# Patient Record
Sex: FEMALE | Race: WHITE | Employment: UNEMPLOYED | URBAN - METROPOLITAN AREA
[De-identification: names, ages, dates, MRNs, and addresses within clinical notes are randomized per-mention and may not be internally consistent; named-entity substitution may affect disease eponyms.]

---

## 2017-01-04 DIAGNOSIS — F43.21 GRIEF REACTION: ICD-10-CM

## 2017-01-04 DIAGNOSIS — F51.02 ADJUSTMENT INSOMNIA: ICD-10-CM

## 2017-01-05 RX ORDER — TRAZODONE HYDROCHLORIDE 50 MG/1
50 TABLET ORAL NIGHTLY
Qty: 30 TABLET | Refills: 2 | Status: SHIPPED | OUTPATIENT
Start: 2017-01-05 | End: 2017-01-10 | Stop reason: ALTCHOICE

## 2017-01-10 ENCOUNTER — NURSE ONLY (OUTPATIENT)
Dept: URGENT CARE | Age: 32
End: 2017-01-10

## 2017-01-10 ENCOUNTER — OFFICE VISIT (OUTPATIENT)
Dept: PSYCHOLOGY | Age: 32
End: 2017-01-10

## 2017-01-10 ENCOUNTER — OFFICE VISIT (OUTPATIENT)
Dept: FAMILY MEDICINE CLINIC | Age: 32
End: 2017-01-10

## 2017-01-10 VITALS
BODY MASS INDEX: 24.4 KG/M2 | OXYGEN SATURATION: 96 % | SYSTOLIC BLOOD PRESSURE: 124 MMHG | HEART RATE: 86 BPM | WEIGHT: 155.8 LBS | DIASTOLIC BLOOD PRESSURE: 80 MMHG

## 2017-01-10 DIAGNOSIS — F31.4 BIPOLAR DISORDER, CURRENT EPISODE DEPRESSED, SEVERE, WITHOUT PSYCHOTIC FEATURES (HCC): Primary | ICD-10-CM

## 2017-01-10 DIAGNOSIS — F31.4 BIPOLAR DISORDER, CURRENT EPISODE DEPRESSED, SEVERE, WITHOUT PSYCHOTIC FEATURES (HCC): ICD-10-CM

## 2017-01-10 DIAGNOSIS — R52 PAIN: Primary | ICD-10-CM

## 2017-01-10 DIAGNOSIS — M79.672 INTRACTABLE LEFT HEEL PAIN: ICD-10-CM

## 2017-01-10 DIAGNOSIS — J01.00 ACUTE NON-RECURRENT MAXILLARY SINUSITIS: ICD-10-CM

## 2017-01-10 DIAGNOSIS — G43.709 CHRONIC MIGRAINE WITHOUT AURA WITHOUT STATUS MIGRAINOSUS, NOT INTRACTABLE: ICD-10-CM

## 2017-01-10 DIAGNOSIS — F43.21 GRIEF REACTION: Primary | ICD-10-CM

## 2017-01-10 PROCEDURE — 99214 OFFICE O/P EST MOD 30 MIN: CPT | Performed by: INTERNAL MEDICINE

## 2017-01-10 PROCEDURE — 90832 PSYTX W PT 30 MINUTES: CPT | Performed by: PSYCHOLOGIST

## 2017-01-10 RX ORDER — PROPRANOLOL HYDROCHLORIDE 120 MG/1
CAPSULE, EXTENDED RELEASE ORAL
Qty: 30 CAPSULE | Refills: 5 | Status: SHIPPED | OUTPATIENT
Start: 2017-01-10 | End: 2017-05-01

## 2017-01-10 RX ORDER — DULOXETIN HYDROCHLORIDE 60 MG/1
CAPSULE, DELAYED RELEASE ORAL
Qty: 30 CAPSULE | Refills: 5 | Status: SHIPPED | OUTPATIENT
Start: 2017-01-10 | End: 2017-05-01

## 2017-01-11 ENCOUNTER — TELEPHONE (OUTPATIENT)
Dept: FAMILY MEDICINE CLINIC | Age: 32
End: 2017-01-11

## 2017-01-12 ENCOUNTER — OFFICE VISIT (OUTPATIENT)
Dept: PSYCHOLOGY | Age: 32
End: 2017-01-12

## 2017-01-12 DIAGNOSIS — F43.21 GRIEF REACTION: ICD-10-CM

## 2017-01-12 DIAGNOSIS — F31.4 BIPOLAR DISORDER, CURRENT EPISODE DEPRESSED, SEVERE, WITHOUT PSYCHOTIC FEATURES (HCC): Primary | ICD-10-CM

## 2017-01-12 PROCEDURE — 90832 PSYTX W PT 30 MINUTES: CPT | Performed by: PSYCHOLOGIST

## 2017-01-15 RX ORDER — AZITHROMYCIN 250 MG/1
TABLET, FILM COATED ORAL
Qty: 6 TABLET | Refills: 0 | Status: SHIPPED | OUTPATIENT
Start: 2017-01-15 | End: 2017-05-01

## 2017-01-16 ENCOUNTER — HOSPITAL ENCOUNTER (OUTPATIENT)
Dept: PSYCHIATRY | Age: 32
Discharge: HOME | End: 2017-01-16
Attending: PSYCHIATRY & NEUROLOGY | Admitting: PSYCHIATRY & NEUROLOGY

## 2017-01-16 ENCOUNTER — TELEPHONE (OUTPATIENT)
Dept: FAMILY MEDICINE CLINIC | Age: 32
End: 2017-01-16

## 2017-01-23 ENCOUNTER — HOSPITAL ENCOUNTER (OUTPATIENT)
Dept: PSYCHIATRY | Age: 32
Discharge: HOME | End: 2017-01-23
Admitting: PSYCHIATRY & NEUROLOGY

## 2017-01-23 VITALS — RESPIRATION RATE: 24 BRPM | HEART RATE: 82 BPM | SYSTOLIC BLOOD PRESSURE: 107 MMHG | DIASTOLIC BLOOD PRESSURE: 74 MMHG

## 2017-01-23 RX ORDER — SUMATRIPTAN 25 MG/1
TABLET, FILM COATED ORAL
COMMUNITY
End: 2017-05-01

## 2017-01-24 ENCOUNTER — HOSPITAL ENCOUNTER (OUTPATIENT)
Dept: PSYCHIATRY | Age: 32
Discharge: HOME | End: 2017-01-24
Admitting: PSYCHIATRY & NEUROLOGY

## 2017-01-25 ENCOUNTER — HOSPITAL ENCOUNTER (OUTPATIENT)
Dept: PSYCHIATRY | Age: 32
Discharge: HOME | End: 2017-01-25
Admitting: PSYCHIATRY & NEUROLOGY

## 2017-01-25 VITALS — RESPIRATION RATE: 20 BRPM | HEART RATE: 84 BPM | DIASTOLIC BLOOD PRESSURE: 77 MMHG | SYSTOLIC BLOOD PRESSURE: 118 MMHG

## 2017-01-25 VITALS — SYSTOLIC BLOOD PRESSURE: 120 MMHG | HEART RATE: 96 BPM | RESPIRATION RATE: 20 BRPM | DIASTOLIC BLOOD PRESSURE: 80 MMHG

## 2017-01-25 PROCEDURE — 99214 OFFICE O/P EST MOD 30 MIN: CPT | Performed by: PHYSICIAN ASSISTANT

## 2017-01-25 ASSESSMENT — ENCOUNTER SYMPTOMS
VOMITING: 0
ABDOMINAL PAIN: 0
CONSTIPATION: 0
HEMOPTYSIS: 0
DIARRHEA: 0
HEARTBURN: 0
STRIDOR: 0
SORE THROAT: 0
WHEEZING: 0
COUGH: 0
NAUSEA: 0
SHORTNESS OF BREATH: 0

## 2017-01-26 ENCOUNTER — HOSPITAL ENCOUNTER (OUTPATIENT)
Dept: PSYCHIATRY | Age: 32
Discharge: HOME | End: 2017-01-26
Admitting: PSYCHIATRY & NEUROLOGY

## 2017-01-27 ENCOUNTER — HOSPITAL ENCOUNTER (OUTPATIENT)
Dept: PSYCHIATRY | Age: 32
Discharge: HOME | End: 2017-01-27
Admitting: PSYCHIATRY & NEUROLOGY

## 2017-01-30 ENCOUNTER — HOSPITAL ENCOUNTER (OUTPATIENT)
Dept: PSYCHIATRY | Age: 32
Discharge: HOME | End: 2017-01-30
Admitting: PSYCHIATRY & NEUROLOGY

## 2017-01-31 ENCOUNTER — HOSPITAL ENCOUNTER (OUTPATIENT)
Dept: PSYCHIATRY | Age: 32
Discharge: HOME | End: 2017-01-31
Admitting: PSYCHIATRY & NEUROLOGY

## 2017-01-31 VITALS — HEART RATE: 93 BPM | RESPIRATION RATE: 20 BRPM | DIASTOLIC BLOOD PRESSURE: 79 MMHG | SYSTOLIC BLOOD PRESSURE: 119 MMHG

## 2017-02-02 ENCOUNTER — HOSPITAL ENCOUNTER (OUTPATIENT)
Dept: PSYCHIATRY | Age: 32
Discharge: HOME | End: 2017-02-28
Attending: PSYCHIATRY & NEUROLOGY | Admitting: PSYCHIATRY & NEUROLOGY

## 2017-02-03 ENCOUNTER — HOSPITAL ENCOUNTER (OUTPATIENT)
Dept: PSYCHIATRY | Age: 32
Discharge: HOME | End: 2017-02-03
Admitting: PSYCHIATRY & NEUROLOGY

## 2017-02-03 DIAGNOSIS — F43.21 GRIEF REACTION: Primary | ICD-10-CM

## 2017-02-03 PROCEDURE — 99213 OFFICE O/P EST LOW 20 MIN: CPT | Performed by: PHYSICIAN ASSISTANT

## 2017-02-03 ASSESSMENT — ENCOUNTER SYMPTOMS
DIARRHEA: 0
SHORTNESS OF BREATH: 0
CONSTIPATION: 0
DOUBLE VISION: 0
VOMITING: 0
BLURRED VISION: 0
BACK PAIN: 0
NAUSEA: 0

## 2017-02-07 ENCOUNTER — HOSPITAL ENCOUNTER (OUTPATIENT)
Dept: PSYCHIATRY | Age: 32
Discharge: HOME | End: 2017-02-07
Admitting: PSYCHIATRY & NEUROLOGY

## 2017-02-08 ENCOUNTER — HOSPITAL ENCOUNTER (OUTPATIENT)
Dept: PSYCHIATRY | Age: 32
Discharge: HOME | End: 2017-02-08
Admitting: PSYCHIATRY & NEUROLOGY

## 2017-02-09 ENCOUNTER — HOSPITAL ENCOUNTER (OUTPATIENT)
Dept: PSYCHIATRY | Age: 32
Discharge: HOME | End: 2017-02-09
Admitting: PSYCHIATRY & NEUROLOGY

## 2017-02-14 ENCOUNTER — HOSPITAL ENCOUNTER (OUTPATIENT)
Dept: PSYCHIATRY | Age: 32
Discharge: HOME | End: 2017-02-14
Admitting: PSYCHIATRY & NEUROLOGY

## 2017-02-16 ENCOUNTER — HOSPITAL ENCOUNTER (OUTPATIENT)
Dept: PSYCHIATRY | Age: 32
Discharge: HOME | End: 2017-02-16
Admitting: PSYCHIATRY & NEUROLOGY

## 2017-02-17 ENCOUNTER — HOSPITAL ENCOUNTER (OUTPATIENT)
Dept: PSYCHIATRY | Age: 32
Discharge: HOME | End: 2017-02-17
Admitting: PSYCHIATRY & NEUROLOGY

## 2017-02-20 ENCOUNTER — HOSPITAL ENCOUNTER (OUTPATIENT)
Dept: PSYCHIATRY | Age: 32
Discharge: HOME | End: 2017-02-20
Admitting: PSYCHIATRY & NEUROLOGY

## 2017-02-21 ENCOUNTER — HOSPITAL ENCOUNTER (OUTPATIENT)
Dept: PSYCHIATRY | Age: 32
Discharge: HOME | End: 2017-02-21
Admitting: PSYCHIATRY & NEUROLOGY

## 2017-02-22 ENCOUNTER — HOSPITAL ENCOUNTER (OUTPATIENT)
Dept: PSYCHIATRY | Age: 32
Discharge: HOME | End: 2017-02-22
Admitting: PSYCHIATRY & NEUROLOGY

## 2017-02-22 DIAGNOSIS — F31.32 BIPOLAR AFFECTIVE DISORDER, CURRENTLY DEPRESSED, MODERATE (HCC): ICD-10-CM

## 2017-02-22 PROCEDURE — 99213 OFFICE O/P EST LOW 20 MIN: CPT | Performed by: PHYSICIAN ASSISTANT

## 2017-02-22 RX ORDER — ARIPIPRAZOLE 10 MG/1
10 TABLET ORAL DAILY
Qty: 30 TABLET | Refills: 1 | Status: SHIPPED | OUTPATIENT
Start: 2017-02-22 | End: 2017-05-01

## 2017-02-22 RX ORDER — HYDROXYZINE HYDROCHLORIDE 10 MG/1
10 TABLET, FILM COATED ORAL 3 TIMES DAILY PRN
Qty: 90 TABLET | Refills: 1 | Status: SHIPPED | OUTPATIENT
Start: 2017-02-22 | End: 2017-05-01

## 2017-03-01 ENCOUNTER — HOSPITAL ENCOUNTER (OUTPATIENT)
Dept: PSYCHIATRY | Age: 32
Discharge: HOME | End: 2017-03-31
Attending: PSYCHIATRY & NEUROLOGY | Admitting: PSYCHIATRY & NEUROLOGY

## 2017-05-08 ENCOUNTER — OFFICE VISIT (OUTPATIENT)
Dept: FAMILY MEDICINE CLINIC | Age: 32
End: 2017-05-08

## 2017-05-08 VITALS
RESPIRATION RATE: 18 BRPM | SYSTOLIC BLOOD PRESSURE: 124 MMHG | BODY MASS INDEX: 26.47 KG/M2 | HEART RATE: 98 BPM | DIASTOLIC BLOOD PRESSURE: 80 MMHG | OXYGEN SATURATION: 98 % | WEIGHT: 169 LBS | TEMPERATURE: 97.8 F

## 2017-05-08 DIAGNOSIS — R05.9 COUGH: ICD-10-CM

## 2017-05-08 DIAGNOSIS — J04.0 LARYNGITIS: ICD-10-CM

## 2017-05-08 DIAGNOSIS — R09.82 PND (POST-NASAL DRIP): ICD-10-CM

## 2017-05-08 PROCEDURE — 99213 OFFICE O/P EST LOW 20 MIN: CPT | Performed by: NURSE PRACTITIONER

## 2017-05-08 RX ORDER — IBUPROFEN 200 MG
200 TABLET ORAL EVERY 6 HOURS PRN
COMMUNITY
End: 2017-06-22

## 2017-05-08 RX ORDER — PRAZOSIN HYDROCHLORIDE 1 MG/1
CAPSULE ORAL
Refills: 2 | COMMUNITY
Start: 2017-04-12 | End: 2017-10-16

## 2017-05-08 RX ORDER — FLUTICASONE PROPIONATE 50 MCG
2 SPRAY, SUSPENSION (ML) NASAL DAILY
Qty: 1 BOTTLE | Refills: 0 | Status: SHIPPED | OUTPATIENT
Start: 2017-05-08 | End: 2017-06-22

## 2017-05-08 RX ORDER — PERPHENAZINE 2 MG/1
TABLET, FILM COATED ORAL
Refills: 2 | COMMUNITY
Start: 2017-04-12 | End: 2017-10-16

## 2017-05-08 RX ORDER — CHLORPROMAZINE HYDROCHLORIDE 25 MG/1
TABLET, FILM COATED ORAL
Refills: 2 | COMMUNITY
Start: 2017-04-12 | End: 2017-10-16

## 2017-05-08 RX ORDER — LITHIUM CARBONATE 300 MG/1
TABLET, FILM COATED, EXTENDED RELEASE ORAL
Refills: 2 | COMMUNITY
Start: 2017-04-12 | End: 2017-10-16

## 2017-05-08 RX ORDER — PREDNISONE 20 MG/1
TABLET ORAL
Refills: 0 | COMMUNITY
Start: 2017-04-19 | End: 2017-06-22

## 2017-05-10 ASSESSMENT — ENCOUNTER SYMPTOMS
TROUBLE SWALLOWING: 0
SINUS PRESSURE: 0
COUGH: 1
VOMITING: 0
ABDOMINAL PAIN: 0
DIARRHEA: 0
NAUSEA: 0
VOICE CHANGE: 1
SORE THROAT: 0
FACIAL SWELLING: 0
SHORTNESS OF BREATH: 0
CHEST TIGHTNESS: 0
WHEEZING: 0

## 2017-05-19 ENCOUNTER — OFFICE VISIT (OUTPATIENT)
Dept: FAMILY MEDICINE CLINIC | Age: 32
End: 2017-05-19

## 2017-05-19 VITALS
HEIGHT: 67 IN | HEART RATE: 80 BPM | BODY MASS INDEX: 27 KG/M2 | WEIGHT: 172 LBS | OXYGEN SATURATION: 98 % | DIASTOLIC BLOOD PRESSURE: 70 MMHG | SYSTOLIC BLOOD PRESSURE: 118 MMHG

## 2017-05-19 DIAGNOSIS — F31.31 BIPOLAR AFFECTIVE DISORDER, CURRENTLY DEPRESSED, MILD (HCC): Primary | ICD-10-CM

## 2017-05-19 PROCEDURE — 99213 OFFICE O/P EST LOW 20 MIN: CPT | Performed by: INTERNAL MEDICINE

## 2017-05-19 RX ORDER — LITHIUM CARBONATE 300 MG/1
TABLET, FILM COATED, EXTENDED RELEASE ORAL
Qty: 60 TABLET | Refills: 0 | Status: CANCELLED | OUTPATIENT
Start: 2017-05-19

## 2017-05-19 RX ORDER — ESCITALOPRAM OXALATE 10 MG/1
10 TABLET ORAL DAILY
Qty: 30 TABLET | Refills: 1 | Status: SHIPPED | OUTPATIENT
Start: 2017-05-19 | End: 2017-06-16 | Stop reason: ALTCHOICE

## 2017-05-19 RX ORDER — ESCITALOPRAM OXALATE 10 MG/1
5 TABLET ORAL DAILY
Qty: 30 TABLET | Refills: 1 | Status: SHIPPED | OUTPATIENT
Start: 2017-05-19 | End: 2017-05-19 | Stop reason: SDUPTHER

## 2017-05-19 ASSESSMENT — PATIENT HEALTH QUESTIONNAIRE - PHQ9
2. FEELING DOWN, DEPRESSED OR HOPELESS: 0
1. LITTLE INTEREST OR PLEASURE IN DOING THINGS: 0
SUM OF ALL RESPONSES TO PHQ QUESTIONS 1-9: 0
SUM OF ALL RESPONSES TO PHQ9 QUESTIONS 1 & 2: 0

## 2017-05-25 PROBLEM — F31.31 BIPOLAR AFFECTIVE DISORDER, CURRENTLY DEPRESSED, MILD (HCC): Status: ACTIVE | Noted: 2017-05-25

## 2017-05-25 ASSESSMENT — ENCOUNTER SYMPTOMS: SHORTNESS OF BREATH: 0

## 2017-06-16 ENCOUNTER — OFFICE VISIT (OUTPATIENT)
Dept: FAMILY MEDICINE CLINIC | Age: 32
End: 2017-06-16

## 2017-06-16 VITALS
WEIGHT: 171 LBS | SYSTOLIC BLOOD PRESSURE: 100 MMHG | OXYGEN SATURATION: 99 % | BODY MASS INDEX: 26.78 KG/M2 | HEART RATE: 74 BPM | DIASTOLIC BLOOD PRESSURE: 60 MMHG

## 2017-06-16 DIAGNOSIS — J04.0 LARYNGITIS: Primary | ICD-10-CM

## 2017-06-16 DIAGNOSIS — F31.32 BIPOLAR AFFECTIVE DISORDER, CURRENTLY DEPRESSED, MODERATE (HCC): ICD-10-CM

## 2017-06-16 PROCEDURE — 82962 GLUCOSE BLOOD TEST: CPT | Performed by: INTERNAL MEDICINE

## 2017-06-16 PROCEDURE — 99213 OFFICE O/P EST LOW 20 MIN: CPT | Performed by: INTERNAL MEDICINE

## 2017-06-16 RX ORDER — PREDNISONE 20 MG/1
TABLET ORAL
Qty: 11 TABLET | Refills: 0 | Status: SHIPPED | OUTPATIENT
Start: 2017-06-16 | End: 2017-06-22

## 2017-06-21 ENCOUNTER — OFFICE VISIT (OUTPATIENT)
Dept: ENT CLINIC | Age: 32
End: 2017-06-21

## 2017-06-21 VITALS
WEIGHT: 169.2 LBS | HEART RATE: 78 BPM | SYSTOLIC BLOOD PRESSURE: 101 MMHG | HEIGHT: 67 IN | BODY MASS INDEX: 26.56 KG/M2 | TEMPERATURE: 98.3 F | DIASTOLIC BLOOD PRESSURE: 70 MMHG

## 2017-06-21 DIAGNOSIS — J38.3 LESION OF VOCAL CORD: ICD-10-CM

## 2017-06-21 DIAGNOSIS — R49.0 DYSPHONIA: Primary | ICD-10-CM

## 2017-06-21 PROCEDURE — 31575 DIAGNOSTIC LARYNGOSCOPY: CPT | Performed by: OTOLARYNGOLOGY

## 2017-06-21 PROCEDURE — 99204 OFFICE O/P NEW MOD 45 MIN: CPT | Performed by: OTOLARYNGOLOGY

## 2017-06-22 ENCOUNTER — OFFICE VISIT (OUTPATIENT)
Dept: FAMILY MEDICINE CLINIC | Age: 32
End: 2017-06-22

## 2017-06-22 VITALS
HEIGHT: 67 IN | HEART RATE: 94 BPM | DIASTOLIC BLOOD PRESSURE: 78 MMHG | TEMPERATURE: 98.5 F | BODY MASS INDEX: 26.84 KG/M2 | OXYGEN SATURATION: 98 % | WEIGHT: 171 LBS | SYSTOLIC BLOOD PRESSURE: 120 MMHG

## 2017-06-22 DIAGNOSIS — J38.3 LESION OF VOCAL CORD: ICD-10-CM

## 2017-06-22 DIAGNOSIS — F39 MOOD DISORDER (HCC): ICD-10-CM

## 2017-06-22 DIAGNOSIS — Z01.818 PRE-OP EXAM: ICD-10-CM

## 2017-06-22 DIAGNOSIS — F17.200 TOBACCO DEPENDENCE: ICD-10-CM

## 2017-06-22 PROCEDURE — 99213 OFFICE O/P EST LOW 20 MIN: CPT | Performed by: NURSE PRACTITIONER

## 2017-06-22 RX ORDER — ACETAMINOPHEN 325 MG/1
650 TABLET ORAL EVERY 8 HOURS PRN
Qty: 30 TABLET | Refills: 0 | Status: SHIPPED | OUTPATIENT
Start: 2017-06-22 | End: 2019-02-05 | Stop reason: ALTCHOICE

## 2017-06-28 ENCOUNTER — HOSPITAL ENCOUNTER (OUTPATIENT)
Dept: PREADMISSION TESTING | Age: 32
Discharge: HOME | End: 2017-06-28
Attending: OTOLARYNGOLOGY | Admitting: OTOLARYNGOLOGY

## 2017-06-28 VITALS
DIASTOLIC BLOOD PRESSURE: 64 MMHG | SYSTOLIC BLOOD PRESSURE: 111 MMHG | HEIGHT: 67 IN | OXYGEN SATURATION: 97 % | RESPIRATION RATE: 14 BRPM | WEIGHT: 169 LBS | HEART RATE: 76 BPM | TEMPERATURE: 97.6 F | BODY MASS INDEX: 26.53 KG/M2

## 2017-06-28 LAB — PREGNANCY, URINE: NEGATIVE

## 2017-06-28 PROCEDURE — 31536 LARYNGOSCOPY W/BX & OP SCOPE: CPT | Performed by: OTOLARYNGOLOGY

## 2017-06-28 RX ORDER — SODIUM CHLORIDE, SODIUM LACTATE, POTASSIUM CHLORIDE, CALCIUM CHLORIDE 600; 310; 30; 20 MG/100ML; MG/100ML; MG/100ML; MG/100ML
INJECTION, SOLUTION INTRAVENOUS CONTINUOUS
Status: DISCONTINUED | OUTPATIENT
Start: 2017-06-28 | End: 2017-06-29 | Stop reason: HOSPADM

## 2017-06-28 RX ORDER — FENTANYL CITRATE 50 UG/ML
25 INJECTION, SOLUTION INTRAMUSCULAR; INTRAVENOUS EVERY 5 MIN PRN
Status: DISCONTINUED | OUTPATIENT
Start: 2017-06-28 | End: 2017-06-29 | Stop reason: HOSPADM

## 2017-06-28 RX ORDER — HYDRALAZINE HYDROCHLORIDE 20 MG/ML
5 INJECTION INTRAMUSCULAR; INTRAVENOUS EVERY 5 MIN PRN
Status: DISCONTINUED | OUTPATIENT
Start: 2017-06-28 | End: 2017-06-29 | Stop reason: HOSPADM

## 2017-06-28 RX ORDER — IPRATROPIUM BROMIDE AND ALBUTEROL SULFATE 2.5; .5 MG/3ML; MG/3ML
1 SOLUTION RESPIRATORY (INHALATION) EVERY 4 HOURS PRN
Status: DISCONTINUED | OUTPATIENT
Start: 2017-06-28 | End: 2017-06-29 | Stop reason: HOSPADM

## 2017-06-28 RX ORDER — ONDANSETRON 2 MG/ML
4 INJECTION INTRAMUSCULAR; INTRAVENOUS ONCE
Status: COMPLETED | OUTPATIENT
Start: 2017-06-28 | End: 2017-06-28

## 2017-06-28 RX ORDER — LABETALOL HYDROCHLORIDE 5 MG/ML
5 INJECTION, SOLUTION INTRAVENOUS EVERY 10 MIN PRN
Status: DISCONTINUED | OUTPATIENT
Start: 2017-06-28 | End: 2017-06-29 | Stop reason: HOSPADM

## 2017-06-28 RX ORDER — FENTANYL CITRATE 50 UG/ML
100 INJECTION, SOLUTION INTRAMUSCULAR; INTRAVENOUS ONCE
Status: COMPLETED | OUTPATIENT
Start: 2017-06-28 | End: 2017-06-28

## 2017-06-28 RX ORDER — DEXAMETHASONE SODIUM PHOSPHATE 4 MG/ML
4 INJECTION, SOLUTION INTRA-ARTICULAR; INTRALESIONAL; INTRAMUSCULAR; INTRAVENOUS; SOFT TISSUE ONCE
Status: COMPLETED | OUTPATIENT
Start: 2017-06-28 | End: 2017-06-28

## 2017-06-28 RX ORDER — IPRATROPIUM BROMIDE AND ALBUTEROL SULFATE 2.5; .5 MG/3ML; MG/3ML
SOLUTION RESPIRATORY (INHALATION)
Status: COMPLETED
Start: 2017-06-28 | End: 2017-06-28

## 2017-06-28 RX ORDER — ENALAPRILAT 2.5 MG/2ML
1.25 INJECTION INTRAVENOUS
Status: ACTIVE | OUTPATIENT
Start: 2017-06-28 | End: 2017-06-28

## 2017-06-28 RX ORDER — SODIUM CHLORIDE 0.9 % (FLUSH) 0.9 %
10 SYRINGE (ML) INJECTION EVERY 12 HOURS SCHEDULED
Status: DISCONTINUED | OUTPATIENT
Start: 2017-06-28 | End: 2017-06-29 | Stop reason: HOSPADM

## 2017-06-28 RX ORDER — SODIUM CHLORIDE 0.9 % (FLUSH) 0.9 %
10 SYRINGE (ML) INJECTION PRN
Status: DISCONTINUED | OUTPATIENT
Start: 2017-06-28 | End: 2017-06-29 | Stop reason: HOSPADM

## 2017-06-28 RX ORDER — CHLORHEXIDINE GLUCONATE 0.12 MG/ML
15 RINSE ORAL 2 TIMES DAILY
Status: DISCONTINUED | OUTPATIENT
Start: 2017-06-28 | End: 2017-06-29 | Stop reason: HOSPADM

## 2017-06-28 RX ORDER — MORPHINE SULFATE 2 MG/ML
2 INJECTION, SOLUTION INTRAMUSCULAR; INTRAVENOUS EVERY 5 MIN PRN
Status: DISCONTINUED | OUTPATIENT
Start: 2017-06-28 | End: 2017-06-29 | Stop reason: HOSPADM

## 2017-06-28 RX ORDER — IPRATROPIUM BROMIDE AND ALBUTEROL SULFATE 2.5; .5 MG/3ML; MG/3ML
1 SOLUTION RESPIRATORY (INHALATION)
Status: DISCONTINUED | OUTPATIENT
Start: 2017-06-28 | End: 2017-06-29 | Stop reason: HOSPADM

## 2017-06-28 RX ORDER — GLYCOPYRROLATE 0.2 MG/ML
0.2 INJECTION INTRAMUSCULAR; INTRAVENOUS ONCE
Status: COMPLETED | OUTPATIENT
Start: 2017-06-28 | End: 2017-06-28

## 2017-06-28 RX ORDER — LIDOCAINE HYDROCHLORIDE 10 MG/ML
1 INJECTION, SOLUTION EPIDURAL; INFILTRATION; INTRACAUDAL; PERINEURAL
Status: ACTIVE | OUTPATIENT
Start: 2017-06-28 | End: 2017-06-28

## 2017-06-28 RX ORDER — ONDANSETRON 2 MG/ML
4 INJECTION INTRAMUSCULAR; INTRAVENOUS
Status: ACTIVE | OUTPATIENT
Start: 2017-06-28 | End: 2017-06-28

## 2017-06-28 RX ADMIN — IPRATROPIUM BROMIDE AND ALBUTEROL SULFATE 1 AMPULE: 2.5; .5 SOLUTION RESPIRATORY (INHALATION) at 12:47

## 2017-06-28 RX ADMIN — DEXAMETHASONE SODIUM PHOSPHATE 4 MG: 4 INJECTION, SOLUTION INTRA-ARTICULAR; INTRALESIONAL; INTRAMUSCULAR; INTRAVENOUS; SOFT TISSUE at 10:55

## 2017-06-28 RX ADMIN — FENTANYL CITRATE 100 MCG: 50 INJECTION, SOLUTION INTRAMUSCULAR; INTRAVENOUS at 10:52

## 2017-06-28 RX ADMIN — FENTANYL CITRATE 25 MCG: 50 INJECTION, SOLUTION INTRAMUSCULAR; INTRAVENOUS at 13:30

## 2017-06-28 RX ADMIN — GLYCOPYRROLATE 0.2 MG: 0.2 INJECTION INTRAMUSCULAR; INTRAVENOUS at 10:57

## 2017-06-28 RX ADMIN — SODIUM CHLORIDE, SODIUM LACTATE, POTASSIUM CHLORIDE, CALCIUM CHLORIDE: 600; 310; 30; 20 INJECTION, SOLUTION INTRAVENOUS at 09:30

## 2017-06-28 RX ADMIN — ONDANSETRON 4 MG: 2 INJECTION INTRAMUSCULAR; INTRAVENOUS at 10:53

## 2017-06-28 RX ADMIN — FENTANYL CITRATE 25 MCG: 50 INJECTION, SOLUTION INTRAMUSCULAR; INTRAVENOUS at 13:13

## 2017-06-28 ASSESSMENT — PAIN SCALES - GENERAL
PAINLEVEL_OUTOF10: 2
PAINLEVEL_OUTOF10: 6
PAINLEVEL_OUTOF10: 4
PAINLEVEL_OUTOF10: 10

## 2017-06-28 ASSESSMENT — PAIN - FUNCTIONAL ASSESSMENT
PAIN_FUNCTIONAL_ASSESSMENT: 0-10
PAIN_FUNCTIONAL_ASSESSMENT: 0-10

## 2017-06-28 ASSESSMENT — PAIN DESCRIPTION - DESCRIPTORS: DESCRIPTORS: ACHING

## 2017-07-03 ENCOUNTER — TELEPHONE (OUTPATIENT)
Dept: ENT CLINIC | Age: 32
End: 2017-07-03

## 2017-07-07 ENCOUNTER — CLINICAL DOCUMENTATION (OUTPATIENT)
Dept: ENT CLINIC | Age: 32
End: 2017-07-07

## 2017-07-18 ENCOUNTER — HOSPITAL ENCOUNTER (OUTPATIENT)
Dept: MRI IMAGING | Age: 32
Discharge: HOME | End: 2017-07-18
Attending: INTERNAL MEDICINE | Admitting: INTERNAL MEDICINE

## 2017-07-18 DIAGNOSIS — J38.7 LARYNGEAL MASS: ICD-10-CM

## 2017-07-18 DIAGNOSIS — J38.7 OTHER DISEASES OF LARYNX: ICD-10-CM

## 2017-07-18 DIAGNOSIS — C32.9 LARYNGEAL CANCER (HCC): ICD-10-CM

## 2017-07-18 RX ORDER — SODIUM CHLORIDE 0.9 % (FLUSH) 0.9 %
10 SYRINGE (ML) INJECTION ONCE
Status: COMPLETED | OUTPATIENT
Start: 2017-07-18 | End: 2017-07-18

## 2017-07-18 RX ADMIN — Medication 10 ML: at 11:41

## 2017-08-01 RX ORDER — ESCITALOPRAM OXALATE 10 MG/1
TABLET ORAL
Qty: 30 TABLET | Refills: 0 | Status: SHIPPED | OUTPATIENT
Start: 2017-08-01 | End: 2017-08-29 | Stop reason: SDUPTHER

## 2017-08-10 ENCOUNTER — OFFICE VISIT (OUTPATIENT)
Dept: ENT CLINIC | Age: 32
End: 2017-08-10

## 2017-08-10 VITALS
HEIGHT: 67 IN | TEMPERATURE: 98 F | SYSTOLIC BLOOD PRESSURE: 128 MMHG | BODY MASS INDEX: 26.84 KG/M2 | DIASTOLIC BLOOD PRESSURE: 84 MMHG | WEIGHT: 171 LBS

## 2017-08-10 DIAGNOSIS — C32.9 CARCINOMA LARYNX (HCC): Primary | ICD-10-CM

## 2017-08-10 DIAGNOSIS — J98.8 AIRWAY OBSTRUCTION: ICD-10-CM

## 2017-08-10 PROCEDURE — 99213 OFFICE O/P EST LOW 20 MIN: CPT | Performed by: OTOLARYNGOLOGY

## 2017-08-10 PROCEDURE — 31575 DIAGNOSTIC LARYNGOSCOPY: CPT | Performed by: OTOLARYNGOLOGY

## 2017-08-11 PROBLEM — I10 HTN (HYPERTENSION): Status: ACTIVE | Noted: 2017-08-11

## 2017-08-16 ENCOUNTER — CARE COORDINATION (OUTPATIENT)
Dept: CARE COORDINATION | Age: 32
End: 2017-08-16

## 2017-08-18 ENCOUNTER — OFFICE VISIT (OUTPATIENT)
Dept: ENT CLINIC | Age: 32
End: 2017-08-18

## 2017-08-18 VITALS
DIASTOLIC BLOOD PRESSURE: 105 MMHG | HEIGHT: 67 IN | SYSTOLIC BLOOD PRESSURE: 146 MMHG | WEIGHT: 171 LBS | HEART RATE: 135 BPM | BODY MASS INDEX: 26.84 KG/M2

## 2017-08-18 DIAGNOSIS — J98.8 AIRWAY OBSTRUCTION: Primary | ICD-10-CM

## 2017-08-18 DIAGNOSIS — C32.9 CARCINOMA LARYNX (HCC): Primary | ICD-10-CM

## 2017-08-18 DIAGNOSIS — J98.8 AIRWAY OBSTRUCTION: ICD-10-CM

## 2017-08-18 DIAGNOSIS — Z93.0 TRACHEOSTOMY IN PLACE (HCC): ICD-10-CM

## 2017-08-18 PROCEDURE — 99213 OFFICE O/P EST LOW 20 MIN: CPT | Performed by: OTOLARYNGOLOGY

## 2017-08-18 PROCEDURE — 99212 OFFICE O/P EST SF 10 MIN: CPT | Performed by: OTOLARYNGOLOGY

## 2017-08-21 ENCOUNTER — TELEPHONE (OUTPATIENT)
Dept: ENT CLINIC | Age: 32
End: 2017-08-21

## 2017-08-21 RX ORDER — MAGNESIUM HYDROXIDE 1200 MG/15ML
LIQUID ORAL
Qty: 1000 ML | Refills: 5 | Status: CANCELLED | OUTPATIENT
Start: 2017-08-21

## 2017-08-21 NOTE — TELEPHONE ENCOUNTER
Autumn Ellis needs a prescription sent to the pharmacy for sterile water to clean out her trach. It is $11.00 a bottle and she does not have an income right now. If there is a prescription the insurance will cover it. She uses [facility] in [facility]. Also she needs an order sent to [facility] to get enough supplies to clean the trach 2x per day. Right now they are only sending enough for 1x per day. The phone number to [facility] is [phone]. If you need to call Cornelio Seip the number is [phone].  Thank you

## 2017-08-22 ENCOUNTER — OFFICE VISIT (OUTPATIENT)
Dept: ENT CLINIC | Age: 32
End: 2017-08-22

## 2017-08-22 VITALS
BODY MASS INDEX: 25.87 KG/M2 | HEIGHT: 67 IN | DIASTOLIC BLOOD PRESSURE: 81 MMHG | WEIGHT: 164.8 LBS | TEMPERATURE: 98.5 F | SYSTOLIC BLOOD PRESSURE: 116 MMHG | HEART RATE: 65 BPM

## 2017-08-22 DIAGNOSIS — C32.9 CARCINOMA LARYNX (HCC): Primary | ICD-10-CM

## 2017-08-22 DIAGNOSIS — Z93.0 TRACHEOSTOMY IN PLACE (HCC): ICD-10-CM

## 2017-08-29 ENCOUNTER — OFFICE VISIT (OUTPATIENT)
Dept: FAMILY MEDICINE CLINIC | Age: 32
End: 2017-08-29

## 2017-08-29 VITALS
BODY MASS INDEX: 25.31 KG/M2 | SYSTOLIC BLOOD PRESSURE: 100 MMHG | HEART RATE: 89 BPM | WEIGHT: 161.6 LBS | TEMPERATURE: 98.5 F | OXYGEN SATURATION: 99 % | DIASTOLIC BLOOD PRESSURE: 74 MMHG

## 2017-08-29 DIAGNOSIS — J20.9 ACUTE TRACHEOBRONCHITIS: Primary | ICD-10-CM

## 2017-08-29 PROCEDURE — 99213 OFFICE O/P EST LOW 20 MIN: CPT | Performed by: INTERNAL MEDICINE

## 2017-08-29 RX ORDER — ESCITALOPRAM OXALATE 10 MG/1
TABLET ORAL
Qty: 30 TABLET | Refills: 5 | Status: SHIPPED | OUTPATIENT
Start: 2017-08-29 | End: 2017-11-10 | Stop reason: SDUPTHER

## 2017-08-29 RX ORDER — AZITHROMYCIN 250 MG/1
TABLET, FILM COATED ORAL
Qty: 1 PACKET | Refills: 0 | Status: SHIPPED | OUTPATIENT
Start: 2017-08-29 | End: 2017-09-08

## 2017-08-29 RX ORDER — ARIPIPRAZOLE 5 MG/1
5 TABLET ORAL DAILY
Qty: 30 TABLET | Refills: 3 | Status: SHIPPED | OUTPATIENT
Start: 2017-08-29 | End: 2017-11-10 | Stop reason: SDUPTHER

## 2017-09-26 ENCOUNTER — OFFICE VISIT (OUTPATIENT)
Dept: ENT CLINIC | Age: 32
End: 2017-09-26

## 2017-09-26 DIAGNOSIS — C32.9 CARCINOMA LARYNX (HCC): Primary | ICD-10-CM

## 2017-09-26 DIAGNOSIS — Z92.3 STATUS POST RADIATION THERAPY WITHIN LAST FOUR WEEKS: ICD-10-CM

## 2017-09-26 DIAGNOSIS — Z93.0 TRACHEOSTOMY IN PLACE (HCC): ICD-10-CM

## 2017-09-26 PROCEDURE — 31575 DIAGNOSTIC LARYNGOSCOPY: CPT | Performed by: OTOLARYNGOLOGY

## 2017-09-26 RX ORDER — HYDROCODONE POLISTIREX AND CHLORPHENIRAMINE POLISTIREX 10; 8 MG/5ML; MG/5ML
5 SUSPENSION, EXTENDED RELEASE ORAL EVERY 12 HOURS PRN
Qty: 120 ML | Refills: 0 | Status: SHIPPED | OUTPATIENT
Start: 2017-09-26 | End: 2017-10-03

## 2017-09-26 RX ORDER — GUAIFENESIN 600 MG/1
600 TABLET, EXTENDED RELEASE ORAL 2 TIMES DAILY
Qty: 60 TABLET | Refills: 1 | Status: SHIPPED | OUTPATIENT
Start: 2017-09-26 | End: 2018-08-29

## 2017-09-27 RX ORDER — HYDROCODONE POLISTIREX AND CHLORPHENIRAMINE POLISTIREX 10; 8 MG/5ML; MG/5ML
5 SUSPENSION, EXTENDED RELEASE ORAL EVERY 12 HOURS PRN
Qty: 120 ML | Refills: 0 | OUTPATIENT
Start: 2017-09-27 | End: 2017-10-04

## 2017-10-24 ENCOUNTER — OFFICE VISIT (OUTPATIENT)
Dept: ENT CLINIC | Age: 32
End: 2017-10-24

## 2017-10-24 VITALS
TEMPERATURE: 98.2 F | SYSTOLIC BLOOD PRESSURE: 120 MMHG | DIASTOLIC BLOOD PRESSURE: 82 MMHG | BODY MASS INDEX: 26.34 KG/M2 | WEIGHT: 167.8 LBS | HEIGHT: 67 IN

## 2017-10-24 DIAGNOSIS — C32.9 CARCINOMA LARYNX (HCC): Primary | ICD-10-CM

## 2017-10-24 DIAGNOSIS — Z92.3 STATUS POST RADIATION THERAPY WITHIN LAST FOUR WEEKS: ICD-10-CM

## 2017-10-24 DIAGNOSIS — Z93.0 TRACHEOSTOMY IN PLACE (HCC): ICD-10-CM

## 2017-10-24 PROCEDURE — G8484 FLU IMMUNIZE NO ADMIN: HCPCS | Performed by: OTOLARYNGOLOGY

## 2017-10-24 PROCEDURE — 1036F TOBACCO NON-USER: CPT | Performed by: OTOLARYNGOLOGY

## 2017-10-24 PROCEDURE — G8427 DOCREV CUR MEDS BY ELIG CLIN: HCPCS | Performed by: OTOLARYNGOLOGY

## 2017-10-24 PROCEDURE — G8417 CALC BMI ABV UP PARAM F/U: HCPCS | Performed by: OTOLARYNGOLOGY

## 2017-10-24 PROCEDURE — 99212 OFFICE O/P EST SF 10 MIN: CPT | Performed by: OTOLARYNGOLOGY

## 2017-10-24 NOTE — PROGRESS NOTES
Past Medical History:   Diagnosis Date    Anemia     Anxiety     Back pain     Bipolar affective disorder ([facility] [address]     Depression     Migraine     Primary cancer of larynx ([facility] [address] 7/18/2017                                                    Past Surgical History:   Procedure Laterality Date    OTHER SURGICAL HISTORY Left 06/28/2017    MICROLARYNGOSCOPY WITH BIOPSY OF LEFT TRUE VOCAL CORD MASS    OTHER SURGICAL HISTORY  08/10/2017    Tracheostomy    TONSILLECTOMY           REVIEW OF SYSTEMS:  Al pertinent positive and negative findings included in HPI. Otherwise, all other systems are reviewed and are negative    PHYSICAL EXAMINATION:   GENERAL: wdwn- no acute distress  COMMUNICATION :  Normal voice  MENTAL STATUS:  Normal  HEAD AND FACE:  Normal  EXTERNAL EARS AND NOSE:  Normal  FACIAL MUSCLES:  Normal  NECK:  No masses or adenopathy  SALIVARY GLANDS:  Normal  THYROID:  Normal  As the patient has symptoms suggestive of disease in the larynx or hypopharynx, fiberoptic laryngoscopy is performed. FIBEROPTIC LARYNGOSCOPY:  Nares topically anaesthetized with lidocaine spray. Fiberoptic scope passed per naris into nasopharynx and hypopharyrnx and larynx visualized. Larynx now looks normal without edema. Vocal cord mobility is excellent. Glottic chink is open.     IMPRESSION: Laryngeal carcinoma is in remission    PLAN: Will arrange for tracheotomy decannulation in [place]: In the hospital.

## 2017-11-03 ENCOUNTER — OFFICE VISIT (OUTPATIENT)
Dept: ENT CLINIC | Age: 32
End: 2017-11-03

## 2017-11-03 VITALS
HEIGHT: 67 IN | TEMPERATURE: 97 F | DIASTOLIC BLOOD PRESSURE: 80 MMHG | SYSTOLIC BLOOD PRESSURE: 124 MMHG | WEIGHT: 175.2 LBS | HEART RATE: 137 BPM | BODY MASS INDEX: 27.5 KG/M2

## 2017-11-03 DIAGNOSIS — C32.9 CARCINOMA LARYNX (HCC): Primary | ICD-10-CM

## 2017-11-03 PROCEDURE — G8417 CALC BMI ABV UP PARAM F/U: HCPCS | Performed by: OTOLARYNGOLOGY

## 2017-11-03 PROCEDURE — G8484 FLU IMMUNIZE NO ADMIN: HCPCS | Performed by: OTOLARYNGOLOGY

## 2017-11-03 PROCEDURE — 4004F PT TOBACCO SCREEN RCVD TLK: CPT | Performed by: OTOLARYNGOLOGY

## 2017-11-03 PROCEDURE — 99212 OFFICE O/P EST SF 10 MIN: CPT | Performed by: OTOLARYNGOLOGY

## 2017-11-03 PROCEDURE — 1111F DSCHRG MED/CURRENT MED MERGE: CPT | Performed by: OTOLARYNGOLOGY

## 2017-11-03 PROCEDURE — G8427 DOCREV CUR MEDS BY ELIG CLIN: HCPCS | Performed by: OTOLARYNGOLOGY

## 2017-11-03 NOTE — PROGRESS NOTES
FOLLOW UP VISIT:      INTERIM HISTORY: Tracheotomy for obstructing carcinoma the larynx. Chemoirradiation has been completed. Neoplasm has resolved. Good airway. Tracheotomy tube removed 3 days ago.       PAST MEDICAL HISTORY:   History   Smoking Status    Current Some Day Smoker    Packs/day: 1.00    Years: 18.00    Types: Cigarettes    Start date: 10/19/2013   Smokeless Tobacco    Never Used                                                    History   Alcohol Use No     Comment: none for 120 days                                                    Current Outpatient Prescriptions:     NONFORMULARY, Take 1 Dose by mouth daily Tri sprintec 28 day, Disp: , Rfl:     ALPRAZolam (XANAX) 1 MG tablet, Take 1 mg by mouth 3 times daily as needed for Sleep, Disp: , Rfl:     ondansetron (ZOFRAN ODT) 4 MG disintegrating tablet, Take 1 tablet by mouth every 8 hours as needed for Nausea, Disp: 20 tablet, Rfl: 0    guaiFENesin (MUCINEX) 600 MG extended release tablet, Take 1 tablet by mouth 2 times daily, Disp: 60 tablet, Rfl: 1    escitalopram (LEXAPRO) 10 MG tablet, TAKE ONE TABLET BY MOUTH DAILY, Disp: 30 tablet, Rfl: 5    ARIPiprazole (ABILIFY) 5 MG tablet, Take 1 tablet by mouth daily, Disp: 30 tablet, Rfl: 3    acetaminophen (TYLENOL) 325 MG tablet, Take 2 tablets by mouth every 8 hours as needed for Pain, Disp: 30 tablet, Rfl: 0    albuterol sulfate HFA (PROVENTIL HFA) 108 (90 BASE) MCG/ACT inhaler, Inhale 2 puffs into the lungs every 4 hours as needed for Wheezing or Shortness of Breath (Space out to every 6 hours as symptoms improve) Space out to every 6 hours as symptoms improve., Disp: 1 Inhaler, Rfl: 0                                                 Past Medical History:   Diagnosis Date    Anemia     Anxiety     Back pain     Bipolar affective disorder (HCC)     Depression     Migraine     Primary cancer of larynx ([facility] [address] 7/18/2017                                                    Past Surgical History:   Procedure Laterality Date    OTHER SURGICAL HISTORY Left 06/28/2017    MICROLARYNGOSCOPY WITH BIOPSY OF LEFT TRUE VOCAL CORD MASS    OTHER SURGICAL HISTORY  08/10/2017    Tracheostomy    TONSILLECTOMY           REVIEW OF SYSTEMS:  Al pertinent positive and negative findings included in HPI. Otherwise, all other systems are reviewed and are negative    PHYSICAL EXAMINATION:   GENERAL: wdwn- no acute distress  COMMUNICATION :  Normal voice  MENTAL STATUS:  Normal  HEAD AND FACE:  Normal  EXTERNAL EARS AND NOSE:  Normal  FACIAL MUSCLES:  Normal  FACE PALPATION:  Negative  NECK:  No masses or adenopathy. Trachea stoma is nearly closed. Granulation tissue at stoma. SALIVARY GLANDS:  Normal  THYROID:  Normal    IMPRESSION: Granulation tissue at trachea stoma. PLAN: Any induration tissue trimmed with the scissors. Silver nitrate to remaining granulation. FOLLOW-UP: 5 days.

## 2017-11-08 ENCOUNTER — OFFICE VISIT (OUTPATIENT)
Dept: ENT CLINIC | Age: 32
End: 2017-11-08

## 2017-11-08 VITALS
BODY MASS INDEX: 28.16 KG/M2 | SYSTOLIC BLOOD PRESSURE: 123 MMHG | HEIGHT: 67 IN | HEART RATE: 114 BPM | DIASTOLIC BLOOD PRESSURE: 86 MMHG | WEIGHT: 179.4 LBS | TEMPERATURE: 97.9 F

## 2017-11-08 DIAGNOSIS — C32.9 CARCINOMA LARYNX (HCC): Primary | ICD-10-CM

## 2017-11-08 PROCEDURE — 1111F DSCHRG MED/CURRENT MED MERGE: CPT | Performed by: OTOLARYNGOLOGY

## 2017-11-08 PROCEDURE — 99212 OFFICE O/P EST SF 10 MIN: CPT | Performed by: OTOLARYNGOLOGY

## 2017-11-08 PROCEDURE — G8428 CUR MEDS NOT DOCUMENT: HCPCS | Performed by: OTOLARYNGOLOGY

## 2017-11-08 PROCEDURE — G8484 FLU IMMUNIZE NO ADMIN: HCPCS | Performed by: OTOLARYNGOLOGY

## 2017-11-08 PROCEDURE — G8417 CALC BMI ABV UP PARAM F/U: HCPCS | Performed by: OTOLARYNGOLOGY

## 2017-11-08 PROCEDURE — 4004F PT TOBACCO SCREEN RCVD TLK: CPT | Performed by: OTOLARYNGOLOGY

## 2017-11-10 ENCOUNTER — OFFICE VISIT (OUTPATIENT)
Dept: FAMILY MEDICINE CLINIC | Age: 32
End: 2017-11-10

## 2017-11-10 VITALS
DIASTOLIC BLOOD PRESSURE: 70 MMHG | OXYGEN SATURATION: 97 % | WEIGHT: 184.8 LBS | HEART RATE: 121 BPM | SYSTOLIC BLOOD PRESSURE: 110 MMHG | BODY MASS INDEX: 28.94 KG/M2

## 2017-11-10 DIAGNOSIS — C32.9 CARCINOMA LARYNX (HCC): ICD-10-CM

## 2017-11-10 DIAGNOSIS — F31.32 BIPOLAR AFFECTIVE DISORDER, CURRENTLY DEPRESSED, MODERATE (HCC): Primary | ICD-10-CM

## 2017-11-10 PROCEDURE — 90732 PPSV23 VACC 2 YRS+ SUBQ/IM: CPT | Performed by: INTERNAL MEDICINE

## 2017-11-10 PROCEDURE — G8427 DOCREV CUR MEDS BY ELIG CLIN: HCPCS | Performed by: INTERNAL MEDICINE

## 2017-11-10 PROCEDURE — 90472 IMMUNIZATION ADMIN EACH ADD: CPT | Performed by: INTERNAL MEDICINE

## 2017-11-10 PROCEDURE — 90686 IIV4 VACC NO PRSV 0.5 ML IM: CPT | Performed by: INTERNAL MEDICINE

## 2017-11-10 PROCEDURE — G8484 FLU IMMUNIZE NO ADMIN: HCPCS | Performed by: INTERNAL MEDICINE

## 2017-11-10 PROCEDURE — G8417 CALC BMI ABV UP PARAM F/U: HCPCS | Performed by: INTERNAL MEDICINE

## 2017-11-10 PROCEDURE — 90471 IMMUNIZATION ADMIN: CPT | Performed by: INTERNAL MEDICINE

## 2017-11-10 PROCEDURE — 99213 OFFICE O/P EST LOW 20 MIN: CPT | Performed by: INTERNAL MEDICINE

## 2017-11-10 PROCEDURE — 4004F PT TOBACCO SCREEN RCVD TLK: CPT | Performed by: INTERNAL MEDICINE

## 2017-11-10 PROCEDURE — 1111F DSCHRG MED/CURRENT MED MERGE: CPT | Performed by: INTERNAL MEDICINE

## 2017-11-10 RX ORDER — KETOROLAC TROMETHAMINE 10 MG/1
10 TABLET, FILM COATED ORAL EVERY 6 HOURS PRN
COMMUNITY
End: 2018-08-29

## 2017-11-10 RX ORDER — METHOCARBAMOL 500 MG/1
1000 TABLET, FILM COATED ORAL 4 TIMES DAILY
COMMUNITY
End: 2018-08-29

## 2017-11-10 RX ORDER — VARENICLINE TARTRATE 25 MG
KIT ORAL
Qty: 53 EACH | Refills: 0 | Status: SHIPPED | OUTPATIENT
Start: 2017-11-10 | End: 2018-08-29

## 2017-11-10 RX ORDER — CHLORPROMAZINE HYDROCHLORIDE 100 MG/1
200 TABLET, FILM COATED ORAL NIGHTLY
COMMUNITY
End: 2017-11-12

## 2017-11-10 RX ORDER — VARENICLINE TARTRATE 1 MG/1
1 TABLET, FILM COATED ORAL 2 TIMES DAILY
Qty: 60 TABLET | Refills: 1 | Status: SHIPPED | OUTPATIENT
Start: 2017-11-10 | End: 2018-08-29

## 2017-11-10 RX ORDER — PREDNISONE 10 MG/1
10 TABLET ORAL DAILY
COMMUNITY
End: 2018-08-29

## 2017-11-10 RX ORDER — ARIPIPRAZOLE 10 MG/1
10 TABLET ORAL DAILY
Qty: 30 TABLET | Refills: 2 | Status: SHIPPED | OUTPATIENT
Start: 2017-11-10 | End: 2018-02-05 | Stop reason: SDUPTHER

## 2017-11-10 RX ORDER — TRAZODONE HYDROCHLORIDE 50 MG/1
100 TABLET ORAL NIGHTLY PRN
Qty: 60 TABLET | Refills: 3 | Status: SHIPPED | OUTPATIENT
Start: 2017-11-10 | End: 2018-04-17 | Stop reason: SDUPTHER

## 2017-11-10 RX ORDER — ESCITALOPRAM OXALATE 20 MG/1
TABLET ORAL
Qty: 30 TABLET | Refills: 5 | Status: SHIPPED | OUTPATIENT
Start: 2017-11-10 | End: 2018-04-17 | Stop reason: SDUPTHER

## 2017-11-10 NOTE — PATIENT INSTRUCTIONS
Xanax   take half tab twice daily, whole tab at night for 5 days  Take half tab 3 times daily for 3 days  Take half tab twice daily for 5 days  Half tab daily at night for 3 days then stop

## 2017-11-10 NOTE — PROGRESS NOTES
Vaccine Information Sheet, \"Influenza - Inactivated\"  given to Jennifer Haines, or parent/legal guardian of  Jennifer Haines and verbalized understanding. Patient responses:    Have you ever had a reaction to a flu vaccine? No  Are you able to eat eggs without adverse effects? Yes  Do you have any current illness? No  Have you ever had Guillian [place] Syndrome? No    Flu vaccine given per order. Please see immunization tab.

## 2017-11-11 NOTE — PROGRESS NOTES
MG extended release tablet, Take 1 tablet by mouth 2 times daily, Disp: 60 tablet, Rfl: 1    acetaminophen (TYLENOL) 325 MG tablet, Take 2 tablets by mouth every 8 hours as needed for Pain, Disp: 30 tablet, Rfl: 0    albuterol sulfate HFA (PROVENTIL HFA) 108 (90 BASE) MCG/ACT inhaler, Inhale 2 puffs into the lungs every 4 hours as needed for Wheezing or Shortness of Breath (Space out to every 6 hours as symptoms improve) Space out to every 6 hours as symptoms improve., Disp: 1 Inhaler, Rfl: 0                                                 Past Medical History:   Diagnosis Date    Anemia     Anxiety     Back pain     Bipolar affective disorder (HCC)     Depression     Migraine     Primary cancer of larynx ([facility] [address] 7/18/2017                                                    Past Surgical History:   Procedure Laterality Date    OTHER SURGICAL HISTORY Left 06/28/2017    MICROLARYNGOSCOPY WITH BIOPSY OF LEFT TRUE VOCAL CORD MASS    OTHER SURGICAL HISTORY  08/10/2017    Tracheostomy    TONSILLECTOMY           REVIEW OF SYSTEMS:  Al pertinent positive and negative findings included in HPI. Otherwise, all other systems are reviewed and are negative    PHYSICAL EXAMINATION:   GENERAL: wdwn- no acute distress  COMMUNICATION :  Normal voice  MENTAL STATUS:  Normal  HEAD AND FACE:  Normal  EXTERNAL EARS AND NOSE:  Normal  FACIAL MUSCLES:  Normal  FACE PALPATION:  Negative  NECK:  No masses or adenopathy. Trachea stoma is healed. SALIVARY GLANDS:  Normal  THYROID:  Normal    IMPRESSION: Healed trachea stoma. PLAN: Reevaluate larynx in 3 months. FOLLOW-UP: 3 months.

## 2017-11-13 ASSESSMENT — ENCOUNTER SYMPTOMS: SHORTNESS OF BREATH: 0

## 2017-11-28 ENCOUNTER — OFFICE VISIT (OUTPATIENT)
Dept: ENT CLINIC | Age: 32
End: 2017-11-28

## 2017-11-28 VITALS
DIASTOLIC BLOOD PRESSURE: 86 MMHG | HEIGHT: 67 IN | WEIGHT: 184.2 LBS | SYSTOLIC BLOOD PRESSURE: 126 MMHG | BODY MASS INDEX: 28.91 KG/M2 | HEART RATE: 114 BPM | TEMPERATURE: 97.9 F

## 2017-11-28 DIAGNOSIS — R05.9 COUGH: Primary | ICD-10-CM

## 2017-11-28 DIAGNOSIS — Z92.3 STATUS POST RADIATION THERAPY WITHIN LAST FOUR WEEKS: ICD-10-CM

## 2017-11-28 DIAGNOSIS — J38.4 LARYNGEAL EDEMA DETERMINED BY LARYNGOSCOPY: ICD-10-CM

## 2017-11-28 DIAGNOSIS — C32.9 CARCINOMA LARYNX (HCC): ICD-10-CM

## 2017-11-28 PROCEDURE — G8417 CALC BMI ABV UP PARAM F/U: HCPCS | Performed by: OTOLARYNGOLOGY

## 2017-11-28 PROCEDURE — 1111F DSCHRG MED/CURRENT MED MERGE: CPT | Performed by: OTOLARYNGOLOGY

## 2017-11-28 PROCEDURE — G8427 DOCREV CUR MEDS BY ELIG CLIN: HCPCS | Performed by: OTOLARYNGOLOGY

## 2017-11-28 PROCEDURE — 99212 OFFICE O/P EST SF 10 MIN: CPT | Performed by: OTOLARYNGOLOGY

## 2017-11-28 PROCEDURE — 1036F TOBACCO NON-USER: CPT | Performed by: OTOLARYNGOLOGY

## 2017-11-28 PROCEDURE — G8484 FLU IMMUNIZE NO ADMIN: HCPCS | Performed by: OTOLARYNGOLOGY

## 2017-11-28 PROCEDURE — 31575 DIAGNOSTIC LARYNGOSCOPY: CPT | Performed by: OTOLARYNGOLOGY

## 2017-11-28 RX ORDER — HYDROCODONE POLISTIREX AND CHLORPHENIRAMINE POLISTIREX 10; 8 MG/5ML; MG/5ML
5 SUSPENSION, EXTENDED RELEASE ORAL EVERY 12 HOURS PRN
Qty: 120 ML | Refills: 0 | Status: SHIPPED | OUTPATIENT
Start: 2017-11-28 | End: 2017-12-05

## 2017-11-28 RX ORDER — METHYLPREDNISOLONE 4 MG/1
TABLET ORAL
Qty: 1 KIT | Refills: 0 | Status: SHIPPED | OUTPATIENT
Start: 2017-11-28 | End: 2018-08-29

## 2017-11-28 NOTE — PROGRESS NOTES
nasopharynx and hypopharyrnx and larynx visualized. Edema of both true vocal cords. Vocal cord mobility is good. Glottic airway seems adequate. No obvious recurrence of tumor. IMPRESSION: Laryngeal edema. PLAN: Prescribe Medrol Dosepak as well as Tussionex. FOLLOW-UP: One week.

## 2017-12-12 ENCOUNTER — TELEPHONE (OUTPATIENT)
Dept: FAMILY MEDICINE CLINIC | Age: 32
End: 2017-12-12

## 2017-12-12 ENCOUNTER — OFFICE VISIT (OUTPATIENT)
Dept: ENT CLINIC | Age: 32
End: 2017-12-12

## 2017-12-12 VITALS
SYSTOLIC BLOOD PRESSURE: 173 MMHG | HEART RATE: 116 BPM | HEIGHT: 68 IN | BODY MASS INDEX: 27.58 KG/M2 | WEIGHT: 182 LBS | TEMPERATURE: 97.8 F | DIASTOLIC BLOOD PRESSURE: 101 MMHG

## 2017-12-12 DIAGNOSIS — C32.9 CARCINOMA LARYNX (HCC): Primary | ICD-10-CM

## 2017-12-12 DIAGNOSIS — R06.1 STRIDOR: ICD-10-CM

## 2017-12-12 DIAGNOSIS — Z92.3 STATUS POST RADIATION THERAPY WITHIN LAST FOUR WEEKS: ICD-10-CM

## 2017-12-12 PROCEDURE — 99212 OFFICE O/P EST SF 10 MIN: CPT | Performed by: OTOLARYNGOLOGY

## 2017-12-12 PROCEDURE — G8427 DOCREV CUR MEDS BY ELIG CLIN: HCPCS | Performed by: OTOLARYNGOLOGY

## 2017-12-12 PROCEDURE — G8484 FLU IMMUNIZE NO ADMIN: HCPCS | Performed by: OTOLARYNGOLOGY

## 2017-12-12 PROCEDURE — 31575 DIAGNOSTIC LARYNGOSCOPY: CPT | Performed by: OTOLARYNGOLOGY

## 2017-12-12 PROCEDURE — 1036F TOBACCO NON-USER: CPT | Performed by: OTOLARYNGOLOGY

## 2017-12-12 PROCEDURE — G8417 CALC BMI ABV UP PARAM F/U: HCPCS | Performed by: OTOLARYNGOLOGY

## 2017-12-12 RX ORDER — VARENICLINE TARTRATE
KIT
Qty: 60 TABLET | Refills: 0 | OUTPATIENT
Start: 2017-12-12

## 2017-12-12 NOTE — PROGRESS NOTES
FOLLOW UP VISIT:      INTERIM HISTORY: Obstructing carcinoma the larynx requiring tracheotomy. Completed chemoradiation in tracheotomy tube was removed 6 weeks ago. Has continued to smoke until about 2 weeks ago. Now with increased hoarseness and some stridor.       PAST MEDICAL HISTORY:   History   Smoking Status    Former Smoker    Packs/day: 1.00    Years: 18.00    Types: Cigarettes    Start date: 10/19/2013   [facility] Quit date: 11/21/2017   Smokeless Tobacco    Never Used     Comment: currently taking chantix                                                    History   Alcohol Use No     Comment: none for 120 days                                                    Current Outpatient Prescriptions:     methylPREDNISolone (MEDROL, JENNIE,) 4 MG tablet, As directed., Disp: 1 kit, Rfl: 0    lithium 300 MG capsule, Take 300 mg by mouth 2 times daily (with meals), Disp: , Rfl:     predniSONE (DELTASONE) 10 MG tablet, Take 10 mg by mouth daily, Disp: , Rfl:     methocarbamol (ROBAXIN) 500 MG tablet, Take 1,000 mg by mouth 4 times daily, Disp: , Rfl:     ketorolac (TORADOL) 10 MG tablet, Take 10 mg by mouth every 6 hours as needed for Pain, Disp: , Rfl:     escitalopram (LEXAPRO) 20 MG tablet, TAKE ONE TABLET BY MOUTH DAILY, Disp: 30 tablet, Rfl: 5    varenicline (CHANTIX STARTING MONTH PAK) 0.5 MG X 11 & 1 MG X 42 tablet, By mouth., Disp: 53 each, Rfl: 0    varenicline (CHANTIX) 1 MG tablet, Take 1 tablet by mouth 2 times daily, Disp: 60 tablet, Rfl: 1    ARIPiprazole (ABILIFY) 10 MG tablet, Take 1 tablet by mouth daily, Disp: 30 tablet, Rfl: 2    traZODone (DESYREL) 50 MG tablet, Take 2 tablets by mouth nightly as needed for Sleep, Disp: 60 tablet, Rfl: 3    NONFORMULARY, Take 1 Dose by mouth daily Tri sprintec 28 day, Disp: , Rfl:     ALPRAZolam (XANAX) 1 MG tablet, Take 1 mg by mouth 3 times daily as needed for Sleep, Disp: , Rfl:     ondansetron (ZOFRAN ODT) 4 MG disintegrating tablet, Take 1 tablet by mouth every 8 hours as needed for Nausea, Disp: 20 tablet, Rfl: 0    guaiFENesin (MUCINEX) 600 MG extended release tablet, Take 1 tablet by mouth 2 times daily, Disp: 60 tablet, Rfl: 1    acetaminophen (TYLENOL) 325 MG tablet, Take 2 tablets by mouth every 8 hours as needed for Pain, Disp: 30 tablet, Rfl: 0    albuterol sulfate HFA (PROVENTIL HFA) 108 (90 BASE) MCG/ACT inhaler, Inhale 2 puffs into the lungs every 4 hours as needed for Wheezing or Shortness of Breath (Space out to every 6 hours as symptoms improve) Space out to every 6 hours as symptoms improve., Disp: 1 Inhaler, Rfl: 0                                                 Past Medical History:   Diagnosis Date    Anemia     Anxiety     Back pain     Bipolar affective disorder (HCC)     Depression     Migraine     Opiate abuse, continuous     Primary cancer of larynx (HCC) 7/18/2017    PTSD (post-traumatic stress disorder)                                                     Past Surgical History:   Procedure Laterality Date    OTHER SURGICAL HISTORY Left 06/28/2017    MICROLARYNGOSCOPY WITH BIOPSY OF LEFT TRUE VOCAL CORD MASS    OTHER SURGICAL HISTORY  08/10/2017    Tracheostomy    TONSILLECTOMY      TRACHEOSTOMY           REVIEW OF SYSTEMS:  Al pertinent positive and negative findings included in HPI. Otherwise, all other systems are reviewed and are negative    PHYSICAL EXAMINATION:   GENERAL: wdwn- no acute distress  COMMUNICATION :  Normal voice  MENTAL STATUS:  Normal  HEAD AND FACE:  Normal  EXTERNAL EARS AND NOSE:  Normal  FACIAL MUSCLES:  Normal  LIPS, TEETH, GINGIVA:  Normal mucosa  PHARYNX:  Normal  NECK:  Trachea stoma healed. SALIVARY GLANDS:  Normal  THYROID:  Normal  As the patient has symptoms suggestive of disease in the larynx or hypopharynx, fiberoptic laryngoscopy is performed. FIBEROPTIC LARYNGOSCOPY:  Nares topically anaesthetized with lidocaine spray.   Fiberoptic scope passed per naris into nasopharynx and hypopharyrnx and larynx visualized. Normal tongue base                                                          Normal epiglottis                                                          Cords appear normal and seemed to move well. Normal pyriform sinuses  IMPRESSION: Stridor following treatment for laryngeal carcinoma. PLAN: To [facility]. for reopening of tracheotomy and microlaryngoscopy to determine source of stridor.     FOLLOW-UP: At [facility]

## 2017-12-14 PROBLEM — C32.9 LARYNGEAL CANCER (HCC): Status: ACTIVE | Noted: 2017-12-14

## 2018-01-02 ENCOUNTER — OFFICE VISIT (OUTPATIENT)
Dept: ENT CLINIC | Age: 33
End: 2018-01-02

## 2018-01-02 VITALS
HEIGHT: 68 IN | DIASTOLIC BLOOD PRESSURE: 88 MMHG | BODY MASS INDEX: 28.37 KG/M2 | SYSTOLIC BLOOD PRESSURE: 114 MMHG | HEART RATE: 119 BPM | TEMPERATURE: 97.9 F | WEIGHT: 187.2 LBS

## 2018-01-02 DIAGNOSIS — C32.9 CARCINOMA LARYNX (HCC): Primary | ICD-10-CM

## 2018-01-02 PROCEDURE — 31575 DIAGNOSTIC LARYNGOSCOPY: CPT | Performed by: OTOLARYNGOLOGY

## 2018-01-02 PROCEDURE — 99212 OFFICE O/P EST SF 10 MIN: CPT | Performed by: OTOLARYNGOLOGY

## 2018-01-02 RX ORDER — OXYCODONE HYDROCHLORIDE AND ACETAMINOPHEN 5; 325 MG/1; MG/1
1 TABLET ORAL EVERY 4 HOURS PRN
Qty: 25 TABLET | Refills: 0 | Status: SHIPPED | OUTPATIENT
Start: 2018-01-02 | End: 2018-01-09

## 2018-01-02 NOTE — PROGRESS NOTES
FOLLOW UP VISIT:      INTERIM HISTORY: Carcinoma of the  Larynx, obstructing, required tracheotomy. After completion of radiation, patient was decannulated but developed stridor and hoarseness within 1 month. Tracheotomy reopened. Now here for follow up laryngeal evaluation.        PAST MEDICAL HISTORY:   History   Smoking Status    Former Smoker    Packs/day: 1.00    Years: 18.00    Types: Cigarettes    Start date: 10/19/2013   [facility] Quit date: 11/21/2017   Smokeless Tobacco    Never Used     Comment: currently taking chantix                                                    History   Alcohol Use No     Comment: none for 120 days                                                    Current Outpatient Prescriptions:     escitalopram (LEXAPRO) 20 MG tablet, TAKE ONE TABLET BY MOUTH DAILY, Disp: 30 tablet, Rfl: 5    ARIPiprazole (ABILIFY) 10 MG tablet, Take 1 tablet by mouth daily, Disp: 30 tablet, Rfl: 2    traZODone (DESYREL) 50 MG tablet, Take 2 tablets by mouth nightly as needed for Sleep, Disp: 60 tablet, Rfl: 3    ondansetron (ZOFRAN ODT) 4 MG disintegrating tablet, Take 1 tablet by mouth every 8 hours as needed for Nausea, Disp: 20 tablet, Rfl: 0    guaiFENesin (MUCINEX) 600 MG extended release tablet, Take 1 tablet by mouth 2 times daily, Disp: 60 tablet, Rfl: 1    acetaminophen (TYLENOL) 325 MG tablet, Take 2 tablets by mouth every 8 hours as needed for Pain, Disp: 30 tablet, Rfl: 0    albuterol sulfate HFA (PROVENTIL HFA) 108 (90 BASE) MCG/ACT inhaler, Inhale 2 puffs into the lungs every 4 hours as needed for Wheezing or Shortness of Breath (Space out to every 6 hours as symptoms improve) Space out to every 6 hours as symptoms improve., Disp: 1 Inhaler, Rfl: 0    oxyCODONE-acetaminophen (PERCOCET) 5-325 MG per tablet, Take 1 tablet by mouth every 6 hours as needed for Pain ., Disp: 20 tablet, Rfl: 0    methylPREDNISolone (MEDROL, JENNIE,) 4 MG tablet, As directed., Disp: 1 kit, Rfl: 0    lithium 300 MG capsule, Take 300 mg by mouth 2 times daily (with meals), Disp: , Rfl:     predniSONE (DELTASONE) 10 MG tablet, Take 10 mg by mouth daily, Disp: , Rfl:     methocarbamol (ROBAXIN) 500 MG tablet, Take 1,000 mg by mouth 4 times daily, Disp: , Rfl:     ketorolac (TORADOL) 10 MG tablet, Take 10 mg by mouth every 6 hours as needed for Pain, Disp: , Rfl:     varenicline (CHANTIX STARTING MONTH PAK) 0.5 MG X 11 & 1 MG X 42 tablet, By mouth., Disp: 53 each, Rfl: 0    varenicline (CHANTIX) 1 MG tablet, Take 1 tablet by mouth 2 times daily, Disp: 60 tablet, Rfl: 1    NONFORMULARY, Take 1 Dose by mouth daily Tri sprintec 28 day, Disp: , Rfl:     ALPRAZolam (XANAX) 1 MG tablet, Take 1 mg by mouth 3 times daily as needed for Sleep, Disp: , Rfl:                                                  Past Medical History:   Diagnosis Date    Anemia     Anxiety     Back pain     Bipolar affective disorder (HCC)     Depression     Migraine     Opiate abuse, continuous     Primary cancer of larynx (HCC) 7/18/2017    PTSD (post-traumatic stress disorder)                                                     Past Surgical History:   Procedure Laterality Date    OTHER SURGICAL HISTORY Left 06/28/2017    MICROLARYNGOSCOPY WITH BIOPSY OF LEFT TRUE VOCAL CORD MASS    OTHER SURGICAL HISTORY  08/10/2017    Tracheostomy    TONSILLECTOMY      TRACHEOSTOMY      TRACHEOTOMY N/A 12/14/2017     TRACHEOTOMY WITH 6DCT [place]                   REVIEW OF SYSTEMS:  Al pertinent positive and negative findings included in HPI.   Otherwise, all other systems are reviewed and are negative    PHYSICAL EXAMINATION:   GENERAL: wdwn- no acute distress  COMMUNICATION :  Normal voice  MENTAL STATUS:  Normal  HEAD AND FACE:  Normal  EXTERNAL EARS AND NOSE:  Normal  FACIAL MUSCLES:  Normal  FACE PALPATION:  Negative  OTOSCOPY:  Normal tympanic membranes and middle ear spaces  TUNING FORKS:  Rinne ++ Courtney midline at 512 Hz  INTRANASAL:  Septum

## 2018-04-17 ENCOUNTER — OFFICE VISIT (OUTPATIENT)
Dept: FAMILY MEDICINE CLINIC | Age: 33
End: 2018-04-17

## 2018-04-17 VITALS
BODY MASS INDEX: 24.34 KG/M2 | DIASTOLIC BLOOD PRESSURE: 60 MMHG | HEART RATE: 93 BPM | SYSTOLIC BLOOD PRESSURE: 100 MMHG | WEIGHT: 162 LBS | OXYGEN SATURATION: 96 %

## 2018-04-17 DIAGNOSIS — Z93.0 TRACHEOSTOMY IN PLACE (HCC): ICD-10-CM

## 2018-04-17 DIAGNOSIS — J20.9 ACUTE BRONCHITIS, UNSPECIFIED ORGANISM: ICD-10-CM

## 2018-04-17 DIAGNOSIS — F31.32 BIPOLAR AFFECTIVE DISORDER, CURRENTLY DEPRESSED, MODERATE (HCC): ICD-10-CM

## 2018-04-17 DIAGNOSIS — J02.9 SORE THROAT: Primary | ICD-10-CM

## 2018-04-17 PROBLEM — R06.03 ACUTE RESPIRATORY DISTRESS: Status: ACTIVE | Noted: 2017-12-09

## 2018-04-17 LAB — S PYO AG THROAT QL: NORMAL

## 2018-04-17 PROCEDURE — 1036F TOBACCO NON-USER: CPT | Performed by: INTERNAL MEDICINE

## 2018-04-17 PROCEDURE — 87880 STREP A ASSAY W/OPTIC: CPT | Performed by: INTERNAL MEDICINE

## 2018-04-17 PROCEDURE — 99213 OFFICE O/P EST LOW 20 MIN: CPT | Performed by: INTERNAL MEDICINE

## 2018-04-17 PROCEDURE — G8427 DOCREV CUR MEDS BY ELIG CLIN: HCPCS | Performed by: INTERNAL MEDICINE

## 2018-04-17 PROCEDURE — G8420 CALC BMI NORM PARAMETERS: HCPCS | Performed by: INTERNAL MEDICINE

## 2018-04-17 RX ORDER — CEFUROXIME AXETIL 500 MG/1
500 TABLET ORAL 2 TIMES DAILY
Qty: 20 TABLET | Refills: 0 | Status: SHIPPED | OUTPATIENT
Start: 2018-04-17 | End: 2018-04-27

## 2018-04-17 RX ORDER — TRAZODONE HYDROCHLORIDE 50 MG/1
100 TABLET ORAL NIGHTLY PRN
Qty: 60 TABLET | Refills: 3 | Status: SHIPPED | OUTPATIENT
Start: 2018-04-17 | End: 2018-09-12 | Stop reason: SDUPTHER

## 2018-04-17 RX ORDER — ESCITALOPRAM OXALATE 20 MG/1
TABLET ORAL
Qty: 30 TABLET | Refills: 5 | Status: SHIPPED | OUTPATIENT
Start: 2018-04-17 | End: 2019-02-05 | Stop reason: ALTCHOICE

## 2018-04-17 RX ORDER — ARIPIPRAZOLE 10 MG/1
TABLET ORAL
Qty: 30 TABLET | Refills: 2 | Status: SHIPPED | OUTPATIENT
Start: 2018-04-17 | End: 2018-10-11 | Stop reason: SDUPTHER

## 2018-04-30 ENCOUNTER — TELEPHONE (OUTPATIENT)
Dept: FAMILY MEDICINE CLINIC | Age: 33
End: 2018-04-30

## 2018-04-30 DIAGNOSIS — R05.9 COUGH: Primary | ICD-10-CM

## 2018-04-30 RX ORDER — AZITHROMYCIN 250 MG/1
TABLET, FILM COATED ORAL
Qty: 1 PACKET | Refills: 0 | Status: SHIPPED | OUTPATIENT
Start: 2018-04-30 | End: 2018-05-04

## 2018-04-30 RX ORDER — BENZONATATE 200 MG/1
200 CAPSULE ORAL 3 TIMES DAILY PRN
Qty: 30 CAPSULE | Refills: 0 | Status: SHIPPED | OUTPATIENT
Start: 2018-04-30 | End: 2018-05-07

## 2018-08-29 ENCOUNTER — OFFICE VISIT (OUTPATIENT)
Dept: FAMILY MEDICINE CLINIC | Age: 33
End: 2018-08-29

## 2018-08-29 VITALS
SYSTOLIC BLOOD PRESSURE: 100 MMHG | WEIGHT: 180 LBS | BODY MASS INDEX: 27.05 KG/M2 | OXYGEN SATURATION: 97 % | DIASTOLIC BLOOD PRESSURE: 64 MMHG | HEART RATE: 82 BPM

## 2018-08-29 DIAGNOSIS — M54.2 NECK PAIN: ICD-10-CM

## 2018-08-29 DIAGNOSIS — F31.0 BIPOLAR AFFECTIVE DISORDER, CURRENT EPISODE HYPOMANIC (HCC): ICD-10-CM

## 2018-08-29 DIAGNOSIS — G43.709 CHRONIC MIGRAINE WITHOUT AURA WITHOUT STATUS MIGRAINOSUS, NOT INTRACTABLE: Primary | ICD-10-CM

## 2018-08-29 DIAGNOSIS — K21.9 GASTROESOPHAGEAL REFLUX DISEASE WITHOUT ESOPHAGITIS: ICD-10-CM

## 2018-08-29 LAB
A/G RATIO: 1.9 (ref 1.1–2.2)
ALBUMIN SERPL-MCNC: 4.1 G/DL (ref 3.4–5)
ALP BLD-CCNC: 72 U/L (ref 40–129)
ALT SERPL-CCNC: 7 U/L (ref 10–40)
ANION GAP SERPL CALCULATED.3IONS-SCNC: 13 MMOL/L (ref 3–16)
AST SERPL-CCNC: 9 U/L (ref 15–37)
BASOPHILS ABSOLUTE: 0.1 K/UL (ref 0–0.2)
BASOPHILS RELATIVE PERCENT: 0.7 %
BILIRUB SERPL-MCNC: <0.2 MG/DL (ref 0–1)
BUN BLDV-MCNC: 4 MG/DL (ref 7–20)
CALCIUM SERPL-MCNC: 9.5 MG/DL (ref 8.3–10.6)
CHLORIDE BLD-SCNC: 102 MMOL/L (ref 99–110)
CO2: 25 MMOL/L (ref 21–32)
CREAT SERPL-MCNC: 0.7 MG/DL (ref 0.6–1.1)
EOSINOPHILS ABSOLUTE: 0.2 K/UL (ref 0–0.6)
EOSINOPHILS RELATIVE PERCENT: 2.4 %
GFR AFRICAN AMERICAN: >60
GFR NON-AFRICAN AMERICAN: >60
GLOBULIN: 2.2 G/DL
GLUCOSE BLD-MCNC: 93 MG/DL (ref 70–99)
HCT VFR BLD CALC: 37.5 % (ref 36–48)
HEMOGLOBIN: 12.5 G/DL (ref 12–16)
LYMPHOCYTES ABSOLUTE: 2.5 K/UL (ref 1–5.1)
LYMPHOCYTES RELATIVE PERCENT: 26.6 %
MCH RBC QN AUTO: 29.4 PG (ref 26–34)
MCHC RBC AUTO-ENTMCNC: 33.3 G/DL (ref 31–36)
MCV RBC AUTO: 88.4 FL (ref 80–100)
MONOCYTES ABSOLUTE: 0.4 K/UL (ref 0–1.3)
MONOCYTES RELATIVE PERCENT: 4.6 %
NEUTROPHILS ABSOLUTE: 6.1 K/UL (ref 1.7–7.7)
NEUTROPHILS RELATIVE PERCENT: 65.7 %
PDW BLD-RTO: 16.2 % (ref 12.4–15.4)
PLATELET # BLD: 263 K/UL (ref 135–450)
PMV BLD AUTO: 7.8 FL (ref 5–10.5)
POTASSIUM SERPL-SCNC: 4.3 MMOL/L (ref 3.5–5.1)
RBC # BLD: 4.24 M/UL (ref 4–5.2)
SODIUM BLD-SCNC: 140 MMOL/L (ref 136–145)
TOTAL PROTEIN: 6.3 G/DL (ref 6.4–8.2)
WBC # BLD: 9.3 K/UL (ref 4–11)

## 2018-08-29 PROCEDURE — G8427 DOCREV CUR MEDS BY ELIG CLIN: HCPCS | Performed by: PHYSICIAN ASSISTANT

## 2018-08-29 PROCEDURE — 99214 OFFICE O/P EST MOD 30 MIN: CPT | Performed by: PHYSICIAN ASSISTANT

## 2018-08-29 PROCEDURE — 4004F PT TOBACCO SCREEN RCVD TLK: CPT | Performed by: PHYSICIAN ASSISTANT

## 2018-08-29 PROCEDURE — G8417 CALC BMI ABV UP PARAM F/U: HCPCS | Performed by: PHYSICIAN ASSISTANT

## 2018-08-29 RX ORDER — SUMATRIPTAN 25 MG/1
25 TABLET, FILM COATED ORAL
Qty: 18 TABLET | Refills: 0 | Status: SHIPPED | OUTPATIENT
Start: 2018-08-29 | End: 2018-12-13 | Stop reason: SDUPTHER

## 2018-08-29 RX ORDER — BUSPIRONE HYDROCHLORIDE 5 MG/1
7.5 TABLET ORAL 3 TIMES DAILY
Qty: 60 TABLET | Refills: 0 | Status: SHIPPED | OUTPATIENT
Start: 2018-08-29 | End: 2018-09-08 | Stop reason: SDUPTHER

## 2018-08-29 RX ORDER — NAPROXEN 500 MG/1
500 TABLET ORAL 2 TIMES DAILY WITH MEALS
Qty: 60 TABLET | Refills: 3 | Status: SHIPPED | OUTPATIENT
Start: 2018-08-29 | End: 2018-12-28 | Stop reason: SDUPTHER

## 2018-08-29 RX ORDER — PANTOPRAZOLE SODIUM 20 MG/1
20 TABLET, DELAYED RELEASE ORAL DAILY
Qty: 30 TABLET | Refills: 3 | Status: SHIPPED | OUTPATIENT
Start: 2018-08-29 | End: 2018-12-28 | Stop reason: SDUPTHER

## 2018-08-29 RX ORDER — CYCLOBENZAPRINE HCL 5 MG
5 TABLET ORAL 3 TIMES DAILY PRN
Qty: 18 TABLET | Refills: 0 | Status: SHIPPED | OUTPATIENT
Start: 2018-08-29 | End: 2018-09-08

## 2018-08-29 ASSESSMENT — ENCOUNTER SYMPTOMS
ABDOMINAL PAIN: 1
RHINORRHEA: 0
VOMITING: 0
SHORTNESS OF BREATH: 0
COUGH: 0
NAUSEA: 0
SORE THROAT: 0
DIARRHEA: 0
CONSTIPATION: 0

## 2018-08-29 NOTE — PATIENT INSTRUCTIONS
Patient Education        Stopping Smoking: Care Instructions  Your Care Instructions  Cigarette smokers crave the nicotine in cigarettes. Giving it up is much harder than simply changing a habit. Your body has to stop craving the nicotine. It is hard to quit, but you can do it. There are many tools that people use to quit smoking. You may find that combining tools works best for you. There are several steps to quitting. First you get ready to quit. Then you get support to help you. After that, you learn new skills and behaviors to become a nonsmoker. For many people, a necessary step is getting and using medicine. Your doctor will help you set up the plan that best meets your needs. You may want to attend a smoking cessation program to help you quit smoking. When you choose a program, look for one that has proven success. Ask your doctor for ideas. You will greatly increase your chances of success if you take medicine as well as get counseling or join a cessation program.  Some of the changes you feel when you first quit tobacco are uncomfortable. Your body will miss the nicotine at first, and you may feel short-tempered and grumpy. You may have trouble sleeping or concentrating. Medicine can help you deal with these symptoms. You may struggle with changing your smoking habits and rituals. The last step is the tricky one: Be prepared for the smoking urge to continue for a time. This is a lot to deal with, but keep at it. You will feel better. Follow-up care is a key part of your treatment and safety. Be sure to make and go to all appointments, and call your doctor if you are having problems. It's also a good idea to know your test results and keep a list of the medicines you take. How can you care for yourself at home? · Ask your family, friends, and coworkers for support. You have a better chance of quitting if you have help and support.   · Join a support group, such as Nicotine Anonymous, for people who are trying to quit smoking. · Consider signing up for a smoking cessation program, such as the American Lung Association's Freedom from Smoking program.  · Get text messaging support. Go to the website at www.smokefree. gov to sign up for the [facility] program.  · Set a quit date. Pick your date carefully so that it is not right in the middle of a big deadline or stressful time. Once you quit, do not even take a puff. Get rid of all ashtrays and lighters after your last cigarette. Clean your house and your clothes so that they do not smell of smoke. · Learn how to be a nonsmoker. Think about ways you can avoid those things that make you reach for a cigarette. ¨ Avoid situations that put you at greatest risk for smoking. For some people, it is hard to have a drink with friends without smoking. For others, they might skip a coffee break with coworkers who smoke. ¨ Change your daily routine. Take a different route to work or eat a meal in a different place. · Cut down on stress. Calm yourself or release tension by doing an activity you enjoy, such as reading a book, taking a hot bath, or gardening. · Talk to your doctor or pharmacist about nicotine replacement therapy, which replaces the nicotine in your body. You still get nicotine but you do not use tobacco. Nicotine replacement products help you slowly reduce the amount of nicotine you need. These products come in several forms, many of them available over-the-counter:  ¨ Nicotine patches  ¨ Nicotine gum and lozenges  ¨ Nicotine inhaler  · Ask your doctor about bupropion (Wellbutrin) or varenicline (Chantix), which are prescription medicines. They do not contain nicotine. They help you by reducing withdrawal symptoms, such as stress and anxiety. · Some people find hypnosis, acupuncture, and massage helpful for ending the smoking habit. · Eat a healthy diet and get regular exercise.  Having healthy habits will help your body move past its craving for

## 2018-08-29 NOTE — PROGRESS NOTES
2018  Thomas Salas (: 1985)  26 y.o. HPI     Patient here with multiple complaints and follow up of chronic conditions. Restless leg syndrome: this is a new problem. Patient states that she has been having restless legs over the last 2 weeks. Is keeping her from sleeping and keeping her boyfriend up at night. States that she just cannot get comfortable. Has not tried anything to help. Neck Pain: 3 weeks of throat pain. Initially thought it was a kink in her neck from sleeping funny, but has persisted. Pain is persistent, does not come and go. Patient with history of laryngeal cancer. Has follow up with onc in the next couple of weeks. Patient reports pain is worse with lowering her left ear to her left shoulder, no pain with flexion/extension. Denies night sweats, fatigue, fever. GERD: Patient reports worsening of reflux since completing radiation therapy for her throat cancer. Has been on protonix previously and reports that it was controlled. States that the prescription ran out     Migraines: Has been on imitrex, but ran out. Would like to restart. Patient is having 1 migraine monthly, that lasts 3 days. Some associated nausea. Denies vision changes, unstable gait. Anxiety/Depression: patient has been taking lexapro and abilify. States that anxiety has been worsening over the last few months. Is asking for prn medication. Denies si/hi. Review of Systems   Constitutional: Negative for activity change, chills and fever. HENT: Negative for congestion, ear pain, rhinorrhea and sore throat. Eyes: Negative for visual disturbance. Respiratory: Negative for cough and shortness of breath. Cardiovascular: Negative for chest pain and palpitations. Gastrointestinal: Positive for abdominal pain (reflux). Negative for constipation, diarrhea, nausea and vomiting. Genitourinary: Negative for difficulty urinating and dysuria.    Musculoskeletal: Positive for myalgias (bilateral leg pain, restless) and neck pain. Negative for arthralgias. Skin: Negative for rash. Neurological: Positive for headaches. Negative for dizziness, weakness and numbness. Psychiatric/Behavioral: Negative for sleep disturbance. Allergies, past medical history, family history, and social history reviewed and unchanged from previous encounter. Current Outpatient Prescriptions   Medication Sig Dispense Refill    SUMAtriptan (IMITREX) 25 MG tablet Take 1 tablet by mouth once as needed for Migraine 18 tablet 0    busPIRone (BUSPAR) 5 MG tablet Take 1.5 tablets by mouth 3 times daily May cause drowsiness. 60 tablet 0    pantoprazole (PROTONIX) 20 MG tablet Take 1 tablet by mouth daily 30 tablet 3    naproxen (NAPROSYN) 500 MG tablet Take 1 tablet by mouth 2 times daily (with meals) 60 tablet 3    cyclobenzaprine (FLEXERIL) 5 MG tablet Take 1 tablet by mouth 3 times daily as needed for Muscle spasms 18 tablet 0    ARIPiprazole (ABILIFY) 10 MG tablet TAKE ONE TABLET BY MOUTH DAILY 30 tablet 2    escitalopram (LEXAPRO) 20 MG tablet TAKE ONE TABLET BY MOUTH DAILY 30 tablet 5    traZODone (DESYREL) 50 MG tablet Take 2 tablets by mouth nightly as needed for Sleep 60 tablet 3    NONFORMULARY Take 1 Dose by mouth daily Tri sprintec  28 day      ondansetron (ZOFRAN ODT) 4 MG disintegrating tablet Take 1 tablet by mouth every 8 hours as needed for Nausea 20 tablet 0    acetaminophen (TYLENOL) 325 MG tablet Take 2 tablets by mouth every 8 hours as needed for Pain 30 tablet 0    albuterol sulfate HFA (PROVENTIL HFA) 108 (90 BASE) MCG/ACT inhaler Inhale 2 puffs into the lungs every 4 hours as needed for Wheezing or Shortness of Breath (Space out to every 6 hours as symptoms improve) Space out to every 6 hours as symptoms improve. 1 Inhaler 0     No current facility-administered medications for this visit.         Vitals:    08/29/18 1435   BP: 100/64   Pulse: 82   SpO2: 97%   Weight: 180 lb (81.6 History of laryngeal cancer, low threshold for imaging. Patient to call if pain does not improve or worsens. -naproxen (NAPROSYN) 500 MG tablet; Take 1 tablet by mouth 2 times daily (with meals)  -     cyclobenzaprine (FLEXERIL) 5 MG tablet; Take 1 tablet by mouth 3 times daily as needed for Muscle spasms      Return in about 6 weeks (around 10/10/2018).

## 2018-09-10 NOTE — TELEPHONE ENCOUNTER
.  Last office visit 8/29/2018     Last written 4-17-18 #60 with 3 refills      Next office visit scheduled 10-16-18    Requested Prescriptions     Pending Prescriptions Disp Refills    traZODone (DESYREL) 50 MG tablet [Pharmacy Med Name: traZODone 50 MG TABLET] 60 tablet 2     Sig: TAKE TWO TABLETS BY MOUTH ONCE NIGHTLY AS NEEDED FOR SLEEP

## 2018-09-12 RX ORDER — TRAZODONE HYDROCHLORIDE 50 MG/1
TABLET ORAL
Qty: 60 TABLET | Refills: 2 | Status: SHIPPED | OUTPATIENT
Start: 2018-09-12 | End: 2018-12-10 | Stop reason: SDUPTHER

## 2018-10-11 DIAGNOSIS — F31.0 BIPOLAR AFFECTIVE DISORDER, CURRENT EPISODE HYPOMANIC (HCC): ICD-10-CM

## 2018-10-11 NOTE — TELEPHONE ENCOUNTER
.  Last office visit 8/29/2018     Last written 9-10-18 #135 with 0 refills      Next office visit scheduled 10-16-18    Requested Prescriptions     Pending Prescriptions Disp Refills    busPIRone (BUSPAR) 7.5 MG tablet [Pharmacy Med Name: busPIRone HCL 7.5 MG TABLET] 90 tablet 0     Sig: TAKE ONE TABLET BY MOUTH THREE TIMES A DAY

## 2018-10-12 RX ORDER — BUSPIRONE HYDROCHLORIDE 7.5 MG/1
TABLET ORAL
Qty: 90 TABLET | Refills: 0 | Status: SHIPPED | OUTPATIENT
Start: 2018-10-12 | End: 2018-11-11 | Stop reason: SDUPTHER

## 2018-10-30 ENCOUNTER — OFFICE VISIT (OUTPATIENT)
Dept: FAMILY MEDICINE CLINIC | Age: 33
End: 2018-10-30
Payer: COMMERCIAL

## 2018-10-30 VITALS
TEMPERATURE: 98.4 F | HEIGHT: 67 IN | WEIGHT: 182 LBS | DIASTOLIC BLOOD PRESSURE: 72 MMHG | OXYGEN SATURATION: 99 % | BODY MASS INDEX: 28.56 KG/M2 | HEART RATE: 106 BPM | SYSTOLIC BLOOD PRESSURE: 112 MMHG

## 2018-10-30 DIAGNOSIS — F31.0 BIPOLAR AFFECTIVE DISORDER, CURRENT EPISODE HYPOMANIC (HCC): Primary | ICD-10-CM

## 2018-10-30 DIAGNOSIS — Z23 NEED FOR INFLUENZA VACCINATION: ICD-10-CM

## 2018-10-30 DIAGNOSIS — R68.82 LOW LIBIDO: ICD-10-CM

## 2018-10-30 DIAGNOSIS — Z01.419 ENCOUNTER FOR ANNUAL ROUTINE GYNECOLOGICAL EXAMINATION: ICD-10-CM

## 2018-10-30 LAB — TSH REFLEX: 3.97 UIU/ML (ref 0.27–4.2)

## 2018-10-30 PROCEDURE — 90686 IIV4 VACC NO PRSV 0.5 ML IM: CPT | Performed by: INTERNAL MEDICINE

## 2018-10-30 PROCEDURE — 99213 OFFICE O/P EST LOW 20 MIN: CPT | Performed by: INTERNAL MEDICINE

## 2018-10-30 PROCEDURE — 90471 IMMUNIZATION ADMIN: CPT | Performed by: INTERNAL MEDICINE

## 2018-10-30 PROCEDURE — 4004F PT TOBACCO SCREEN RCVD TLK: CPT | Performed by: INTERNAL MEDICINE

## 2018-10-30 PROCEDURE — G8482 FLU IMMUNIZE ORDER/ADMIN: HCPCS | Performed by: INTERNAL MEDICINE

## 2018-10-30 PROCEDURE — G8427 DOCREV CUR MEDS BY ELIG CLIN: HCPCS | Performed by: INTERNAL MEDICINE

## 2018-10-30 PROCEDURE — G8417 CALC BMI ABV UP PARAM F/U: HCPCS | Performed by: INTERNAL MEDICINE

## 2018-10-30 RX ORDER — BUPROPION HYDROCHLORIDE 100 MG/1
100 TABLET, EXTENDED RELEASE ORAL 2 TIMES DAILY
Qty: 60 TABLET | Refills: 5 | Status: SHIPPED | OUTPATIENT
Start: 2018-10-30 | End: 2018-11-20 | Stop reason: SINTOL

## 2018-10-30 RX ORDER — NORGESTIMATE AND ETHINYL ESTRADIOL 7DAYSX3 LO
1 KIT ORAL DAILY
Qty: 28 TABLET | Refills: 12 | Status: SHIPPED | OUTPATIENT
Start: 2018-10-30 | End: 2019-11-07 | Stop reason: SDUPTHER

## 2018-10-30 NOTE — PROGRESS NOTES
Vaccine Information Sheet, \"Influenza - Inactivated\"  given to Valetta Kehr, or parent/legal guardian of  Valetta Kehr and verbalized understanding. Patient responses:    Have you ever had a reaction to a flu vaccine? No  Are you able to eat eggs without adverse effects? Yes  Do you have any current illness? No  Have you ever had Guillian Cleveland Syndrome? No    Flu vaccine given per order. Please see immunization tab.

## 2018-10-30 NOTE — PROGRESS NOTES
out to every 6 hours as symptoms improve) Space out to every 6 hours as symptoms improve. 1 Inhaler 0       Preventive Care and Risk Factor Assessment  Health Maintenance   Topic Date Due    Cervical cancer screen  06/25/2018    Flu vaccine (1) 09/01/2018    DTaP/Tdap/Td vaccine (2 - Td) 10/22/2023    Pneumococcal med risk  Completed    HIV screen  Completed      Hx abnormal PAP: no  Sexual activity: single partner, contraception - OCP (estrogen/progesterone). Hx of STD: no  Hx of abnormal mammogram: no  Self-breast exams: no  FH of breast cancer: no  FH of GYN cancer: no   Previous DEXA scan: no  Exercise: no regular exercise  History   Smoking Status    Current Every Day Smoker    Packs/day: 1.00    Years: 18.00    Types: Cigarettes    Start date: 10/19/2013    Last attempt to quit: 11/21/2017   Smokeless Tobacco    Never Used      History   Alcohol Use No     Comment: none for 120 days        Immunization History   Administered Date(s) Administered    Influenza Virus Vaccine 09/27/2012, 10/07/2013, 12/18/2014, 11/10/2015    Influenza, Kp Birks, 3 Years and older, IM (Fluzone 3 yrs and older or Afluria 5 yrs and older) 10/01/2016    Influenza, Kp Birks, 3 yrs and older, IM, PF (Fluzone 3 yrs and older or Afluria 5 yrs and older) 11/10/2017    Pneumococcal Polysaccharide ([id number]) 11/10/2017    Tdap (Boostrix, Adacel) 10/22/2013       Review of Systems:  A comprehensive review of systems was negative except for what was noted in the HPI.     Wt Readings from Last 3 Encounters:   10/30/18 182 lb (82.6 kg)   08/29/18 180 lb (81.6 kg)   04/17/18 162 lb (73.5 kg)     BP Readings from Last 3 Encounters:   10/30/18 112/72   08/29/18 100/64   04/17/18 100/60       Physical Exam:  Vitals:    10/30/18 1139   BP: 112/72   Site: Right Upper Arm   Position: Sitting   Cuff Size: Small Adult   Pulse: 106   Temp: 98.4 °F (36.9 °C)   TempSrc: Oral   SpO2: 99%   Weight: 182 lb (82.6 kg)   Height: 5' 7\" (1.702 m) Body mass index is 28.51 kg/m². Constitutional: She is oriented to person, place, and time. She appears well-developed and well-nourished. No distress. Neck: No mass and no thyromegaly present. Cardiovascular: Normal rate, regular rhythm and normal heart sounds. No murmur heard. Pulmonary/Chest: Effort and breath sounds normal.   Abdominal: Soft, non-tender. No distension or masses. Genitourinary: normal external genitalia, vulva, vagina, cervix, uterus and adnexa. Breast exam:  breasts appear normal, no suspicious masses, no skin or nipple changes or axillary nodes. Neurological: She is alert and oriented to person, place, and time. Skin: Skin is warm and dry. No rash noted. No erythema. Psychiatric: She has a normal mood and affect. Her behavior is normal.     Assessment/Plan:  Gustabo Drake was seen today for gynecologic exam.    Diagnoses and all orders for this visit:    Encounter for annual routine gynecological examination  -     PAP SMEAR    Need for influenza vaccination  -     INFLUENZA, QUADV, 3 YRS AND OLDER, IM, PF, PREFILL SYR OR SDV, 0.5ML (FLUZONE QUADV, PF)    Bipolar affective disorder, current episode hypomanic ([facility] [address]  Adding wellbutrin, follow up with psychiatry. Low libido  -     TSH with Reflex  Likely depression and med related. Trial of wellbutrin. Other orders  -     Norgestim-Eth Estrad Triphasic 0.18/0.215/0.25 MG-25 MCG TABS; Take 1 tablet by mouth daily  -     buPROPion (WELLBUTRIN SR) 100 MG extended release tablet;  Take 1 tablet by mouth 2 times daily

## 2018-11-05 LAB
HPV COMMENT: NORMAL
HPV TYPE 16: NORMAL
HPV TYPE 18: NORMAL
HPVOH (OTHER TYPES): NORMAL

## 2018-11-20 ENCOUNTER — TELEPHONE (OUTPATIENT)
Dept: FAMILY MEDICINE CLINIC | Age: 33
End: 2018-11-20

## 2018-11-21 RX ORDER — MIRTAZAPINE 15 MG/1
15 TABLET, FILM COATED ORAL NIGHTLY
Qty: 30 TABLET | Refills: 1 | Status: SHIPPED | OUTPATIENT
Start: 2018-11-21 | End: 2019-01-23 | Stop reason: SDUPTHER

## 2018-12-09 DIAGNOSIS — F31.0 BIPOLAR AFFECTIVE DISORDER, CURRENT EPISODE HYPOMANIC (HCC): ICD-10-CM

## 2018-12-10 RX ORDER — TRAZODONE HYDROCHLORIDE 50 MG/1
TABLET ORAL
Qty: 60 TABLET | Refills: 1 | Status: SHIPPED | OUTPATIENT
Start: 2018-12-10 | End: 2019-02-06 | Stop reason: SDUPTHER

## 2018-12-10 RX ORDER — BUSPIRONE HYDROCHLORIDE 7.5 MG/1
TABLET ORAL
Qty: 90 TABLET | Refills: 0 | Status: SHIPPED | OUTPATIENT
Start: 2018-12-10 | End: 2019-01-07 | Stop reason: SDUPTHER

## 2018-12-10 NOTE — TELEPHONE ENCOUNTER
Last Seen: 8/29/2018  Next Appointment: Visit date not found    Requested Prescriptions     Pending Prescriptions Disp Refills    traZODone (DESYREL) 50 MG tablet [Pharmacy Med Name: traZODone 50 MG TABLET] 60 tablet 1     Sig: TAKE TWO TABLETS BY MOUTH ONCE NIGHTLY AS NEEDED FOR SLEEP

## 2018-12-28 DIAGNOSIS — M54.2 NECK PAIN: ICD-10-CM

## 2018-12-28 DIAGNOSIS — K21.9 GASTROESOPHAGEAL REFLUX DISEASE WITHOUT ESOPHAGITIS: ICD-10-CM

## 2018-12-28 RX ORDER — PANTOPRAZOLE SODIUM 20 MG/1
TABLET, DELAYED RELEASE ORAL
Qty: 30 TABLET | Refills: 2 | Status: SHIPPED | OUTPATIENT
Start: 2018-12-28 | End: 2019-03-27 | Stop reason: SDUPTHER

## 2018-12-28 RX ORDER — NAPROXEN 500 MG/1
TABLET ORAL
Qty: 60 TABLET | Refills: 2 | Status: SHIPPED | OUTPATIENT
Start: 2018-12-28 | End: 2019-05-23 | Stop reason: SDUPTHER

## 2018-12-30 ENCOUNTER — HOSPITAL ENCOUNTER (EMERGENCY)
Age: 33
Discharge: HOME | End: 2018-12-30
Attending: EMERGENCY MEDICINE
Payer: COMMERCIAL

## 2018-12-30 VITALS
BODY MASS INDEX: 28.19 KG/M2 | DIASTOLIC BLOOD PRESSURE: 106 MMHG | WEIGHT: 180 LBS | TEMPERATURE: 98.1 F | RESPIRATION RATE: 16 BRPM | OXYGEN SATURATION: 96 % | SYSTOLIC BLOOD PRESSURE: 175 MMHG | HEART RATE: 96 BPM

## 2018-12-30 DIAGNOSIS — R22.0 SWELLING OF RIGHT SIDE OF FACE: ICD-10-CM

## 2018-12-30 DIAGNOSIS — K04.7 DENTAL ABSCESS: Primary | ICD-10-CM

## 2018-12-30 LAB
A/G RATIO: 1.4 (ref 1.1–2.2)
ALBUMIN SERPL-MCNC: 4.3 G/DL (ref 3.4–5)
ALP BLD-CCNC: 88 U/L (ref 40–129)
ALT SERPL-CCNC: 10 U/L (ref 10–40)
ANION GAP SERPL CALCULATED.3IONS-SCNC: 10 MMOL/L (ref 3–16)
AST SERPL-CCNC: 10 U/L (ref 15–37)
BILIRUB SERPL-MCNC: <0.2 MG/DL (ref 0–1)
BUN BLDV-MCNC: 9 MG/DL (ref 7–20)
CALCIUM SERPL-MCNC: 8.9 MG/DL (ref 8.3–10.6)
CHLORIDE BLD-SCNC: 99 MMOL/L (ref 99–110)
CO2: 24 MMOL/L (ref 21–32)
CREAT SERPL-MCNC: 0.6 MG/DL (ref 0.6–1.1)
GFR AFRICAN AMERICAN: >60
GFR NON-AFRICAN AMERICAN: >60
GLOBULIN: 3.1 G/DL
GLUCOSE BLD-MCNC: 122 MG/DL (ref 70–99)
HCT VFR BLD CALC: 38.4 % (ref 36–48)
HEMOGLOBIN: 12.5 G/DL (ref 12–16)
LACTIC ACID: 1.2 MMOL/L (ref 0.4–2)
MCH RBC QN AUTO: 27.9 PG (ref 26–34)
MCHC RBC AUTO-ENTMCNC: 32.6 G/DL (ref 31–36)
MCV RBC AUTO: 85.5 FL (ref 80–100)
PDW BLD-RTO: 17.2 % (ref 12.4–15.4)
PLATELET # BLD: 308 K/UL (ref 135–450)
PMV BLD AUTO: 7.3 FL (ref 5–10.5)
POTASSIUM SERPL-SCNC: 3.8 MMOL/L (ref 3.5–5.1)
RBC # BLD: 4.49 M/UL (ref 4–5.2)
SODIUM BLD-SCNC: 133 MMOL/L (ref 136–145)
TOTAL PROTEIN: 7.4 G/DL (ref 6.4–8.2)
WBC # BLD: 15.5 K/UL (ref 4–11)

## 2018-12-30 PROCEDURE — 80053 COMPREHEN METABOLIC PANEL: CPT

## 2018-12-30 PROCEDURE — 83605 ASSAY OF LACTIC ACID: CPT

## 2018-12-30 PROCEDURE — 6370000000 HC RX 637 (ALT 250 FOR IP): Performed by: PHYSICIAN ASSISTANT

## 2018-12-30 PROCEDURE — 85027 COMPLETE CBC AUTOMATED: CPT

## 2018-12-30 PROCEDURE — 99283 EMERGENCY DEPT VISIT LOW MDM: CPT

## 2018-12-30 RX ORDER — ACETAMINOPHEN 500 MG
1000 TABLET ORAL ONCE
Status: COMPLETED | OUTPATIENT
Start: 2018-12-30 | End: 2018-12-30

## 2018-12-30 RX ORDER — NAPROXEN 250 MG/1
500 TABLET ORAL ONCE
Status: COMPLETED | OUTPATIENT
Start: 2018-12-30 | End: 2018-12-30

## 2018-12-30 RX ORDER — CLINDAMYCIN HYDROCHLORIDE 150 MG/1
300 CAPSULE ORAL ONCE
Status: COMPLETED | OUTPATIENT
Start: 2018-12-30 | End: 2018-12-30

## 2018-12-30 RX ORDER — NAPROXEN 500 MG/1
500 TABLET ORAL 2 TIMES DAILY WITH MEALS
Qty: 30 TABLET | Refills: 0 | Status: SHIPPED | OUTPATIENT
Start: 2018-12-30 | End: 2019-02-05 | Stop reason: SDUPTHER

## 2018-12-30 RX ORDER — ACETAMINOPHEN 325 MG/1
650 TABLET ORAL EVERY 6 HOURS PRN
Qty: 120 TABLET | Refills: 3 | Status: SHIPPED | OUTPATIENT
Start: 2018-12-30 | End: 2019-08-14

## 2018-12-30 RX ORDER — CLINDAMYCIN HYDROCHLORIDE 150 MG/1
300 CAPSULE ORAL 4 TIMES DAILY
Qty: 56 CAPSULE | Refills: 0 | Status: SHIPPED | OUTPATIENT
Start: 2018-12-30 | End: 2019-01-06

## 2018-12-30 RX ADMIN — CLINDAMYCIN HYDROCHLORIDE 300 MG: 150 CAPSULE ORAL at 17:47

## 2018-12-30 RX ADMIN — NAPROXEN 500 MG: 250 TABLET ORAL at 17:47

## 2018-12-30 RX ADMIN — ACETAMINOPHEN 1000 MG: 500 TABLET, FILM COATED ORAL at 17:47

## 2018-12-30 ASSESSMENT — PAIN SCALES - GENERAL
PAINLEVEL_OUTOF10: 10

## 2019-01-07 DIAGNOSIS — F31.0 BIPOLAR AFFECTIVE DISORDER, CURRENT EPISODE HYPOMANIC (HCC): ICD-10-CM

## 2019-01-07 RX ORDER — BUSPIRONE HYDROCHLORIDE 7.5 MG/1
TABLET ORAL
Qty: 90 TABLET | Refills: 0 | Status: SHIPPED | OUTPATIENT
Start: 2019-01-07 | End: 2019-02-04 | Stop reason: SDUPTHER

## 2019-01-23 RX ORDER — MIRTAZAPINE 15 MG/1
TABLET, FILM COATED ORAL
Qty: 30 TABLET | Refills: 1 | Status: SHIPPED | OUTPATIENT
Start: 2019-01-23 | End: 2019-03-26 | Stop reason: SDUPTHER

## 2019-02-04 DIAGNOSIS — F31.0 BIPOLAR AFFECTIVE DISORDER, CURRENT EPISODE HYPOMANIC (HCC): ICD-10-CM

## 2019-02-04 RX ORDER — BUSPIRONE HYDROCHLORIDE 7.5 MG/1
TABLET ORAL
Qty: 90 TABLET | Refills: 3 | Status: SHIPPED | OUTPATIENT
Start: 2019-02-04 | End: 2019-04-23 | Stop reason: ALTCHOICE

## 2019-02-05 ENCOUNTER — OFFICE VISIT (OUTPATIENT)
Dept: FAMILY MEDICINE CLINIC | Age: 34
End: 2019-02-05
Payer: COMMERCIAL

## 2019-02-05 VITALS
DIASTOLIC BLOOD PRESSURE: 72 MMHG | HEART RATE: 118 BPM | SYSTOLIC BLOOD PRESSURE: 100 MMHG | TEMPERATURE: 97.5 F | OXYGEN SATURATION: 98 % | WEIGHT: 203 LBS | HEIGHT: 67 IN | BODY MASS INDEX: 31.86 KG/M2

## 2019-02-05 DIAGNOSIS — L91.0 KELOID OF SKIN: Primary | ICD-10-CM

## 2019-02-05 DIAGNOSIS — C32.9 LARYNGEAL CARCINOMA (HCC): ICD-10-CM

## 2019-02-05 DIAGNOSIS — F31.75 BIPOLAR DISORDER, IN PARTIAL REMISSION, MOST RECENT EPISODE DEPRESSED (HCC): ICD-10-CM

## 2019-02-05 DIAGNOSIS — M54.2 NECK PAIN, ACUTE: ICD-10-CM

## 2019-02-05 PROCEDURE — G8427 DOCREV CUR MEDS BY ELIG CLIN: HCPCS | Performed by: INTERNAL MEDICINE

## 2019-02-05 PROCEDURE — 4004F PT TOBACCO SCREEN RCVD TLK: CPT | Performed by: INTERNAL MEDICINE

## 2019-02-05 PROCEDURE — 99213 OFFICE O/P EST LOW 20 MIN: CPT | Performed by: INTERNAL MEDICINE

## 2019-02-05 PROCEDURE — G8417 CALC BMI ABV UP PARAM F/U: HCPCS | Performed by: INTERNAL MEDICINE

## 2019-02-05 PROCEDURE — G8482 FLU IMMUNIZE ORDER/ADMIN: HCPCS | Performed by: INTERNAL MEDICINE

## 2019-02-05 RX ORDER — DESVENLAFAXINE 50 MG/1
50 TABLET, EXTENDED RELEASE ORAL DAILY
Qty: 30 TABLET | Refills: 5 | Status: SHIPPED | OUTPATIENT
Start: 2019-02-05 | End: 2019-04-23 | Stop reason: SDUPTHER

## 2019-02-05 RX ORDER — CEFUROXIME AXETIL 500 MG/1
500 TABLET ORAL 2 TIMES DAILY
Qty: 20 TABLET | Refills: 0 | Status: SHIPPED | OUTPATIENT
Start: 2019-02-05 | End: 2019-02-15

## 2019-02-06 RX ORDER — TRAZODONE HYDROCHLORIDE 50 MG/1
TABLET ORAL
Qty: 60 TABLET | Refills: 0 | Status: SHIPPED | OUTPATIENT
Start: 2019-02-06 | End: 2019-03-09 | Stop reason: SDUPTHER

## 2019-02-19 PROBLEM — R06.03 ACUTE RESPIRATORY DISTRESS: Status: RESOLVED | Noted: 2017-12-09 | Resolved: 2019-02-19

## 2019-02-19 ASSESSMENT — ENCOUNTER SYMPTOMS: SHORTNESS OF BREATH: 0

## 2019-02-25 ENCOUNTER — HOSPITAL ENCOUNTER (OUTPATIENT)
Dept: CT IMAGING | Age: 34
Discharge: HOME | End: 2019-02-25
Payer: COMMERCIAL

## 2019-02-25 DIAGNOSIS — M54.2 NECK PAIN, ACUTE: ICD-10-CM

## 2019-02-25 DIAGNOSIS — C32.9 LARYNGEAL CARCINOMA (HCC): ICD-10-CM

## 2019-02-25 PROCEDURE — 6360000004 HC RX CONTRAST MEDICATION: Performed by: INTERNAL MEDICINE

## 2019-02-25 PROCEDURE — 70492 CT SFT TSUE NCK W/O & W/DYE: CPT

## 2019-02-25 RX ADMIN — IOPAMIDOL 75 ML: 755 INJECTION, SOLUTION INTRAVENOUS at 14:36

## 2019-03-11 RX ORDER — TRAZODONE HYDROCHLORIDE 50 MG/1
TABLET ORAL
Qty: 60 TABLET | Refills: 0 | Status: SHIPPED | OUTPATIENT
Start: 2019-03-11 | End: 2019-04-07 | Stop reason: SDUPTHER

## 2019-03-26 RX ORDER — MIRTAZAPINE 15 MG/1
TABLET, FILM COATED ORAL
Qty: 30 TABLET | Refills: 0 | Status: SHIPPED | OUTPATIENT
Start: 2019-03-26 | End: 2019-04-24 | Stop reason: SDUPTHER

## 2019-03-27 DIAGNOSIS — K21.9 GASTROESOPHAGEAL REFLUX DISEASE WITHOUT ESOPHAGITIS: ICD-10-CM

## 2019-03-27 RX ORDER — PANTOPRAZOLE SODIUM 20 MG/1
TABLET, DELAYED RELEASE ORAL
Qty: 30 TABLET | Refills: 1 | Status: SHIPPED | OUTPATIENT
Start: 2019-03-27 | End: 2019-04-23 | Stop reason: SDUPTHER

## 2019-04-01 ENCOUNTER — OFFICE VISIT (OUTPATIENT)
Dept: FAMILY MEDICINE CLINIC | Age: 34
End: 2019-04-01
Payer: COMMERCIAL

## 2019-04-01 VITALS
TEMPERATURE: 97.3 F | HEIGHT: 67 IN | BODY MASS INDEX: 32.8 KG/M2 | WEIGHT: 209 LBS | OXYGEN SATURATION: 99 % | HEART RATE: 108 BPM | DIASTOLIC BLOOD PRESSURE: 100 MMHG | RESPIRATION RATE: 16 BRPM | SYSTOLIC BLOOD PRESSURE: 140 MMHG

## 2019-04-01 DIAGNOSIS — J04.2 ACUTE LARYNGOTRACHEITIS: Primary | ICD-10-CM

## 2019-04-01 PROCEDURE — G8427 DOCREV CUR MEDS BY ELIG CLIN: HCPCS | Performed by: INTERNAL MEDICINE

## 2019-04-01 PROCEDURE — G8417 CALC BMI ABV UP PARAM F/U: HCPCS | Performed by: INTERNAL MEDICINE

## 2019-04-01 PROCEDURE — 99213 OFFICE O/P EST LOW 20 MIN: CPT | Performed by: INTERNAL MEDICINE

## 2019-04-01 PROCEDURE — 4004F PT TOBACCO SCREEN RCVD TLK: CPT | Performed by: INTERNAL MEDICINE

## 2019-04-01 RX ORDER — IPRATROPIUM BROMIDE AND ALBUTEROL SULFATE 2.5; .5 MG/3ML; MG/3ML
1 SOLUTION RESPIRATORY (INHALATION) 4 TIMES DAILY
Qty: 120 VIAL | Refills: 5 | Status: SHIPPED | OUTPATIENT
Start: 2019-04-01 | End: 2021-01-01

## 2019-04-01 RX ORDER — BENZONATATE 200 MG/1
200 CAPSULE ORAL 3 TIMES DAILY PRN
Qty: 90 CAPSULE | Refills: 1 | Status: SHIPPED | OUTPATIENT
Start: 2019-04-01 | End: 2019-04-08

## 2019-04-01 NOTE — PROGRESS NOTES
Subjective:       Steven Rodrigues is a 26 y.o. female who presents for evaluation of chronic cough post tracheostomy. Thick white phlegm. Continues to smoke. Recent stressor of mom kicking her out and moving out of state to live with her dad. Depression ongoing, has not seen psychiatry and is quite upset today. Struggles with health, family relationships. Denies suicidal thoughts and plans. Patient's medications, allergies, past medical, surgical, social and family histories were reviewed and updated as appropriate. Review of Systems  Constitutional: negative for fatigue and fevers  Ears, nose, mouth, throat, and face: negative for earaches and nasal congestion  Respiratory: positive for cough and sputum     Objective:      BP (!) 140/100 (Site: Left Upper Arm, Position: Sitting, Cuff Size: Small Adult)   Pulse 108   Temp 97.3 °F (36.3 °C) (Oral)   Resp 16   Ht 5' 7\" (1.702 m)   Wt 209 lb (94.8 kg)   LMP 03/11/2019 (Approximate)   SpO2 99% Comment: RA  BMI 32.73 kg/m²   General appearance: alert, appears stated age and cooperative  Ears: normal TM's and external ear canals both ears  Nose: Nares normal. Septum midline. Mucosa normal. No drainage or sinus tenderness. Throat: lips, mucosa, and tongue normal; teeth and gums normal  Neck: no adenopathy, no carotid bruit, no JVD, supple, symmetrical, trachea midline and thyroid not enlarged, symmetric, no tenderness/mass/nodules  Lungs: clear to auscultation bilaterally  Heart: regular rate and rhythm, S1, S2 normal, no murmur, click, rub or gallop     Assessment:     Ellen Adams was seen today for cough and pharyngitis. Diagnoses and all orders for this visit:    Acute laryngotracheitis    Other orders  -     Respiratory Therapy Supplies (NEBULIZER COMPRESSOR) KIT; Use Q6 hours as needed with Duoneb. -     ipratropium-albuterol (DUONEB) 0.5-2.5 (3) MG/3ML SOLN nebulizer solution;  Inhale 3 mLs into the lungs 4 times daily  -     benzonatate (TESSALON) 200 MG capsule; Take 1 capsule by mouth 3 times daily as needed for Cough    chronic due to smoking and chronic cough. Trial of nebs to help cough up phlegm and tessalon per orders. Smoking and discussed cessation, behavioral changes, dependence, medication uses and side effects. Consider follow up with Dr. Bhumi Rivas. Support given for her social circumstances. Plan:        As above and per orders.

## 2019-04-08 RX ORDER — TRAZODONE HYDROCHLORIDE 50 MG/1
TABLET ORAL
Qty: 60 TABLET | Refills: 0 | Status: SHIPPED | OUTPATIENT
Start: 2019-04-08 | End: 2019-05-12 | Stop reason: SDUPTHER

## 2019-04-17 RX ORDER — ARIPIPRAZOLE 10 MG/1
TABLET ORAL
Qty: 30 TABLET | Refills: 0 | Status: SHIPPED | OUTPATIENT
Start: 2019-04-17 | End: 2019-05-18 | Stop reason: SDUPTHER

## 2019-04-23 ENCOUNTER — OFFICE VISIT (OUTPATIENT)
Dept: FAMILY MEDICINE CLINIC | Age: 34
End: 2019-04-23
Payer: COMMERCIAL

## 2019-04-23 VITALS
DIASTOLIC BLOOD PRESSURE: 110 MMHG | SYSTOLIC BLOOD PRESSURE: 140 MMHG | BODY MASS INDEX: 31.95 KG/M2 | OXYGEN SATURATION: 98 % | RESPIRATION RATE: 19 BRPM | HEART RATE: 118 BPM | WEIGHT: 204 LBS | TEMPERATURE: 98.5 F

## 2019-04-23 DIAGNOSIS — F31.32 BIPOLAR AFFECTIVE DISORDER, CURRENTLY DEPRESSED, MODERATE (HCC): Primary | ICD-10-CM

## 2019-04-23 DIAGNOSIS — K21.9 GASTROESOPHAGEAL REFLUX DISEASE WITHOUT ESOPHAGITIS: ICD-10-CM

## 2019-04-23 DIAGNOSIS — R49.9 HOARSENESS OR CHANGING VOICE: ICD-10-CM

## 2019-04-23 PROBLEM — C80.1 NECK PAIN DUE TO MALIGNANT NEOPLASTIC DISEASE (HCC): Status: ACTIVE | Noted: 2018-05-02

## 2019-04-23 PROBLEM — M54.2 NECK PAIN DUE TO MALIGNANT NEOPLASTIC DISEASE (HCC): Status: ACTIVE | Noted: 2018-05-02

## 2019-04-23 PROCEDURE — 4004F PT TOBACCO SCREEN RCVD TLK: CPT | Performed by: INTERNAL MEDICINE

## 2019-04-23 PROCEDURE — G8427 DOCREV CUR MEDS BY ELIG CLIN: HCPCS | Performed by: INTERNAL MEDICINE

## 2019-04-23 PROCEDURE — G8417 CALC BMI ABV UP PARAM F/U: HCPCS | Performed by: INTERNAL MEDICINE

## 2019-04-23 PROCEDURE — 99213 OFFICE O/P EST LOW 20 MIN: CPT | Performed by: INTERNAL MEDICINE

## 2019-04-23 RX ORDER — LAMOTRIGINE 25 MG/1
TABLET ORAL
Qty: 70 TABLET | Refills: 0 | Status: SHIPPED | OUTPATIENT
Start: 2019-04-23 | End: 2019-06-14 | Stop reason: DRUGHIGH

## 2019-04-23 RX ORDER — GUAIFENESIN AND DEXTROMETHORPHAN HYDROBROMIDE 1200; 60 MG/1; MG/1
1 TABLET, EXTENDED RELEASE ORAL 2 TIMES DAILY
Qty: 60 TABLET | Refills: 0 | Status: SHIPPED | OUTPATIENT
Start: 2019-04-23 | End: 2019-05-23

## 2019-04-23 RX ORDER — ALBUTEROL SULFATE 90 UG/1
2 AEROSOL, METERED RESPIRATORY (INHALATION) EVERY 4 HOURS PRN
Qty: 1 INHALER | Refills: 2 | Status: SHIPPED | OUTPATIENT
Start: 2019-04-23 | End: 2019-06-14 | Stop reason: SDUPTHER

## 2019-04-23 RX ORDER — PANTOPRAZOLE SODIUM 20 MG/1
TABLET, DELAYED RELEASE ORAL
Qty: 30 TABLET | Refills: 5 | Status: SHIPPED | OUTPATIENT
Start: 2019-04-23 | End: 2020-02-12 | Stop reason: ALTCHOICE

## 2019-04-23 RX ORDER — FLUTICASONE PROPIONATE 110 UG/1
2 AEROSOL, METERED RESPIRATORY (INHALATION) 2 TIMES DAILY
Qty: 1 INHALER | Refills: 3 | Status: SHIPPED | OUTPATIENT
Start: 2019-04-23 | End: 2019-06-14 | Stop reason: SDUPTHER

## 2019-04-23 RX ORDER — DESVENLAFAXINE 100 MG/1
100 TABLET, EXTENDED RELEASE ORAL DAILY
Qty: 30 TABLET | Refills: 2 | Status: SHIPPED | OUTPATIENT
Start: 2019-04-23 | End: 2019-06-14 | Stop reason: SDUPTHER

## 2019-04-24 ENCOUNTER — PATIENT MESSAGE (OUTPATIENT)
Dept: FAMILY MEDICINE CLINIC | Age: 34
End: 2019-04-24

## 2019-04-24 RX ORDER — MIRTAZAPINE 15 MG/1
TABLET, FILM COATED ORAL
Qty: 30 TABLET | Refills: 0 | Status: SHIPPED | OUTPATIENT
Start: 2019-04-24 | End: 2019-05-27 | Stop reason: SDUPTHER

## 2019-04-24 RX ORDER — BENZONATATE 100 MG/1
100 CAPSULE ORAL 2 TIMES DAILY PRN
Qty: 20 CAPSULE | Refills: 0 | Status: SHIPPED | OUTPATIENT
Start: 2019-04-24 | End: 2019-05-01

## 2019-04-24 NOTE — TELEPHONE ENCOUNTER
From: Shashi Grant  To: Mohan Morrison MD  Sent: 4/24/2019 9:30 AM EDT  Subject: Prescription Question    My insurance doesn't cover the Mucinex, that you prescribed. Will you PLEASE call in something for this cough? I would appreciate it. Thank you.

## 2019-04-24 NOTE — TELEPHONE ENCOUNTER
.  Last office visit 4/23/2019     Last written 3/26/19 #30 no refills    Next office visit scheduled 5/13/2019    Requested Prescriptions     Pending Prescriptions Disp Refills    mirtazapine (REMERON) 15 MG tablet [Pharmacy Med Name: MIRTAZAPINE 15 MG TABLET] 30 tablet 0     Sig: TAKE ONE TABLET BY MOUTH ONCE NIGHTLY

## 2019-05-06 NOTE — PROGRESS NOTES
Subjective:       Alexia Figueroa is a 26 y.o. female who presents for evaluation of chronic cough post tracheostomy. Ongoing. Continues to smoke. Didn't get kicked out of mom's house, still need to get her the neb machine. Struggles with health, family relationships. Denies suicidal thoughts and plans. Depression is not improving and she needs to see psychiatry. Patient's medications, allergies, past medical, surgical, social and family histories were reviewed and updated as appropriate. Review of Systems  Constitutional: negative for fatigue and fevers  Ears, nose, mouth, throat, and face: negative for earaches and nasal congestion  Respiratory: positive for cough and sputum  Behavioral/Psych: positive for bad mood and mood swings, negative for aggressive behavior and behavior problems     Objective:      BP (!) 140/110   Pulse 118   Temp 98.5 °F (36.9 °C) (Oral)   Resp 19   Wt 204 lb (92.5 kg)   LMP 04/15/2019 (Exact Date)   SpO2 98%   BMI 31.95 kg/m²   General appearance: alert, appears stated age and cooperative  Ears: normal TM's and external ear canals both ears  Nose: Nares normal. Septum midline. Mucosa normal. No drainage or sinus tenderness. Throat: lips, mucosa, and tongue normal; teeth and gums normal  Neck: no adenopathy, no carotid bruit, no JVD, supple, symmetrical, trachea midline and thyroid not enlarged, symmetric, no tenderness/mass/nodules  Lungs: clear to auscultation bilaterally  Heart: regular rate and rhythm, S1, S2 normal, no murmur, click, rub or gallop   Psych: tearful throughout visit, depressed, normal range of emotion and affect. Assessment:     Bryant Holbhupinder was seen today for depression, gastroesophageal reflux, cough, nicotine dependence and manic behavior.     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 -     Respiratory Therapy Supplies (NEBULIZER COMPRESSOR) KIT; Use Q6 hours as needed with Duoneb. Disp: Nebulizer Machine #1 no refill and disposable Nebulizer tubing and mouthpiece Disp: 3 months supply with 3 refills  -     fluticasone (FLOVENT HFA) 110 MCG/ACT inhaler; Inhale 2 puffs into the lungs 2 times daily  -     Dextromethorphan-guaiFENesin (MUCINEX DM MAXIMUM STRENGTH)  MG TB12; Take 1 tablet by mouth 2 times daily  -     desvenlafaxine succinate (PRISTIQ) 100 MG TB24 extended release tablet; Take 1 tablet by mouth daily  -     lamoTRIgine (LAMICTAL) 25 MG tablet; Take 1 tablet by mouth daily for 7 days, THEN 2 tablets daily for 7 days, THEN 3 tablets daily for 7 days, THEN 4 tablets daily for 7 days. Trial of nebs to help cough up phlegm. Smoking and discussed cessation, behavioral changes, dependence, medication uses and side effects. Consider follow up with Dr. Rajiv Lara. Support given for her social circumstances. Plan:        As above and per orders.

## 2019-05-13 RX ORDER — TRAZODONE HYDROCHLORIDE 50 MG/1
TABLET ORAL
Qty: 60 TABLET | Refills: 0 | Status: SHIPPED | OUTPATIENT
Start: 2019-05-13 | End: 2019-06-09 | Stop reason: SDUPTHER

## 2019-05-17 ENCOUNTER — TELEPHONE (OUTPATIENT)
Dept: FAMILY MEDICINE CLINIC | Age: 34
End: 2019-05-17

## 2019-05-17 RX ORDER — LAMOTRIGINE 25 MG/1
TABLET ORAL
Qty: 30 TABLET | Refills: 1 | Status: CANCELLED | OUTPATIENT
Start: 2019-05-17

## 2019-05-17 NOTE — TELEPHONE ENCOUNTER
Please confirm if patient is taking the 100 mg dose (four 25 mg tabs) if so, I will send in the 100mg tab instead.

## 2019-05-23 ENCOUNTER — OFFICE VISIT (OUTPATIENT)
Dept: ENT CLINIC | Age: 34
End: 2019-05-23
Payer: COMMERCIAL

## 2019-05-23 VITALS
DIASTOLIC BLOOD PRESSURE: 76 MMHG | WEIGHT: 205.3 LBS | HEIGHT: 67 IN | SYSTOLIC BLOOD PRESSURE: 138 MMHG | TEMPERATURE: 98.3 F | BODY MASS INDEX: 32.22 KG/M2

## 2019-05-23 DIAGNOSIS — R49.0 DYSPHONIA: ICD-10-CM

## 2019-05-23 DIAGNOSIS — J98.8 AIRWAY OBSTRUCTION: ICD-10-CM

## 2019-05-23 DIAGNOSIS — R05.9 COUGH: Primary | ICD-10-CM

## 2019-05-23 DIAGNOSIS — Z92.3 STATUS POST RADIATION THERAPY GREATER THAN TWELVE WEEKS AGO: ICD-10-CM

## 2019-05-23 DIAGNOSIS — M54.2 NECK PAIN: ICD-10-CM

## 2019-05-23 DIAGNOSIS — J38.4 LARYNGEAL EDEMA DETERMINED BY LARYNGOSCOPY: ICD-10-CM

## 2019-05-23 DIAGNOSIS — C32.9 CARCINOMA LARYNX (HCC): ICD-10-CM

## 2019-05-23 PROCEDURE — 31575 DIAGNOSTIC LARYNGOSCOPY: CPT | Performed by: OTOLARYNGOLOGY

## 2019-05-23 PROCEDURE — 99214 OFFICE O/P EST MOD 30 MIN: CPT | Performed by: OTOLARYNGOLOGY

## 2019-05-23 RX ORDER — NAPROXEN 500 MG/1
TABLET ORAL
Qty: 60 TABLET | Refills: 1 | Status: SHIPPED | OUTPATIENT
Start: 2019-05-23 | End: 2020-02-12 | Stop reason: ALTCHOICE

## 2019-05-23 RX ORDER — GABAPENTIN 100 MG/1
100 CAPSULE ORAL NIGHTLY
Qty: 30 CAPSULE | Refills: 1 | Status: SHIPPED | OUTPATIENT
Start: 2019-05-23 | End: 2020-02-12 | Stop reason: SDUPTHER

## 2019-05-24 RX ORDER — LAMOTRIGINE 100 MG/1
100 TABLET ORAL DAILY
Qty: 30 TABLET | Refills: 5 | Status: SHIPPED | OUTPATIENT
Start: 2019-05-24 | End: 2019-10-25

## 2019-05-25 NOTE — PROGRESS NOTES
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 R tracheotomy for safe biopsy. Underwent radiation therapy. Had post radiation vocal cord edema requiring reopening her tracheotomy. Last seen by me in January 2018. Was not comfortable with the cannulating her and  obtained a second opinion from [place]. He was ultimately decannulated by [facility] ENT department at [facility]. He has persistent intermittent airway obstruction, hoarseness, and a chronic cough. Subsequent PET scans and indicated no evidence of recurrent carcinoma. PAST MEDICAL HISTORY:   Social History     Tobacco Use   Smoking Status Current Every Day Smoker    Packs/day: 0.50    Years: 18.00    Pack years: 9.00    Types: Cigarettes    Start date: 10/19/2013   Smokeless Tobacco Never Used                                                    Social History     Substance and Sexual Activity   Alcohol Use No    Alcohol/week: 12.0 oz    Types: 10 Cans of beer, 10 Shots of liquor per week    Comment: none for 120 days                                                    Current Outpatient Medications:     gabapentin (NEURONTIN) 100 MG capsule, Take 1 capsule by mouth nightly for 30 days. , Disp: 30 capsule, Rfl: 1    lamoTRIgine (LAMICTAL) 100 MG tablet, Take 1 tablet by mouth daily, Disp: 30 tablet, Rfl: 5    naproxen (NAPROSYN) 500 MG tablet, TAKE ONE TABLET BY MOUTH TWICE A DAY WITH MEALS, Disp: 60 tablet, Rfl: 1    ARIPiprazole (ABILIFY) 10 MG tablet, TAKE ONE TABLET BY MOUTH DAILY, Disp: 30 tablet, Rfl: 2    traZODone (DESYREL) 50 MG tablet, TAKE TWO TABLETS BY MOUTH ONCE NIGHTLY AS NEEDED FOR SLEEP, Disp: 60 tablet, Rfl: 0    Respiratory Therapy Supplies (NEBULIZER COMPRESSOR) KIT, Use Q6 hours as needed with Duoneb. Dispense: Nebulizer Compressor Machine, mouthpiece and tubing.  DX: Airway Obstruction J98.8, Disp: 1 kit, Rfl: 0    mirtazapine (REMERON) 15 MG tablet, TAKE ONE TABLET BY MOUTH ONCE NIGHTLY, Disp: 30 tablet, Rfl: 0    pantoprazole (PROTONIX) 20 MG tablet, TAKE ONE TABLET BY MOUTH DAILY, Disp: 30 tablet, Rfl: 5    albuterol sulfate HFA (PROVENTIL HFA) 108 (90 Base) MCG/ACT inhaler, Inhale 2 puffs into the lungs every 4 hours as needed for Wheezing or Shortness of Breath (Space out to every 6 hours as symptoms improve) Space out to every 6 hours as symptoms improve., Disp: 1 Inhaler, Rfl: 2    Respiratory Therapy Supplies (NEBULIZER COMPRESSOR) KIT, Use Q6 hours as needed with Duoneb. Disp: Nebulizer Machine #1 no refill and disposable Nebulizer tubing and mouthpiece Disp: 3 months supply with 3 refills, Disp: 1 kit, Rfl: 0    fluticasone (FLOVENT HFA) 110 MCG/ACT inhaler, Inhale 2 puffs into the lungs 2 times daily, Disp: 1 Inhaler, Rfl: 3    desvenlafaxine succinate (PRISTIQ) 100 MG TB24 extended release tablet, Take 1 tablet by mouth daily, Disp: 30 tablet, Rfl: 2    lamoTRIgine (LAMICTAL) 25 MG tablet, Take 1 tablet by mouth daily for 7 days, THEN 2 tablets daily for 7 days, THEN 3 tablets daily for 7 days, THEN 4 tablets daily for 7 days. , Disp: 70 tablet, Rfl: 0    ipratropium-albuterol (DUONEB) 0.5-2.5 (3) MG/3ML SOLN nebulizer solution, Inhale 3 mLs into the lungs 4 times daily, Disp: 120 vial, Rfl: 5    acetaminophen (AMINOFEN) 325 MG tablet, Take 2 tablets by mouth every 6 hours as needed for Pain, Disp: 120 tablet, Rfl: 3    SUMAtriptan (IMITREX) 25 MG tablet, TAKE ONE TABLET BY MOUTH AT ONSET OF HEADACHE; MAY REPEAT ONE TABLET IN 2 HOURS IF NEEDED., Disp: 9 tablet, Rfl: 5    Norgestim-Eth Estrad Triphasic 0.18/0.215/0.25 MG-25 MCG TABS, Take 1 tablet by mouth daily, Disp: 28 tablet, Rfl: 12    ondansetron (ZOFRAN ODT) 4 MG disintegrating tablet, Take 1 tablet by mouth every 8 hours as needed for Nausea, Disp: 20 tablet, Rfl: 0                                                 Past Medical History:   Diagnosis Date    Anemia     Anxiety     Back pain  Bipolar affective disorder ([facility] [address]     Depression     Migraine     Opiate abuse, continuous ([facility] [address]     Primary cancer of larynx ([facility] [address] 7/18/2017    PTSD (post-traumatic stress disorder)                                                     Past Surgical History:   Procedure Laterality Date    OTHER SURGICAL HISTORY Left 06/28/2017    MICROLARYNGOSCOPY WITH BIOPSY OF LEFT TRUE VOCAL CORD MASS    OTHER SURGICAL HISTORY  08/10/2017    Tracheostomy    TONSILLECTOMY      TRACHEOSTOMY      TRACHEOTOMY N/A 12/14/2017     TRACHEOTOMY WITH 6DCT PA[place]LEY                   REVIEW OF SYSTEMS:  All pertinent positive and negative findings included in HPI. Otherwise, all other systems are reviewed and are negative    PHYSICAL EXAMINATION:   GENERAL: wdwn- no acute distress  COMMUNICATION :  Normal voice  MENTAL STATUS:  Normal  HEAD AND FACE:  Normal  EXTERNAL EARS AND NOSE:  Normal  FACIAL MUSCLES:  Normal  FACE PALPATION:  Negative  OTOSCOPY:  Normal tympanic membranes and middle ear spaces  TUNING FORKS:  Rinne ++ Courtney midline at 512 Hz  INTRANASAL:  Septum midline, turbinates normal, meati clear. LIPS, TEETH, GINGIVA:  Normal mucosa  PHARYNX:  Normal  NECK:  No masses or adenopathy. Well-healed tracheotomy scar. SALIVARY GLANDS:  Normal  THYROID:  Normal  To assess the status of the larynx, fiberoptic laryngoscopy is performed. FIBEROPTIC LARYNGOSCOPY:  Nares topically anaesthetized with lidocaine spray. Fiberoptic scope passed per naris into nasopharynx and hypopharyrnx and larynx visualized. Normal tongue base                                                          Normal epiglottis                                                         Edema of both vocal cords. There is some cicatrix formation at the anterior commissure with compromise of the glottic airway.                                                           Normal pyriform sinuses    IMPRESSION: Laryngeal scarring following radiation for carcinoma of the larynx. No apparent recurrent tumor. PLAN: After long discussion with patient agreed to return to the operating room and reopen the tracheotomy for safety sake. I will perform a microlaryngoscopy and used the CO2 laser to lyse any adhesions and takedown scar tissue and try and give her a better airway. So prescribed gabapentin 100 mg to see if this would control her cough. FOLLOW-UP: At surgery.

## 2019-05-31 RX ORDER — MIRTAZAPINE 15 MG/1
TABLET, FILM COATED ORAL
Qty: 30 TABLET | Refills: 0 | Status: SHIPPED | OUTPATIENT
Start: 2019-05-31 | End: 2019-06-12 | Stop reason: SDUPTHER

## 2019-06-10 NOTE — TELEPHONE ENCOUNTER
.  Last office visit 4/23/2019     Last written 2-4-19 90 with 3      Next office visit scheduled 6/14/2019    Requested Prescriptions     Pending Prescriptions Disp Refills    busPIRone (BUSPAR) 7.5 MG tablet [Pharmacy Med Name: busPIRone HCL 7.5 MG TABLET] 90 tablet 2     Sig: TAKE ONE TABLET BY MOUTH THREE TIMES A DAY AS NEEDED FOR ANXIETY

## 2019-06-10 NOTE — TELEPHONE ENCOUNTER
.  Last office visit 8/29/2018     Last written 5-13-19 60 with 0      Next office visit scheduled 6-14-19    Requested Prescriptions     Pending Prescriptions Disp Refills    traZODone (DESYREL) 50 MG tablet [Pharmacy Med Name: traZODone 50 MG TABLET] 60 tablet 0     Sig: TAKE TWO TABLETS BY MOUTH ONCE NIGHTLY AS NEEDED FOR SLEEP

## 2019-06-13 RX ORDER — BUSPIRONE HYDROCHLORIDE 7.5 MG/1
TABLET ORAL
Qty: 90 TABLET | Refills: 2 | Status: SHIPPED | OUTPATIENT
Start: 2019-06-13 | End: 2019-09-08 | Stop reason: SDUPTHER

## 2019-06-13 RX ORDER — TRAZODONE HYDROCHLORIDE 50 MG/1
TABLET ORAL
Qty: 60 TABLET | Refills: 0 | Status: SHIPPED | OUTPATIENT
Start: 2019-06-13 | End: 2019-10-25 | Stop reason: ALTCHOICE

## 2019-06-13 NOTE — PROGRESS NOTES
[facility] [employer] / [facility] [place] ([facility]) [address] [facility], [address]    Acknowledgment of Informed Consent for Surgical or Medical Procedure and Sedation  I agree to allow doctor(s) TRISTIN GONZALEZ and his/her associates or assistants, including residents and/or other qualified medical practitioner to perform the following medical treatment or procedure and to administer or direct the administration of sedation as necessary:  Procedure(s): TRACHEOTOMY, MICROLARYNGOSCOPY WITH CO2 EXCISION OF TUMOR  My doctor has explained the following regarding the proposed procedure:   the explanation of the procedure   the benefits of the procedure   the potential problems that might occur during recuperation   the risks and side effects of the procedure which could include but are not limited to severe blood loss, infection, stroke or death   the benefits, risks and side effect of alternative procedures including the consequences of declining this procedure or any alternative procedures   the likelihood of achieving satisfactory results. I acknowledge no guarantee or assurance has been made to me regarding the results. I understand that during the course of this treatment/procedure, unforeseen conditions can occur which require an additional or different procedure. I agree to allow my physician or assistants to perform such extension of the original procedure as they may find necessary. I understand that sedation will often result in temporary impairment of memory and fine motor skills and that sedation can occasionally progress to a state of deep sedation or general anesthesia. I understand the risks of anesthesia for surgery include, but are not limited to, sore throat, hoarseness, injury to face, mouth, or teeth; nausea; headache; injury to blood vessels or nerves; death, brain damage, or paralysis.     I understand that if I have a Limitation of Treatment order in effect during my hospitalization, the order may or may not be in effect during this procedure. I give my doctor permission to give me blood or blood products. I understand that there are risks with receiving blood such as hepatitis, AIDS, fever, or allergic reaction. I acknowledge that the risks, benefits, and alternatives of this treatment have been explained to me and that no express or implied warranty has been given by the hospital, any blood bank, or any person or entity as to the blood or blood components transfused. At the discretion of my doctor, I agree to allow observers, equipment/product representatives and allow photographing, and/or televising of the procedure, provided my name or identity is maintained confidentially. I agree the hospital may dispose of or use for scientific or educational purposes any tissue, fluid, or body parts which may be removed.     ________________________________Date________Time______ am/pm  ([place] [facility])  Patient or Signature of Closest Relative or Legal Guardian    ________________________________Date________Time______am/pm      Page 1 of  1  Witness

## 2019-06-14 ENCOUNTER — HOSPITAL ENCOUNTER (OUTPATIENT)
Age: 34
Discharge: HOME | End: 2019-06-14
Payer: COMMERCIAL

## 2019-06-14 ENCOUNTER — HOSPITAL ENCOUNTER (OUTPATIENT)
Dept: GENERAL RADIOLOGY | Age: 34
Discharge: HOME | End: 2019-06-14
Payer: COMMERCIAL

## 2019-06-14 ENCOUNTER — OFFICE VISIT (OUTPATIENT)
Dept: FAMILY MEDICINE CLINIC | Age: 34
End: 2019-06-14
Payer: COMMERCIAL

## 2019-06-14 VITALS
RESPIRATION RATE: 18 BRPM | DIASTOLIC BLOOD PRESSURE: 86 MMHG | SYSTOLIC BLOOD PRESSURE: 124 MMHG | OXYGEN SATURATION: 98 % | WEIGHT: 206 LBS | HEIGHT: 66 IN | BODY MASS INDEX: 33.11 KG/M2 | HEART RATE: 41 BPM | TEMPERATURE: 98.6 F

## 2019-06-14 DIAGNOSIS — J98.8 AIRWAY OBSTRUCTION: Primary | ICD-10-CM

## 2019-06-14 DIAGNOSIS — R05.9 COUGH: ICD-10-CM

## 2019-06-14 DIAGNOSIS — F31.32 BIPOLAR AFFECTIVE DISORDER, CURRENTLY DEPRESSED, MODERATE (HCC): ICD-10-CM

## 2019-06-14 DIAGNOSIS — Z01.818 PRE-OP EXAM: ICD-10-CM

## 2019-06-14 PROCEDURE — 71046 X-RAY EXAM CHEST 2 VIEWS: CPT

## 2019-06-14 PROCEDURE — G8427 DOCREV CUR MEDS BY ELIG CLIN: HCPCS | Performed by: INTERNAL MEDICINE

## 2019-06-14 PROCEDURE — 4004F PT TOBACCO SCREEN RCVD TLK: CPT | Performed by: INTERNAL MEDICINE

## 2019-06-14 PROCEDURE — 99214 OFFICE O/P EST MOD 30 MIN: CPT | Performed by: INTERNAL MEDICINE

## 2019-06-14 PROCEDURE — G8417 CALC BMI ABV UP PARAM F/U: HCPCS | Performed by: INTERNAL MEDICINE

## 2019-06-14 RX ORDER — FLUTICASONE PROPIONATE 110 UG/1
2 AEROSOL, METERED RESPIRATORY (INHALATION) 2 TIMES DAILY
Qty: 1 INHALER | Refills: 5 | Status: SHIPPED | OUTPATIENT
Start: 2019-06-14 | End: 2021-01-01

## 2019-06-14 RX ORDER — DESVENLAFAXINE 100 MG/1
100 TABLET, EXTENDED RELEASE ORAL DAILY
Qty: 30 TABLET | Refills: 5 | Status: SHIPPED | OUTPATIENT
Start: 2019-06-14 | End: 2019-11-04 | Stop reason: SDUPTHER

## 2019-06-14 RX ORDER — ARIPIPRAZOLE 10 MG/1
TABLET ORAL
Qty: 30 TABLET | Refills: 5 | Status: CANCELLED | OUTPATIENT
Start: 2019-06-14

## 2019-06-14 RX ORDER — ALBUTEROL SULFATE 90 UG/1
2 AEROSOL, METERED RESPIRATORY (INHALATION) EVERY 4 HOURS PRN
Qty: 1 INHALER | Refills: 5 | Status: SHIPPED | OUTPATIENT
Start: 2019-06-14 | End: 2021-01-01 | Stop reason: ALTCHOICE

## 2019-06-14 RX ORDER — TRAZODONE HYDROCHLORIDE 50 MG/1
TABLET ORAL
Qty: 60 TABLET | Refills: 5 | Status: ON HOLD | OUTPATIENT
Start: 2019-06-14 | End: 2019-06-20 | Stop reason: SDUPTHER

## 2019-06-14 RX ORDER — MIRTAZAPINE 15 MG/1
TABLET, FILM COATED ORAL
Qty: 30 TABLET | Refills: 5 | Status: SHIPPED | OUTPATIENT
Start: 2019-06-14 | End: 2019-10-25 | Stop reason: SDUPTHER

## 2019-06-14 NOTE — PATIENT INSTRUCTIONS
Address: [address] [place]     Phone: [phone]  [place]ul [address] [place] [address], above number    Wean off abilify, take half daily for one week, then every other day for one week, then stop.     [facility]   Website   Directions   Save   3.217 Google reviews     Doctor in [facility]     Address: [facility] [address] [facility]   Hours:   Open now ·   Add full hours         Phone: [phone]

## 2019-06-14 NOTE — PROGRESS NOTES
Preoperative Consultation      [facility]  YOB: 1985    Date of Service:  6/14/2019    Vitals:    06/14/19 0913 06/14/19 0923   BP: (!) 126/92 124/86   Site: Left Upper Arm    Position: Sitting    Cuff Size: Small Adult    Pulse: (!) 41    Resp: 18    Temp: 98.6 °F (37 °C)    TempSrc: Oral    SpO2: 98%    Weight: 206 lb (93.4 kg)    Height: 5' 6.14\" (1.68 m)       Wt Readings from Last 2 Encounters:   06/14/19 206 lb (93.4 kg)   05/23/19 205 lb 4.8 oz (93.1 kg)     BP Readings from Last 3 Encounters:   06/14/19 124/86   05/23/19 138/76   04/23/19 (!) 140/110        Chief Complaint   Patient presents with   Dr. Tootie Torres, fax [phone], 6/20/19     Discuss Medications     THROAT PAIN- DR. GONZALEZ GAVE GABAPENTIN NOT WORKING     Migraine     takes imitrex     Manic Behavior    Gastroesophageal Reflux    Nicotine Dependence     Allergies   Allergen Reactions    Amoxicillin-Pot Clavulanate      vomiting     Outpatient Medications Marked as Taking for the 6/14/19 encounter (Office Visit) with Chelsie Patrick MD   Medication Sig Dispense Refill    busPIRone (BUSPAR) 7.5 MG tablet TAKE ONE TABLET BY MOUTH THREE TIMES A DAY AS NEEDED FOR ANXIETY 90 tablet 2    ARIPiprazole (ABILIFY) 10 MG tablet TAKE ONE TABLET BY MOUTH DAILY 30 tablet 2    albuterol sulfate HFA (PROVENTIL HFA) 108 (90 Base) MCG/ACT inhaler Inhale 2 puffs into the lungs every 4 hours as needed for Wheezing or Shortness of Breath (Space out to every 6 hours as symptoms improve) Space out to every 6 hours as symptoms improve.  1 Inhaler 2    desvenlafaxine succinate (PRISTIQ) 100 MG TB24 extended release tablet Take 1 tablet by mouth daily 30 tablet 2    acetaminophen (AMINOFEN) 325 MG tablet Take 2 tablets by mouth every 6 hours as needed for Pain 120 tablet 3       This patient presents to the office today for a preoperative consultation at the request of surgeon,  Josi, who plans on performing tracheostomy on June 20 at [facility].  The current problem began 1 year ago, and symptoms have been unchanged with time. Conservative therapy: Nebs which have not been effective.     Planned anesthesia: General   Known anesthesia problems: None   Bleeding risk: No recent or remote history of abnormal bleeding  Personal or FH of DVT/PE: No    Patient objection to receiving blood products: No    Patient Active Problem List   Diagnosis    Migraine    Anemia    Bipolar affective disorder ([address]    Adjustment insomnia    Intractable left heel pain    Bipolar affective disorder, currently depressed, moderate ([address]    Laryngeal carcinoma ([address]    Airway obstruction    HTN (hypertension)    Status post radiation therapy within four to twelve weeks    Laryngeal cancer ([address]    Hoarseness or changing voice    Neck pain due to malignant neoplastic disease ([address]       Past Medical History:   Diagnosis Date    Anemia     Anxiety     Back pain     Bipolar affective disorder ([address]     Depression     Migraine     Opiate abuse, continuous ([address]     Primary cancer of larynx ([address] 7/18/2017    PTSD (post-traumatic stress disorder)      Past Surgical History:   Procedure Laterality Date    OTHER SURGICAL HISTORY Left 06/28/2017    MICROLARYNGOSCOPY WITH BIOPSY OF LEFT TRUE VOCAL CORD MASS    OTHER SURGICAL HISTORY  08/10/2017    Tracheostomy    TONSILLECTOMY      TRACHEOSTOMY      TRACHEOTOMY N/A 12/14/2017     TRACHEOTOMY WITH 6DCT [place]               Family History   Problem Relation Age of Onset    High Blood Pressure Mother     High Blood Pressure Father     Diabetes Maternal Uncle      Social History     Socioeconomic History    Marital status: Single     Spouse name: Not on file    Number of children: 3    Years of education: 15    Highest education level: Not on file   Occupational History     Comment: unemployed    Social Needs    Financial resource strain: Not on file    Food insecurity:     Worry: Not on file     Inability: Not on file    Transportation needs:     Medical: Not on file     Non-medical: Not on file   Tobacco Use    Smoking status: Current Every Day Smoker     Packs/day: 0.50     Years: 18.00     Pack years: 9.00     Types: Cigarettes     Start date: 10/19/2013    Smokeless tobacco: Never Used   Substance and Sexual Activity    Alcohol use: No     Alcohol/week: 12.0 oz     Types: 10 Cans of beer, 10 Shots of liquor per week     Comment: none for 120 days    Drug use: Yes     Types: Marijuana, Opiates     Sexual activity: Yes     Partners: Male   Lifestyle    Physical activity:     Days per week: Not on file     Minutes per session: Not on file    Stress: Not on file   Relationships    Social connections:     Talks on phone: Not on file     Gets together: Not on file     Attends Pentecostal service: Not on file     Active member of club or organization: Not on file     Attends meetings of clubs or organizations: Not on file     Relationship status: Not on file    Intimate partner violence:     Fear of current or ex partner: Not on file     Emotionally abused: Not on file     Physically abused: Not on file     Forced sexual activity: Not on file   Other Topics Concern    Not on file   Social History Narrative    Not on file       Review of Systems  A comprehensive review of systems was negative except for what was noted in the HPI. Physical Exam   Constitutional: She is oriented to person, place, and time. She appears well-developed and well-nourished. No distress. HENT:   Head: Normocephalic and atraumatic. Mouth/Throat: Uvula is midline, oropharynx is clear and moist and mucous membranes are normal.   Eyes: Conjunctivae and EOM are normal. Pupils are equal, round, and reactive to light. Neck: Trachea normal, firm soft tissue enlargement bilateral upper trachea Neck supple. No JVD present.  Carotid bruit is not present. No mass and no thyromegaly present. Cardiovascular: Normal rate, regular rhythm, normal heart sounds and intact distal pulses. Exam reveals no gallop and no friction rub. No murmur heard. Pulmonary/Chest: Effort normal and breath sounds normal. No respiratory distress. She has no wheezes. She has no rales. Abdominal: Soft. Normal aorta and bowel sounds are normal. She exhibits no distension and no mass. There is no hepatosplenomegaly. No tenderness. Musculoskeletal: She exhibits no edema and no tenderness. Neurological: She is alert and oriented to person, place, and time. She has normal strength. No cranial nerve deficit or sensory deficit. Coordination and gait normal.   Skin: Skin is warm and dry. No rash noted. No erythema. Psychiatric: She has a normal mood and affect. Her behavior is normal.     EKG Interpretation:  Not completed or required. Lab Review   No visits with results within 2 Month(s) from this visit.    Latest known visit with results is:   Admission on 12/30/2018, Discharged on 12/30/2018   Component Date Value    WBC 12/30/2018 15.5*    RBC 12/30/2018 4.49     Hemoglobin 12/30/2018 12.5     Hematocrit 12/30/2018 38.4     MCV 12/30/2018 85.5     MCH 12/30/2018 27.9     MCHC 12/30/2018 32.6     RDW 12/30/2018 17.2*    Platelets 21/52/1770 308     MPV 12/30/2018 7.3     Sodium 12/30/2018 133*    Potassium 12/30/2018 3.8     Chloride 12/30/2018 99     CO2 12/30/2018 24     Anion Gap 12/30/2018 10     Glucose 12/30/2018 122*    BUN 12/30/2018 9     CREATININE 12/30/2018 0.6     GFR Non- 12/30/2018 >60     GFR  12/30/2018 >60     Calcium 12/30/2018 8.9     Total Protein 12/30/2018 7.4     Alb 12/30/2018 4.3     Albumin/Globulin Ratio 12/30/2018 1.4     Total Bilirubin 12/30/2018 <0.2     Alkaline Phosphatase 12/30/2018 88     ALT 12/30/2018 10     AST 12/30/2018 10*    Globulin 12/30/2018 3.1     Lactic Acid

## 2019-06-19 ENCOUNTER — ANESTHESIA EVENT (OUTPATIENT)
Dept: OPERATING ROOM | Age: 34
End: 2019-06-19
Payer: COMMERCIAL

## 2019-06-19 NOTE — PROGRESS NOTES
PRE-OP INSTRUCTIONS FOR THE SURGICAL PATIENT YOU ARE UNABLE TO MAKE CONTACT FOR AN INTERVIEW:      1. Follow instructions for your ARRIVAL TIME as DIRECTED BY YOUR SURGEON. 2. Enter the MAIN entrance located on [address] and report to the desk. 3. Bring your insurance & prescription card and photo ID with you. You may also be asked to pay a co-pay, as you may want to bring a check or credit card with you. 4. Leave all other valuables at home. 5. Arrange for someone to drive you home and be with you for the first 24 hours after discharge. 6. You must contact your surgeon for Instructions regarding:         - ALL medication instructions, especially if taking blood thinners, aspirin, or diabetic medication.  - IF  there is a change in your physical condition such as a cold, fever, rash, cuts, sores or any other infection, especially near your surgical site. 7. A Pre-op History and Physical for surgery MUST be completed by your Physician or an Urgent Care within 30 days of your procedure date. Please bring a copy with you on the day of your procedure and along with any other testing performed. 8. DO NOT EAT ANYTHING eight hours prior to surgery. May have up to 8 ounces of water 4 hours prior to surgery. 9. No gum, candy, mints, or ice chips day of procedure. 10. Please refrain from drinking alcohol the day before or day of your procedure. 11. Please do not smoke the day of your procedure. 12. Dress in loose, comfortable clothing appropriate for redressing after your procedure. Do not wear jewelry (including body piercings), make-up, fingernail polish, lotion, powders or metal hairclips. 15. Contacts will need to be removed prior to surgery. You may want to bring your            Eye glasses to wear immediately before and after surgery. 14. Dentures will need to be removed before your procedure.    13. Bring cases for your glasses, contacts, dentures, or hearing aids to protect them while you are in surgery. 16. If you use a CPAP, please bring it with you on the day of your procedure. 17. Do not shave or wax for 72 hours prior to procedure near your operative site  18. FOR WOMAN OF CHILDBEARING AGE ONLY- please bring a urine sample with you on day of surgery or make sure we can collect on arrival.    If you have further questions, you may contact us at [phone]    Left instructions on patient's voicemail. Che Degroot. 6/19/2019 .1:07 PM      H&P ON CHART

## 2019-06-20 ENCOUNTER — HOSPITAL ENCOUNTER (OUTPATIENT)
Age: 34
Setting detail: SURGERY ADMIT
Discharge: HOME | End: 2019-06-20
Attending: OTOLARYNGOLOGY | Admitting: OTOLARYNGOLOGY
Payer: COMMERCIAL

## 2019-06-20 ENCOUNTER — ANESTHESIA (OUTPATIENT)
Dept: OPERATING ROOM | Age: 34
End: 2019-06-20
Payer: COMMERCIAL

## 2019-06-20 VITALS
BODY MASS INDEX: 32.78 KG/M2 | WEIGHT: 204 LBS | HEIGHT: 66 IN | RESPIRATION RATE: 16 BRPM | SYSTOLIC BLOOD PRESSURE: 104 MMHG | DIASTOLIC BLOOD PRESSURE: 67 MMHG | HEART RATE: 91 BPM | OXYGEN SATURATION: 93 % | TEMPERATURE: 96.2 F

## 2019-06-20 VITALS — DIASTOLIC BLOOD PRESSURE: 102 MMHG | OXYGEN SATURATION: 94 % | TEMPERATURE: 97 F | SYSTOLIC BLOOD PRESSURE: 149 MMHG

## 2019-06-20 DIAGNOSIS — J38.6 LARYNGEAL STENOSIS: Primary | ICD-10-CM

## 2019-06-20 LAB
ABO/RH: NORMAL
ANION GAP SERPL CALCULATED.3IONS-SCNC: 13 MMOL/L (ref 3–16)
ANTIBODY SCREEN: NORMAL
BUN BLDV-MCNC: 12 MG/DL (ref 7–20)
CALCIUM SERPL-MCNC: 8.9 MG/DL (ref 8.3–10.6)
CHLORIDE BLD-SCNC: 103 MMOL/L (ref 99–110)
CO2: 24 MMOL/L (ref 21–32)
CREAT SERPL-MCNC: 0.8 MG/DL (ref 0.6–1.1)
GFR AFRICAN AMERICAN: >60
GFR NON-AFRICAN AMERICAN: >60
GLUCOSE BLD-MCNC: 92 MG/DL (ref 70–99)
GLUCOSE BLD-MCNC: 95 MG/DL (ref 70–99)
HCT VFR BLD CALC: 32.3 % (ref 36–48)
HEMOGLOBIN: 10.5 G/DL (ref 12–16)
MCH RBC QN AUTO: 26.9 PG (ref 26–34)
MCHC RBC AUTO-ENTMCNC: 32.6 G/DL (ref 31–36)
MCV RBC AUTO: 82.6 FL (ref 80–100)
PDW BLD-RTO: 16.8 % (ref 12.4–15.4)
PERFORMED ON: NORMAL
PLATELET # BLD: 273 K/UL (ref 135–450)
PMV BLD AUTO: 7.6 FL (ref 5–10.5)
POTASSIUM SERPL-SCNC: 3.8 MMOL/L (ref 3.5–5.1)
PREGNANCY, URINE: NEGATIVE
RBC # BLD: 3.91 M/UL (ref 4–5.2)
SODIUM BLD-SCNC: 140 MMOL/L (ref 136–145)
WBC # BLD: 9.4 K/UL (ref 4–11)

## 2019-06-20 PROCEDURE — 3600000003 HC SURGERY LEVEL 3 BASE: Performed by: OTOLARYNGOLOGY

## 2019-06-20 PROCEDURE — 86850 RBC ANTIBODY SCREEN: CPT

## 2019-06-20 PROCEDURE — 2500000003 HC RX 250 WO HCPCS: Performed by: NURSE ANESTHETIST, CERTIFIED REGISTERED

## 2019-06-20 PROCEDURE — 85027 COMPLETE CBC AUTOMATED: CPT

## 2019-06-20 PROCEDURE — 31541 LARYNSCOP W/TUMR EXC + SCOPE: CPT | Performed by: OTOLARYNGOLOGY

## 2019-06-20 PROCEDURE — 3600000013 HC SURGERY LEVEL 3 ADDTL 15MIN: Performed by: OTOLARYNGOLOGY

## 2019-06-20 PROCEDURE — 6370000000 HC RX 637 (ALT 250 FOR IP): Performed by: OTOLARYNGOLOGY

## 2019-06-20 PROCEDURE — 6360000002 HC RX W HCPCS: Performed by: ANESTHESIOLOGY

## 2019-06-20 PROCEDURE — 86900 BLOOD TYPING SEROLOGIC ABO: CPT

## 2019-06-20 PROCEDURE — 7100000011 HC PHASE II RECOVERY - ADDTL 15 MIN: Performed by: OTOLARYNGOLOGY

## 2019-06-20 PROCEDURE — 2580000003 HC RX 258: Performed by: ANESTHESIOLOGY

## 2019-06-20 PROCEDURE — 7100000010 HC PHASE II RECOVERY - FIRST 15 MIN: Performed by: OTOLARYNGOLOGY

## 2019-06-20 PROCEDURE — 6360000002 HC RX W HCPCS: Performed by: NURSE ANESTHETIST, CERTIFIED REGISTERED

## 2019-06-20 PROCEDURE — 84703 CHORIONIC GONADOTROPIN ASSAY: CPT

## 2019-06-20 PROCEDURE — 88305 TISSUE EXAM BY PATHOLOGIST: CPT

## 2019-06-20 PROCEDURE — 3700000001 HC ADD 15 MINUTES (ANESTHESIA): Performed by: OTOLARYNGOLOGY

## 2019-06-20 PROCEDURE — 7100000000 HC PACU RECOVERY - FIRST 15 MIN: Performed by: OTOLARYNGOLOGY

## 2019-06-20 PROCEDURE — 2709999900 HC NON-CHARGEABLE SUPPLY: Performed by: OTOLARYNGOLOGY

## 2019-06-20 PROCEDURE — 80048 BASIC METABOLIC PNL TOTAL CA: CPT

## 2019-06-20 PROCEDURE — 7100000001 HC PACU RECOVERY - ADDTL 15 MIN: Performed by: OTOLARYNGOLOGY

## 2019-06-20 PROCEDURE — 3700000000 HC ANESTHESIA ATTENDED CARE: Performed by: OTOLARYNGOLOGY

## 2019-06-20 PROCEDURE — 86901 BLOOD TYPING SEROLOGIC RH(D): CPT

## 2019-06-20 RX ORDER — OXYCODONE HYDROCHLORIDE AND ACETAMINOPHEN 5; 325 MG/1; MG/1
1 TABLET ORAL EVERY 4 HOURS PRN
Qty: 25 TABLET | Refills: 0 | Status: SHIPPED | OUTPATIENT
Start: 2019-06-20 | End: 2019-06-25

## 2019-06-20 RX ORDER — ROCURONIUM BROMIDE 10 MG/ML
INJECTION, SOLUTION INTRAVENOUS PRN
Status: DISCONTINUED | OUTPATIENT
Start: 2019-06-20 | End: 2019-06-20 | Stop reason: SDUPTHER

## 2019-06-20 RX ORDER — PROPOFOL 10 MG/ML
INJECTION, EMULSION INTRAVENOUS PRN
Status: DISCONTINUED | OUTPATIENT
Start: 2019-06-20 | End: 2019-06-20 | Stop reason: SDUPTHER

## 2019-06-20 RX ORDER — FENTANYL CITRATE 50 UG/ML
50 INJECTION, SOLUTION INTRAMUSCULAR; INTRAVENOUS EVERY 5 MIN PRN
Status: DISCONTINUED | OUTPATIENT
Start: 2019-06-20 | End: 2019-06-20 | Stop reason: HOSPADM

## 2019-06-20 RX ORDER — HYDRALAZINE HYDROCHLORIDE 20 MG/ML
5 INJECTION INTRAMUSCULAR; INTRAVENOUS EVERY 10 MIN PRN
Status: DISCONTINUED | OUTPATIENT
Start: 2019-06-20 | End: 2019-06-20 | Stop reason: HOSPADM

## 2019-06-20 RX ORDER — MIDAZOLAM HYDROCHLORIDE 1 MG/ML
1 INJECTION INTRAMUSCULAR; INTRAVENOUS EVERY 10 MIN PRN
Status: DISCONTINUED | OUTPATIENT
Start: 2019-06-20 | End: 2019-06-20 | Stop reason: HOSPADM

## 2019-06-20 RX ORDER — SODIUM CHLORIDE, SODIUM LACTATE, POTASSIUM CHLORIDE, CALCIUM CHLORIDE 600; 310; 30; 20 MG/100ML; MG/100ML; MG/100ML; MG/100ML
INJECTION, SOLUTION INTRAVENOUS CONTINUOUS
Status: DISCONTINUED | OUTPATIENT
Start: 2019-06-20 | End: 2019-06-20 | Stop reason: HOSPADM

## 2019-06-20 RX ORDER — DEXAMETHASONE SODIUM PHOSPHATE 4 MG/ML
INJECTION, SOLUTION INTRA-ARTICULAR; INTRALESIONAL; INTRAMUSCULAR; INTRAVENOUS; SOFT TISSUE PRN
Status: DISCONTINUED | OUTPATIENT
Start: 2019-06-20 | End: 2019-06-20 | Stop reason: SDUPTHER

## 2019-06-20 RX ORDER — FENTANYL CITRATE 50 UG/ML
25 INJECTION, SOLUTION INTRAMUSCULAR; INTRAVENOUS EVERY 5 MIN PRN
Status: DISCONTINUED | OUTPATIENT
Start: 2019-06-20 | End: 2019-06-20 | Stop reason: HOSPADM

## 2019-06-20 RX ORDER — ONDANSETRON 2 MG/ML
INJECTION INTRAMUSCULAR; INTRAVENOUS PRN
Status: DISCONTINUED | OUTPATIENT
Start: 2019-06-20 | End: 2019-06-20 | Stop reason: SDUPTHER

## 2019-06-20 RX ORDER — ONDANSETRON 2 MG/ML
4 INJECTION INTRAMUSCULAR; INTRAVENOUS
Status: DISCONTINUED | OUTPATIENT
Start: 2019-06-20 | End: 2019-06-20 | Stop reason: HOSPADM

## 2019-06-20 RX ORDER — SUCCINYLCHOLINE CHLORIDE 20 MG/ML
INJECTION INTRAMUSCULAR; INTRAVENOUS PRN
Status: DISCONTINUED | OUTPATIENT
Start: 2019-06-20 | End: 2019-06-20 | Stop reason: SDUPTHER

## 2019-06-20 RX ORDER — EPINEPHRINE NASAL SOLUTION 1 MG/ML
SOLUTION NASAL PRN
Status: DISCONTINUED | OUTPATIENT
Start: 2019-06-20 | End: 2019-06-20 | Stop reason: ALTCHOICE

## 2019-06-20 RX ORDER — LABETALOL HYDROCHLORIDE 5 MG/ML
5 INJECTION, SOLUTION INTRAVENOUS EVERY 10 MIN PRN
Status: DISCONTINUED | OUTPATIENT
Start: 2019-06-20 | End: 2019-06-20 | Stop reason: HOSPADM

## 2019-06-20 RX ORDER — FENTANYL CITRATE 50 UG/ML
INJECTION, SOLUTION INTRAMUSCULAR; INTRAVENOUS PRN
Status: DISCONTINUED | OUTPATIENT
Start: 2019-06-20 | End: 2019-06-20 | Stop reason: SDUPTHER

## 2019-06-20 RX ORDER — LIDOCAINE HYDROCHLORIDE 20 MG/ML
INJECTION, SOLUTION INTRAVENOUS PRN
Status: DISCONTINUED | OUTPATIENT
Start: 2019-06-20 | End: 2019-06-20 | Stop reason: SDUPTHER

## 2019-06-20 RX ADMIN — MIDAZOLAM 1 MG: 1 INJECTION INTRAMUSCULAR; INTRAVENOUS at 08:19

## 2019-06-20 RX ADMIN — PROPOFOL 200 MG: 10 INJECTION, EMULSION INTRAVENOUS at 08:40

## 2019-06-20 RX ADMIN — FENTANYL CITRATE 100 MCG: 50 INJECTION INTRAMUSCULAR; INTRAVENOUS at 08:40

## 2019-06-20 RX ADMIN — SUCCINYLCHOLINE CHLORIDE 100 MG: 20 INJECTION, SOLUTION INTRAMUSCULAR; INTRAVENOUS; PARENTERAL at 08:44

## 2019-06-20 RX ADMIN — DEXAMETHASONE SODIUM PHOSPHATE 10 MG: 4 INJECTION, SOLUTION INTRAMUSCULAR; INTRAVENOUS at 09:04

## 2019-06-20 RX ADMIN — ROCURONIUM BROMIDE 5 MG: 10 INJECTION, SOLUTION INTRAVENOUS at 08:40

## 2019-06-20 RX ADMIN — SUGAMMADEX 200 MG: 100 INJECTION, SOLUTION INTRAVENOUS at 09:22

## 2019-06-20 RX ADMIN — ONDANSETRON 4 MG: 2 INJECTION INTRAMUSCULAR; INTRAVENOUS at 09:04

## 2019-06-20 RX ADMIN — ROCURONIUM BROMIDE 30 MG: 10 INJECTION, SOLUTION INTRAVENOUS at 08:57

## 2019-06-20 RX ADMIN — HYDROMORPHONE HYDROCHLORIDE 0.5 MG: 1 INJECTION, SOLUTION INTRAMUSCULAR; INTRAVENOUS; SUBCUTANEOUS at 09:51

## 2019-06-20 RX ADMIN — SODIUM CHLORIDE, SODIUM LACTATE, POTASSIUM CHLORIDE, AND CALCIUM CHLORIDE: 600; 310; 30; 20 INJECTION, SOLUTION INTRAVENOUS at 08:36

## 2019-06-20 RX ADMIN — LIDOCAINE HYDROCHLORIDE 100 MG: 20 INJECTION, SOLUTION INTRAVENOUS at 08:40

## 2019-06-20 RX ADMIN — SODIUM CHLORIDE, SODIUM LACTATE, POTASSIUM CHLORIDE, AND CALCIUM CHLORIDE: 600; 310; 30; 20 INJECTION, SOLUTION INTRAVENOUS at 07:43

## 2019-06-20 ASSESSMENT — PULMONARY FUNCTION TESTS
PIF_VALUE: 26
PIF_VALUE: 0
PIF_VALUE: 26
PIF_VALUE: 2
PIF_VALUE: 18
PIF_VALUE: 1
PIF_VALUE: 28
PIF_VALUE: 6
PIF_VALUE: 28
PIF_VALUE: 28
PIF_VALUE: 26
PIF_VALUE: 28
PIF_VALUE: 26
PIF_VALUE: 22
PIF_VALUE: 28
PIF_VALUE: 1
PIF_VALUE: 28
PIF_VALUE: 28
PIF_VALUE: 0
PIF_VALUE: 26
PIF_VALUE: 21
PIF_VALUE: 21
PIF_VALUE: 26
PIF_VALUE: 1
PIF_VALUE: 1
PIF_VALUE: 4
PIF_VALUE: 4
PIF_VALUE: 24
PIF_VALUE: 20
PIF_VALUE: 28
PIF_VALUE: 26
PIF_VALUE: 26
PIF_VALUE: 2
PIF_VALUE: 5
PIF_VALUE: 26
PIF_VALUE: 28
PIF_VALUE: 18
PIF_VALUE: 28
PIF_VALUE: 1
PIF_VALUE: 23
PIF_VALUE: 26
PIF_VALUE: 3
PIF_VALUE: 1
PIF_VALUE: 20
PIF_VALUE: 20
PIF_VALUE: 19
PIF_VALUE: 24
PIF_VALUE: 26
PIF_VALUE: 26
PIF_VALUE: 19
PIF_VALUE: 23
PIF_VALUE: 26
PIF_VALUE: 20
PIF_VALUE: 28

## 2019-06-20 ASSESSMENT — PAIN SCALES - GENERAL
PAINLEVEL_OUTOF10: 7
PAINLEVEL_OUTOF10: 2
PAINLEVEL_OUTOF10: 3

## 2019-06-20 ASSESSMENT — PAIN DESCRIPTION - PAIN TYPE: TYPE: SURGICAL PAIN

## 2019-06-20 ASSESSMENT — LIFESTYLE VARIABLES: SMOKING_STATUS: 1

## 2019-06-20 ASSESSMENT — PAIN DESCRIPTION - LOCATION: LOCATION: THROAT

## 2019-06-20 ASSESSMENT — PAIN - FUNCTIONAL ASSESSMENT: PAIN_FUNCTIONAL_ASSESSMENT: 0-10

## 2019-06-20 ASSESSMENT — PAIN DESCRIPTION - DESCRIPTORS: DESCRIPTORS: ACHING

## 2019-06-20 NOTE — PROGRESS NOTES
Patient admitted to PACU #17 from OR per stretcher at 8850 s/p MICROLARYNGOSCOPY AND LYSIS OF ADHESIONS OF LARYNX (N/A). Report received at bedside in PACU per CRNA. Patient was reported to be hemodynamically stable in OR with no complications reported. Patient connected to PACU monitoring equipment. IVF's infusing with site unremarkable. Patient arrived to PACU responsive but not fully wakeful from anesthesia but with respirations easy, even and regular. Raspy voice noted when patient talked. No pain verbalized upon arrival to PACU. Face tent applied soon after arrival. No further changes. Will continue to monitor.

## 2019-06-20 NOTE — PROGRESS NOTES
PACU Transfer to [facility]    Vitals:    06/20/19 1115   BP: 115/86   Pulse: 80   Resp: 16   Temp: 98 °F (36.7 °C)   SpO2: 94%         Intake/Output Summary (Last 24 hours) at 6/20/2019 1122  Last data filed at 6/20/2019 1115  Gross per 24 hour   Intake 1012 ml   Output 25 ml   Net 987 ml       Pain assessment:  present - adequately treated  Pain Level: 3    Patient transferred to care of [facility] RN. Patients mother called personally per myself and instructed to come to hospital, park car in garage, go to same day waiting area and go to desk.  Same day waiting area informed of this along with room number patient will be in [facility] #5      6/20/2019 11:22 AM

## 2019-06-20 NOTE — H&P
Peterson Band    [id number]    [facility] [employer] Same Day Surgery Update H & P  Department of General Surgery   Surgical Service   Pre-operative History and Physical  Last H & P within the last 30 days. DIAGNOSIS:   AIRWAY OBSTRUCTION, LARYNGEAL EDEMA    PROCEDURE:  UT TRACHEOSTOMY, PLANNED [85789] (TRACHEOTOMY)  UT LARYNGOSCOPY,DIRCT,OP SCOP,EXC TUMR [65170] (LARYNGOSCOPY MICRO)     HISTORY OF PRESENT ILLNESS:    Patient with hx of larynx carcinoma c/o intermittent airway obstruction, hoarseness and chronic cough. Past Medical History:        Diagnosis Date    Anemia     Anxiety     Back pain     Bipolar affective disorder ([facility]     Depression     Migraine     Opiate abuse, continuous ([facility]     Primary cancer of larynx ([facility] 7/18/2017    PTSD (post-traumatic stress disorder)      Past Surgical History:        Procedure Laterality Date    OTHER SURGICAL HISTORY Left 06/28/2017    MICROLARYNGOSCOPY WITH BIOPSY OF LEFT TRUE VOCAL CORD MASS    OTHER SURGICAL HISTORY  08/10/2017    Tracheostomy    TONSILLECTOMY      TRACHEOSTOMY      TRACHEOTOMY N/A 12/14/2017     TRACHEOTOMY WITH 6DCT [place]               Past Social History:  Social History     Socioeconomic History    Marital status: Single     Spouse name: None    Number of children: 3    Years of education: 15    Highest education level: None   Occupational History     Comment: unemployed    Social Needs    Financial resource strain: None    Food insecurity:     Worry: None     Inability: None    Transportation needs:     Medical: None     Non-medical: None   Tobacco Use    Smoking status: Current Every Day Smoker     Packs/day: 0.50     Years: 18.00     Pack years: 9.00     Types: Cigarettes     Start date: 10/19/2013    Smokeless tobacco: Never Used   Substance and Sexual Activity    Alcohol use: No     Alcohol/week: 12.0 oz     Types: 10 Cans of beer, 10 Shots of liquor per week     Comment: none for 120 days    Drug use:  Yes Types: Marijuana, Opiates      Comment: last 3 days ago    Sexual activity: Yes     Partners: Male   Lifestyle    Physical activity:     Days per week: None     Minutes per session: None    Stress: None   Relationships    Social connections:     Talks on phone: None     Gets together: None     Attends Yazidi service: None     Active member of club or organization: None     Attends meetings of clubs or organizations: None     Relationship status: None    Intimate partner violence:     Fear of current or ex partner: None     Emotionally abused: None     Physically abused: None     Forced sexual activity: None   Other Topics Concern    None   Social History Narrative    None         Medications Prior to Admission:      Prior to Admission medications    Medication Sig Start Date End Date Taking? Authorizing Provider   mirtazapine (REMERON) 15 MG tablet TAKE ONE TABLET BY MOUTH ONCE NIGHTLY 6/14/19  Yes Genesis Koch MD   albuterol sulfate HFA (PROVENTIL HFA) 108 (90 Base) MCG/ACT inhaler Inhale 2 puffs into the lungs every 4 hours as needed for Wheezing or Shortness of Breath (Space out to every 6 hours as symptoms improve) 6/14/19  Yes Genesis Koch MD   desvenlafaxine succinate (PRISTIQ) 100 MG TB24 extended release tablet Take 1 tablet by mouth daily 6/14/19  Yes Genesis Koch MD   fluticasone (FLOVENT HFA) 110 MCG/ACT inhaler Inhale 2 puffs into the lungs 2 times daily 6/14/19 6/13/20 Yes Genesis Koch MD   busPIRone (BUSPAR) 7.5 MG tablet TAKE ONE TABLET BY MOUTH THREE TIMES A DAY AS NEEDED FOR ANXIETY 6/13/19  Yes Genesis Koch MD   lamoTRIgine (LAMICTAL) 100 MG tablet Take 1 tablet by mouth daily 5/24/19  Yes Genesis Koch MD   naproxen (NAPROSYN) 500 MG tablet TAKE ONE TABLET BY MOUTH TWICE A DAY WITH MEALS 5/23/19  Yes VENKATA Veliz   gabapentin (NEURONTIN) 100 MG capsule Take 1 capsule by mouth nightly for 30 days.  5/23/19 6/22/19 Yes Linda Wallace MD   ARIPiprazole (ABILIFY) 10 MG tablet TAKE ONE TABLET BY MOUTH DAILY 5/22/19  Yes VENKATA Veliz   pantoprazole (PROTONIX) 20 MG tablet TAKE ONE TABLET BY MOUTH DAILY 4/23/19  Yes Genesis Koch MD   Norgestim-Eth Estrad Triphasic 0.18/0.215/0.25 MG-25 MCG TABS Take 1 tablet by mouth daily 10/30/18  Yes Genesis Koch MD   traZODone (DESYREL) 50 MG tablet TAKE TWO TABLETS BY MOUTH ONCE NIGHTLY AS NEEDED FOR SLEEP 6/13/19   Genesis Koch MD   Respiratory Therapy Supplies (NEBULIZER COMPRESSOR) KIT Use Q6 hours as needed with Duoneb. Dispense: Nebulizer Compressor Machine, mouthpiece and tubing. DX: Airway Obstruction J98.8 4/29/19   Genesis Koch MD   Respiratory Therapy Supplies (NEBULIZER COMPRESSOR) KIT Use Q6 hours as needed with Duoneb.  Disp: Nebulizer Machine #1 no refill and disposable Nebulizer tubing and mouthpiece Disp: 3 months supply with 3 refills 4/23/19   Genesis Koch MD   ipratropium-albuterol (DUONEB) 0.5-2.5 (3) MG/3ML SOLN nebulizer solution Inhale 3 mLs into the lungs 4 times daily 4/1/19   Genesis Koch MD   acetaminophen (AMINOFEN) 325 MG tablet Take 2 tablets by mouth every 6 hours as needed for Pain 12/30/18   VENKATA Guillory   SUMAtriptan (IMITREX) 25 MG tablet TAKE ONE TABLET BY MOUTH AT ONSET OF HEADACHE; MAY REPEAT ONE TABLET IN 2 HOURS IF NEEDED. 12/13/18   Genesis Koch MD   ondansetron (ZOFRAN ODT) 4 MG disintegrating tablet Take 1 tablet by mouth every 8 hours as needed for Nausea 10/16/17   Lyndol Pair, APRN - CNP         Allergies:  Amoxicillin-pot clavulanate    PHYSICAL EXAM:      BP (!) 131/92   Pulse 104   Temp 98.2 °F (36.8 °C) (Oral)   Resp 16   Ht 5' 6\" (1.676 m)   Wt 204 lb (92.5 kg)   LMP 06/03/2019 (Exact Date)   SpO2 96%   BMI 32.93 kg/m²      Heart:  regular rate and rhythm    Lungs:  No increased work of breathing, good air exchange, clear to auscultation bilaterally, no crackles or wheezing    Abdomen:  soft, non-distended, non-tender, no rebound tenderness or guarding, normal active bowel sounds and no masses palpated    ASSESSMENT AND PLAN:    1. Patient seen and focused exam done today- no new changes since last physical exam on 6/14/19    2. Access to ancillary services are available per request of the provider.     AMBER Burgos - CNP     6/20/2019

## 2019-06-20 NOTE — ANESTHESIA PRE PROCEDURE
Department of Anesthesiology  Preprocedure Note       Name:  Ani Irene   Age:  37 y.o.  :  1985                                          MRN:  [id number]         Date:  2019      Surgeon: Jeison Vega): Norberto Key MD    Procedure: TRACHEOTOMY (N/A )  MICROLARYNGOSCOPY WITH C02 EXCISION OF TUMOR (N/A )    Medications prior to admission:   Prior to Admission medications    Medication Sig Start Date End Date Taking? Authorizing Provider   mirtazapine (REMERON) 15 MG tablet TAKE ONE TABLET BY MOUTH ONCE NIGHTLY 19  Yes Baron Lloyd MD   albuterol sulfate HFA (PROVENTIL HFA) 108 (90 Base) MCG/ACT inhaler Inhale 2 puffs into the lungs every 4 hours as needed for Wheezing or Shortness of Breath (Space out to every 6 hours as symptoms improve) 19  Yes Baron Lloyd MD   desvenlafaxine succinate (PRISTIQ) 100 MG TB24 extended release tablet Take 1 tablet by mouth daily 19  Yes Baron Lloyd MD   fluticasone (FLOVENT HFA) 110 MCG/ACT inhaler Inhale 2 puffs into the lungs 2 times daily 19 Yes Baron Lloyd MD   busPIRone (BUSPAR) 7.5 MG tablet TAKE ONE TABLET BY MOUTH THREE TIMES A DAY AS NEEDED FOR ANXIETY 19  Yes Baron Lloyd MD   lamoTRIgine (LAMICTAL) 100 MG tablet Take 1 tablet by mouth daily 19  Yes Baron Lloyd MD   naproxen (NAPROSYN) 500 MG tablet TAKE ONE TABLET BY MOUTH TWICE A DAY WITH MEALS 19  Yes VENKATA Cardona   gabapentin (NEURONTIN) 100 MG capsule Take 1 capsule by mouth nightly for 30 days.  19 Yes Norberto Key MD   ARIPiprazole (ABILIFY) 10 MG tablet TAKE ONE TABLET BY MOUTH DAILY 19  Yes VENKATA Cardona   pantoprazole (PROTONIX) 20 MG tablet TAKE ONE TABLET BY MOUTH DAILY 19  Yes Baron Lloyd MD   Norgestim-Eth Estrad Triphasic 0.18/0.215/0.25 MG-25 MCG TABS Take 1 tablet by mouth daily 10/30/18  Yes Baron Lloyd MD   traZODone (DESYREL) 50 MG tablet TAKE TWO TABLETS BY MOUTH ONCE NIGHTLY AS NEEDED FOR SLEEP 6/13/19   Rosalba Aschoff, MD   Respiratory Therapy Supplies (NEBULIZER COMPRESSOR) KIT Use Q6 hours as needed with Duoneb. Dispense: Nebulizer Compressor Machine, mouthpiece and tubing. DX: Airway Obstruction J98.8 4/29/19   Rosalba Aschoff, MD   Respiratory Therapy Supplies (NEBULIZER COMPRESSOR) KIT Use Q6 hours as needed with Duoneb. Disp: Nebulizer Machine #1 no refill and disposable Nebulizer tubing and mouthpiece Disp: 3 months supply with 3 refills 4/23/19   Rosalba Aschoff, MD   ipratropium-albuterol (DUONEB) 0.5-2.5 (3) MG/3ML SOLN nebulizer solution Inhale 3 mLs into the lungs 4 times daily 4/1/19   Rosalba Aschoff, MD   acetaminophen (AMINOFEN) 325 MG tablet Take 2 tablets by mouth every 6 hours as needed for Pain 12/30/18   VENKATA Cheng   SUMAtriptan (IMITREX) 25 MG tablet TAKE ONE TABLET BY MOUTH AT ONSET OF HEADACHE; MAY REPEAT ONE TABLET IN 2 HOURS IF NEEDED. 12/13/18   Rosalba Aschoff, MD   ondansetron (ZOFRAN ODT) 4 MG disintegrating tablet Take 1 tablet by mouth every 8 hours as needed for Nausea 10/16/17   AMBER Munoz - CNP       Current medications:    Current Facility-Administered Medications   Medication Dose Route Frequency Provider Last Rate Last Dose    lactated ringers infusion   Intravenous Continuous Virlinfrank Acosta  mL/hr at 06/20/19 0743      midazolam (VERSED) injection 1 mg  1 mg Intravenous Q10 Min PRN Carlos Koehler MD   1 mg at 06/20/19 0553       Allergies:     Allergies   Allergen Reactions    Amoxicillin-Pot Clavulanate      vomiting       Problem List:    Patient Active Problem List   Diagnosis Code    Migraine G43.909    Anemia D64.9    Bipolar affective disorder (HCC) F31.9    Adjustment insomnia F51.02    Intractable left heel pain M79.672    Bipolar affective disorder, currently depressed, moderate (HCC) F31.32    Laryngeal carcinoma ([address] C32.9    Airway obstruction J98.8    HTN (hypertension) I10 consumption: 06/19/19                        Date of last solid food consumption: 06/19/19    BMI:   Wt Readings from Last 3 Encounters:   06/20/19 204 lb (92.5 kg)   06/14/19 206 lb (93.4 kg)   05/23/19 205 lb 4.8 oz (93.1 kg)     Body mass index is 32.93 kg/m².     CBC:   Lab Results   Component Value Date    WBC 9.4 06/20/2019    RBC 3.91 06/20/2019    HGB 10.5 06/20/2019    HCT 32.3 06/20/2019    MCV 82.6 06/20/2019    RDW 16.8 06/20/2019     06/20/2019       CMP:   Lab Results   Component Value Date     12/30/2018    K 3.8 12/30/2018    CL 99 12/30/2018    CO2 24 12/30/2018    BUN 9 12/30/2018    CREATININE 0.6 12/30/2018    GFRAA >60 12/30/2018    GFRAA >60 11/14/2011    AGRATIO 1.4 12/30/2018    LABGLOM >60 12/30/2018    GLUCOSE 122 12/30/2018    PROT 7.4 12/30/2018    PROT 7.8 11/14/2011    CALCIUM 8.9 12/30/2018    BILITOT <0.2 12/30/2018    ALKPHOS 88 12/30/2018    AST 10 12/30/2018    ALT 10 12/30/2018       POC Tests:   Recent Labs     06/20/19  0740   POCGLU 95       Coags: No results found for: PROTIME, INR, APTT    HCG (If Applicable):   Lab Results   Component Value Date    PREGTESTUR Negative 06/20/2019        ABGs: No results found for: PHART, PO2ART, SNF3TIZ, BWA2IEK, BEART, Q0CIBHOA     Type & Screen (If Applicable):  Lab Results   Component Value Date    LABABO O 03/12/2013    [address] Positive 03/12/2013       Anesthesia Evaluation  Patient summary reviewed and Nursing notes reviewed  Airway: Mallampati: II  TM distance: <3 FB   Neck ROM: limited  Mouth opening: < 3 FB Dental:          Pulmonary:Negative Pulmonary ROS   (+) current smoker                          ROS comment: Patient states she has scarring \"inside\" from previous tracheostomy  Hoarseness   Cardiovascular:Negative CV ROS          ECG reviewed                        Neuro/Psych:   Negative Neuro/Psych ROS              GI/Hepatic/Renal: Neg GI/Hepatic/Renal ROS            Endo/Other: Negative Endo/Other ROS Abdominal:           Vascular: negative vascular ROS. Anesthesia Plan      general     ASA 3       Induction: intravenous. MIPS: Postoperative opioids intended and Prophylactic antiemetics administered. Anesthetic plan and risks discussed with patient.         Attending anesthesiologist reviewed and agrees with Gila Ellison MD   6/20/2019

## 2019-06-20 NOTE — PROGRESS NOTES
No family with patient at this moment. Patient stated they need to be called when she is ready to go home.

## 2019-06-20 NOTE — BRIEF OP NOTE
Brief Postoperative Note  ______________________________________________________________    Patient: Shashi Grant  YOB: 1985  MRN: [id number]  Date of Procedure: 6/20/2019    Pre-Op Diagnosis: AIRWAY OBSTRUCTION, LARYNGEAL  STENOSIS    Post-Op Diagnosis: Same       Procedure(s): MICROLARYNGOSCOPY AND LYSIS OF ADHESIONS OF LARYNX    Anesthesia: General    Surgeon(s):   Art Soto MD    Assistant: Dr. Yuliet Miller    Estimated Blood Loss (mL): less than 50     Complications: None    Specimens:   ID Type Source Tests Collected by Time Destination   A : LARYNGEAL SCAR TISSUE Specimen Mouth SURGICAL PATHOLOGY Art Soto MD 6/20/2019 8901        Implants:  * No implants in log *      Drains: * No LDAs found *    Findings: Anterior stenosis larynx    Art Soto MD  Date: 6/20/2019  Time: 9:41 AM

## 2019-06-20 NOTE — PROGRESS NOTES
Ambulatory Surgery/Procedure Discharge Note    Vitals:    06/20/19 1149   BP: 104/67   Pulse: 91   Resp: 16   Temp: 96.2 °F (35.7 °C)   SpO2: 93%       In: 1062 [P.O.:50; I.V.:1012]  Out: 25     Restroom use offered before discharge. Yes    Pain assessment:  level of pain (1-10, 10 severe), resp easy, swallowing ice and water without problem  Pain Level: 3        Patient discharged to home/self care.  Patient discharged via wheel chair by transporter to waiting family/S.O.       6/20/2019 12:03 PM

## 2019-06-22 NOTE — OP NOTE
was  ventilating well. She was transferred to the recovery room in good  condition.         Eddie Doll MD    D: 06/22/2019 10:41:06       T: 06/22/2019 10:52:05     [email or web address]  Job#: [id number]     Doc#: [id number]    CC:

## 2019-07-11 ENCOUNTER — OFFICE VISIT (OUTPATIENT)
Dept: ENT CLINIC | Age: 34
End: 2019-07-11
Payer: COMMERCIAL

## 2019-07-11 DIAGNOSIS — C32.9 CARCINOMA LARYNX (HCC): ICD-10-CM

## 2019-07-11 DIAGNOSIS — R05.9 COUGH: Primary | ICD-10-CM

## 2019-07-11 DIAGNOSIS — K21.9 LARYNGOPHARYNGEAL REFLUX (LPR): ICD-10-CM

## 2019-07-11 DIAGNOSIS — Z92.3 STATUS POST RADIATION THERAPY GREATER THAN TWELVE WEEKS AGO: ICD-10-CM

## 2019-07-11 DIAGNOSIS — R49.0 DYSPHONIA: ICD-10-CM

## 2019-07-11 PROCEDURE — 4004F PT TOBACCO SCREEN RCVD TLK: CPT | Performed by: OTOLARYNGOLOGY

## 2019-07-11 PROCEDURE — 31575 DIAGNOSTIC LARYNGOSCOPY: CPT | Performed by: OTOLARYNGOLOGY

## 2019-07-11 PROCEDURE — 99212 OFFICE O/P EST SF 10 MIN: CPT | Performed by: OTOLARYNGOLOGY

## 2019-07-11 PROCEDURE — G8417 CALC BMI ABV UP PARAM F/U: HCPCS | Performed by: OTOLARYNGOLOGY

## 2019-07-11 PROCEDURE — G8427 DOCREV CUR MEDS BY ELIG CLIN: HCPCS | Performed by: OTOLARYNGOLOGY

## 2019-07-11 RX ORDER — OMEPRAZOLE 40 MG/1
40 CAPSULE, DELAYED RELEASE ORAL DAILY
Qty: 30 CAPSULE | Refills: 3 | Status: SHIPPED | OUTPATIENT
Start: 2019-07-11 | End: 2019-11-13 | Stop reason: SDUPTHER

## 2019-07-11 NOTE — PROGRESS NOTES
30 capsule, Rfl: 1    ARIPiprazole (ABILIFY) 10 MG tablet, TAKE ONE TABLET BY MOUTH DAILY, Disp: 30 tablet, Rfl: 2    Respiratory Therapy Supplies (NEBULIZER COMPRESSOR) KIT, Use Q6 hours as needed with Duoneb. Dispense: Nebulizer Compressor Machine, mouthpiece and tubing. DX: Airway Obstruction J98.8, Disp: 1 kit, Rfl: 0    pantoprazole (PROTONIX) 20 MG tablet, TAKE ONE TABLET BY MOUTH DAILY, Disp: 30 tablet, Rfl: 5    Respiratory Therapy Supplies (NEBULIZER COMPRESSOR) KIT, Use Q6 hours as needed with Duoneb.  Disp: Nebulizer Machine #1 no refill and disposable Nebulizer tubing and mouthpiece Disp: 3 months supply with 3 refills, Disp: 1 kit, Rfl: 0    ipratropium-albuterol (DUONEB) 0.5-2.5 (3) MG/3ML SOLN nebulizer solution, Inhale 3 mLs into the lungs 4 times daily, Disp: 120 vial, Rfl: 5    acetaminophen (AMINOFEN) 325 MG tablet, Take 2 tablets by mouth every 6 hours as needed for Pain, Disp: 120 tablet, Rfl: 3    SUMAtriptan (IMITREX) 25 MG tablet, TAKE ONE TABLET BY MOUTH AT ONSET OF HEADACHE; MAY REPEAT ONE TABLET IN 2 HOURS IF NEEDED., Disp: 9 tablet, Rfl: 5    Norgestim-Eth Estrad Triphasic 0.18/0.215/0.25 MG-25 MCG TABS, Take 1 tablet by mouth daily, Disp: 28 tablet, Rfl: 12    ondansetron (ZOFRAN ODT) 4 MG disintegrating tablet, Take 1 tablet by mouth every 8 hours as needed for Nausea, Disp: 20 tablet, Rfl: 0                                                 Past Medical History:   Diagnosis Date    Anemia     Anxiety     Back pain     Bipolar affective disorder (HCC)     Depression     Migraine     Opiate abuse, continuous (HCC)     Primary cancer of larynx (HCC) 7/18/2017    PTSD (post-traumatic stress disorder)     Voice hoarseness                                                     Past Surgical History:   Procedure Laterality Date    LARYNGOSCOPY N/A 6/20/2019    MICROLARYNGOSCOPY AND LYSIS OF ADHESIONS OF LARYNX performed by Vicenta Gu MD at [address] [place] [address]

## 2019-07-19 DIAGNOSIS — R05.9 COUGH: ICD-10-CM

## 2019-07-19 RX ORDER — GABAPENTIN 100 MG/1
CAPSULE ORAL
Qty: 30 CAPSULE | Refills: 0 | OUTPATIENT
Start: 2019-07-19

## 2019-08-12 ENCOUNTER — OFFICE VISIT (OUTPATIENT)
Dept: ENT CLINIC | Age: 34
End: 2019-08-12
Payer: COMMERCIAL

## 2019-08-12 VITALS
SYSTOLIC BLOOD PRESSURE: 133 MMHG | WEIGHT: 182.4 LBS | HEART RATE: 99 BPM | TEMPERATURE: 97.2 F | DIASTOLIC BLOOD PRESSURE: 91 MMHG | BODY MASS INDEX: 28.63 KG/M2 | HEIGHT: 67 IN

## 2019-08-12 DIAGNOSIS — C32.9 CARCINOMA LARYNX (HCC): ICD-10-CM

## 2019-08-12 DIAGNOSIS — J38.4 LARYNGEAL EDEMA DETERMINED BY LARYNGOSCOPY: ICD-10-CM

## 2019-08-12 DIAGNOSIS — R05.9 COUGH: Primary | ICD-10-CM

## 2019-08-12 DIAGNOSIS — R49.0 DYSPHONIA: ICD-10-CM

## 2019-08-12 PROCEDURE — 4004F PT TOBACCO SCREEN RCVD TLK: CPT | Performed by: OTOLARYNGOLOGY

## 2019-08-12 PROCEDURE — 99213 OFFICE O/P EST LOW 20 MIN: CPT | Performed by: OTOLARYNGOLOGY

## 2019-08-12 PROCEDURE — G8417 CALC BMI ABV UP PARAM F/U: HCPCS | Performed by: OTOLARYNGOLOGY

## 2019-08-12 PROCEDURE — 31575 DIAGNOSTIC LARYNGOSCOPY: CPT | Performed by: OTOLARYNGOLOGY

## 2019-08-12 PROCEDURE — G8427 DOCREV CUR MEDS BY ELIG CLIN: HCPCS | Performed by: OTOLARYNGOLOGY

## 2019-08-12 RX ORDER — GABAPENTIN 100 MG/1
100 CAPSULE ORAL NIGHTLY
Qty: 90 CAPSULE | Refills: 5 | Status: SHIPPED | OUTPATIENT
Start: 2019-08-12 | End: 2020-03-06 | Stop reason: SDUPTHER

## 2019-08-12 NOTE — PROGRESS NOTES
LARYNGOSCOPY N/A 6/20/2019    MICROLARYNGOSCOPY AND LYSIS OF ADHESIONS OF LARYNX performed by Florina Ramirez MD at [address] Left 06/28/2017    MICROLARYNGOSCOPY WITH BIOPSY OF LEFT TRUE VOCAL CORD MASS    OTHER SURGICAL HISTORY  08/10/2017    Tracheostomy    TONSILLECTOMY      TRACHEOSTOMY      TRACHEOTOMY N/A 12/14/2017     TRACHEOTOMY WITH 6DCT [place]                 FAMILY HISTORY: Family history reviewed and except as pertinent to the interim history is not contributory. REVIEW OF SYSTEMS:  All pertinent positive and negative findings included in HPI. Otherwise, all other systems are reviewed and are negative    PHYSICAL EXAMINATION:   GENERAL: wdwn- no acute distress  RESPIRATORY: No stridor. COMMUNICATION : Voice is hoarse. MENTAL STATUS: Alert and oriented x3  HEAD AND FACE: No skin lesions of the face. EXTERNAL EARS AND NOSE: The external ears and nasal pyramid are normal.  FACIAL MUSCLES: All branches of the facial nerve intact. FACE PALPATION: Zygomatic arches and orbital rims are intact. No tenderness over the sinuses. EXTRAOCULAR MUSCLES: Intact with full range of motion. INTRANASAL:  Septum midline, turbinates normal, meati clear. LIPS, TEETH, GINGIVA:  Normal mucosa  PHARYNX:  Normal  NECK:  No masses. LYMPH NODES: No cervical lymphadenopathy. SALIVARY GLANDS: Parotid and submandibular glands normal.  THYROID: No goiter or thyroid nodules palpable. As the patient has symptoms suggestive of disease in the larynx or hypopharynx, fiberoptic laryngoscopy is performed. FIBEROPTIC LARYNGOSCOPY:  Nares topically anaesthetized with lidocaine spray. Fiberoptic scope passed per naris into nasopharynx and hypopharyrnx and larynx visualized.                                                             Normal tongue base                                                          Normal epiglottis                                                         Some edema of

## 2019-08-14 ENCOUNTER — OFFICE VISIT (OUTPATIENT)
Dept: INTERNAL MEDICINE CLINIC | Age: 34
End: 2019-08-14
Payer: COMMERCIAL

## 2019-08-14 VITALS
SYSTOLIC BLOOD PRESSURE: 134 MMHG | DIASTOLIC BLOOD PRESSURE: 84 MMHG | HEART RATE: 104 BPM | HEIGHT: 67 IN | BODY MASS INDEX: 28.66 KG/M2 | WEIGHT: 182.6 LBS

## 2019-08-14 DIAGNOSIS — F51.02 ADJUSTMENT INSOMNIA: ICD-10-CM

## 2019-08-14 DIAGNOSIS — D50.8 IRON DEFICIENCY ANEMIA SECONDARY TO INADEQUATE DIETARY IRON INTAKE: ICD-10-CM

## 2019-08-14 DIAGNOSIS — F41.9 ANXIETY: ICD-10-CM

## 2019-08-14 DIAGNOSIS — F31.32 BIPOLAR AFFECTIVE DISORDER, CURRENTLY DEPRESSED, MODERATE (HCC): ICD-10-CM

## 2019-08-14 DIAGNOSIS — I10 ESSENTIAL HYPERTENSION: ICD-10-CM

## 2019-08-14 DIAGNOSIS — Z00.00 HEALTHCARE MAINTENANCE: Primary | ICD-10-CM

## 2019-08-14 DIAGNOSIS — J38.6 LARYNGEAL STENOSIS: ICD-10-CM

## 2019-08-14 PROBLEM — Z86.005: Status: ACTIVE | Noted: 2017-07-18

## 2019-08-14 PROBLEM — C32.9 LARYNGEAL CANCER (HCC): Status: RESOLVED | Noted: 2017-12-14 | Resolved: 2019-08-14

## 2019-08-14 PROBLEM — M79.672 INTRACTABLE LEFT HEEL PAIN: Status: RESOLVED | Noted: 2017-01-10 | Resolved: 2019-08-14

## 2019-08-14 PROCEDURE — 99395 PREV VISIT EST AGE 18-39: CPT | Performed by: NURSE PRACTITIONER

## 2019-08-14 ASSESSMENT — ENCOUNTER SYMPTOMS
WHEEZING: 0
DIARRHEA: 0
APNEA: 0
BACK PAIN: 0
BLOOD IN STOOL: 0
EYE REDNESS: 0
CONSTIPATION: 0
EYE PAIN: 0
RHINORRHEA: 0
SHORTNESS OF BREATH: 0
COUGH: 0
NAUSEA: 0
VOMITING: 0
CHEST TIGHTNESS: 0
ABDOMINAL PAIN: 0
ABDOMINAL DISTENTION: 0
SINUS PRESSURE: 0

## 2019-08-14 NOTE — PROGRESS NOTES
8/14/2019    This is a 37 y.o. female   Chief Complaint   Patient presents with   Jose Luong Doctor     closer to home- discuss Vit D, also sun damage   . HPI     Here to establish care. Iris Zurita comes for depression, biopolar and anxiety. She does take medication for her condition. She is not suicidal or homicidal.  The depression is not associated with excessive sleeping, anhedonia. Not seeing a psychiatrist at this time. Still having some mood swings, does not know what kind of mood she will be in when she wakes up. Anemia, iron def- has been stable  Is not currently taking her iron     History of laryngeal cancer in 2017  ENT- Dr. David Morrell for follow up. Doing well. No cancer. PET in Feb  Is on gabapentin for cough. Is on inhalers which are effective     Iris Zurita returns for follow up of hypertension. Patient has been taking Her medications as prescribed. Patient's blood pressure is  controlled.   Side effects related to taking the medications include no medication side effects noted    Past Medical History:   Diagnosis Date    Anemia     Anxiety     Back pain     Bipolar affective disorder ([address]     Depression     Migraine     Opiate abuse, continuous ([address]     [facility] cancer of l[address] ([facility] 7/18/2017    PTSD (post-traumatic stress disorder)     Voice hoarseness      Past Surgical History:   Procedure Laterality Date    LARYNGOSCOPY N/A 6/20/2019    MICROLARYNGOSCOPY AND LYSIS OF ADHESIONS OF LARYNX performed by Tatyana Castrejon MD at [facility] Left 06/28/2017    MICROLARYNGOSCOPY WITH BIOPSY OF LEFT TRUE VOCAL CORD MASS    OTHER SURGICAL HISTORY  08/10/2017    Tracheostomy    TONSILLECTOMY      TRACHEOSTOMY      TRACHEOTOMY N/A 12/14/2017     TRACHEOTOMY WITH 6DCT [place]               Social History     Socioeconomic History    Marital status: Single     Spouse name: Not on file    Number of children: 3    Years of 20 tablet 0     No current facility-administered medications for this visit. Allergies   Allergen Reactions    Amoxicillin-Pot Clavulanate      vomiting       Admission on 06/20/2019, Discharged on 06/20/2019   Component Date Value Ref Range Status    Pregnancy, Urine 06/20/2019 Negative  Detects HCG level >25 MIU/mL Final    Comment: Performed on POC  Note:  Always repeat results in question with a serum  quantitative pregnancy test. A serum hCG is positive  2-5 days before the urine hCG test.      WBC 06/20/2019 9.4  4.0 - 11.0 K/uL Final    RBC 06/20/2019 3.91* 4.00 - 5.20 M/uL Final    Hemoglobin 06/20/2019 10.5* 12.0 - 16.0 g/dL Final    Hematocrit 06/20/2019 32.3* 36.0 - 48.0 % Final    MCV 06/20/2019 82.6  80.0 - 100.0 fL Final    MCH 06/20/2019 26.9  26.0 - 34.0 pg Final    MCHC 06/20/2019 32.6  31.0 - 36.0 g/dL Final    RDW 06/20/2019 16.8* 12.4 - 15.4 % Final    Platelets 19/08/1894 273  135 - 450 K/uL Final    MPV 06/20/2019 7.6  5.0 - 10.5 fL Final    Sodium 06/20/2019 140  136 - 145 mmol/L Final    Potassium 06/20/2019 3.8  3.5 - 5.1 mmol/L Final    Chloride 06/20/2019 103  99 - 110 mmol/L Final    CO2 06/20/2019 24  21 - 32 mmol/L Final    Anion Gap 06/20/2019 13  3 - 16 Final    Glucose 06/20/2019 92  70 - 99 mg/dL Final    BUN 06/20/2019 12  7 - 20 mg/dL Final    CREATININE 06/20/2019 0.8  0.6 - 1.1 mg/dL Final    GFR Non- 06/20/2019 >60  >60 Final    Comment: >60 mL/min/1.73m2 EGFR, calc. for ages 25 and older using the  MDRD formula (not corrected for weight), is valid for stable  renal function.  GFR  06/20/2019 >60  >60 Final    Comment: Chronic Kidney Disease: less than 60 ml/min/1.73 sq.m. Kidney Failure: less than 15 ml/min/1.73 sq.m. Results valid for patients 18 years and older.       Calcium 06/20/2019 8.9  8.3 - 10.6 mg/dL Final    ABO/Rh 06/20/2019 O POS   Final    Antibody Screen 06/20/2019 NEG   Final    POC

## 2019-09-12 ENCOUNTER — OFFICE VISIT (OUTPATIENT)
Dept: ENT CLINIC | Age: 34
End: 2019-09-12
Payer: COMMERCIAL

## 2019-09-12 ENCOUNTER — TELEPHONE (OUTPATIENT)
Dept: PULMONOLOGY | Age: 34
End: 2019-09-12

## 2019-09-12 VITALS
HEART RATE: 114 BPM | SYSTOLIC BLOOD PRESSURE: 152 MMHG | BODY MASS INDEX: 27.88 KG/M2 | DIASTOLIC BLOOD PRESSURE: 101 MMHG | WEIGHT: 178 LBS | TEMPERATURE: 97.6 F

## 2019-09-12 DIAGNOSIS — J38.4 LARYNGEAL EDEMA DETERMINED BY LARYNGOSCOPY: ICD-10-CM

## 2019-09-12 DIAGNOSIS — R05.3 CHRONIC COUGH: Primary | ICD-10-CM

## 2019-09-12 DIAGNOSIS — R05.9 COUGH: Primary | ICD-10-CM

## 2019-09-12 DIAGNOSIS — Z92.3 STATUS POST RADIATION THERAPY GREATER THAN TWELVE WEEKS AGO: ICD-10-CM

## 2019-09-12 DIAGNOSIS — C32.9 CARCINOMA LARYNX (HCC): ICD-10-CM

## 2019-09-12 PROCEDURE — 4004F PT TOBACCO SCREEN RCVD TLK: CPT | Performed by: OTOLARYNGOLOGY

## 2019-09-12 PROCEDURE — G8417 CALC BMI ABV UP PARAM F/U: HCPCS | Performed by: OTOLARYNGOLOGY

## 2019-09-12 PROCEDURE — G8427 DOCREV CUR MEDS BY ELIG CLIN: HCPCS | Performed by: OTOLARYNGOLOGY

## 2019-09-12 PROCEDURE — 99212 OFFICE O/P EST SF 10 MIN: CPT | Performed by: OTOLARYNGOLOGY

## 2019-09-12 NOTE — PROGRESS NOTES
FOLLOW UP VISIT:      INTERIM HISTORY: 2 years following completion of radiation therapy for laryngeal carcinoma. He has persistent cough which is been present for several months. With proton pump inhibitors was not effective. Recently put on gabapentin 100 mg 3 times daily. Cough persists.       PAST MEDICAL HISTORY:   Social History     Tobacco Use   Smoking Status Current Every Day Smoker    Packs/day: 0.50    Years: 18.00    Pack years: 9.00    Types: Cigarettes    Start date: 10/19/2013   Smokeless Tobacco Never Used                                                    Social History     Substance and Sexual Activity   Alcohol Use No    Alcohol/week: 20.0 standard drinks    Types: 10 Cans of beer, 10 Shots of liquor per week    Comment: none for 120 days                                                    Current Outpatient Medications:     busPIRone (BUSPAR) 7.5 MG tablet, TAKE ONE TABLET BY MOUTH THREE TIMES A DAY AS NEEDED FOR ANXIETY, Disp: 90 tablet, Rfl: 1    mirtazapine (REMERON) 15 MG tablet, TAKE ONE TABLET BY MOUTH ONCE NIGHTLY, Disp: 30 tablet, Rfl: 5    albuterol sulfate HFA (PROVENTIL HFA) 108 (90 Base) MCG/ACT inhaler, Inhale 2 puffs into the lungs every 4 hours as needed for Wheezing or Shortness of Breath (Space out to every 6 hours as symptoms improve), Disp: 1 Inhaler, Rfl: 5    desvenlafaxine succinate (PRISTIQ) 100 MG TB24 extended release tablet, Take 1 tablet by mouth daily, Disp: 30 tablet, Rfl: 5    fluticasone (FLOVENT HFA) 110 MCG/ACT inhaler, Inhale 2 puffs into the lungs 2 times daily, Disp: 1 Inhaler, Rfl: 5    traZODone (DESYREL) 50 MG tablet, TAKE TWO TABLETS BY MOUTH ONCE NIGHTLY AS NEEDED FOR SLEEP, Disp: 60 tablet, Rfl: 0    lamoTRIgine (LAMICTAL) 100 MG tablet, Take 1 tablet by mouth daily, Disp: 30 tablet, Rfl: 5    naproxen (NAPROSYN) 500 MG tablet, TAKE ONE TABLET BY MOUTH TWICE A DAY WITH MEALS, Disp: 60 tablet, Rfl: 1    pantoprazole (PROTONIX) 20 MG

## 2019-09-25 ENCOUNTER — OFFICE VISIT (OUTPATIENT)
Dept: INTERNAL MEDICINE CLINIC | Age: 34
End: 2019-09-25
Payer: COMMERCIAL

## 2019-09-25 ENCOUNTER — TELEPHONE (OUTPATIENT)
Dept: RHEUMATOLOGY | Age: 34
End: 2019-09-25

## 2019-09-25 VITALS
HEART RATE: 68 BPM | BODY MASS INDEX: 27.15 KG/M2 | DIASTOLIC BLOOD PRESSURE: 88 MMHG | HEIGHT: 67 IN | SYSTOLIC BLOOD PRESSURE: 148 MMHG | WEIGHT: 173 LBS

## 2019-09-25 DIAGNOSIS — K04.7 DENTAL ABSCESS: Primary | ICD-10-CM

## 2019-09-25 DIAGNOSIS — I10 ESSENTIAL HYPERTENSION: ICD-10-CM

## 2019-09-25 PROCEDURE — 99213 OFFICE O/P EST LOW 20 MIN: CPT | Performed by: NURSE PRACTITIONER

## 2019-09-25 PROCEDURE — G8427 DOCREV CUR MEDS BY ELIG CLIN: HCPCS | Performed by: NURSE PRACTITIONER

## 2019-09-25 PROCEDURE — G8417 CALC BMI ABV UP PARAM F/U: HCPCS | Performed by: NURSE PRACTITIONER

## 2019-09-25 PROCEDURE — 4004F PT TOBACCO SCREEN RCVD TLK: CPT | Performed by: NURSE PRACTITIONER

## 2019-09-25 RX ORDER — IBUPROFEN 800 MG/1
800 TABLET ORAL EVERY 6 HOURS PRN
Qty: 90 TABLET | Refills: 0 | Status: SHIPPED | OUTPATIENT
Start: 2019-09-25 | End: 2019-10-13 | Stop reason: SDUPTHER

## 2019-09-25 RX ORDER — CLINDAMYCIN HYDROCHLORIDE 300 MG/1
300 CAPSULE ORAL 3 TIMES DAILY
Qty: 30 CAPSULE | Refills: 0 | Status: SHIPPED | OUTPATIENT
Start: 2019-09-25 | End: 2019-10-05

## 2019-09-25 NOTE — PROGRESS NOTES
clindamycin (CLEOCIN) 300 MG capsule; Take 1 capsule by mouth 3 times daily for 10 days  Dispense: 30 capsule; Refill: 0  - ibuprofen (ADVIL;MOTRIN) 800 MG tablet; Take 1 tablet by mouth every 6 hours as needed for Pain  Dispense: 90 tablet; Refill: 0    2. Essential hypertension  Stable, uncontrolled  ?  Due to pain    Follow up in 2 weeks for BP    Electronically signed by AMBER Paz CNP on 9/25/2019 at 3:10 PM

## 2019-10-15 ENCOUNTER — OFFICE VISIT (OUTPATIENT)
Dept: INTERNAL MEDICINE CLINIC | Age: 34
End: 2019-10-15
Payer: COMMERCIAL

## 2019-10-15 VITALS
DIASTOLIC BLOOD PRESSURE: 78 MMHG | WEIGHT: 169 LBS | HEIGHT: 67 IN | SYSTOLIC BLOOD PRESSURE: 124 MMHG | BODY MASS INDEX: 26.53 KG/M2 | TEMPERATURE: 97.5 F | HEART RATE: 104 BPM

## 2019-10-15 DIAGNOSIS — R05.9 COUGH: ICD-10-CM

## 2019-10-15 DIAGNOSIS — J02.9 PHARYNGITIS, UNSPECIFIED ETIOLOGY: Primary | ICD-10-CM

## 2019-10-15 PROCEDURE — G8427 DOCREV CUR MEDS BY ELIG CLIN: HCPCS | Performed by: NURSE PRACTITIONER

## 2019-10-15 PROCEDURE — 99213 OFFICE O/P EST LOW 20 MIN: CPT | Performed by: NURSE PRACTITIONER

## 2019-10-15 PROCEDURE — G8417 CALC BMI ABV UP PARAM F/U: HCPCS | Performed by: NURSE PRACTITIONER

## 2019-10-15 PROCEDURE — G8484 FLU IMMUNIZE NO ADMIN: HCPCS | Performed by: NURSE PRACTITIONER

## 2019-10-15 PROCEDURE — 4004F PT TOBACCO SCREEN RCVD TLK: CPT | Performed by: NURSE PRACTITIONER

## 2019-10-15 RX ORDER — METHYLPREDNISOLONE 4 MG/1
TABLET ORAL
Qty: 1 KIT | Refills: 0 | Status: SHIPPED | OUTPATIENT
Start: 2019-10-15 | End: 2019-10-21

## 2019-10-15 ASSESSMENT — ENCOUNTER SYMPTOMS
SORE THROAT: 1
COUGH: 1
VOICE CHANGE: 1

## 2019-10-25 ENCOUNTER — OFFICE VISIT (OUTPATIENT)
Dept: PSYCHIATRY | Age: 34
End: 2019-10-25
Payer: COMMERCIAL

## 2019-10-25 VITALS
BODY MASS INDEX: 27.12 KG/M2 | WEIGHT: 172.8 LBS | SYSTOLIC BLOOD PRESSURE: 124 MMHG | HEIGHT: 67 IN | HEART RATE: 98 BPM | DIASTOLIC BLOOD PRESSURE: 79 MMHG

## 2019-10-25 DIAGNOSIS — F17.200 NICOTINE USE DISORDER: ICD-10-CM

## 2019-10-25 DIAGNOSIS — F12.20 CANNABIS USE DISORDER, MODERATE, DEPENDENCE (HCC): ICD-10-CM

## 2019-10-25 DIAGNOSIS — F43.10 POST TRAUMATIC STRESS DISORDER: ICD-10-CM

## 2019-10-25 DIAGNOSIS — F10.21 ALCOHOL USE DISORDER, MODERATE, IN EARLY REMISSION (HCC): ICD-10-CM

## 2019-10-25 DIAGNOSIS — F33.2 SEVERE EPISODE OF RECURRENT MAJOR DEPRESSIVE DISORDER, WITHOUT PSYCHOTIC FEATURES (HCC): Primary | ICD-10-CM

## 2019-10-25 PROCEDURE — G8484 FLU IMMUNIZE NO ADMIN: HCPCS | Performed by: PSYCHIATRY & NEUROLOGY

## 2019-10-25 PROCEDURE — G8427 DOCREV CUR MEDS BY ELIG CLIN: HCPCS | Performed by: PSYCHIATRY & NEUROLOGY

## 2019-10-25 PROCEDURE — 4004F PT TOBACCO SCREEN RCVD TLK: CPT | Performed by: PSYCHIATRY & NEUROLOGY

## 2019-10-25 PROCEDURE — G8417 CALC BMI ABV UP PARAM F/U: HCPCS | Performed by: PSYCHIATRY & NEUROLOGY

## 2019-10-25 PROCEDURE — 99204 OFFICE O/P NEW MOD 45 MIN: CPT | Performed by: PSYCHIATRY & NEUROLOGY

## 2019-10-25 RX ORDER — MIRTAZAPINE 30 MG/1
TABLET, FILM COATED ORAL
Qty: 30 TABLET | Refills: 1 | Status: SHIPPED | OUTPATIENT
Start: 2019-10-25 | End: 2019-11-04 | Stop reason: DRUGHIGH

## 2019-10-25 ASSESSMENT — ANXIETY QUESTIONNAIRES
GAD7 TOTAL SCORE: 21
2. NOT BEING ABLE TO STOP OR CONTROL WORRYING: 3-NEARLY EVERY DAY
5. BEING SO RESTLESS THAT IT IS HARD TO SIT STILL: 3-NEARLY EVERY DAY
6. BECOMING EASILY ANNOYED OR IRRITABLE: 3-NEARLY EVERY DAY
4. TROUBLE RELAXING: 3-NEARLY EVERY DAY
3. WORRYING TOO MUCH ABOUT DIFFERENT THINGS: 3-NEARLY EVERY DAY
1. FEELING NERVOUS, ANXIOUS, OR ON EDGE: 3-NEARLY EVERY DAY
7. FEELING AFRAID AS IF SOMETHING AWFUL MIGHT HAPPEN: 3-NEARLY EVERY DAY

## 2019-10-25 ASSESSMENT — PATIENT HEALTH QUESTIONNAIRE - PHQ9
9. THOUGHTS THAT YOU WOULD BE BETTER OFF DEAD, OR OF HURTING YOURSELF: 3
SUM OF ALL RESPONSES TO PHQ9 QUESTIONS 1 & 2: 6
1. LITTLE INTEREST OR PLEASURE IN DOING THINGS: 3
7. TROUBLE CONCENTRATING ON THINGS, SUCH AS READING THE NEWSPAPER OR WATCHING TELEVISION: 3
2. FEELING DOWN, DEPRESSED OR HOPELESS: 3
8. MOVING OR SPEAKING SO SLOWLY THAT OTHER PEOPLE COULD HAVE NOTICED. OR THE OPPOSITE, BEING SO FIGETY OR RESTLESS THAT YOU HAVE BEEN MOVING AROUND A LOT MORE THAN USUAL: 3
5. POOR APPETITE OR OVEREATING: 3
6. FEELING BAD ABOUT YOURSELF - OR THAT YOU ARE A FAILURE OR HAVE LET YOURSELF OR YOUR FAMILY DOWN: 3
SUM OF ALL RESPONSES TO PHQ QUESTIONS 1-9: 27
4. FEELING TIRED OR HAVING LITTLE ENERGY: 3
3. TROUBLE FALLING OR STAYING ASLEEP: 3
SUM OF ALL RESPONSES TO PHQ QUESTIONS 1-9: 27

## 2019-10-28 PROBLEM — F43.10 POST TRAUMATIC STRESS DISORDER: Status: ACTIVE | Noted: 2019-10-28

## 2019-10-28 PROBLEM — F33.2 SEVERE EPISODE OF RECURRENT MAJOR DEPRESSIVE DISORDER, WITHOUT PSYCHOTIC FEATURES (HCC): Status: ACTIVE | Noted: 2019-10-28

## 2019-10-28 PROBLEM — F10.21 ALCOHOL USE DISORDER, MODERATE, IN EARLY REMISSION (HCC): Status: ACTIVE | Noted: 2019-10-28

## 2019-10-28 PROBLEM — F17.200 NICOTINE USE DISORDER: Status: ACTIVE | Noted: 2019-10-28

## 2019-10-28 PROBLEM — F12.20 CANNABIS USE DISORDER, MODERATE, DEPENDENCE (HCC): Status: ACTIVE | Noted: 2019-10-28

## 2019-11-01 ENCOUNTER — TELEPHONE (OUTPATIENT)
Dept: PSYCHIATRY | Age: 34
End: 2019-11-01

## 2019-11-04 ENCOUNTER — TELEPHONE (OUTPATIENT)
Dept: INTERNAL MEDICINE CLINIC | Age: 34
End: 2019-11-04

## 2019-11-04 RX ORDER — DESVENLAFAXINE 100 MG/1
100 TABLET, EXTENDED RELEASE ORAL DAILY
Qty: 30 TABLET | Refills: 1 | Status: SHIPPED | OUTPATIENT
Start: 2019-11-04 | End: 2020-02-12 | Stop reason: SDUPTHER

## 2019-11-04 RX ORDER — MIRTAZAPINE 45 MG/1
45 TABLET, FILM COATED ORAL NIGHTLY
Qty: 30 TABLET | Refills: 1 | Status: SHIPPED | OUTPATIENT
Start: 2019-11-04 | End: 2020-01-02

## 2019-11-04 RX ORDER — LEVOMEFOLATE CALCIUM 15 MG
15 TABLET ORAL DAILY
Qty: 90 TABLET | Refills: 0 | Status: SHIPPED | OUTPATIENT
Start: 2019-11-04 | End: 2020-03-10

## 2019-11-07 RX ORDER — NORGESTIMATE AND ETHINYL ESTRADIOL 7DAYSX3 28
KIT ORAL
Qty: 28 TABLET | Refills: 12 | Status: SHIPPED | OUTPATIENT
Start: 2019-11-07 | End: 2021-01-01

## 2019-11-11 ENCOUNTER — TELEPHONE (OUTPATIENT)
Dept: INTERNAL MEDICINE CLINIC | Age: 34
End: 2019-11-11

## 2019-11-11 ENCOUNTER — TELEPHONE (OUTPATIENT)
Dept: FAMILY MEDICINE CLINIC | Age: 34
End: 2019-11-11

## 2019-11-11 NOTE — TELEPHONE ENCOUNTER
Called but patient did not answer. Left a message to call back and leave a time for me to reach her or to send me more details via Breeze.

## 2019-11-12 ENCOUNTER — TELEPHONE (OUTPATIENT)
Dept: FAMILY MEDICINE CLINIC | Age: 34
End: 2019-11-12

## 2019-11-13 ENCOUNTER — OFFICE VISIT (OUTPATIENT)
Dept: PSYCHIATRY | Age: 34
End: 2019-11-13
Payer: COMMERCIAL

## 2019-11-13 VITALS
SYSTOLIC BLOOD PRESSURE: 132 MMHG | DIASTOLIC BLOOD PRESSURE: 82 MMHG | WEIGHT: 172.5 LBS | HEART RATE: 110 BPM | BODY MASS INDEX: 27.02 KG/M2

## 2019-11-13 DIAGNOSIS — F33.2 SEVERE EPISODE OF RECURRENT MAJOR DEPRESSIVE DISORDER, WITHOUT PSYCHOTIC FEATURES (HCC): Primary | ICD-10-CM

## 2019-11-13 DIAGNOSIS — K21.9 LARYNGOPHARYNGEAL REFLUX (LPR): ICD-10-CM

## 2019-11-13 DIAGNOSIS — R05.9 COUGH: ICD-10-CM

## 2019-11-13 PROCEDURE — 99214 OFFICE O/P EST MOD 30 MIN: CPT | Performed by: PSYCHIATRY & NEUROLOGY

## 2019-11-13 PROCEDURE — G8428 CUR MEDS NOT DOCUMENT: HCPCS | Performed by: PSYCHIATRY & NEUROLOGY

## 2019-11-13 PROCEDURE — G8417 CALC BMI ABV UP PARAM F/U: HCPCS | Performed by: PSYCHIATRY & NEUROLOGY

## 2019-11-13 PROCEDURE — G8484 FLU IMMUNIZE NO ADMIN: HCPCS | Performed by: PSYCHIATRY & NEUROLOGY

## 2019-11-13 PROCEDURE — 4004F PT TOBACCO SCREEN RCVD TLK: CPT | Performed by: PSYCHIATRY & NEUROLOGY

## 2019-11-13 RX ORDER — HYDROXYZINE 50 MG/1
50 TABLET, FILM COATED ORAL 2 TIMES DAILY PRN
Qty: 60 TABLET | Refills: 0 | Status: SHIPPED | OUTPATIENT
Start: 2019-11-13 | End: 2019-12-18

## 2019-11-13 RX ORDER — OMEPRAZOLE 40 MG/1
CAPSULE, DELAYED RELEASE ORAL
Qty: 30 CAPSULE | Refills: 2 | Status: SHIPPED | OUTPATIENT
Start: 2019-11-13 | End: 2020-02-12

## 2019-11-13 ASSESSMENT — PATIENT HEALTH QUESTIONNAIRE - PHQ9
9. THOUGHTS THAT YOU WOULD BE BETTER OFF DEAD, OR OF HURTING YOURSELF: 3
8. MOVING OR SPEAKING SO SLOWLY THAT OTHER PEOPLE COULD HAVE NOTICED. OR THE OPPOSITE, BEING SO FIGETY OR RESTLESS THAT YOU HAVE BEEN MOVING AROUND A LOT MORE THAN USUAL: 3
4. FEELING TIRED OR HAVING LITTLE ENERGY: 3
7. TROUBLE CONCENTRATING ON THINGS, SUCH AS READING THE NEWSPAPER OR WATCHING TELEVISION: 3
5. POOR APPETITE OR OVEREATING: 3
3. TROUBLE FALLING OR STAYING ASLEEP: 3
SUM OF ALL RESPONSES TO PHQ QUESTIONS 1-9: 27
10. IF YOU CHECKED OFF ANY PROBLEMS, HOW DIFFICULT HAVE THESE PROBLEMS MADE IT FOR YOU TO DO YOUR WORK, TAKE CARE OF THINGS AT HOME, OR GET ALONG WITH OTHER PEOPLE: 2
SUM OF ALL RESPONSES TO PHQ9 QUESTIONS 1 & 2: 6
SUM OF ALL RESPONSES TO PHQ QUESTIONS 1-9: 27
2. FEELING DOWN, DEPRESSED OR HOPELESS: 3
1. LITTLE INTEREST OR PLEASURE IN DOING THINGS: 3
6. FEELING BAD ABOUT YOURSELF - OR THAT YOU ARE A FAILURE OR HAVE LET YOURSELF OR YOUR FAMILY DOWN: 3

## 2019-11-13 ASSESSMENT — ANXIETY QUESTIONNAIRES
2. NOT BEING ABLE TO STOP OR CONTROL WORRYING: 3-NEARLY EVERY DAY
5. BEING SO RESTLESS THAT IT IS HARD TO SIT STILL: 3-NEARLY EVERY DAY
6. BECOMING EASILY ANNOYED OR IRRITABLE: 3-NEARLY EVERY DAY
7. FEELING AFRAID AS IF SOMETHING AWFUL MIGHT HAPPEN: 3-NEARLY EVERY DAY
4. TROUBLE RELAXING: 3-NEARLY EVERY DAY
GAD7 TOTAL SCORE: 18
3. WORRYING TOO MUCH ABOUT DIFFERENT THINGS: 3-NEARLY EVERY DAY

## 2019-11-22 DIAGNOSIS — K04.7 DENTAL ABSCESS: ICD-10-CM

## 2019-11-22 RX ORDER — IBUPROFEN 800 MG/1
TABLET ORAL
Qty: 90 TABLET | Refills: 1 | OUTPATIENT
Start: 2019-11-22

## 2019-12-11 ENCOUNTER — OFFICE VISIT (OUTPATIENT)
Dept: INTERNAL MEDICINE CLINIC | Age: 34
End: 2019-12-11
Payer: COMMERCIAL

## 2019-12-11 VITALS
HEIGHT: 67 IN | HEART RATE: 100 BPM | SYSTOLIC BLOOD PRESSURE: 126 MMHG | TEMPERATURE: 97.9 F | WEIGHT: 170 LBS | DIASTOLIC BLOOD PRESSURE: 80 MMHG | BODY MASS INDEX: 26.68 KG/M2

## 2019-12-11 DIAGNOSIS — R68.89 FLU-LIKE SYMPTOMS: Primary | ICD-10-CM

## 2019-12-11 DIAGNOSIS — J02.9 SORETHROAT: ICD-10-CM

## 2019-12-11 LAB
INFLUENZA A ANTIBODY: NORMAL
INFLUENZA B ANTIBODY: NORMAL
S PYO AG THROAT QL: NORMAL

## 2019-12-11 PROCEDURE — G8427 DOCREV CUR MEDS BY ELIG CLIN: HCPCS | Performed by: NURSE PRACTITIONER

## 2019-12-11 PROCEDURE — 87804 INFLUENZA ASSAY W/OPTIC: CPT | Performed by: NURSE PRACTITIONER

## 2019-12-11 PROCEDURE — 4004F PT TOBACCO SCREEN RCVD TLK: CPT | Performed by: NURSE PRACTITIONER

## 2019-12-11 PROCEDURE — G8417 CALC BMI ABV UP PARAM F/U: HCPCS | Performed by: NURSE PRACTITIONER

## 2019-12-11 PROCEDURE — G8484 FLU IMMUNIZE NO ADMIN: HCPCS | Performed by: NURSE PRACTITIONER

## 2019-12-11 PROCEDURE — 99213 OFFICE O/P EST LOW 20 MIN: CPT | Performed by: NURSE PRACTITIONER

## 2019-12-11 PROCEDURE — 87880 STREP A ASSAY W/OPTIC: CPT | Performed by: NURSE PRACTITIONER

## 2019-12-11 RX ORDER — OSELTAMIVIR PHOSPHATE 75 MG/1
75 CAPSULE ORAL 2 TIMES DAILY
Qty: 10 CAPSULE | Refills: 0 | Status: SHIPPED | OUTPATIENT
Start: 2019-12-11 | End: 2019-12-16

## 2019-12-11 ASSESSMENT — ENCOUNTER SYMPTOMS
RHINORRHEA: 1
COUGH: 1
SORE THROAT: 1
SHORTNESS OF BREATH: 0
CHEST TIGHTNESS: 0
WHEEZING: 0
SINUS PRESSURE: 1

## 2019-12-18 DIAGNOSIS — K04.7 DENTAL ABSCESS: ICD-10-CM

## 2019-12-18 RX ORDER — IBUPROFEN 800 MG/1
TABLET ORAL
Qty: 90 TABLET | Refills: 0 | Status: SHIPPED | OUTPATIENT
Start: 2019-12-18 | End: 2020-01-09

## 2019-12-18 RX ORDER — HYDROXYZINE 50 MG/1
TABLET, FILM COATED ORAL
Qty: 60 TABLET | Refills: 0 | Status: SHIPPED | OUTPATIENT
Start: 2019-12-18 | End: 2020-02-12 | Stop reason: SDUPTHER

## 2019-12-27 ENCOUNTER — TELEPHONE (OUTPATIENT)
Dept: PULMONOLOGY | Age: 34
End: 2019-12-27

## 2020-01-01 ENCOUNTER — VIRTUAL VISIT (OUTPATIENT)
Dept: PSYCHOLOGY | Age: 35
End: 2020-01-01

## 2020-01-01 ENCOUNTER — OFFICE VISIT (OUTPATIENT)
Dept: INTERNAL MEDICINE CLINIC | Age: 35
End: 2020-01-01
Payer: COMMERCIAL

## 2020-01-01 ENCOUNTER — TELEPHONE (OUTPATIENT)
Dept: INTERNAL MEDICINE CLINIC | Age: 35
End: 2020-01-01

## 2020-01-01 ENCOUNTER — VIRTUAL VISIT (OUTPATIENT)
Dept: PSYCHOLOGY | Age: 35
End: 2020-01-01
Payer: COMMERCIAL

## 2020-01-01 ENCOUNTER — VIRTUAL VISIT (OUTPATIENT)
Dept: PSYCHIATRY | Age: 35
End: 2020-01-01
Payer: COMMERCIAL

## 2020-01-01 ENCOUNTER — PATIENT MESSAGE (OUTPATIENT)
Dept: INTERNAL MEDICINE CLINIC | Age: 35
End: 2020-01-01

## 2020-01-01 ENCOUNTER — HOSPITAL ENCOUNTER (EMERGENCY)
Age: 35
Discharge: HOME | End: 2020-09-12
Attending: EMERGENCY MEDICINE
Payer: COMMERCIAL

## 2020-01-01 ENCOUNTER — TELEPHONE (OUTPATIENT)
Dept: PSYCHOLOGY | Age: 35
End: 2020-01-01

## 2020-01-01 ENCOUNTER — OFFICE VISIT (OUTPATIENT)
Dept: ENT CLINIC | Age: 35
End: 2020-01-01
Payer: COMMERCIAL

## 2020-01-01 ENCOUNTER — VIRTUAL VISIT (OUTPATIENT)
Dept: INTERNAL MEDICINE CLINIC | Age: 35
End: 2020-01-01
Payer: COMMERCIAL

## 2020-01-01 ENCOUNTER — HOSPITAL ENCOUNTER (OUTPATIENT)
Age: 35
Discharge: HOME | End: 2020-10-16
Payer: COMMERCIAL

## 2020-01-01 ENCOUNTER — OFFICE VISIT (OUTPATIENT)
Dept: PSYCHIATRY | Age: 35
End: 2020-01-01
Payer: COMMERCIAL

## 2020-01-01 ENCOUNTER — OFFICE VISIT (OUTPATIENT)
Dept: RHEUMATOLOGY | Age: 35
End: 2020-01-01
Payer: COMMERCIAL

## 2020-01-01 ENCOUNTER — APPOINTMENT (OUTPATIENT)
Dept: GENERAL RADIOLOGY | Age: 35
End: 2020-01-01
Payer: COMMERCIAL

## 2020-01-01 ENCOUNTER — TELEPHONE (OUTPATIENT)
Dept: RHEUMATOLOGY | Age: 35
End: 2020-01-01

## 2020-01-01 ENCOUNTER — OFFICE VISIT (OUTPATIENT)
Dept: PSYCHOLOGY | Age: 35
End: 2020-01-01
Payer: COMMERCIAL

## 2020-01-01 ENCOUNTER — NURSE TRIAGE (OUTPATIENT)
Dept: OTHER | Facility: CLINIC | Age: 35
End: 2020-01-01

## 2020-01-01 ENCOUNTER — HOSPITAL ENCOUNTER (OUTPATIENT)
Dept: PHYSICAL THERAPY | Age: 35
Setting detail: THERAPIES SERIES
Discharge: HOME | End: 2020-10-19
Payer: COMMERCIAL

## 2020-01-01 ENCOUNTER — TELEPHONE (OUTPATIENT)
Dept: ENT CLINIC | Age: 35
End: 2020-01-01

## 2020-01-01 ENCOUNTER — TELEMEDICINE (OUTPATIENT)
Dept: PSYCHOLOGY | Age: 35
End: 2020-01-01
Payer: COMMERCIAL

## 2020-01-01 ENCOUNTER — HOSPITAL ENCOUNTER (OUTPATIENT)
Dept: GENERAL RADIOLOGY | Age: 35
Discharge: HOME | End: 2020-10-16
Payer: COMMERCIAL

## 2020-01-01 VITALS
HEART RATE: 98 BPM | BODY MASS INDEX: 24.43 KG/M2 | DIASTOLIC BLOOD PRESSURE: 78 MMHG | OXYGEN SATURATION: 99 % | TEMPERATURE: 96.8 F | WEIGHT: 156 LBS | SYSTOLIC BLOOD PRESSURE: 132 MMHG

## 2020-01-01 VITALS — SYSTOLIC BLOOD PRESSURE: 137 MMHG | HEART RATE: 97 BPM | DIASTOLIC BLOOD PRESSURE: 100 MMHG | TEMPERATURE: 97.8 F

## 2020-01-01 VITALS
WEIGHT: 150 LBS | BODY MASS INDEX: 23.49 KG/M2 | SYSTOLIC BLOOD PRESSURE: 124 MMHG | TEMPERATURE: 97.6 F | HEART RATE: 78 BPM | OXYGEN SATURATION: 98 % | DIASTOLIC BLOOD PRESSURE: 78 MMHG

## 2020-01-01 VITALS
HEIGHT: 67 IN | DIASTOLIC BLOOD PRESSURE: 112 MMHG | HEART RATE: 122 BPM | BODY MASS INDEX: 26.37 KG/M2 | TEMPERATURE: 98 F | SYSTOLIC BLOOD PRESSURE: 148 MMHG | WEIGHT: 168 LBS

## 2020-01-01 VITALS — TEMPERATURE: 95 F | WEIGHT: 167 LBS | HEIGHT: 67 IN | BODY MASS INDEX: 26.21 KG/M2

## 2020-01-01 VITALS
HEIGHT: 67 IN | RESPIRATION RATE: 20 BRPM | TEMPERATURE: 98.6 F | HEART RATE: 90 BPM | WEIGHT: 150 LBS | DIASTOLIC BLOOD PRESSURE: 80 MMHG | SYSTOLIC BLOOD PRESSURE: 111 MMHG | OXYGEN SATURATION: 100 % | BODY MASS INDEX: 23.54 KG/M2

## 2020-01-01 VITALS — DIASTOLIC BLOOD PRESSURE: 98 MMHG | TEMPERATURE: 97.5 F | SYSTOLIC BLOOD PRESSURE: 148 MMHG

## 2020-01-01 VITALS
HEART RATE: 94 BPM | OXYGEN SATURATION: 99 % | SYSTOLIC BLOOD PRESSURE: 134 MMHG | HEIGHT: 67 IN | WEIGHT: 169.6 LBS | DIASTOLIC BLOOD PRESSURE: 88 MMHG | TEMPERATURE: 98.2 F | BODY MASS INDEX: 26.62 KG/M2

## 2020-01-01 DIAGNOSIS — Z79.899 ON LONG TERM DRUG THERAPY: ICD-10-CM

## 2020-01-01 DIAGNOSIS — F17.200 NICOTINE USE DISORDER: ICD-10-CM

## 2020-01-01 LAB
A/G RATIO: 1.6 (ref 1.1–2.2)
ACETAMINOPHEN LEVEL: <5 UG/ML (ref 10–30)
ALBUMIN SERPL-MCNC: 4.4 G/DL (ref 3.4–5)
ALBUMIN SERPL-MCNC: 4.4 G/DL (ref 3.4–5)
ALBUMIN SERPL-MCNC: 4.6 G/DL (ref 3.4–5)
ALP BLD-CCNC: 103 U/L (ref 40–129)
ALT SERPL-CCNC: 25 U/L (ref 10–40)
ANGIOTENSIN CONVERTING ENZYME: 49 U/L (ref 9–67)
ANION GAP SERPL CALCULATED.3IONS-SCNC: 11 MMOL/L (ref 3–16)
ANION GAP SERPL CALCULATED.3IONS-SCNC: 11 MMOL/L (ref 3–16)
ANION GAP SERPL CALCULATED.3IONS-SCNC: 12 MMOL/L (ref 3–16)
AST SERPL-CCNC: 24 U/L (ref 15–37)
BASOPHILS ABSOLUTE: 0.1 K/UL (ref 0–0.2)
BASOPHILS ABSOLUTE: 0.1 K/UL (ref 0–0.2)
BASOPHILS RELATIVE PERCENT: 0.6 %
BASOPHILS RELATIVE PERCENT: 0.7 %
BILIRUB SERPL-MCNC: 0.3 MG/DL (ref 0–1)
BLOOD CULTURE, ROUTINE: NORMAL
BUN BLDV-MCNC: 13 MG/DL (ref 7–20)
BUN BLDV-MCNC: 17 MG/DL (ref 7–20)
BUN BLDV-MCNC: 9 MG/DL (ref 7–20)
C-REACTIVE PROTEIN: 1.1 MG/L (ref 0–5.1)
CALCIUM SERPL-MCNC: 9.2 MG/DL (ref 8.3–10.6)
CALCIUM SERPL-MCNC: 9.2 MG/DL (ref 8.3–10.6)
CALCIUM SERPL-MCNC: 9.3 MG/DL (ref 8.3–10.6)
CHLORIDE BLD-SCNC: 104 MMOL/L (ref 99–110)
CHLORIDE BLD-SCNC: 104 MMOL/L (ref 99–110)
CHLORIDE BLD-SCNC: 105 MMOL/L (ref 99–110)
CO2: 23 MMOL/L (ref 21–32)
CO2: 23 MMOL/L (ref 21–32)
CO2: 25 MMOL/L (ref 21–32)
CREAT SERPL-MCNC: 0.6 MG/DL (ref 0.6–1.1)
CREAT SERPL-MCNC: 0.6 MG/DL (ref 0.6–1.1)
CREAT SERPL-MCNC: 0.9 MG/DL (ref 0.6–1.1)
CULTURE, BLOOD 2: NORMAL
EKG ATRIAL RATE: 99 BPM
EKG DIAGNOSIS: NORMAL
EKG P AXIS: 69 DEGREES
EKG P-R INTERVAL: 142 MS
EKG Q-T INTERVAL: 376 MS
EKG QRS DURATION: 92 MS
EKG QTC CALCULATION (BAZETT): 482 MS
EKG R AXIS: 54 DEGREES
EKG T AXIS: 67 DEGREES
EKG VENTRICULAR RATE: 99 BPM
EOSINOPHILS ABSOLUTE: 0.1 K/UL (ref 0–0.6)
EOSINOPHILS ABSOLUTE: 0.2 K/UL (ref 0–0.6)
EOSINOPHILS RELATIVE PERCENT: 1.1 %
EOSINOPHILS RELATIVE PERCENT: 1.1 %
ETHANOL: NORMAL MG/DL (ref 0–0.08)
GFR AFRICAN AMERICAN: >60
GFR NON-AFRICAN AMERICAN: >60
GLOBULIN: 2.7 G/DL
GLUCOSE BLD-MCNC: 110 MG/DL (ref 70–99)
GLUCOSE BLD-MCNC: 113 MG/DL (ref 70–99)
GLUCOSE BLD-MCNC: 86 MG/DL (ref 70–99)
HCG QUALITATIVE: NEGATIVE
HCT VFR BLD CALC: 32.4 % (ref 36–48)
HCT VFR BLD CALC: 36.5 % (ref 36–48)
HEMOGLOBIN: 10.6 G/DL (ref 12–16)
HEMOGLOBIN: 11.3 G/DL (ref 12–16)
LITHIUM DOSE AMOUNT: ABNORMAL
LITHIUM LEVEL: 0.2 MMOL/L (ref 0.6–1.2)
LYMPHOCYTES ABSOLUTE: 2 K/UL (ref 1–5.1)
LYMPHOCYTES ABSOLUTE: 4.3 K/UL (ref 1–5.1)
LYMPHOCYTES RELATIVE PERCENT: 19 %
LYMPHOCYTES RELATIVE PERCENT: 28.6 %
MAGNESIUM: 2 MG/DL (ref 1.8–2.4)
MCH RBC QN AUTO: 24.6 PG (ref 26–34)
MCH RBC QN AUTO: 25.6 PG (ref 26–34)
MCHC RBC AUTO-ENTMCNC: 30.9 G/DL (ref 31–36)
MCHC RBC AUTO-ENTMCNC: 32.7 G/DL (ref 31–36)
MCV RBC AUTO: 78.4 FL (ref 80–100)
MCV RBC AUTO: 79.4 FL (ref 80–100)
MONOCYTES ABSOLUTE: 0.6 K/UL (ref 0–1.3)
MONOCYTES ABSOLUTE: 0.8 K/UL (ref 0–1.3)
MONOCYTES RELATIVE PERCENT: 5.6 %
MONOCYTES RELATIVE PERCENT: 5.7 %
NEUTROPHILS ABSOLUTE: 7.7 K/UL (ref 1.7–7.7)
NEUTROPHILS ABSOLUTE: 9.6 K/UL (ref 1.7–7.7)
NEUTROPHILS RELATIVE PERCENT: 64 %
NEUTROPHILS RELATIVE PERCENT: 73.6 %
PDW BLD-RTO: 17.6 % (ref 12.4–15.4)
PDW BLD-RTO: 19.5 % (ref 12.4–15.4)
PHOSPHORUS: 4.1 MG/DL (ref 2.5–4.9)
PHOSPHORUS: 4.2 MG/DL (ref 2.5–4.9)
PLATELET # BLD: 279 K/UL (ref 135–450)
PLATELET # BLD: 565 K/UL (ref 135–450)
PMV BLD AUTO: 7.3 FL (ref 5–10.5)
PMV BLD AUTO: 7.5 FL (ref 5–10.5)
POTASSIUM REFLEX MAGNESIUM: 3.3 MMOL/L (ref 3.5–5.1)
POTASSIUM SERPL-SCNC: 3.9 MMOL/L (ref 3.5–5.1)
POTASSIUM SERPL-SCNC: 4.4 MMOL/L (ref 3.5–5.1)
RBC # BLD: 4.13 M/UL (ref 4–5.2)
RBC # BLD: 4.59 M/UL (ref 4–5.2)
SALICYLATE, SERUM: <0.3 MG/DL (ref 15–30)
SODIUM BLD-SCNC: 138 MMOL/L (ref 136–145)
SODIUM BLD-SCNC: 139 MMOL/L (ref 136–145)
SODIUM BLD-SCNC: 141 MMOL/L (ref 136–145)
T4 FREE: 1.2 NG/DL (ref 0.9–1.8)
THYROGLOBULIN BY LC-MS/MS, SERUM/PLASMA: 5.2 NG/ML (ref 1.3–31.8)
THYROID STIMULATING IMMUNOGLOBULIN: <0.1 IU/L
TOTAL PROTEIN: 7.1 G/DL (ref 6.4–8.2)
TROPONIN: <0.01 NG/ML
TSH REFLEX: 2.22 UIU/ML (ref 0.27–4.2)
TSH REFLEX: 4.81 UIU/ML (ref 0.27–4.2)
URIC ACID, SERUM: 4.3 MG/DL (ref 2.6–6)
WBC # BLD: 10.5 K/UL (ref 4–11)
WBC # BLD: 15 K/UL (ref 4–11)

## 2020-01-01 PROCEDURE — 84484 ASSAY OF TROPONIN QUANT: CPT

## 2020-01-01 PROCEDURE — 99214 OFFICE O/P EST MOD 30 MIN: CPT | Performed by: OTOLARYNGOLOGY

## 2020-01-01 PROCEDURE — 36415 COLL VENOUS BLD VENIPUNCTURE: CPT

## 2020-01-01 PROCEDURE — G8427 DOCREV CUR MEDS BY ELIG CLIN: HCPCS | Performed by: NURSE PRACTITIONER

## 2020-01-01 PROCEDURE — 99214 OFFICE O/P EST MOD 30 MIN: CPT | Performed by: PSYCHIATRY & NEUROLOGY

## 2020-01-01 PROCEDURE — G0480 DRUG TEST DEF 1-7 CLASSES: HCPCS

## 2020-01-01 PROCEDURE — 4004F PT TOBACCO SCREEN RCVD TLK: CPT | Performed by: INTERNAL MEDICINE

## 2020-01-01 PROCEDURE — 85025 COMPLETE CBC W/AUTO DIFF WBC: CPT

## 2020-01-01 PROCEDURE — G8420 CALC BMI NORM PARAMETERS: HCPCS | Performed by: PSYCHIATRY & NEUROLOGY

## 2020-01-01 PROCEDURE — G8484 FLU IMMUNIZE NO ADMIN: HCPCS | Performed by: NURSE PRACTITIONER

## 2020-01-01 PROCEDURE — 99213 OFFICE O/P EST LOW 20 MIN: CPT | Performed by: INTERNAL MEDICINE

## 2020-01-01 PROCEDURE — 90832 PSYTX W PT 30 MINUTES: CPT | Performed by: PSYCHOLOGIST

## 2020-01-01 PROCEDURE — 99283 EMERGENCY DEPT VISIT LOW MDM: CPT

## 2020-01-01 PROCEDURE — G8417 CALC BMI ABV UP PARAM F/U: HCPCS | Performed by: INTERNAL MEDICINE

## 2020-01-01 PROCEDURE — 93010 ELECTROCARDIOGRAM REPORT: CPT | Performed by: INTERNAL MEDICINE

## 2020-01-01 PROCEDURE — 80053 COMPREHEN METABOLIC PANEL: CPT

## 2020-01-01 PROCEDURE — G8428 CUR MEDS NOT DOCUMENT: HCPCS | Performed by: INTERNAL MEDICINE

## 2020-01-01 PROCEDURE — 4004F PT TOBACCO SCREEN RCVD TLK: CPT | Performed by: PSYCHIATRY & NEUROLOGY

## 2020-01-01 PROCEDURE — G8428 CUR MEDS NOT DOCUMENT: HCPCS | Performed by: PSYCHIATRY & NEUROLOGY

## 2020-01-01 PROCEDURE — 71045 X-RAY EXAM CHEST 1 VIEW: CPT

## 2020-01-01 PROCEDURE — G8417 CALC BMI ABV UP PARAM F/U: HCPCS | Performed by: OTOLARYNGOLOGY

## 2020-01-01 PROCEDURE — G8427 DOCREV CUR MEDS BY ELIG CLIN: HCPCS | Performed by: OTOLARYNGOLOGY

## 2020-01-01 PROCEDURE — 93005 ELECTROCARDIOGRAM TRACING: CPT | Performed by: EMERGENCY MEDICINE

## 2020-01-01 PROCEDURE — 83735 ASSAY OF MAGNESIUM: CPT

## 2020-01-01 PROCEDURE — 99214 OFFICE O/P EST MOD 30 MIN: CPT | Performed by: NURSE PRACTITIONER

## 2020-01-01 PROCEDURE — 72050 X-RAY EXAM NECK SPINE 4/5VWS: CPT

## 2020-01-01 PROCEDURE — 6370000000 HC RX 637 (ALT 250 FOR IP): Performed by: PHYSICIAN ASSISTANT

## 2020-01-01 PROCEDURE — G8417 CALC BMI ABV UP PARAM F/U: HCPCS | Performed by: NURSE PRACTITIONER

## 2020-01-01 PROCEDURE — 99212 OFFICE O/P EST SF 10 MIN: CPT | Performed by: OTOLARYNGOLOGY

## 2020-01-01 PROCEDURE — 4004F PT TOBACCO SCREEN RCVD TLK: CPT | Performed by: NURSE PRACTITIONER

## 2020-01-01 PROCEDURE — G8484 FLU IMMUNIZE NO ADMIN: HCPCS | Performed by: OTOLARYNGOLOGY

## 2020-01-01 PROCEDURE — G8420 CALC BMI NORM PARAMETERS: HCPCS | Performed by: NURSE PRACTITIONER

## 2020-01-01 PROCEDURE — 4004F PT TOBACCO SCREEN RCVD TLK: CPT | Performed by: OTOLARYNGOLOGY

## 2020-01-01 PROCEDURE — 84703 CHORIONIC GONADOTROPIN ASSAY: CPT

## 2020-01-01 PROCEDURE — 99215 OFFICE O/P EST HI 40 MIN: CPT | Performed by: NURSE PRACTITIONER

## 2020-01-01 PROCEDURE — G8420 CALC BMI NORM PARAMETERS: HCPCS | Performed by: OTOLARYNGOLOGY

## 2020-01-01 PROCEDURE — G8427 DOCREV CUR MEDS BY ELIG CLIN: HCPCS | Performed by: PSYCHIATRY & NEUROLOGY

## 2020-01-01 RX ORDER — AMLODIPINE BESYLATE 5 MG/1
TABLET ORAL
Qty: 30 TABLET | Refills: 2 | Status: SHIPPED | OUTPATIENT
Start: 2020-01-01 | End: 2020-01-01

## 2020-01-01 RX ORDER — DESVENLAFAXINE 25 MG/1
TABLET, EXTENDED RELEASE ORAL
Qty: 42 TABLET | Refills: 0 | Status: SHIPPED | OUTPATIENT
Start: 2020-01-01 | End: 2020-01-01 | Stop reason: ALTCHOICE

## 2020-01-01 RX ORDER — AZITHROMYCIN 250 MG/1
250 TABLET, FILM COATED ORAL SEE ADMIN INSTRUCTIONS
Qty: 6 TABLET | Refills: 0 | Status: SHIPPED | OUTPATIENT
Start: 2020-01-01 | End: 2020-01-01

## 2020-01-01 RX ORDER — LITHIUM CARBONATE 300 MG/1
CAPSULE ORAL
Qty: 60 CAPSULE | Refills: 0 | Status: SHIPPED | OUTPATIENT
Start: 2020-01-01 | End: 2020-01-01

## 2020-01-01 RX ORDER — OMEPRAZOLE 40 MG/1
CAPSULE, DELAYED RELEASE ORAL
Qty: 30 CAPSULE | Refills: 0 | Status: SHIPPED | OUTPATIENT
Start: 2020-01-01 | End: 2020-01-01

## 2020-01-01 RX ORDER — LORAZEPAM 1 MG/1
1 TABLET ORAL EVERY 4 HOURS PRN
Status: DISCONTINUED | OUTPATIENT
Start: 2020-01-01 | End: 2020-01-01

## 2020-01-01 RX ORDER — LITHIUM CARBONATE 150 MG/1
150 CAPSULE ORAL 2 TIMES DAILY
Qty: 60 CAPSULE | Refills: 1 | Status: SHIPPED | OUTPATIENT
Start: 2020-01-01 | End: 2020-01-01

## 2020-01-01 RX ORDER — DOXYCYCLINE 100 MG/1
100 CAPSULE ORAL 2 TIMES DAILY
Qty: 14 CAPSULE | Refills: 0 | Status: SHIPPED | OUTPATIENT
Start: 2020-01-01 | End: 2020-01-01

## 2020-01-01 RX ORDER — GABAPENTIN 100 MG/1
CAPSULE ORAL
Qty: 90 CAPSULE | Refills: 0 | OUTPATIENT
Start: 2020-01-01

## 2020-01-01 RX ORDER — PROPRANOLOL HYDROCHLORIDE 120 MG/1
CAPSULE, EXTENDED RELEASE ORAL
Qty: 30 CAPSULE | Refills: 4 | Status: SHIPPED | OUTPATIENT
Start: 2020-01-01 | End: 2021-01-01

## 2020-01-01 RX ORDER — GABAPENTIN 300 MG/1
300 CAPSULE ORAL 3 TIMES DAILY
Qty: 90 CAPSULE | Refills: 2 | Status: SHIPPED
Start: 2020-01-01 | End: 2020-01-01

## 2020-01-01 RX ORDER — VARENICLINE TARTRATE
KIT
Qty: 1 BOX | Refills: 0 | Status: SHIPPED | OUTPATIENT
Start: 2020-01-01 | End: 2020-01-01

## 2020-01-01 RX ORDER — HYDROCODONE POLISTIREX AND CHLORPHENIRAMINE POLISTIREX 10; 8 MG/5ML; MG/5ML
5 SUSPENSION, EXTENDED RELEASE ORAL EVERY 12 HOURS PRN
Qty: 240 ML | Refills: 0 | Status: SHIPPED | OUTPATIENT
Start: 2020-01-01 | End: 2021-01-01

## 2020-01-01 RX ORDER — CEPHALEXIN 500 MG/1
500 CAPSULE ORAL 2 TIMES DAILY
Qty: 14 CAPSULE | Refills: 0 | Status: SHIPPED | OUTPATIENT
Start: 2020-01-01 | End: 2020-01-01

## 2020-01-01 RX ORDER — AZITHROMYCIN 250 MG/1
250 TABLET, FILM COATED ORAL SEE ADMIN INSTRUCTIONS
Qty: 6 TABLET | Refills: 0 | Status: SHIPPED | OUTPATIENT
Start: 2020-01-01 | End: 2020-01-01 | Stop reason: SDUPTHER

## 2020-01-01 RX ORDER — LORAZEPAM 1 MG/1
1 TABLET ORAL ONCE
Status: COMPLETED | OUTPATIENT
Start: 2020-01-01 | End: 2020-01-01

## 2020-01-01 RX ORDER — LITHIUM CARBONATE 300 MG/1
CAPSULE ORAL
Qty: 60 CAPSULE | Refills: 0 | Status: SHIPPED | OUTPATIENT
Start: 2020-01-01 | End: 2021-01-01 | Stop reason: ALTCHOICE

## 2020-01-01 RX ORDER — ONDANSETRON 4 MG/1
TABLET, ORALLY DISINTEGRATING ORAL
Qty: 20 TABLET | Refills: 0 | Status: SHIPPED | OUTPATIENT
Start: 2020-01-01 | End: 2020-01-01

## 2020-01-01 RX ORDER — MIRTAZAPINE 30 MG/1
30 TABLET, FILM COATED ORAL NIGHTLY
Qty: 30 TABLET | Refills: 1 | Status: SHIPPED | OUTPATIENT
Start: 2020-01-01 | End: 2020-01-01 | Stop reason: SDUPTHER

## 2020-01-01 RX ORDER — ONDANSETRON 4 MG/1
TABLET, ORALLY DISINTEGRATING ORAL
Qty: 20 TABLET | Refills: 0 | Status: SHIPPED | OUTPATIENT
Start: 2020-01-01 | End: 2021-01-01

## 2020-01-01 RX ORDER — PREDNISONE 20 MG/1
20 TABLET ORAL 2 TIMES DAILY
Qty: 10 TABLET | Refills: 0 | Status: SHIPPED | OUTPATIENT
Start: 2020-01-01 | End: 2020-01-01

## 2020-01-01 RX ORDER — OMEPRAZOLE 40 MG/1
CAPSULE, DELAYED RELEASE ORAL
Qty: 30 CAPSULE | Refills: 0 | Status: SHIPPED | OUTPATIENT
Start: 2020-01-01 | End: 2021-01-01

## 2020-01-01 RX ORDER — OMEPRAZOLE 40 MG/1
CAPSULE, DELAYED RELEASE ORAL
Qty: 30 CAPSULE | Refills: 0 | Status: SHIPPED | OUTPATIENT
Start: 2020-01-01 | End: 2020-01-01 | Stop reason: SDUPTHER

## 2020-01-01 RX ORDER — AMLODIPINE BESYLATE 5 MG/1
TABLET ORAL
Qty: 30 TABLET | Refills: 1 | Status: SHIPPED | OUTPATIENT
Start: 2020-01-01 | End: 2020-01-01

## 2020-01-01 RX ORDER — METHOCARBAMOL 500 MG/1
500 TABLET, FILM COATED ORAL 3 TIMES DAILY PRN
Qty: 40 TABLET | Refills: 0 | Status: SHIPPED | OUTPATIENT
Start: 2020-01-01 | End: 2020-01-01

## 2020-01-01 RX ORDER — NAPROXEN 500 MG/1
500 TABLET ORAL 2 TIMES DAILY PRN
Qty: 20 TABLET | Refills: 0 | Status: SHIPPED | OUTPATIENT
Start: 2020-01-01 | End: 2020-01-01

## 2020-01-01 RX ORDER — HYDROXYZINE 50 MG/1
50 TABLET, FILM COATED ORAL 2 TIMES DAILY PRN
Qty: 60 TABLET | Refills: 2 | Status: SHIPPED | OUTPATIENT
Start: 2020-01-01 | End: 2020-01-01 | Stop reason: SDUPTHER

## 2020-01-01 RX ORDER — MIRTAZAPINE 30 MG/1
30 TABLET, FILM COATED ORAL NIGHTLY
Qty: 30 TABLET | Refills: 2 | Status: SHIPPED | OUTPATIENT
Start: 2020-01-01 | End: 2021-01-01

## 2020-01-01 RX ORDER — LITHIUM CARBONATE 300 MG/1
300 CAPSULE ORAL 2 TIMES DAILY WITH MEALS
Qty: 60 CAPSULE | Refills: 0 | Status: SHIPPED | OUTPATIENT
Start: 2020-01-01 | End: 2020-01-01

## 2020-01-01 RX ORDER — ONDANSETRON 4 MG/1
TABLET, ORALLY DISINTEGRATING ORAL
Qty: 20 TABLET | Refills: 0 | Status: SHIPPED | OUTPATIENT
Start: 2020-01-01 | End: 2020-01-01 | Stop reason: SDUPTHER

## 2020-01-01 RX ORDER — HYDROXYZINE 50 MG/1
50 TABLET, FILM COATED ORAL 2 TIMES DAILY PRN
Qty: 60 TABLET | Refills: 2 | Status: SHIPPED | OUTPATIENT
Start: 2020-01-01 | End: 2020-01-01

## 2020-01-01 RX ORDER — GUAIFENESIN 600 MG/1
1200 TABLET, EXTENDED RELEASE ORAL 2 TIMES DAILY
Qty: 40 TABLET | Refills: 0 | Status: SHIPPED | OUTPATIENT
Start: 2020-01-01 | End: 2020-01-01

## 2020-01-01 RX ORDER — AMLODIPINE BESYLATE 5 MG/1
TABLET ORAL
Qty: 30 TABLET | Refills: 0 | Status: SHIPPED | OUTPATIENT
Start: 2020-01-01 | End: 2021-01-01

## 2020-01-01 RX ORDER — MELOXICAM 15 MG/1
15 TABLET ORAL DAILY
Qty: 30 TABLET | Refills: 3 | Status: SHIPPED | OUTPATIENT
Start: 2020-01-01 | End: 2021-01-01

## 2020-01-01 RX ORDER — VARENICLINE TARTRATE 1 MG/1
1 TABLET, FILM COATED ORAL 2 TIMES DAILY
Qty: 60 TABLET | Refills: 0 | Status: SHIPPED | OUTPATIENT
Start: 2020-01-01 | End: 2020-01-01

## 2020-01-01 RX ORDER — LORAZEPAM 1 MG/1
TABLET ORAL
Status: DISCONTINUED
Start: 2020-01-01 | End: 2020-01-01 | Stop reason: HOSPADM

## 2020-01-01 RX ADMIN — LORAZEPAM 1 MG: 1 TABLET ORAL at 19:34

## 2020-01-01 ASSESSMENT — ENCOUNTER SYMPTOMS
SHORTNESS OF BREATH: 0
CHEST TIGHTNESS: 0
NAUSEA: 0
DIARRHEA: 0
ABDOMINAL PAIN: 0
VOMITING: 0
CHEST TIGHTNESS: 0
SHORTNESS OF BREATH: 0
COUGH: 1
COUGH: 1
SORE THROAT: 1
WHEEZING: 0
WHEEZING: 0
SHORTNESS OF BREATH: 0
COUGH: 1

## 2020-01-01 ASSESSMENT — PAIN DESCRIPTION - PAIN TYPE: TYPE: ACUTE PAIN

## 2020-01-01 ASSESSMENT — PAIN DESCRIPTION - FREQUENCY: FREQUENCY: CONTINUOUS

## 2020-01-01 ASSESSMENT — PAIN SCALES - GENERAL: PAINLEVEL_OUTOF10: 10

## 2020-01-01 ASSESSMENT — PAIN DESCRIPTION - LOCATION: LOCATION: OTHER (COMMENT)

## 2020-01-02 RX ORDER — MIRTAZAPINE 45 MG/1
TABLET, FILM COATED ORAL
Qty: 30 TABLET | Refills: 0 | Status: SHIPPED | OUTPATIENT
Start: 2020-01-02 | End: 2020-01-30

## 2020-01-05 ENCOUNTER — APPOINTMENT (OUTPATIENT)
Dept: CT IMAGING | Age: 35
End: 2020-01-05
Payer: COMMERCIAL

## 2020-01-05 ENCOUNTER — HOSPITAL ENCOUNTER (EMERGENCY)
Age: 35
Discharge: HOME | End: 2020-01-05
Attending: EMERGENCY MEDICINE
Payer: COMMERCIAL

## 2020-01-05 VITALS
DIASTOLIC BLOOD PRESSURE: 59 MMHG | HEIGHT: 67 IN | BODY MASS INDEX: 26.68 KG/M2 | SYSTOLIC BLOOD PRESSURE: 112 MMHG | WEIGHT: 170 LBS | OXYGEN SATURATION: 99 % | HEART RATE: 128 BPM | RESPIRATION RATE: 14 BRPM

## 2020-01-05 LAB
ANION GAP SERPL CALCULATED.3IONS-SCNC: 12 MMOL/L (ref 3–16)
BASOPHILS ABSOLUTE: 0 K/UL (ref 0–0.2)
BASOPHILS RELATIVE PERCENT: 0.3 %
BUN BLDV-MCNC: 7 MG/DL (ref 7–20)
CALCIUM SERPL-MCNC: 8.7 MG/DL (ref 8.3–10.6)
CHLORIDE BLD-SCNC: 96 MMOL/L (ref 99–110)
CO2: 24 MMOL/L (ref 21–32)
CREAT SERPL-MCNC: 0.7 MG/DL (ref 0.6–1.1)
EKG ATRIAL RATE: 116 BPM
EKG DIAGNOSIS: NORMAL
EKG P AXIS: 69 DEGREES
EKG P-R INTERVAL: 140 MS
EKG Q-T INTERVAL: 328 MS
EKG QRS DURATION: 74 MS
EKG QTC CALCULATION (BAZETT): 455 MS
EKG R AXIS: 53 DEGREES
EKG T AXIS: 55 DEGREES
EKG VENTRICULAR RATE: 116 BPM
EOSINOPHILS ABSOLUTE: 0.2 K/UL (ref 0–0.6)
EOSINOPHILS RELATIVE PERCENT: 2.1 %
GFR AFRICAN AMERICAN: >60
GFR NON-AFRICAN AMERICAN: >60
GLUCOSE BLD-MCNC: 128 MG/DL (ref 70–99)
HCT VFR BLD CALC: 33.3 % (ref 36–48)
HEMOGLOBIN: 10.9 G/DL (ref 12–16)
LYMPHOCYTES ABSOLUTE: 1.2 K/UL (ref 1–5.1)
LYMPHOCYTES RELATIVE PERCENT: 11.9 %
MCH RBC QN AUTO: 26.2 PG (ref 26–34)
MCHC RBC AUTO-ENTMCNC: 32.6 G/DL (ref 31–36)
MCV RBC AUTO: 80.3 FL (ref 80–100)
MONOCYTES ABSOLUTE: 0.4 K/UL (ref 0–1.3)
MONOCYTES RELATIVE PERCENT: 3.6 %
NEUTROPHILS ABSOLUTE: 8.4 K/UL (ref 1.7–7.7)
NEUTROPHILS RELATIVE PERCENT: 82.1 %
PDW BLD-RTO: 17.3 % (ref 12.4–15.4)
PLATELET # BLD: 349 K/UL (ref 135–450)
PMV BLD AUTO: 7.2 FL (ref 5–10.5)
POTASSIUM REFLEX MAGNESIUM: 3.9 MMOL/L (ref 3.5–5.1)
RBC # BLD: 4.14 M/UL (ref 4–5.2)
SODIUM BLD-SCNC: 132 MMOL/L (ref 136–145)
WBC # BLD: 10.2 K/UL (ref 4–11)

## 2020-01-05 PROCEDURE — 36415 COLL VENOUS BLD VENIPUNCTURE: CPT

## 2020-01-05 PROCEDURE — 85025 COMPLETE CBC W/AUTO DIFF WBC: CPT

## 2020-01-05 PROCEDURE — 94640 AIRWAY INHALATION TREATMENT: CPT

## 2020-01-05 PROCEDURE — 70491 CT SOFT TISSUE NECK W/DYE: CPT

## 2020-01-05 PROCEDURE — 96374 THER/PROPH/DIAG INJ IV PUSH: CPT

## 2020-01-05 PROCEDURE — 6360000002 HC RX W HCPCS: Performed by: EMERGENCY MEDICINE

## 2020-01-05 PROCEDURE — 6360000004 HC RX CONTRAST MEDICATION: Performed by: EMERGENCY MEDICINE

## 2020-01-05 PROCEDURE — 2500000003 HC RX 250 WO HCPCS

## 2020-01-05 PROCEDURE — 71260 CT THORAX DX C+: CPT

## 2020-01-05 PROCEDURE — 99284 EMERGENCY DEPT VISIT MOD MDM: CPT

## 2020-01-05 PROCEDURE — 93010 ELECTROCARDIOGRAM REPORT: CPT | Performed by: INTERNAL MEDICINE

## 2020-01-05 PROCEDURE — 93005 ELECTROCARDIOGRAM TRACING: CPT | Performed by: EMERGENCY MEDICINE

## 2020-01-05 PROCEDURE — 96375 TX/PRO/DX INJ NEW DRUG ADDON: CPT

## 2020-01-05 PROCEDURE — 80048 BASIC METABOLIC PNL TOTAL CA: CPT

## 2020-01-05 RX ORDER — METHYLPREDNISOLONE SODIUM SUCCINATE 125 MG/2ML
125 INJECTION, POWDER, LYOPHILIZED, FOR SOLUTION INTRAMUSCULAR; INTRAVENOUS ONCE
Status: COMPLETED | OUTPATIENT
Start: 2020-01-05 | End: 2020-01-05

## 2020-01-05 RX ORDER — LIDOCAINE HYDROCHLORIDE 10 MG/ML
INJECTION, SOLUTION EPIDURAL; INFILTRATION; INTRACAUDAL; PERINEURAL
Status: COMPLETED
Start: 2020-01-05 | End: 2020-01-05

## 2020-01-05 RX ORDER — LORAZEPAM 2 MG/ML
2 INJECTION INTRAMUSCULAR ONCE
Status: COMPLETED | OUTPATIENT
Start: 2020-01-05 | End: 2020-01-05

## 2020-01-05 RX ORDER — LIDOCAINE HYDROCHLORIDE 10 MG/ML
10 INJECTION, SOLUTION EPIDURAL; INFILTRATION; INTRACAUDAL; PERINEURAL ONCE
Status: COMPLETED | OUTPATIENT
Start: 2020-01-05 | End: 2020-01-05

## 2020-01-05 RX ORDER — PREDNISONE 20 MG/1
40 TABLET ORAL DAILY
Qty: 20 TABLET | Refills: 0 | Status: SHIPPED | OUTPATIENT
Start: 2020-01-05 | End: 2020-01-05

## 2020-01-05 RX ORDER — LORAZEPAM 2 MG/ML
1 INJECTION INTRAMUSCULAR ONCE
Status: COMPLETED | OUTPATIENT
Start: 2020-01-05 | End: 2020-01-05

## 2020-01-05 RX ORDER — GUAIFENESIN AND CODEINE PHOSPHATE 100; 10 MG/5ML; MG/5ML
5 SOLUTION ORAL 3 TIMES DAILY PRN
Qty: 1 BOTTLE | Refills: 0 | Status: SHIPPED | OUTPATIENT
Start: 2020-01-05 | End: 2020-01-08

## 2020-01-05 RX ADMIN — METHYLPREDNISOLONE SODIUM SUCCINATE 125 MG: 125 INJECTION, POWDER, FOR SOLUTION INTRAMUSCULAR; INTRAVENOUS at 06:19

## 2020-01-05 RX ADMIN — LIDOCAINE HYDROCHLORIDE 10 ML: 10 INJECTION, SOLUTION EPIDURAL; INFILTRATION; INTRACAUDAL; PERINEURAL at 05:58

## 2020-01-05 RX ADMIN — IOPAMIDOL 75 ML: 755 INJECTION, SOLUTION INTRAVENOUS at 08:14

## 2020-01-05 RX ADMIN — LORAZEPAM 1 MG: 2 INJECTION INTRAMUSCULAR; INTRAVENOUS at 06:01

## 2020-01-05 RX ADMIN — LORAZEPAM 2 MG: 2 INJECTION INTRAMUSCULAR; INTRAVENOUS at 06:19

## 2020-01-05 NOTE — ED NOTES
Pt awakened when this RN went into the room. Upon entering, she was resting easy with her eyes closed. Once awake she started to move about the bed and becoming anxious, coughing in my face. PIV placed at this time with 22 g diffusic.      Taisha Almanzar, [facility]  01/05/20 1319

## 2020-01-05 NOTE — ED NOTES
Bed: 01  Expected date:   Expected time:   Means of arrival: [facility]  Comments:     Kira Wright RN  01/05/20 3865

## 2020-01-05 NOTE — ED PROVIDER NOTES
I independently performed a history and physical on Cameron Valero. Briefly, this is a 37 y.o. female here for foreign body sensation in her throat. Has history of throat cancer and radiation in 2017. For many months now, she has had coughing, gagging, and this sensation in her throat. She has seen multiple ear nose and throat doctors for this without finding relief. A couple of weeks ago, she did have an ear nose and throat doctor use a video scope in her throat. Her cough is unchanged. She is not having any chest pain. Her breathing is unchanged. She was recently started on Bactrim for possible pneumonia. She has been treated multiple times in the past for possible pneumonia however she states that this never improves her symptoms. .    On exam,   General: Patient is in no acute distress. Anxious  Skin: No cyanosis  HEENT: Moist mucous membranes. Raspy voice, coughing  Heart: Regular rate, regular rhythm  Lung: No respiratory distress. Abdomen: Soft, nontender  Neuro: Moving all extremities, no facial droop, no slurred speech, answers questions appropriately            Screenings            MDM  Patient is a 66-year-old woman with past medical history of throat cancer status post radiation in 2017 who presents with foreign body sensation and cough in her throat. She presents today because she is not having any relief and is having difficulty sleeping with this. Obtain CT chest which showed possible pneumonia. Patient was recorded to be very tachycardic however she seems very anxious and is crying about how difficult her symptoms have been. She was given some Ativan with improvement. While patient was asleep, her heart rate was much lower in the 80s. O2 saturations have been 99%. I do not believe that patient is tachycardic from sepsis. Discussed this with the patient and she does not wish to have any admission or additional antibiotics for her pneumonia.   She is already on Bactrim and every antibiotic treatment she has had in the past is never improved her symptoms. I discussed benefits and risks of not giving antibiotics to which she agreed. The CT of her neck did show laryngeal edema. I do believe that she is protecting her airway. Discussed with on-call ear nose and throat DrDenita Mari who agreed with this plan and stated that she is fit for close follow-up. Given the chronicity of symptoms, I believe that she is fit for outpatient treatment. She does have close follow-up with an ear nose and throat doctor. Will treat her with codeine. Patient Referrals:  AMBER Jane - [facility]  [address]  [phone]    Call in 1 day        Discharge Medications:  New Prescriptions    GUAIFENESIN-CODEINE (TUSSI-ORGANIDIN NR) 100-10 MG/5ML SYRUP    Take 5 mLs by mouth 3 times daily as needed for Cough for up to 3 days. PREDNISONE (DELTASONE) 20 MG TABLET    Take 2 tablets by mouth daily for 10 days       FINAL IMPRESSION  1. Cough    2. Laryngeal edema        Blood pressure 130/73, pulse 103, resp. rate 17, height 5' 7\" (1.702 m), weight 170 lb (77.1 kg), last menstrual period 11/25/2019, SpO2 97 %, not currently breastfeeding.      For further details of [facility] emergency department encounter, please see documentation by Dr. Radha Gates MD  01/05/20 138 Tereso Cifuentes MD  01/05/20 7380

## 2020-01-05 NOTE — ED NOTES
This RN in to speak with pt. She is very anxious about being sent home. Dr. Shani Lewis updated her on after care after discharge.      Tori Deal RN  01/05/20 1013

## 2020-01-05 NOTE — ED PROVIDER NOTES
LEFT TRUE VOCAL CORD MASS    OTHER SURGICAL HISTORY  08/10/2017    Tracheostomy    TONSILLECTOMY      TRACHEOSTOMY      TRACHEOTOMY N/A 12/14/2017     TRACHEOTOMY WITH 6DCT [place]                   CURRENT MEDICATIONS       Previous Medications    ALBUTEROL SULFATE HFA (PROVENTIL HFA) 108 (90 BASE) MCG/ACT INHALER    Inhale 2 puffs into the lungs every 4 hours as needed for Wheezing or Shortness of Breath (Space out to every 6 hours as symptoms improve)    DESVENLAFAXINE SUCCINATE (PRISTIQ) 100 MG TB24 EXTENDED RELEASE TABLET    Take 1 tablet by mouth daily    DEXTROMETHORPHAN-GUAIFENESIN  MG/5ML LIQD SYRUP    Take 5 mLs by mouth every 4 hours as needed (cough)    FLUTICASONE (FLOVENT HFA) 110 MCG/ACT INHALER    Inhale 2 puffs into the lungs 2 times daily    GABAPENTIN (NEURONTIN) 100 MG CAPSULE    Take 1 capsule by mouth nightly for 30 days. GABAPENTIN (NEURONTIN) 100 MG CAPSULE    Take 1 capsule by mouth nightly for 30 days. HYDROXYZINE (ATARAX) 50 MG TABLET    TAKE ONE TABLET BY MOUTH TWICE A DAY AS NEEDED FOR  ANXIETY    IBUPROFEN (ADVIL;MOTRIN) 800 MG TABLET    TAKE ONE TABLET BY MOUTH EVERY 6 HOURS AS NEEDED FOR PAIN    IPRATROPIUM-ALBUTEROL (DUONEB) 0.5-2.5 (3) MG/3ML SOLN NEBULIZER SOLUTION    Inhale 3 mLs into the lungs 4 times daily    L-METHYLFOLATE 15 MG TABS    Take 15 mg by mouth daily    MIRTAZAPINE (REMERON) 45 MG TABLET    TAKE ONE TABLET BY MOUTH ONCE NIGHTLY    NAPROXEN (NAPROSYN) 500 MG TABLET    TAKE ONE TABLET BY MOUTH TWICE A DAY WITH MEALS    OMEPRAZOLE (PRILOSEC) 40 MG DELAYED RELEASE CAPSULE    TAKE ONE CAPSULE BY MOUTH DAILY ONE HOUR BEFORE EVENING MEAL    ONDANSETRON (ZOFRAN ODT) 4 MG DISINTEGRATING TABLET    Take 1 tablet by mouth every 8 hours as needed for Nausea    PANTOPRAZOLE (PROTONIX) 20 MG TABLET    TAKE ONE TABLET BY MOUTH DAILY    RESPIRATORY THERAPY SUPPLIES (NEBULIZER COMPRESSOR) KIT    Use Q6 hours as needed with Duoneb.  Disp: Nebulizer Machine #1 no refill

## 2020-01-07 ENCOUNTER — OFFICE VISIT (OUTPATIENT)
Dept: ENT CLINIC | Age: 35
End: 2020-01-07
Payer: COMMERCIAL

## 2020-01-07 VITALS — SYSTOLIC BLOOD PRESSURE: 125 MMHG | DIASTOLIC BLOOD PRESSURE: 78 MMHG | OXYGEN SATURATION: 98 % | HEART RATE: 106 BPM

## 2020-01-07 PROCEDURE — 31575 DIAGNOSTIC LARYNGOSCOPY: CPT | Performed by: OTOLARYNGOLOGY

## 2020-01-07 PROCEDURE — G8484 FLU IMMUNIZE NO ADMIN: HCPCS | Performed by: OTOLARYNGOLOGY

## 2020-01-07 PROCEDURE — 4004F PT TOBACCO SCREEN RCVD TLK: CPT | Performed by: OTOLARYNGOLOGY

## 2020-01-07 PROCEDURE — G8417 CALC BMI ABV UP PARAM F/U: HCPCS | Performed by: OTOLARYNGOLOGY

## 2020-01-07 PROCEDURE — 99213 OFFICE O/P EST LOW 20 MIN: CPT | Performed by: OTOLARYNGOLOGY

## 2020-01-07 PROCEDURE — G8427 DOCREV CUR MEDS BY ELIG CLIN: HCPCS | Performed by: OTOLARYNGOLOGY

## 2020-01-07 NOTE — PROGRESS NOTES
release capsule, TAKE ONE CAPSULE BY MOUTH DAILY ONE HOUR BEFORE EVENING MEAL, Disp: 30 capsule, Rfl: 2    TRI-SPRINTEC 0.18/0.215/0.25 MG-35 MCG TABS, TAKE ONE TABLET BY MOUTH DAILY, Disp: 28 tablet, Rfl: 12    desvenlafaxine succinate (PRISTIQ) 100 MG TB24 extended release tablet, Take 1 tablet by mouth daily, Disp: 30 tablet, Rfl: 1    L-Methylfolate 15 MG TABS, Take 15 mg by mouth daily, Disp: 90 tablet, Rfl: 0    gabapentin (NEURONTIN) 100 MG capsule, Take 1 capsule by mouth nightly for 30 days. , Disp: 90 capsule, Rfl: 5    albuterol sulfate HFA (PROVENTIL HFA) 108 (90 Base) MCG/ACT inhaler, Inhale 2 puffs into the lungs every 4 hours as needed for Wheezing or Shortness of Breath (Space out to every 6 hours as symptoms improve), Disp: 1 Inhaler, Rfl: 5    fluticasone (FLOVENT HFA) 110 MCG/ACT inhaler, Inhale 2 puffs into the lungs 2 times daily, Disp: 1 Inhaler, Rfl: 5    naproxen (NAPROSYN) 500 MG tablet, TAKE ONE TABLET BY MOUTH TWICE A DAY WITH MEALS, Disp: 60 tablet, Rfl: 1    gabapentin (NEURONTIN) 100 MG capsule, Take 1 capsule by mouth nightly for 30 days. , Disp: 30 capsule, Rfl: 1    pantoprazole (PROTONIX) 20 MG tablet, TAKE ONE TABLET BY MOUTH DAILY, Disp: 30 tablet, Rfl: 5    Respiratory Therapy Supplies (NEBULIZER COMPRESSOR) KIT, Use Q6 hours as needed with Duoneb.  Disp: Nebulizer Machine #1 no refill and disposable Nebulizer tubing and mouthpiece Disp: 3 months supply with 3 refills, Disp: 1 kit, Rfl: 0    ipratropium-albuterol (DUONEB) 0.5-2.5 (3) MG/3ML SOLN nebulizer solution, Inhale 3 mLs into the lungs 4 times daily, Disp: 120 vial, Rfl: 5    SUMAtriptan (IMITREX) 25 MG tablet, TAKE ONE TABLET BY MOUTH AT ONSET OF HEADACHE; MAY REPEAT ONE TABLET IN 2 HOURS IF NEEDED., Disp: 9 tablet, Rfl: 5    ondansetron (ZOFRAN ODT) 4 MG disintegrating tablet, Take 1 tablet by mouth every 8 hours as needed for Nausea, Disp: 20 tablet, Rfl: 0                                                 Past Medical History:   Diagnosis Date    Alcohol use disorder, moderate, in early remission ([facility] [address] 10/28/2019    Anemia     Anxiety     Back pain     Bipolar affective disorder (HCC)     Depression     Migraine     Opiate abuse, continuous (HCC)     Primary cancer of larynx ([facility] [address] 7/18/2017    PTSD (post-traumatic stress disorder)     Voice hoarseness                                                     Past Surgical History:   Procedure Laterality Date    LARYNGOSCOPY N/A 6/20/2019    MICROLARYNGOSCOPY AND LYSIS OF ADHESIONS OF LARYNX performed by Justice Michael MD at [phone] Left 06/28/2017    MICROLARYNGOSCOPY WITH BIOPSY OF LEFT TRUE VOCAL CORD MASS    OTHER SURGICAL HISTORY  08/10/2017    Tracheostomy    TONSILLECTOMY      TRACHEOSTOMY      TRACHEOTOMY N/A 12/14/2017     TRACHEOTOMY WITH 6DCT [place]                 FAMILY HISTORY: Family history reviewed and except as pertinent to the interim history is not contributory. REVIEW OF SYSTEMS:  All pertinent positive and negative findings included in HPI. Otherwise, all other systems are reviewed and are negative    PHYSICAL EXAMINATION:   GENERAL: wdwn-appears greatly agitated. RESPIRATORY: No stridor. Oxygen saturation is 98% on room air. COMMUNICATION : Her voice is slightly hoarse. MENTAL STATUS: Alert and oriented x3  HEAD AND FACE: No skin lesions of the face. EXTERNAL EARS AND NOSE: The external ears and nasal pyramid are normal.  FACIAL MUSCLES: All branches of the facial nerve intact. FACE PALPATION: Zygomatic arches and orbital rims are intact. No tenderness over the sinuses. EXTRAOCULAR MUSCLES: Intact with full range of motion. OTOSCOPY:  Normal tympanic membranes, middle ear spaces, and external auditory canals. TUNING FORKS:  Rinne ++ Courtney midline at 512 Hz  INTRANASAL:  Septum midline, turbinates normal, meati clear.   LIPS, TEETH, GINGIVA:  Normal mucosa  PHARYNX:  Normal  NECK:  No

## 2020-01-09 RX ORDER — IBUPROFEN 800 MG/1
TABLET ORAL
Qty: 90 TABLET | Refills: 0 | Status: SHIPPED | OUTPATIENT
Start: 2020-01-09 | End: 2020-02-04

## 2020-01-28 ENCOUNTER — OFFICE VISIT (OUTPATIENT)
Dept: INTERNAL MEDICINE CLINIC | Age: 35
End: 2020-01-28
Payer: COMMERCIAL

## 2020-01-28 ENCOUNTER — HOSPITAL ENCOUNTER (OUTPATIENT)
Age: 35
Discharge: HOME | End: 2020-01-28
Payer: COMMERCIAL

## 2020-01-28 ENCOUNTER — HOSPITAL ENCOUNTER (OUTPATIENT)
Dept: GENERAL RADIOLOGY | Age: 35
Discharge: HOME | End: 2020-01-28
Payer: COMMERCIAL

## 2020-01-28 VITALS
WEIGHT: 168 LBS | BODY MASS INDEX: 26.31 KG/M2 | SYSTOLIC BLOOD PRESSURE: 144 MMHG | HEART RATE: 94 BPM | DIASTOLIC BLOOD PRESSURE: 98 MMHG

## 2020-01-28 LAB — URIC ACID, SERUM: 2.2 MG/DL (ref 2.6–6)

## 2020-01-28 PROCEDURE — 4004F PT TOBACCO SCREEN RCVD TLK: CPT | Performed by: NURSE PRACTITIONER

## 2020-01-28 PROCEDURE — 99213 OFFICE O/P EST LOW 20 MIN: CPT | Performed by: NURSE PRACTITIONER

## 2020-01-28 PROCEDURE — 73630 X-RAY EXAM OF FOOT: CPT

## 2020-01-28 PROCEDURE — 73610 X-RAY EXAM OF ANKLE: CPT

## 2020-01-28 PROCEDURE — G8427 DOCREV CUR MEDS BY ELIG CLIN: HCPCS | Performed by: NURSE PRACTITIONER

## 2020-01-28 PROCEDURE — G8417 CALC BMI ABV UP PARAM F/U: HCPCS | Performed by: NURSE PRACTITIONER

## 2020-01-28 PROCEDURE — G8484 FLU IMMUNIZE NO ADMIN: HCPCS | Performed by: NURSE PRACTITIONER

## 2020-01-28 ASSESSMENT — ENCOUNTER SYMPTOMS: COLOR CHANGE: 1

## 2020-01-30 PROCEDURE — 96372 THER/PROPH/DIAG INJ SC/IM: CPT | Performed by: OTOLARYNGOLOGY

## 2020-01-30 RX ORDER — MIRTAZAPINE 45 MG/1
TABLET, FILM COATED ORAL
Qty: 30 TABLET | Refills: 0 | Status: SHIPPED | OUTPATIENT
Start: 2020-01-30 | End: 2020-02-20

## 2020-01-30 RX ORDER — BETAMETHASONE SODIUM PHOSPHATE AND BETAMETHASONE ACETATE 3; 3 MG/ML; MG/ML
6 INJECTION, SUSPENSION INTRA-ARTICULAR; INTRALESIONAL; INTRAMUSCULAR; SOFT TISSUE ONCE
Status: COMPLETED | OUTPATIENT
Start: 2020-01-30 | End: 2020-01-30

## 2020-01-30 RX ADMIN — BETAMETHASONE SODIUM PHOSPHATE AND BETAMETHASONE ACETATE 6 MG: 3; 3 INJECTION, SUSPENSION INTRA-ARTICULAR; INTRALESIONAL; INTRAMUSCULAR; SOFT TISSUE at 11:49

## 2020-01-31 ENCOUNTER — TELEPHONE (OUTPATIENT)
Dept: INTERNAL MEDICINE CLINIC | Age: 35
End: 2020-01-31

## 2020-01-31 NOTE — TELEPHONE ENCOUNTER
Phone number has a different name on VM. Left message for pt to call back if this is an accurate number.

## 2020-01-31 NOTE — TELEPHONE ENCOUNTER
Pt states its been a week since she has been seen for her right foot. It is still swollen and painful. Pt states she is using ibuprofen and its not helping? Pt ask what should she do? Please advise pt.

## 2020-02-04 RX ORDER — IBUPROFEN 800 MG/1
TABLET ORAL
Qty: 90 TABLET | Refills: 0 | Status: SHIPPED | OUTPATIENT
Start: 2020-02-04 | End: 2020-02-12 | Stop reason: ALTCHOICE

## 2020-02-12 ENCOUNTER — OFFICE VISIT (OUTPATIENT)
Dept: INTERNAL MEDICINE CLINIC | Age: 35
End: 2020-02-12
Payer: COMMERCIAL

## 2020-02-12 VITALS
WEIGHT: 173.6 LBS | HEIGHT: 67 IN | DIASTOLIC BLOOD PRESSURE: 98 MMHG | SYSTOLIC BLOOD PRESSURE: 162 MMHG | BODY MASS INDEX: 27.25 KG/M2 | HEART RATE: 104 BPM

## 2020-02-12 PROCEDURE — G8484 FLU IMMUNIZE NO ADMIN: HCPCS | Performed by: NURSE PRACTITIONER

## 2020-02-12 PROCEDURE — G8427 DOCREV CUR MEDS BY ELIG CLIN: HCPCS | Performed by: NURSE PRACTITIONER

## 2020-02-12 PROCEDURE — 4004F PT TOBACCO SCREEN RCVD TLK: CPT | Performed by: NURSE PRACTITIONER

## 2020-02-12 PROCEDURE — G8417 CALC BMI ABV UP PARAM F/U: HCPCS | Performed by: NURSE PRACTITIONER

## 2020-02-12 PROCEDURE — 99215 OFFICE O/P EST HI 40 MIN: CPT | Performed by: NURSE PRACTITIONER

## 2020-02-12 RX ORDER — AMLODIPINE BESYLATE 5 MG/1
5 TABLET ORAL DAILY
Qty: 30 TABLET | Refills: 0 | Status: SHIPPED | OUTPATIENT
Start: 2020-02-12 | End: 2020-03-11

## 2020-02-12 RX ORDER — HYDROXYZINE 50 MG/1
TABLET, FILM COATED ORAL
Qty: 60 TABLET | Refills: 0 | Status: SHIPPED | OUTPATIENT
Start: 2020-02-12 | End: 2020-03-18

## 2020-02-12 RX ORDER — DESVENLAFAXINE 100 MG/1
100 TABLET, EXTENDED RELEASE ORAL DAILY
Qty: 30 TABLET | Refills: 1 | Status: SHIPPED | OUTPATIENT
Start: 2020-02-12 | End: 2020-04-29 | Stop reason: SDUPTHER

## 2020-02-12 RX ORDER — OMEPRAZOLE 40 MG/1
CAPSULE, DELAYED RELEASE ORAL
Qty: 30 CAPSULE | Refills: 1 | Status: SHIPPED | OUTPATIENT
Start: 2020-02-12 | End: 2020-04-10

## 2020-02-12 RX ORDER — RIZATRIPTAN BENZOATE 10 MG/1
10 TABLET ORAL
Qty: 30 TABLET | Refills: 3 | Status: SHIPPED | OUTPATIENT
Start: 2020-02-12 | End: 2020-05-15 | Stop reason: SDUPTHER

## 2020-02-12 ASSESSMENT — ENCOUNTER SYMPTOMS
COUGH: 1
SHORTNESS OF BREATH: 0
CHOKING: 0
EYES NEGATIVE: 1
ALLERGIC/IMMUNOLOGIC NEGATIVE: 1
WHEEZING: 0
GASTROINTESTINAL NEGATIVE: 1

## 2020-02-12 NOTE — PROGRESS NOTES
rizatriptan (MAXALT) 10 MG tablet Take 1 tablet by mouth once as needed for Migraine May repeat in 2 hours if needed 30 tablet 3    mirtazapine (REMERON) 45 MG tablet TAKE ONE TABLET BY MOUTH ONCE NIGHTLY 30 tablet 0    omeprazole (PRILOSEC) 40 MG delayed release capsule TAKE ONE CAPSULE BY MOUTH DAILY ONE HOUR BEFORE EVENING MEAL 30 capsule 2    TRI-SPRINTEC 0.18/0.215/0.25 MG-35 MCG TABS TAKE ONE TABLET BY MOUTH DAILY 28 tablet 12    L-Methylfolate 15 MG TABS Take 15 mg by mouth daily 90 tablet 0    gabapentin (NEURONTIN) 100 MG capsule Take 1 capsule by mouth nightly for 30 days. 90 capsule 5    albuterol sulfate HFA (PROVENTIL HFA) 108 (90 Base) MCG/ACT inhaler Inhale 2 puffs into the lungs every 4 hours as needed for Wheezing or Shortness of Breath (Space out to every 6 hours as symptoms improve) 1 Inhaler 5    fluticasone (FLOVENT HFA) 110 MCG/ACT inhaler Inhale 2 puffs into the lungs 2 times daily 1 Inhaler 5    Respiratory Therapy Supplies (NEBULIZER COMPRESSOR) KIT Use Q6 hours as needed with Duoneb. Disp: Nebulizer Machine #1 no refill and disposable Nebulizer tubing and mouthpiece Disp: 3 months supply with 3 refills 1 kit 0    ipratropium-albuterol (DUONEB) 0.5-2.5 (3) MG/3ML SOLN nebulizer solution Inhale 3 mLs into the lungs 4 times daily 120 vial 5    SUMAtriptan (IMITREX) 25 MG tablet TAKE ONE TABLET BY MOUTH AT ONSET OF HEADACHE; MAY REPEAT ONE TABLET IN 2 HOURS IF NEEDED. 9 tablet 5    ondansetron (ZOFRAN ODT) 4 MG disintegrating tablet Take 1 tablet by mouth every 8 hours as needed for Nausea (Patient not taking: Reported on 2/12/2020) 20 tablet 0     No current facility-administered medications for this visit. Review of Systems   Constitutional: Negative for chills, fatigue and fever. HENT: Positive for congestion. Eyes: Negative. Respiratory: Positive for cough. Negative for choking, shortness of breath and wheezing.     Cardiovascular: Negative for chest pain, palpitations and leg swelling. Gastrointestinal: Negative. Endocrine: Negative. Genitourinary: Negative. Musculoskeletal: Positive for arthralgias (right ankle pain), gait problem and joint swelling (right ankle). Skin: Negative. Allergic/Immunologic: Negative. Hematological: Negative. Psychiatric/Behavioral: The patient is nervous/anxious (depressed). Vitals:    02/12/20 1118 02/12/20 1210   BP: (!) 180/100 (!) 162/98   Site: Left Upper Arm    Position: Sitting    Cuff Size: Small Adult    Pulse: 104    Weight: 173 lb 9.6 oz (78.7 kg)    Height: 5' 7\" (1.702 m)       Physical Exam  Constitutional:       General: She is not in acute distress. Appearance: Normal appearance. She is not toxic-appearing. HENT:      Head: Normocephalic and atraumatic. Nose: Congestion present. Neck:      Musculoskeletal: Normal range of motion. Cardiovascular:      Rate and Rhythm: Normal rate and regular rhythm. Heart sounds: Normal heart sounds. No murmur. Pulmonary:      Effort: Respiratory distress (Mild with persistent cough -after expectorating breathing normalizes.) present. Breath sounds: Normal breath sounds. Abdominal:      General: Abdomen is flat. Palpations: Abdomen is soft. Musculoskeletal:      Right ankle: She exhibits decreased range of motion. She exhibits no swelling, no ecchymosis, no deformity, no laceration and normal pulse. Tenderness. Feet:       Comments: No swelling or erythema appreciated during office visit. Skin:     General: Skin is warm and dry. Neurological:      General: No focal deficit present. Mental Status: She is alert and oriented to person, place, and time. Assessment/Plan     1. Acute right ankle pain  Stable, uncontrolled  - Previous x-ray showed no abnormalities  - Given referral to Orthopedics with no appointment necessary -this is the third time that she is been advised to go to Ortho clinic.   Is no red flags. No swelling or erythema. 2. Right foot pain  See #1    3. Essential hypertension  Uncontrolled. Discussed options. Given the fact that she has been borderline before we are starting amlodipine. Cough was not controlled when first arriving to the office and this likely contributed to elevation. After expectorating and cough improved BP started to improve. - BP in OV was 162/98  - Previous BPs have been borderline  - amLODIPine (NORVASC) 5 MG tablet; Take 1 tablet by mouth daily  Dispense: 30 tablet; Refill: 0    4. History of carcinoma in situ of larynx  Stable, she is seeing ENT and a specialist with [facility] -continue follow-up and plan per them. She continues to smoke -strongly encouraged to quit  -Pulmonary Function Testing scheduled for 2/13/2020  -Pulmonology appt scheduled for 3/3/2020    5. Neck pain due to malignant neoplastic disease ([facility] [address]  See #4    6. Laryngeal stenosis  See #4    7. Nicotine use disorder  noncompliant  -Patient continues to smoke  Strongly advised to stop smoking. 8. Hoarseness or changing voice  Stable, controlled    9. Post traumatic stress disorder  uncontrolled  -Advised to schedule follow-up with [facility] or Dr. Fela Craven -she has been noncompliant with her follow-ups. She states she is taking her medication however it has not been filled-refilling today. - hydrOXYzine (ATARAX) 50 MG tablet; TAKE ONE TABLET BY MOUTH TWICE A DAY AS NEEDED FOR  ANXIETY  Dispense: 60 tablet; Refill: 0  - desvenlafaxine succinate (PRISTIQ) 100 MG TB24 extended release tablet; Take 1 tablet by mouth daily  Dispense: 30 tablet; Refill: 1    Discussed medications with patient, who voiced understanding of their use and indications. All questions answered. Follow up in 2 weeks for BP and as needed. Electronically signed by AMBER Arroyo CNP on 2/12/2020 at 12:51 PM     40 min spent with patient- >50 % continuity of care and/or counseling.

## 2020-02-13 ENCOUNTER — HOSPITAL ENCOUNTER (OUTPATIENT)
Dept: PULMONOLOGY | Age: 35
Discharge: HOME | End: 2020-02-13
Payer: COMMERCIAL

## 2020-02-13 ENCOUNTER — TELEPHONE (OUTPATIENT)
Dept: INTERNAL MEDICINE CLINIC | Age: 35
End: 2020-02-13

## 2020-02-13 VITALS — OXYGEN SATURATION: 98 % | RESPIRATION RATE: 16 BRPM | HEART RATE: 117 BPM

## 2020-02-13 LAB
DLCO %PRED: 78 %
DLCO PRED: NORMAL
DLCO/VA %PRED: NORMAL
DLCO/VA PRED: NORMAL
DLCO/VA: NORMAL
DLCO: NORMAL
EXPIRATORY TIME: NORMAL
FEF 25-75% %PRED-PRE: NORMAL
FEF 25-75% PRED: NORMAL
FEF 25-75%-PRE: NORMAL
FEV1 %PRED-PRE: 98 %
FEV1 PRED: NORMAL
FEV1/FVC %PRED-PRE: NORMAL
FEV1/FVC PRED: NORMAL
FEV1/FVC: 106 %
FEV1: NORMAL
FVC %PRED-PRE: NORMAL
FVC PRED: NORMAL
FVC: NORMAL
GAW %PRED: NORMAL
GAW PRED: NORMAL
GAW: NORMAL
IC %PRED: NORMAL
IC PRED: NORMAL
IC: NORMAL
MVV %PRED-PRE: NORMAL
MVV PRED: NORMAL
MVV-PRE: NORMAL
PEF %PRED-PRE: NORMAL
PEF PRED: NORMAL
PEF-PRE: NORMAL
RAW %PRED: NORMAL
RAW PRED: NORMAL
RAW: NORMAL
RV %PRED: NORMAL
RV PRED: NORMAL
RV: NORMAL
SVC %PRED: NORMAL
SVC PRED: NORMAL
SVC: NORMAL
TLC %PRED: 106 %
TLC PRED: NORMAL
TLC: NORMAL
VA %PRED: NORMAL
VA PRED: NORMAL
VA: NORMAL
VTG %PRED: NORMAL
VTG PRED: NORMAL
VTG: NORMAL

## 2020-02-13 PROCEDURE — 94760 N-INVAS EAR/PLS OXIMETRY 1: CPT

## 2020-02-13 PROCEDURE — 94200 LUNG FUNCTION TEST (MBC/MVV): CPT

## 2020-02-13 PROCEDURE — 94726 PLETHYSMOGRAPHY LUNG VOLUMES: CPT

## 2020-02-13 PROCEDURE — 6370000000 HC RX 637 (ALT 250 FOR IP): Performed by: INTERNAL MEDICINE

## 2020-02-13 PROCEDURE — 94010 BREATHING CAPACITY TEST: CPT

## 2020-02-13 PROCEDURE — 94729 DIFFUSING CAPACITY: CPT

## 2020-02-13 RX ORDER — ALBUTEROL SULFATE 90 UG/1
4 AEROSOL, METERED RESPIRATORY (INHALATION) ONCE
Status: DISCONTINUED | OUTPATIENT
Start: 2020-02-13 | End: 2020-02-13

## 2020-02-13 ASSESSMENT — PULMONARY FUNCTION TESTS
FEV1/FVC: 106
FEV1_PERCENT_PREDICTED_PRE: 98

## 2020-02-13 NOTE — TELEPHONE ENCOUNTER
Pt calling saw you yesterday---calling in tears said no one will help her up all night coughing throat hurts ankle still swollen and she can't walk on it---please call the pt. Thanks.

## 2020-02-17 NOTE — PROCEDURES
[facility]                     [facility], [address]                               PULMONARY FUNCTION    PATIENT NAME: Blossom Ojeda                    :        1985  MED REC NO:   [id number]                          ROOM:  ACCOUNT NO:   [de-identified]                           ADMIT DATE: 2020  PROVIDER:     Natali Page MD    DATE OF PROCEDURE:  2020    Spirometry reveals normal FVC and FEV1.  FEV1/FVC ratio is normal.   Expiratory flow rates are normal.  Lung volumes reveal normal total lung  capacity, vital capacity and residual volume. Diffusion capacity is  normal.    IMPRESSION:  Normal pulmonary function testing.         Jeyson Shah MD    D: 2020 11:58:11       T: 2020 17:15:25     [email or web address]  Job#: [id number]     Doc#: [id number]    CC:

## 2020-02-20 RX ORDER — MIRTAZAPINE 45 MG/1
TABLET, FILM COATED ORAL
Qty: 30 TABLET | Refills: 0 | Status: SHIPPED | OUTPATIENT
Start: 2020-02-20 | End: 2020-04-06

## 2020-03-03 ENCOUNTER — TELEPHONE (OUTPATIENT)
Dept: PULMONOLOGY | Age: 35
End: 2020-03-03

## 2020-03-03 ENCOUNTER — OFFICE VISIT (OUTPATIENT)
Dept: PULMONOLOGY | Age: 35
End: 2020-03-03
Payer: COMMERCIAL

## 2020-03-03 VITALS
BODY MASS INDEX: 27.62 KG/M2 | WEIGHT: 176 LBS | DIASTOLIC BLOOD PRESSURE: 90 MMHG | OXYGEN SATURATION: 99 % | HEIGHT: 67 IN | HEART RATE: 123 BPM | SYSTOLIC BLOOD PRESSURE: 152 MMHG

## 2020-03-03 PROCEDURE — 99204 OFFICE O/P NEW MOD 45 MIN: CPT | Performed by: INTERNAL MEDICINE

## 2020-03-03 PROCEDURE — G8417 CALC BMI ABV UP PARAM F/U: HCPCS | Performed by: INTERNAL MEDICINE

## 2020-03-03 PROCEDURE — G8427 DOCREV CUR MEDS BY ELIG CLIN: HCPCS | Performed by: INTERNAL MEDICINE

## 2020-03-03 PROCEDURE — G8484 FLU IMMUNIZE NO ADMIN: HCPCS | Performed by: INTERNAL MEDICINE

## 2020-03-03 PROCEDURE — 4004F PT TOBACCO SCREEN RCVD TLK: CPT | Performed by: INTERNAL MEDICINE

## 2020-03-03 RX ORDER — BUPROPION HYDROCHLORIDE 150 MG/1
TABLET, EXTENDED RELEASE ORAL
Qty: 171 TABLET | Refills: 0 | Status: SHIPPED | OUTPATIENT
Start: 2020-03-03 | End: 2020-03-20

## 2020-03-03 ASSESSMENT — ENCOUNTER SYMPTOMS
WHEEZING: 0
CHEST TIGHTNESS: 0
COUGH: 1
SHORTNESS OF BREATH: 1

## 2020-03-03 NOTE — TELEPHONE ENCOUNTER
Pauly Lynn from central scheduling called and needs to have a Stat order for a BUN. Please sign pending order.

## 2020-03-03 NOTE — PROGRESS NOTES
[facility]    Outpatient Note    Subjective:   CHIEF COMPLAINT:   Chief Complaint   Patient presents with    Establish Care     PFT done       HPI:     The patient is 37 y.o. female who presents today for a new patient visit for evaluation of cough which she has since she had the throat cancer in 2017 that was treated with radiation therapy. She is crying and emotional and does not maintain eye contact. She feels depressed. She has children and apparently broke up with her boyfriend in 2016. She is also concerned about visible veins in the legs and legs pain. She has this cough since 2017, feels as something stuck in the throat, but also has sputum production which is gray to green in color amd ometimes blood in it. She tried Tessalon perles  before but it did not help. There is no fever. She is not working at time. She gets SOB with exertion. She is still smoking 1/2 PPD. Used to smoke 1 PPD. She started smoking at the age of 23. She tried to quit before but she is unsuccessfully.       Past Medical History:    Past Medical History:   Diagnosis Date    Alcohol use disorder, moderate, in early remission ([facility] [address] 10/28/2019    Anemia     Anxiety     Back pain     Bipolar affective disorder (HCC)     Depression     Migraine     Opiate abuse, continuous (HCC)     Primary cancer of larynx (HCC) 7/18/2017    PTSD (post-traumatic stress disorder)     Voice hoarseness        Social History:    Social History     Tobacco Use   Smoking Status Current Every Day Smoker    Packs/day: 0.50    Years: 18.00    Pack years: 9.00    Types: Cigarettes    Start date: 10/19/2013   Smokeless Tobacco Never Used       Family History:  Family History   Problem Relation Age of Onset    High Blood Pressure Mother     Depression Mother     High Blood Pressure Father     Depression Father     Diabetes Maternal Uncle     Depression Paternal Grandmother        Current Medications:  Current Outpatient Medications on File Prior to Visit   Medication Sig Dispense Refill    mirtazapine (REMERON) 45 MG tablet TAKE ONE TABLET BY MOUTH ONCE NIGHTLY 30 tablet 0    omeprazole (PRILOSEC) 40 MG delayed release capsule TAKE ONE CAPSULE BY MOUTH DAILY ONE HOUR BEFORE EVENING MEAL 30 capsule 1    amLODIPine (NORVASC) 5 MG tablet Take 1 tablet by mouth daily 30 tablet 0    hydrOXYzine (ATARAX) 50 MG tablet TAKE ONE TABLET BY MOUTH TWICE A DAY AS NEEDED FOR  ANXIETY 60 tablet 0    desvenlafaxine succinate (PRISTIQ) 100 MG TB24 extended release tablet Take 1 tablet by mouth daily 30 tablet 1    TRI-SPRINTEC 0.18/0.215/0.25 MG-35 MCG TABS TAKE ONE TABLET BY MOUTH DAILY 28 tablet 12    L-Methylfolate 15 MG TABS Take 15 mg by mouth daily 90 tablet 0    albuterol sulfate HFA (PROVENTIL HFA) 108 (90 Base) MCG/ACT inhaler Inhale 2 puffs into the lungs every 4 hours as needed for Wheezing or Shortness of Breath (Space out to every 6 hours as symptoms improve) 1 Inhaler 5    fluticasone (FLOVENT HFA) 110 MCG/ACT inhaler Inhale 2 puffs into the lungs 2 times daily 1 Inhaler 5    Respiratory Therapy Supplies (NEBULIZER COMPRESSOR) KIT Use Q6 hours as needed with Duoneb. Disp: Nebulizer Machine #1 no refill and disposable Nebulizer tubing and mouthpiece Disp: 3 months supply with 3 refills 1 kit 0    ipratropium-albuterol (DUONEB) 0.5-2.5 (3) MG/3ML SOLN nebulizer solution Inhale 3 mLs into the lungs 4 times daily 120 vial 5    rizatriptan (MAXALT) 10 MG tablet Take 1 tablet by mouth once as needed for Migraine May repeat in 2 hours if needed 30 tablet 3    gabapentin (NEURONTIN) 100 MG capsule Take 1 capsule by mouth nightly for 30 days. 90 capsule 5    SUMAtriptan (IMITREX) 25 MG tablet TAKE ONE TABLET BY MOUTH AT ONSET OF HEADACHE; MAY REPEAT ONE TABLET IN 2 HOURS IF NEEDED.  (Patient not taking: Reported on 3/3/2020) 9 tablet 5    ondansetron (ZOFRAN ODT) 4 MG disintegrating tablet Take 1 Wellbutrin - it will help with depression too. Counseled for smoking cessation   Consider bronch depending on CT findings     Depending on CT chest findings bronchoscopy will be considered. This option was discussed with the patient.

## 2020-03-06 ENCOUNTER — NURSE TRIAGE (OUTPATIENT)
Dept: OTHER | Facility: CLINIC | Age: 35
End: 2020-03-06

## 2020-03-06 ENCOUNTER — OFFICE VISIT (OUTPATIENT)
Dept: INTERNAL MEDICINE CLINIC | Age: 35
End: 2020-03-06
Payer: COMMERCIAL

## 2020-03-06 VITALS
BODY MASS INDEX: 27.38 KG/M2 | WEIGHT: 174.8 LBS | HEART RATE: 112 BPM | SYSTOLIC BLOOD PRESSURE: 152 MMHG | DIASTOLIC BLOOD PRESSURE: 100 MMHG

## 2020-03-06 LAB — HBA1C MFR BLD: 6 %

## 2020-03-06 PROCEDURE — 4004F PT TOBACCO SCREEN RCVD TLK: CPT | Performed by: NURSE PRACTITIONER

## 2020-03-06 PROCEDURE — 99214 OFFICE O/P EST MOD 30 MIN: CPT | Performed by: NURSE PRACTITIONER

## 2020-03-06 PROCEDURE — G8484 FLU IMMUNIZE NO ADMIN: HCPCS | Performed by: NURSE PRACTITIONER

## 2020-03-06 PROCEDURE — 83036 HEMOGLOBIN GLYCOSYLATED A1C: CPT | Performed by: NURSE PRACTITIONER

## 2020-03-06 PROCEDURE — G8427 DOCREV CUR MEDS BY ELIG CLIN: HCPCS | Performed by: NURSE PRACTITIONER

## 2020-03-06 PROCEDURE — G8417 CALC BMI ABV UP PARAM F/U: HCPCS | Performed by: NURSE PRACTITIONER

## 2020-03-06 RX ORDER — ONDANSETRON 4 MG/1
4 TABLET, ORALLY DISINTEGRATING ORAL EVERY 8 HOURS PRN
Qty: 20 TABLET | Refills: 0 | Status: SHIPPED | OUTPATIENT
Start: 2020-03-06 | End: 2020-05-15 | Stop reason: SDUPTHER

## 2020-03-06 RX ORDER — PROPRANOLOL HYDROCHLORIDE 120 MG/1
CAPSULE, EXTENDED RELEASE ORAL
Qty: 30 CAPSULE | Refills: 5 | Status: SHIPPED | OUTPATIENT
Start: 2020-03-06 | End: 2020-01-01

## 2020-03-06 RX ORDER — GABAPENTIN 100 MG/1
100 CAPSULE ORAL 2 TIMES DAILY
Qty: 60 CAPSULE | Refills: 2 | Status: SHIPPED | OUTPATIENT
Start: 2020-03-06 | End: 2020-03-20 | Stop reason: SDUPTHER

## 2020-03-06 ASSESSMENT — ENCOUNTER SYMPTOMS
COUGH: 1
SHORTNESS OF BREATH: 1
WHEEZING: 0
CHEST TIGHTNESS: 0

## 2020-03-06 NOTE — TELEPHONE ENCOUNTER
Reason for Disposition   Thigh, calf, or ankle swelling in both legs, but one side is definitely more swollen    Protocols used: LEG SWELLING AND EDEMA-ADULT-OH    Received call from Maria Del Carmen Fuller in [facility]. Patient states she has bilateral lower leg swelling, right is much worse than left. Swelling goes up to mid calf. It is tender. She has a hard time straightening legs. She states she has some swelling for a few weeks, but has gotten worse over the past week. She has a complicated hx. She has throat cancer. Has had a cough for years and gets SOB at times. Has some chest pain when coughing,. No chest pain now. No fever. Call soft transferred to PCP office to discuss. Spoke with Chey Alonso, and she would like her seen in office today. Call soft transferred to [facility] in [address] to schedule.

## 2020-03-06 NOTE — PROGRESS NOTES
3/6/20     Chief Complaint   Patient presents with    Leg Swelling     Foot and ankle pain, intermittent numbness and tingling, ibuprofen not helping     HPI    Patient is here for worsening bilateral foot and ankle pain described as burning and tingling   She complains of intermittent swelling and intermittent numbness  Sometimes the tingling and numbness goes up the leg  States she went to ortho clinic and was told that they are not able to do anything about it since imaging is normal.  No injury known. Painful after walking. Has been coming and going   Today swelling is a better than yesterday but pain in feet is bad  She is not using compression consistently -when she is wearing compression it does help with the swelling but does not help with the pain. She was recently started on amlodipine for hypertension -this was after the intermittent swelling and pain started. She states she is taking her medications as prescribed. She is not monitoring her blood pressure at home. She was previously on propanolol. Her heart rate is elevated again today. Patient denies any chest pain, worsening shortness of breath, dizziness, palpitations. Saw pulmonology - getting CT chest and neck  Did swallow evaluation with ENT   Started wellbutrin to help quit smoking  Has not seen psychiatrist in a long time    Allergies   Allergen Reactions    Amoxicillin-Pot Clavulanate      vomiting       Current Outpatient Medications   Medication Sig Dispense Refill    ondansetron (ZOFRAN ODT) 4 MG disintegrating tablet Take 1 tablet by mouth every 8 hours as needed for Nausea 20 tablet 0    propranolol (INDERAL LA) 120 MG extended release capsule TAKE ONE CAPSULE BY MOUTH DAILY 30 capsule 5    gabapentin (NEURONTIN) 100 MG capsule Take 1 capsule by mouth 2 times daily for 90 days. 60 capsule 2    buPROPion (WELLBUTRIN SR) 150 MG extended release tablet I tab PO daily for 3 days then 1 Tab PO BID for 12 weeks.  171 tablet 0  mirtazapine (REMERON) 45 MG tablet TAKE ONE TABLET BY MOUTH ONCE NIGHTLY 30 tablet 0    omeprazole (PRILOSEC) 40 MG delayed release capsule TAKE ONE CAPSULE BY MOUTH DAILY ONE HOUR BEFORE EVENING MEAL 30 capsule 1    amLODIPine (NORVASC) 5 MG tablet Take 1 tablet by mouth daily 30 tablet 0    hydrOXYzine (ATARAX) 50 MG tablet TAKE ONE TABLET BY MOUTH TWICE A DAY AS NEEDED FOR  ANXIETY 60 tablet 0    desvenlafaxine succinate (PRISTIQ) 100 MG TB24 extended release tablet Take 1 tablet by mouth daily 30 tablet 1    TRI-SPRINTEC 0.18/0.215/0.25 MG-35 MCG TABS TAKE ONE TABLET BY MOUTH DAILY 28 tablet 12    L-Methylfolate 15 MG TABS Take 15 mg by mouth daily 90 tablet 0    albuterol sulfate HFA (PROVENTIL HFA) 108 (90 Base) MCG/ACT inhaler Inhale 2 puffs into the lungs every 4 hours as needed for Wheezing or Shortness of Breath (Space out to every 6 hours as symptoms improve) 1 Inhaler 5    fluticasone (FLOVENT HFA) 110 MCG/ACT inhaler Inhale 2 puffs into the lungs 2 times daily 1 Inhaler 5    Respiratory Therapy Supplies (NEBULIZER COMPRESSOR) KIT Use Q6 hours as needed with Duoneb. Disp: Nebulizer Machine #1 no refill and disposable Nebulizer tubing and mouthpiece Disp: 3 months supply with 3 refills 1 kit 0    ipratropium-albuterol (DUONEB) 0.5-2.5 (3) MG/3ML SOLN nebulizer solution Inhale 3 mLs into the lungs 4 times daily 120 vial 5    rizatriptan (MAXALT) 10 MG tablet Take 1 tablet by mouth once as needed for Migraine May repeat in 2 hours if needed 30 tablet 3    SUMAtriptan (IMITREX) 25 MG tablet TAKE ONE TABLET BY MOUTH AT ONSET OF HEADACHE; MAY REPEAT ONE TABLET IN 2 HOURS IF NEEDED. (Patient not taking: Reported on 3/3/2020) 9 tablet 5     No current facility-administered medications for this visit. Review of Systems   Constitutional: Negative for chills, fatigue and fever. Respiratory: Positive for cough and shortness of breath. Negative for chest tightness and wheezing.          No worse

## 2020-03-09 ENCOUNTER — TELEPHONE (OUTPATIENT)
Dept: PULMONOLOGY | Age: 35
End: 2020-03-09

## 2020-03-09 NOTE — TELEPHONE ENCOUNTER
Patient calls stating that Wellbutrin is causing a sensation all over her body, \"like worms crawling\". She seems rather irritated and wants to let Dr. Merry Delgado know that she cannot take this med. Allergy list updated to include this problem.

## 2020-03-10 ENCOUNTER — TELEPHONE (OUTPATIENT)
Dept: PULMONOLOGY | Age: 35
End: 2020-03-10

## 2020-03-10 NOTE — TELEPHONE ENCOUNTER
I reordered the CT. I cant cancer the other one because it is scheduled. Can you please call Carmela Ruiz back so she can cancel the old one.       Thanks

## 2020-03-11 ENCOUNTER — HOSPITAL ENCOUNTER (OUTPATIENT)
Dept: CT IMAGING | Age: 35
Discharge: HOME | End: 2020-03-11
Payer: COMMERCIAL

## 2020-03-11 LAB
BUN BLDV-MCNC: 20 MG/DL (ref 7–20)
CREAT SERPL-MCNC: 0.8 MG/DL (ref 0.6–1.1)
GFR AFRICAN AMERICAN: >60
GFR NON-AFRICAN AMERICAN: >60

## 2020-03-11 PROCEDURE — 82565 ASSAY OF CREATININE: CPT

## 2020-03-11 PROCEDURE — 6360000004 HC RX CONTRAST MEDICATION: Performed by: INTERNAL MEDICINE

## 2020-03-11 PROCEDURE — 36415 COLL VENOUS BLD VENIPUNCTURE: CPT

## 2020-03-11 PROCEDURE — 84520 ASSAY OF UREA NITROGEN: CPT

## 2020-03-11 PROCEDURE — 71250 CT THORAX DX C-: CPT

## 2020-03-11 PROCEDURE — 70491 CT SOFT TISSUE NECK W/DYE: CPT

## 2020-03-11 RX ORDER — AMLODIPINE BESYLATE 5 MG/1
TABLET ORAL
Qty: 30 TABLET | Refills: 0 | Status: SHIPPED | OUTPATIENT
Start: 2020-03-11 | End: 2020-04-09

## 2020-03-11 RX ADMIN — IOPAMIDOL 75 ML: 755 INJECTION, SOLUTION INTRAVENOUS at 16:08

## 2020-03-17 ENCOUNTER — TELEPHONE (OUTPATIENT)
Dept: PULMONOLOGY | Age: 35
End: 2020-03-17

## 2020-03-17 NOTE — TELEPHONE ENCOUNTER
She would like to have the results of her CTA that was completed on 03/11/20. She can be reached at [phone]. Thanks.

## 2020-03-18 RX ORDER — HYDROXYZINE 50 MG/1
TABLET, FILM COATED ORAL
Qty: 60 TABLET | Refills: 2 | Status: SHIPPED | OUTPATIENT
Start: 2020-03-18 | End: 2020-01-01 | Stop reason: SDUPTHER

## 2020-03-18 RX ORDER — IBUPROFEN 800 MG/1
TABLET ORAL
Qty: 90 TABLET | Refills: 1 | Status: SHIPPED | OUTPATIENT
Start: 2020-03-18 | End: 2020-04-02

## 2020-03-20 ENCOUNTER — OFFICE VISIT (OUTPATIENT)
Dept: INTERNAL MEDICINE CLINIC | Age: 35
End: 2020-03-20
Payer: COMMERCIAL

## 2020-03-20 VITALS
BODY MASS INDEX: 27.47 KG/M2 | HEART RATE: 86 BPM | DIASTOLIC BLOOD PRESSURE: 78 MMHG | WEIGHT: 175 LBS | HEIGHT: 67 IN | SYSTOLIC BLOOD PRESSURE: 130 MMHG

## 2020-03-20 PROBLEM — F10.10 ALCOHOL ABUSE: Status: ACTIVE | Noted: 2019-10-28

## 2020-03-20 PROBLEM — R91.8 ABNORMAL FINDINGS ON DIAGNOSTIC IMAGING OF LUNG: Status: ACTIVE | Noted: 2020-03-20

## 2020-03-20 PROBLEM — Z85.9 HISTORY OF CARCINOMA: Status: ACTIVE | Noted: 2017-07-18

## 2020-03-20 PROBLEM — R05.3 CHRONIC COUGH: Status: ACTIVE | Noted: 2020-03-13

## 2020-03-20 PROCEDURE — G8484 FLU IMMUNIZE NO ADMIN: HCPCS | Performed by: NURSE PRACTITIONER

## 2020-03-20 PROCEDURE — 99214 OFFICE O/P EST MOD 30 MIN: CPT | Performed by: NURSE PRACTITIONER

## 2020-03-20 PROCEDURE — G8427 DOCREV CUR MEDS BY ELIG CLIN: HCPCS | Performed by: NURSE PRACTITIONER

## 2020-03-20 PROCEDURE — G8417 CALC BMI ABV UP PARAM F/U: HCPCS | Performed by: NURSE PRACTITIONER

## 2020-03-20 PROCEDURE — 4004F PT TOBACCO SCREEN RCVD TLK: CPT | Performed by: NURSE PRACTITIONER

## 2020-03-20 RX ORDER — GABAPENTIN 100 MG/1
100 CAPSULE ORAL 3 TIMES DAILY
Qty: 270 CAPSULE | Refills: 1 | Status: SHIPPED
Start: 2020-03-20 | End: 2020-01-01 | Stop reason: DRUGHIGH

## 2020-03-20 ASSESSMENT — ENCOUNTER SYMPTOMS
COUGH: 1
WHEEZING: 0
CHEST TIGHTNESS: 0
SHORTNESS OF BREATH: 0

## 2020-03-20 NOTE — PROGRESS NOTES
3/20/20     Chief Complaint   Patient presents with    Follow-up     HTN     HPI     Kelsie Jacques returns for follow up of hypertension. Patient has been taking Her medications as prescribed. Patient's blood pressure is  controlled. Side effects related to taking the medications include no medication side effects noted. Recently restarted on propranolol. Took wellbutrin to help with smoking cessation by pulmonology last took over a week ago- felt like worms were in her body and itching. This was stopped and added to her allergy list at that time. Still complaining of bilateral foot and ankle pain   Described as a burning and tingling pain  Also has intermit swelling and numbness  Denies any injury  Recently increased gabapentin - reports mild improvement  Next step EMG    Still seeing ENT and pulmonology for chronic cough and history of laryngeal cancer  Recently had imaging    Allergies   Allergen Reactions    Wellbutrin [Bupropion] Itching     Feels like worms crawling on her body; crawling sensation    Amoxicillin-Pot Clavulanate      vomiting     Current Outpatient Medications   Medication Sig Dispense Refill    gabapentin (NEURONTIN) 100 MG capsule Take 1 capsule by mouth 3 times daily for 180 days.  270 capsule 1    hydrOXYzine (ATARAX) 50 MG tablet TAKE ONE TABLET BY MOUTH TWICE A DAY AS NEEDED FOR ANXIETY 60 tablet 2    ibuprofen (ADVIL;MOTRIN) 800 MG tablet TAKE ONE TABLET BY MOUTH EVERY 6 HOURS AS NEEDED FOR PAIN 90 tablet 1    amLODIPine (NORVASC) 5 MG tablet TAKE ONE TABLET BY MOUTH DAILY 30 tablet 0    L-Methylfolate 15 MG TABS TAKE ONE TABLET BY MOUTH DAILY 90 tablet 0    ondansetron (ZOFRAN ODT) 4 MG disintegrating tablet Take 1 tablet by mouth every 8 hours as needed for Nausea 20 tablet 0    propranolol (INDERAL LA) 120 MG extended release capsule TAKE ONE CAPSULE BY MOUTH DAILY 30 capsule 5    mirtazapine (REMERON) 45 MG tablet TAKE ONE TABLET BY MOUTH ONCE NIGHTLY 30 tablet 0    omeprazole (PRILOSEC) 40 MG delayed release capsule TAKE ONE CAPSULE BY MOUTH DAILY ONE HOUR BEFORE EVENING MEAL 30 capsule 1    desvenlafaxine succinate (PRISTIQ) 100 MG TB24 extended release tablet Take 1 tablet by mouth daily 30 tablet 1    rizatriptan (MAXALT) 10 MG tablet Take 1 tablet by mouth once as needed for Migraine May repeat in 2 hours if needed 30 tablet 3    TRI-SPRINTEC 0.18/0.215/0.25 MG-35 MCG TABS TAKE ONE TABLET BY MOUTH DAILY 28 tablet 12    albuterol sulfate HFA (PROVENTIL HFA) 108 (90 Base) MCG/ACT inhaler Inhale 2 puffs into the lungs every 4 hours as needed for Wheezing or Shortness of Breath (Space out to every 6 hours as symptoms improve) 1 Inhaler 5    fluticasone (FLOVENT HFA) 110 MCG/ACT inhaler Inhale 2 puffs into the lungs 2 times daily 1 Inhaler 5    Respiratory Therapy Supplies (NEBULIZER COMPRESSOR) KIT Use Q6 hours as needed with Duoneb. Disp: Nebulizer Machine #1 no refill and disposable Nebulizer tubing and mouthpiece Disp: 3 months supply with 3 refills 1 kit 0    ipratropium-albuterol (DUONEB) 0.5-2.5 (3) MG/3ML SOLN nebulizer solution Inhale 3 mLs into the lungs 4 times daily 120 vial 5    SUMAtriptan (IMITREX) 25 MG tablet TAKE ONE TABLET BY MOUTH AT ONSET OF HEADACHE; MAY REPEAT ONE TABLET IN 2 HOURS IF NEEDED. (Patient not taking: Reported on 3/3/2020) 9 tablet 5     No current facility-administered medications for this visit. Review of Systems   Constitutional: Negative for chills, fatigue and fever. Respiratory: Positive for cough (chronic - unchanged). Negative for chest tightness, shortness of breath and wheezing. Cardiovascular: Negative for chest pain, palpitations and leg swelling. Musculoskeletal: Positive for joint swelling (intermittent bilateral feet ). Neurological: Negative for dizziness, tremors, light-headedness and headaches.      Vitals:    03/20/20 0949   BP: 130/78   Pulse: 86   Weight: 175 lb (79.4 kg)   Height: 5' 7\" (1.702 m)      Physical Exam  Vitals signs reviewed. Constitutional:       General: She is not in acute distress. Appearance: She is well-developed. She is not ill-appearing or diaphoretic. HENT:      Head: Normocephalic and atraumatic. Cardiovascular:      Rate and Rhythm: Normal rate and regular rhythm. Heart sounds: Normal heart sounds. No murmur. Pulmonary:      Effort: Pulmonary effort is normal. No respiratory distress. Breath sounds: Normal breath sounds. No wheezing or rhonchi. Musculoskeletal:      Right lower leg: No edema. Left lower leg: No edema. Skin:     General: Skin is warm and dry. Neurological:      General: No focal deficit present. Mental Status: She is alert and oriented to person, place, and time. Psychiatric:         Mood and Affect: Mood and affect normal.         Behavior: Behavior normal.       Assessment/Plan     1. Essential hypertension  Stable, controlled on current regimen. Much better controlled since restarting propranolol  Tachycardia since resolved as well    2. Pain in both feet  Stable, some improvement increasing gabapentin to twice daily  Increase gabapentin to 3 times daily  We will check EMG given persistence of symptoms  - EMG; Future  - gabapentin (NEURONTIN) 100 MG capsule; Take 1 capsule by mouth 3 times daily for 180 days. Dispense: 270 capsule; Refill: 1    3. Numbness and tingling of both feet  See #2  - EMG; Future  - gabapentin (NEURONTIN) 100 MG capsule; Take 1 capsule by mouth 3 times daily for 180 days. Dispense: 270 capsule; Refill: 1    4. Cough  Stable, chronic given history  Seeing ENT and pulmonology  - gabapentin (NEURONTIN) 100 MG capsule; Take 1 capsule by mouth 3 times daily for 180 days. Dispense: 270 capsule; Refill: 1    Discussed medications with patient, who voiced understanding of their use and indications. All questions answered.     F/U 3 month and prn          Electronically signed by Jillian Box STEFAN, APRN - CNP on 3/20/2020 at 10:26 AM

## 2020-03-23 ENCOUNTER — TELEPHONE (OUTPATIENT)
Dept: PULMONOLOGY | Age: 35
End: 2020-03-23

## 2020-03-23 NOTE — TELEPHONE ENCOUNTER
I spoke with Ms. Mamadou Vines. Advised her that edema in throat seen on CT scan has improved, and the groundglass opacities in her chest have cleared. She is continuing on antibiotic therapy from her otolaryngologist.  I recommended a follow-up with Dr. José Barrera, when we are able to resume patient visits in the office.   She was thankful to get the information regarding her scans and will follow-up with Dr. José Barrera

## 2020-04-02 ENCOUNTER — PATIENT MESSAGE (OUTPATIENT)
Dept: INTERNAL MEDICINE CLINIC | Age: 35
End: 2020-04-02

## 2020-04-02 RX ORDER — INDOMETHACIN 50 MG/1
50 CAPSULE ORAL 3 TIMES DAILY PRN
Qty: 60 CAPSULE | Refills: 0 | Status: SHIPPED | OUTPATIENT
Start: 2020-04-02 | End: 2020-01-01

## 2020-04-02 NOTE — TELEPHONE ENCOUNTER
Called and spoke to pt. She is tearful on the phone   She denies any si/hi thoughts. States she will never hurt herself bc of her kids who have already lost their father. She is just tired of not feeling well and her feet and ankles hurt a lot today. She is not wearing compression. Discussed indomethacin (sent earlier today) and supportive care. States joints are not hot to touch   Checking blood work - pt will get today or tomorrow. Scheduling follow up with Dr. Maximino Baez and setting up appt with [facility] - agreeable.    Marco García

## 2020-04-05 ENCOUNTER — APPOINTMENT (OUTPATIENT)
Dept: GENERAL RADIOLOGY | Age: 35
End: 2020-04-05
Payer: COMMERCIAL

## 2020-04-05 ENCOUNTER — HOSPITAL ENCOUNTER (EMERGENCY)
Age: 35
Discharge: HOME | End: 2020-04-05
Attending: EMERGENCY MEDICINE
Payer: COMMERCIAL

## 2020-04-05 VITALS
WEIGHT: 170 LBS | HEIGHT: 67 IN | BODY MASS INDEX: 26.68 KG/M2 | DIASTOLIC BLOOD PRESSURE: 82 MMHG | HEART RATE: 120 BPM | SYSTOLIC BLOOD PRESSURE: 135 MMHG | OXYGEN SATURATION: 100 % | RESPIRATION RATE: 20 BRPM | TEMPERATURE: 98.3 F

## 2020-04-05 LAB
A/G RATIO: 1.5 (ref 1.1–2.2)
ALBUMIN SERPL-MCNC: 4.5 G/DL (ref 3.4–5)
ALP BLD-CCNC: 94 U/L (ref 40–129)
ALT SERPL-CCNC: 12 U/L (ref 10–40)
ANION GAP SERPL CALCULATED.3IONS-SCNC: 14 MMOL/L (ref 3–16)
AST SERPL-CCNC: 12 U/L (ref 15–37)
BASOPHILS ABSOLUTE: 0 K/UL (ref 0–0.2)
BASOPHILS RELATIVE PERCENT: 0.4 %
BILIRUB SERPL-MCNC: <0.2 MG/DL (ref 0–1)
BUN BLDV-MCNC: 17 MG/DL (ref 7–20)
CALCIUM SERPL-MCNC: 9.3 MG/DL (ref 8.3–10.6)
CHLORIDE BLD-SCNC: 101 MMOL/L (ref 99–110)
CO2: 22 MMOL/L (ref 21–32)
CREAT SERPL-MCNC: 0.6 MG/DL (ref 0.6–1.1)
EOSINOPHILS ABSOLUTE: 0.2 K/UL (ref 0–0.6)
EOSINOPHILS RELATIVE PERCENT: 2 %
GFR AFRICAN AMERICAN: >60
GFR NON-AFRICAN AMERICAN: >60
GLOBULIN: 3 G/DL
GLUCOSE BLD-MCNC: 112 MG/DL (ref 70–99)
HCT VFR BLD CALC: 28.6 % (ref 36–48)
HEMOGLOBIN: 9.4 G/DL (ref 12–16)
INR BLD: 0.96 (ref 0.86–1.14)
LYMPHOCYTES ABSOLUTE: 1.6 K/UL (ref 1–5.1)
LYMPHOCYTES RELATIVE PERCENT: 14.1 %
MCH RBC QN AUTO: 25.6 PG (ref 26–34)
MCHC RBC AUTO-ENTMCNC: 32.8 G/DL (ref 31–36)
MCV RBC AUTO: 78.2 FL (ref 80–100)
MONOCYTES ABSOLUTE: 0.6 K/UL (ref 0–1.3)
MONOCYTES RELATIVE PERCENT: 5.2 %
NEUTROPHILS ABSOLUTE: 8.8 K/UL (ref 1.7–7.7)
NEUTROPHILS RELATIVE PERCENT: 78.3 %
PDW BLD-RTO: 17.9 % (ref 12.4–15.4)
PLATELET # BLD: 351 K/UL (ref 135–450)
PMV BLD AUTO: 7.3 FL (ref 5–10.5)
POTASSIUM SERPL-SCNC: 3.6 MMOL/L (ref 3.5–5.1)
PROTHROMBIN TIME: 11.1 SEC (ref 10–13.2)
RBC # BLD: 3.65 M/UL (ref 4–5.2)
SEDIMENTATION RATE, ERYTHROCYTE: 58 MM/HR (ref 0–20)
SODIUM BLD-SCNC: 137 MMOL/L (ref 136–145)
TOTAL PROTEIN: 7.5 G/DL (ref 6.4–8.2)
WBC # BLD: 11.3 K/UL (ref 4–11)

## 2020-04-05 PROCEDURE — 6370000000 HC RX 637 (ALT 250 FOR IP): Performed by: NURSE PRACTITIONER

## 2020-04-05 PROCEDURE — 85025 COMPLETE CBC W/AUTO DIFF WBC: CPT

## 2020-04-05 PROCEDURE — 85610 PROTHROMBIN TIME: CPT

## 2020-04-05 PROCEDURE — 80074 ACUTE HEPATITIS PANEL: CPT

## 2020-04-05 PROCEDURE — 99284 EMERGENCY DEPT VISIT MOD MDM: CPT

## 2020-04-05 PROCEDURE — 85652 RBC SED RATE AUTOMATED: CPT

## 2020-04-05 PROCEDURE — 80053 COMPREHEN METABOLIC PANEL: CPT

## 2020-04-05 PROCEDURE — 71045 X-RAY EXAM CHEST 1 VIEW: CPT

## 2020-04-05 PROCEDURE — 86140 C-REACTIVE PROTEIN: CPT

## 2020-04-05 RX ORDER — METHYLPREDNISOLONE 4 MG/1
TABLET ORAL
Qty: 1 KIT | Refills: 0 | Status: SHIPPED | OUTPATIENT
Start: 2020-04-05 | End: 2020-04-11

## 2020-04-05 RX ORDER — HYDROCODONE BITARTRATE AND ACETAMINOPHEN 5; 325 MG/1; MG/1
1 TABLET ORAL ONCE
Status: COMPLETED | OUTPATIENT
Start: 2020-04-05 | End: 2020-04-05

## 2020-04-05 RX ADMIN — HYDROCODONE BITARTRATE AND ACETAMINOPHEN 1 TABLET: 5; 325 TABLET ORAL at 16:39

## 2020-04-05 ASSESSMENT — ENCOUNTER SYMPTOMS
ABDOMINAL PAIN: 0
SHORTNESS OF BREATH: 0
CHEST TIGHTNESS: 0
NAUSEA: 0
DIARRHEA: 0
VOMITING: 0

## 2020-04-05 ASSESSMENT — PAIN DESCRIPTION - PAIN TYPE: TYPE: ACUTE PAIN

## 2020-04-05 ASSESSMENT — PAIN SCALES - GENERAL
PAINLEVEL_OUTOF10: 10
PAINLEVEL_OUTOF10: 10

## 2020-04-05 NOTE — ED PROVIDER NOTES
[facility]        Pt Name: Lety Junior  MRN: [id number]  [place] 1985  Date of evaluation: 4/5/2020  Provider: AMBER Canales CNP  PCP: AMBER Escalante CNP     I have seen and evaluated this patient with my supervising physician randy murphy [facility]       Chief Complaint   Patient presents with    Leg Swelling     bilat leg swelling since january. Pt is anxious in triage, keeps repeating \"everything just hurts\". HISTORY OF PRESENT ILLNESS   (Location, Timing/Onset, Context/Setting, Quality, Duration, Modifying Factors, Severity, Associated Signs and Symptoms)  Note limiting factors. Lety Junior is a 37 y.o. female presents to the emergency department today with swelling to bilateral lower legs, feeling taut skin with a burning sensation and redness. Patient reports \"everything just hurts\". She rates her pain as a 10 of 10. States the pain is worse with any ambulation or standing. She reports a paresthesia to the bottom of her feet. She reports intermittent swelling since January but worse over the past 1 weeks. States that primary care did give her indomethacin last week for diagnosis of \"gout\". She does appear very anxious. She reports history of throat cancer from years ago and this chronic oropharynx infection over the past 60 days, states she has been on Bactrim DS daily for 60 days. She has been treated by ENT. She reports coughing up \"this stringy stuff\". Denies any hemoptysis    Denies any headache, fever, lightheadedness, dizziness, visual disturbances. No chest pain or pressure. No neck or back pain. No abdominal pain, nausea, vomiting, diarrhea, constipation, or dysuria. No rash. Nursing Notes were all reviewed and agreed with or any disagreements were addressed in the HPI.     REVIEW OF SYSTEMS    (2-9 systems for level 4, 10 or more for level 5)

## 2020-04-06 LAB
C-REACTIVE PROTEIN: 94.1 MG/L (ref 0–5.1)
HAV IGM SER IA-ACNC: NORMAL
HEPATITIS B CORE IGM ANTIBODY: NORMAL
HEPATITIS B SURFACE ANTIGEN INTERPRETATION: NORMAL
HEPATITIS C ANTIBODY INTERPRETATION: NORMAL

## 2020-04-06 RX ORDER — MIRTAZAPINE 45 MG/1
TABLET, FILM COATED ORAL
Qty: 30 TABLET | Refills: 0 | Status: SHIPPED | OUTPATIENT
Start: 2020-04-06 | End: 2020-04-29 | Stop reason: ALTCHOICE

## 2020-04-08 ENCOUNTER — HOSPITAL ENCOUNTER (OUTPATIENT)
Age: 35
Discharge: HOME | End: 2020-04-08
Payer: COMMERCIAL

## 2020-04-08 LAB
ANION GAP SERPL CALCULATED.3IONS-SCNC: 16 MMOL/L (ref 3–16)
BASOPHILS ABSOLUTE: 0.1 K/UL (ref 0–0.2)
BASOPHILS RELATIVE PERCENT: 1.4 %
BUN BLDV-MCNC: 9 MG/DL (ref 7–20)
C-REACTIVE PROTEIN: 39.2 MG/L (ref 0–5.1)
CALCIUM SERPL-MCNC: 9.4 MG/DL (ref 8.3–10.6)
CHLORIDE BLD-SCNC: 103 MMOL/L (ref 99–110)
CO2: 22 MMOL/L (ref 21–32)
CREAT SERPL-MCNC: 0.6 MG/DL (ref 0.6–1.1)
EOSINOPHILS ABSOLUTE: 0.1 K/UL (ref 0–0.6)
EOSINOPHILS RELATIVE PERCENT: 0.8 %
GFR AFRICAN AMERICAN: >60
GFR NON-AFRICAN AMERICAN: >60
GLUCOSE BLD-MCNC: 108 MG/DL (ref 70–99)
HCT VFR BLD CALC: 31.1 % (ref 36–48)
HEMOGLOBIN: 10 G/DL (ref 12–16)
LYMPHOCYTES ABSOLUTE: 2.2 K/UL (ref 1–5.1)
LYMPHOCYTES RELATIVE PERCENT: 22.9 %
MCH RBC QN AUTO: 25.7 PG (ref 26–34)
MCHC RBC AUTO-ENTMCNC: 32.2 G/DL (ref 31–36)
MCV RBC AUTO: 79.8 FL (ref 80–100)
MONOCYTES ABSOLUTE: 0.4 K/UL (ref 0–1.3)
MONOCYTES RELATIVE PERCENT: 3.9 %
NEUTROPHILS ABSOLUTE: 6.7 K/UL (ref 1.7–7.7)
NEUTROPHILS RELATIVE PERCENT: 71 %
PDW BLD-RTO: 18.3 % (ref 12.4–15.4)
PLATELET # BLD: 522 K/UL (ref 135–450)
PMV BLD AUTO: 7.1 FL (ref 5–10.5)
POTASSIUM SERPL-SCNC: 3.5 MMOL/L (ref 3.5–5.1)
RBC # BLD: 3.9 M/UL (ref 4–5.2)
RHEUMATOID FACTOR: 12 IU/ML
SEDIMENTATION RATE, ERYTHROCYTE: 74 MM/HR (ref 0–20)
SODIUM BLD-SCNC: 141 MMOL/L (ref 136–145)
TSH SERPL DL<=0.05 MIU/L-ACNC: 11.81 UIU/ML (ref 0.27–4.2)
URIC ACID, SERUM: 2.8 MG/DL (ref 2.6–6)
WBC # BLD: 9.4 K/UL (ref 4–11)

## 2020-04-08 PROCEDURE — 86140 C-REACTIVE PROTEIN: CPT

## 2020-04-08 PROCEDURE — 86200 CCP ANTIBODY: CPT

## 2020-04-08 PROCEDURE — 84550 ASSAY OF BLOOD/URIC ACID: CPT

## 2020-04-08 PROCEDURE — 36415 COLL VENOUS BLD VENIPUNCTURE: CPT

## 2020-04-08 PROCEDURE — 85652 RBC SED RATE AUTOMATED: CPT

## 2020-04-08 PROCEDURE — 84443 ASSAY THYROID STIM HORMONE: CPT

## 2020-04-08 PROCEDURE — 86431 RHEUMATOID FACTOR QUANT: CPT

## 2020-04-08 PROCEDURE — 85025 COMPLETE CBC W/AUTO DIFF WBC: CPT

## 2020-04-08 PROCEDURE — 80048 BASIC METABOLIC PNL TOTAL CA: CPT

## 2020-04-09 ENCOUNTER — VIRTUAL VISIT (OUTPATIENT)
Dept: PSYCHOLOGY | Age: 35
End: 2020-04-09
Payer: COMMERCIAL

## 2020-04-09 LAB — CYCLIC CITRULLINATED PEPTIDE ANTIBODY IGG: <0.5 U/ML (ref 0–2.9)

## 2020-04-09 PROCEDURE — 90791 PSYCH DIAGNOSTIC EVALUATION: CPT | Performed by: PSYCHOLOGIST

## 2020-04-09 RX ORDER — AMLODIPINE BESYLATE 5 MG/1
TABLET ORAL
Qty: 30 TABLET | Refills: 0 | Status: SHIPPED | OUTPATIENT
Start: 2020-04-09 | End: 2020-05-06

## 2020-04-09 RX ORDER — LEVOTHYROXINE SODIUM 0.07 MG/1
75 TABLET ORAL DAILY
Qty: 90 TABLET | Refills: 1 | Status: SHIPPED | OUTPATIENT
Start: 2020-04-09 | End: 2020-01-01

## 2020-04-10 RX ORDER — OMEPRAZOLE 40 MG/1
CAPSULE, DELAYED RELEASE ORAL
Qty: 30 CAPSULE | Refills: 0 | Status: SHIPPED | OUTPATIENT
Start: 2020-04-10 | End: 2020-05-14

## 2020-04-16 ENCOUNTER — VIRTUAL VISIT (OUTPATIENT)
Dept: PSYCHOLOGY | Age: 35
End: 2020-04-16
Payer: COMMERCIAL

## 2020-04-16 PROCEDURE — 90832 PSYTX W PT 30 MINUTES: CPT | Performed by: PSYCHOLOGIST

## 2020-04-16 NOTE — PROGRESS NOTES
or ex partner: Not on file     Emotionally abused: Not on file     Physically abused: Not on file     Forced sexual activity: Not on file   Other Topics Concern    Not on file   Social History Narrative    Not on file     TOBACCO:   reports that she has been smoking cigarettes. She started smoking about 6 years ago. She has a 9.00 pack-year smoking history. She has never used smokeless tobacco.  ETOH:   reports no history of alcohol use. Diagnosis:  1. Severe episode of recurrent major depressive disorder, without psychotic features ([facility] [address]    2. PTSD (post-traumatic stress disorder)          Plan:  Pt interventions:  Supportive techniques, Provided Psychoeducation re: about presentation and assumptions that might occur and Identified maladaptive thoughts        Documentation was done using voice recognition dragon software. Every effort was made to ensure accuracy; however, inadvertent, unintentional computerized transcription errors may be present.

## 2020-04-23 ENCOUNTER — TELEPHONE (OUTPATIENT)
Dept: PSYCHOLOGY | Age: 35
End: 2020-04-23

## 2020-04-23 NOTE — TELEPHONE ENCOUNTER
LVM reminding pt of appt and instructing pt to follow the texted link or call the office to reschedule more convenient time.

## 2020-04-27 ENCOUNTER — TELEPHONE (OUTPATIENT)
Dept: INTERNAL MEDICINE CLINIC | Age: 35
End: 2020-04-27

## 2020-04-27 NOTE — TELEPHONE ENCOUNTER
Pt calling wanting to be seen--feet swelling-hand hurting throbbing-jonts hurt back and ribs---do you want her to come to office or virtual visit? Please call the pt. Thanks.

## 2020-04-28 ENCOUNTER — VIRTUAL VISIT (OUTPATIENT)
Dept: INTERNAL MEDICINE CLINIC | Age: 35
End: 2020-04-28
Payer: COMMERCIAL

## 2020-04-28 VITALS — SYSTOLIC BLOOD PRESSURE: 121 MMHG | HEART RATE: 91 BPM | TEMPERATURE: 97.2 F | DIASTOLIC BLOOD PRESSURE: 81 MMHG

## 2020-04-28 PROCEDURE — G8427 DOCREV CUR MEDS BY ELIG CLIN: HCPCS | Performed by: NURSE PRACTITIONER

## 2020-04-28 PROCEDURE — 99213 OFFICE O/P EST LOW 20 MIN: CPT | Performed by: NURSE PRACTITIONER

## 2020-04-28 PROCEDURE — G8417 CALC BMI ABV UP PARAM F/U: HCPCS | Performed by: NURSE PRACTITIONER

## 2020-04-28 PROCEDURE — 4004F PT TOBACCO SCREEN RCVD TLK: CPT | Performed by: NURSE PRACTITIONER

## 2020-04-28 ASSESSMENT — ENCOUNTER SYMPTOMS
SHORTNESS OF BREATH: 0
COUGH: 0
WHEEZING: 0

## 2020-04-28 NOTE — PROGRESS NOTES
TELEHEALTH EVALUATION -- Audio/Visual (During AWUDZ-35 public health emergency)  Acute Office Visit  4/28/2020    SUBJECTIVE:    Patient ID: Brenda Peters is a 37 y.o. female. Chief Complaint   Patient presents with    Swelling     Hands and legs both painful on and off since January- referred to Rheumatology      HPI: The patient presents for an acute visit. Patient has been reporting bilateral foot and hand pains described as a burning and tingling for months. Patient is taking gabapentin. EMG was ordered and not completed. She was also seen by the emergency room and was also prescribed steroids and diagnosed with vasculitis due to chronic Bactrim use. Appears that her sed rate was elevated as well as her C-reactive protein. A referral to rheumatology was placed. She has not scheduled this appointment. Today she reports that she is still has pain intermittently. States her pain is well controlled today. Sitting makes the pain better. Walking makes the pain worse. No swelling/heat in hands. Denies heat in ankles, but states intermittent ankle swelling bilaterally. Today she reports that she has not seen/scheduled with rheumatology. Has EMG scheduled. Taking gabapentin. She was also referred to psychiatry- has appt tomorrow. Seeing psychology.     Allergies   Allergen Reactions    Wellbutrin [Bupropion] Itching     Feels like worms crawling on her body; crawling sensation    Amoxicillin-Pot Clavulanate      vomiting     Current Outpatient Medications   Medication Sig Dispense Refill    omeprazole (PRILOSEC) 40 MG delayed release capsule TAKE ONE CAPSULE BY MOUTH DAILY ONE HOUR BEFORE EVENING MEAL 30 capsule 0    amLODIPine (NORVASC) 5 MG tablet TAKE ONE TABLET BY MOUTH DAILY 30 tablet 0    levothyroxine (SYNTHROID) 75 MCG tablet Take 1 tablet by mouth daily 90 tablet 1    mirtazapine (REMERON) 45 MG tablet TAKE ONE TABLET BY MOUTH ONCE NIGHTLY 30 tablet 0    indomethacin (INDOCIN) 50 MG

## 2020-04-29 ENCOUNTER — OFFICE VISIT (OUTPATIENT)
Dept: PSYCHOLOGY | Age: 35
End: 2020-04-29
Payer: COMMERCIAL

## 2020-04-29 ENCOUNTER — VIRTUAL VISIT (OUTPATIENT)
Dept: PSYCHIATRY | Age: 35
End: 2020-04-29
Payer: COMMERCIAL

## 2020-04-29 PROCEDURE — 90832 PSYTX W PT 30 MINUTES: CPT | Performed by: PSYCHOLOGIST

## 2020-04-29 PROCEDURE — 99214 OFFICE O/P EST MOD 30 MIN: CPT | Performed by: PSYCHIATRY & NEUROLOGY

## 2020-04-29 RX ORDER — LEVOMEFOLATE CALCIUM 15 MG
TABLET ORAL
Qty: 90 TABLET | Refills: 2 | Status: SHIPPED | OUTPATIENT
Start: 2020-04-29 | End: 2021-01-01 | Stop reason: ALTCHOICE

## 2020-04-29 RX ORDER — MIRTAZAPINE 30 MG/1
60 TABLET, FILM COATED ORAL NIGHTLY
Qty: 60 TABLET | Refills: 1 | Status: SHIPPED | OUTPATIENT
Start: 2020-04-29 | End: 2020-01-01

## 2020-04-29 RX ORDER — DESVENLAFAXINE 100 MG/1
100 TABLET, EXTENDED RELEASE ORAL DAILY
Qty: 30 TABLET | Refills: 5 | Status: SHIPPED
Start: 2020-04-29 | End: 2020-01-01 | Stop reason: ALTCHOICE

## 2020-04-29 NOTE — PROGRESS NOTES
PSYCHIATRY PROGRESS NOTE  TELEHEALTH VISIT    Dang Vinson  1985 04/29/20  Face to Face time: 25 min  Provider location: Office  Patient location: Home  PCP: AMBER Marshall CNP    CC:   Chief Complaint   Patient presents with    Depression     S:   Pursuant to the emergency declaration under the [address] [place] [address] authority and the [employer] and [employer] Act, this Virtual  Visit was conducted, with patient's consent, to reduce the patient's risk of exposure to COVID-19 and provide continuity of care for an established patient. Services were provided through a video synchronous discussion virtually to substitute for in-person clinic visit. Doxy. me used for the [employer]. Pt has not been seen in about 6 months. She continues to take mirtazapine 45mg qhs, pristiq 100mg daily. She denies any change in her depression or anxiety. It continues to be a daily struggle for her to have energy, motivation to do things, or sleep consistently. She still is tearful multiple times during the day. She struggles with irritability with managing her children (ages 9, 10, 15), and interacting with her mother. She continues to ruminate frequently on the loss of her fiance. She will think often that she'd rather not be alive anymore, has nothing to look forward to. She denies any suicidal ideation, intent or plan, wants to be around for her children. She denies any self harm behaviors / cutting. She denies any alcohol use \"I used to love drinking, I don't even want to do that anymore\". Continues to smoke 1 -1.5ppd. Feels its the only thing that gives her pleasure. Had TFTs recently showing hypothyroidism and started in synthroid about 20 days ago. She does talk to her father about once per week and her aunt a couple times per week.      ROS: no headaches, vision problems, dysuria, abd pain, +coughing, +SOB occ,

## 2020-04-29 NOTE — PROGRESS NOTES
TELEHEALTH VISIT -- Audio/Visual (During MCMQQ-32 public health emergency)  }  Pursuant to the emergency declaration under the [address] [place] [address], [facility] waiver authority and the [employer] and [employer] Act, this Virtual Visit was conducted, with patient's consent, to reduce the patient's risk of exposure to COVID-19 and provide continuity of care for an established patient. Services were provided through a video synchronous discussion virtually to substitute for in-person clinic visit. Pt gave verbal informed consent to participate in telehealth services. Conducted a risk-benefit analysis and determined that the patient's presenting problems are consistent with the use of telepsychology. Determined that the patient has sufficient knowledge and skills in the use of technology enabling them to adequately benefit from telepsychology. It was determined that this patient was able to be properly treated without an in-person session. Patient verified that they were currently located at the [facility] address that was provided during registration or another [facility] address, if noted below. Verified the following information:  Patient's identification: Yes  Patient location: [place] [address]   Patient's call back number: [phone]   Patient's emergency contact's name and number, as well as permission to contact them if needed: Extended Emergency Contact Information  Primary Emergency Contact: Jessica Donato  [facility] [address] Phone: [phone]  Relation: Parent     Provider location: [place], [address] Consultation  Reema Cruz, Ph.D.  Psychologist  4/29/2020  10:34 AM      Time spent with Patient: 28 minutes  This is patient's third  [facility] appointment.     Reason for Consult:    Chief Complaint   Patient presents with   Ward Depression pack-year smoking history. She has never used smokeless tobacco.  ETOH:   reports no history of alcohol use. Diagnosis:  1. Severe episode of recurrent major depressive disorder, without psychotic features ([facility] [address]    2. PTSD (post-traumatic stress disorder)    3. TOMA (generalized anxiety disorder)          Plan:  Pt interventions:  Trained in strategies for increasing balanced thinking, Discussed and problem-solved barriers in adhering to behavioral change plan and Motivational Interviewing to increase patient confidence and compliance with adhering to behavioral change plan        Documentation was done using voice recognition dragon software. Every effort was made to ensure accuracy; however, inadvertent, unintentional computerized transcription errors may be present.

## 2020-04-30 RX ORDER — IBUPROFEN 800 MG/1
TABLET ORAL
Qty: 90 TABLET | Refills: 0 | OUTPATIENT
Start: 2020-04-30

## 2020-04-30 RX ORDER — DESVENLAFAXINE 100 MG/1
TABLET, EXTENDED RELEASE ORAL
Qty: 30 TABLET | Refills: 0 | OUTPATIENT
Start: 2020-04-30

## 2020-05-07 ENCOUNTER — VIRTUAL VISIT (OUTPATIENT)
Dept: PSYCHOLOGY | Age: 35
End: 2020-05-07
Payer: COMMERCIAL

## 2020-05-07 PROCEDURE — 90832 PSYTX W PT 30 MINUTES: CPT | Performed by: PSYCHOLOGIST

## 2020-05-07 NOTE — PROGRESS NOTES
Not on file     Inability: Not on file    Transportation needs     Medical: Not on file     Non-medical: Not on file   Tobacco Use    Smoking status: Current Every Day Smoker     Packs/day: 0.50     Years: 18.00     Pack years: 9.00     Types: Cigarettes     Start date: 10/19/2013    Smokeless tobacco: Never Used   Substance and Sexual Activity    Alcohol use: No     Alcohol/week: 20.0 standard drinks     Types: 10 Cans of beer, 10 Shots of liquor per week     Comment: none for 120 days    Drug use: Yes     Types: Marijuana, Opiates      Comment: currently only marijuana    Sexual activity: Not Currently     Partners: Male   Lifestyle    Physical activity     Days per week: Not on file     Minutes per session: Not on file    Stress: Not on file   Relationships    Social connections     Talks on phone: Not on file     Gets together: Not on file     Attends Holiness service: Not on file     Active member of club or organization: Not on file     Attends meetings of clubs or organizations: Not on file     Relationship status: Not on file    Intimate partner violence     Fear of current or ex partner: Not on file     Emotionally abused: Not on file     Physically abused: Not on file     Forced sexual activity: Not on file   Other Topics Concern    Not on file   Social History Narrative    Not on file     TOBACCO:   reports that she has been smoking cigarettes. She started smoking about 6 years ago. She has a 9.00 pack-year smoking history. She has never used smokeless tobacco.  ETOH:   reports no history of alcohol use. Diagnosis:  1. Severe episode of recurrent major depressive disorder, without psychotic features ([facility] [address]    2. PTSD (post-traumatic stress disorder)    3.  Alcohol abuse          Plan:  Pt interventions:  Trained in improving communication skills, Discussed and problem-solved barriers in adhering to behavioral change plan, Discussed potential barriers to change, Supportive techniques and Identified maladaptive thoughts        Documentation was done using voice recognition dragon software. Every effort was made to ensure accuracy; however, inadvertent, unintentional computerized transcription errors may be present.

## 2020-05-11 ENCOUNTER — PATIENT MESSAGE (OUTPATIENT)
Dept: INTERNAL MEDICINE CLINIC | Age: 35
End: 2020-05-11

## 2020-05-13 ENCOUNTER — HOSPITAL ENCOUNTER (OUTPATIENT)
Age: 35
Discharge: HOME | End: 2020-05-13
Payer: COMMERCIAL

## 2020-05-13 ENCOUNTER — HOSPITAL ENCOUNTER (OUTPATIENT)
Dept: GENERAL RADIOLOGY | Age: 35
Discharge: HOME | End: 2020-05-13
Payer: COMMERCIAL

## 2020-05-13 LAB
BASOPHILS ABSOLUTE: 0.1 K/UL (ref 0–0.2)
BASOPHILS RELATIVE PERCENT: 0.7 %
EOSINOPHILS ABSOLUTE: 0.2 K/UL (ref 0–0.6)
EOSINOPHILS RELATIVE PERCENT: 1.9 %
HCT VFR BLD CALC: 32.9 % (ref 36–48)
HEMOGLOBIN: 10.6 G/DL (ref 12–16)
LYMPHOCYTES ABSOLUTE: 2.2 K/UL (ref 1–5.1)
LYMPHOCYTES RELATIVE PERCENT: 21.4 %
MCH RBC QN AUTO: 25.3 PG (ref 26–34)
MCHC RBC AUTO-ENTMCNC: 32.1 G/DL (ref 31–36)
MCV RBC AUTO: 78.7 FL (ref 80–100)
MONOCYTES ABSOLUTE: 0.6 K/UL (ref 0–1.3)
MONOCYTES RELATIVE PERCENT: 5.4 %
NEUTROPHILS ABSOLUTE: 7.3 K/UL (ref 1.7–7.7)
NEUTROPHILS RELATIVE PERCENT: 70.6 %
PDW BLD-RTO: 17.4 % (ref 12.4–15.4)
PLATELET # BLD: 362 K/UL (ref 135–450)
PMV BLD AUTO: 7.4 FL (ref 5–10.5)
RBC # BLD: 4.18 M/UL (ref 4–5.2)
T3 FREE: 2.6 PG/ML (ref 2.3–4.2)
THYROID PEROXIDASE (TPO) ABS: 10 IU/ML
TSH REFLEX FT4: 1.34 UIU/ML (ref 0.27–4.2)
WBC # BLD: 10.4 K/UL (ref 4–11)

## 2020-05-13 PROCEDURE — 73610 X-RAY EXAM OF ANKLE: CPT

## 2020-05-13 PROCEDURE — 85025 COMPLETE CBC W/AUTO DIFF WBC: CPT

## 2020-05-13 PROCEDURE — 84445 ASSAY OF TSI GLOBULIN: CPT

## 2020-05-13 PROCEDURE — 87040 BLOOD CULTURE FOR BACTERIA: CPT

## 2020-05-13 PROCEDURE — 84443 ASSAY THYROID STIM HORMONE: CPT

## 2020-05-13 PROCEDURE — 36415 COLL VENOUS BLD VENIPUNCTURE: CPT

## 2020-05-13 PROCEDURE — 84432 ASSAY OF THYROGLOBULIN: CPT

## 2020-05-13 PROCEDURE — 84165 PROTEIN E-PHORESIS SERUM: CPT

## 2020-05-13 PROCEDURE — 84481 FREE ASSAY (FT-3): CPT

## 2020-05-13 PROCEDURE — 86376 MICROSOMAL ANTIBODY EACH: CPT

## 2020-05-13 PROCEDURE — 84155 ASSAY OF PROTEIN SERUM: CPT

## 2020-05-14 ENCOUNTER — OFFICE VISIT (OUTPATIENT)
Dept: RHEUMATOLOGY | Age: 35
End: 2020-05-14
Payer: COMMERCIAL

## 2020-05-14 VITALS
HEART RATE: 90 BPM | HEIGHT: 67 IN | BODY MASS INDEX: 26.4 KG/M2 | DIASTOLIC BLOOD PRESSURE: 76 MMHG | TEMPERATURE: 98.7 F | SYSTOLIC BLOOD PRESSURE: 110 MMHG | WEIGHT: 168.2 LBS

## 2020-05-14 LAB
C-REACTIVE PROTEIN: 56 MG/L (ref 0–5.1)
HBV SURFACE AB TITR SER: <3.5 MIU/ML
HEPATITIS B CORE IGM ANTIBODY: NORMAL
HEPATITIS B SURFACE ANTIGEN INTERPRETATION: NORMAL
HEPATITIS C ANTIBODY INTERPRETATION: NORMAL
IRON SATURATION: 11 % (ref 15–50)
IRON: 45 UG/DL (ref 37–145)
RHEUMATOID FACTOR: <10 IU/ML
TOTAL IRON BINDING CAPACITY: 399 UG/DL (ref 260–445)

## 2020-05-14 PROCEDURE — 4004F PT TOBACCO SCREEN RCVD TLK: CPT | Performed by: INTERNAL MEDICINE

## 2020-05-14 PROCEDURE — G8427 DOCREV CUR MEDS BY ELIG CLIN: HCPCS | Performed by: INTERNAL MEDICINE

## 2020-05-14 PROCEDURE — 99204 OFFICE O/P NEW MOD 45 MIN: CPT | Performed by: INTERNAL MEDICINE

## 2020-05-14 PROCEDURE — G8417 CALC BMI ABV UP PARAM F/U: HCPCS | Performed by: INTERNAL MEDICINE

## 2020-05-14 RX ORDER — OMEPRAZOLE 40 MG/1
CAPSULE, DELAYED RELEASE ORAL
Qty: 30 CAPSULE | Refills: 0 | Status: SHIPPED | OUTPATIENT
Start: 2020-05-14 | End: 2020-01-01

## 2020-05-14 RX ORDER — PREDNISONE 1 MG/1
5 TABLET ORAL DAILY
Qty: 30 TABLET | Refills: 1 | Status: SHIPPED | OUTPATIENT
Start: 2020-05-14 | End: 2020-01-01

## 2020-05-14 NOTE — PROGRESS NOTES
SUBJECTIVE:    Patient ID: Aric Nuñez is a 37 y.o. female. This 57-year-old woman sent for consultation by [facility] because of swelling at the ankles. The patient states she was well until January when she developed swelling in the right ankle initially and then it became prominent and left ankle. When the pain is at its peak she can even let a sheet touch it. Her uric acid level was less than 3. She denies using aspirin. She states she has a mother with rheumatoid arthritis. She denies a history of psoriasis. She is using ibuprofen 800 mg 3-4 times daily without benefit. She denies exposure to ticks or tick bites. .  Rheumatoid factor and CCP antibodies were negative about a month ago. Sed rate and CRP have been elevated    Review of Systems:    Constitutional symptoms: denies weight loss, fevers night sweats  Eyes: denies dry eyes, Ears, nose, mouth, throat: denies mucosal sores denies dry mouth  Cardiovascular: denies pleuritic chest pain  Resp   intermittent cough, complains of intermittent shortness of breath  Gastrointestinal: denies peptic ulcer  Skin: denies photosensitivity, denies rash, denies alopecia  Neurologic: denies history of stroke,   Hematologic: denies blood clots  Genitourinary: denies kidney stones  Musculoskeletal: denies Raynaud's, complains of joint swelling  Past Medical History:   Diagnosis Date    Alcohol use disorder, moderate, in early remission ([address] 10/28/2019    Anemia     Anxiety     Back pain     Bipolar affective disorder ([address]     Depression     Migraine     Opiate abuse, continuous ([address]     Primary cancer of larynx ([address] 7/18/2017    PTSD (post-traumatic stress disorder)     Voice hoarseness        Prior to Visit Medications    Medication Sig Taking?  Authorizing Provider   omeprazole (PRILOSEC) 40 MG delayed release capsule TAKE ONE CAPSULE BY MOUTH DAILY ONE HOUR BEFORE EVENING MEAL Yes Patricia Fry MD   predniSONE (DELTASONE) 5 MG tablet MG/3ML SOLN nebulizer solution Inhale 3 mLs into the lungs 4 times daily Yes Lorena Wall MD   rizatriptan (MAXALT) 10 MG tablet Take 1 tablet by mouth once as needed for Migraine May repeat in 2 hours if needed  AMBER Stone CNP   SUMAtriptan (IMITREX) 25 MG tablet TAKE ONE TABLET BY MOUTH AT ONSET OF HEADACHE; MAY REPEAT ONE TABLET IN 2 HOURS IF NEEDED. Patient not taking: Reported on 4/28/2020  Lorena Wall MD        OBJECTIVE:  /76   Pulse 90   Temp 98.7 °F (37.1 °C) (Oral)   Ht 5' 7\" (1.702 m)   Wt 168 lb 3.2 oz (76.3 kg)   BMI 26.34 kg/m²      Physical Exam:   General: Normal gait and posture. Hygiene appears normal.  No evidence of overt muscle wasting. Eye: inspection of the conjunctiva and eyelids reveals no evidence of abnormal injection or discharge. Examination of the pupils shows that they react equally and consensually to light and to accommodation. The irises appear normal.  Fundi are grossly benign. Ears, nose, mouth, throat: external inspection of the ears and nose reveals no evidence of asymmetry mass or other abnormality. Otoscopic examination of the external auditory canals and tympanic membranes reveals no obstruction mass or other abnormal finding. The tympanic membranes are clear without evidence of injection. Visualized structures within the middle ear are normal.  There are no mucosal sores. Neck: examination of the neck reveals no evidence of mass asymmetry or crepitus. The trachea is midline. Examination of the thyroid shows no evidence of enlargement tenderness or mass. Respiratory: the patient's respiratory effort shows normal respiration without evidence of the use of accessory muscles. Diaphragmatic movement is normal.  There is no evidence of intercostal retractions. Percussion of the chest revealed no evidence of dullness flatness or hyperresonance. Auscultation revealed normal breath sounds in all lung fields.   They worsen no evidence

## 2020-05-15 LAB
ALBUMIN SERPL-MCNC: 3.3 G/DL (ref 3.1–4.9)
ALPHA-1-GLOBULIN: 0.4 G/DL (ref 0.2–0.4)
ALPHA-2-GLOBULIN: 1.1 G/DL (ref 0.4–1.1)
ANTI-NUCLEAR ANTIBODY (ANA): NEGATIVE
BETA GLOBULIN: 1.3 G/DL (ref 0.9–1.6)
CYCLIC CITRULLINATED PEPTIDE ANTIBODY IGG: <0.5 U/ML (ref 0–2.9)
GAMMA GLOBULIN: 0.7 G/DL (ref 0.6–1.8)
SPE/IFE INTERPRETATION: NORMAL
TOTAL PROTEIN: 6.8 G/DL (ref 6.4–8.2)

## 2020-05-15 RX ORDER — RIZATRIPTAN BENZOATE 10 MG/1
10 TABLET ORAL
Qty: 30 TABLET | Refills: 3 | Status: SHIPPED | OUTPATIENT
Start: 2020-05-15 | End: 2021-01-01

## 2020-05-15 RX ORDER — ONDANSETRON 4 MG/1
4 TABLET, ORALLY DISINTEGRATING ORAL EVERY 8 HOURS PRN
Qty: 20 TABLET | Refills: 0 | Status: SHIPPED | OUTPATIENT
Start: 2020-05-15 | End: 2020-01-01

## 2020-05-18 NOTE — PROGRESS NOTES
Feedback given to PCP. S:  Pt seen for f/u of depression and PTSD. Arrived online 17 mins late, was seen for abbreviated appt. Pt reported unchanged mood and sxs. Stated she has been feeling sick w \"spasms\" in her throat \"and everywhere in my body. \" Upset ENT told her some of her sxs will be permanent and others would improve if she quit smoking. Stated she would quite smoking if she was happier and she cannot feel happy while she feels sick. Currently smoking 10-15 cig. ID'ed circular logic of smoking-illness-depression. Stated she stays in [address] all day. Created goal to change into clean clothes daily. Stated she takes the dog on a 5min walk, depending on her pain. Encouraged getting outside daily. Stated she is busy all day w housework and childcare, as this is her arrangement for free rent. Stated her room is full of boxed d/t hoarding boxes, gets anxious if she starts to try get rid of them. Formerly was able to minimize this w help of Philip. Stated her grandmother also hoarded. Pt continued to state vague and passive suicidal ideation, but denied intent or plan, citing her kids as a reason she could never act on the ideation.      O:  MSE:    Appearance    alert, cooperative  Appetite abnormal: low  Sleep disturbance Yes  Fatigue Yes  Loss of pleasure Yes  Impulsive behavior Yes  Speech    spontaneous, normal rate and normal volume  Mood    Depressed  Affect    depressed affect  Thought Content    excessive preoccupations, cognitive distortions and all or nothing thinking  Thought Process    goal directed, coherent and tangential  Associations    logical connections, tangential connections  Insight    Fair  Judgment    fair  Orientation    oriented to person, place, time, and general circumstances  Memory    recent and remote memory intact  Attention/Concentration    impaired  Morbid ideation Yes, see above  Suicide Assessment    no suicidal ideation    History:  Social History:   Social History

## 2020-05-28 NOTE — PROGRESS NOTES
TELEHEALTH VISIT -- Audio/Visual (During UFKJQ-88 public health emergency)  }  Pursuant to the emergency declaration under the [address] [place] [address] waiver authority and the [employer] Act, this Virtual Visit was conducted, with patient's consent, to reduce the patient's risk of exposure to COVID-19 and provide continuity of care for an established patient. Services were provided through a video synchronous discussion virtually to substitute for in-person clinic visit. Pt gave verbal informed consent to participate in telehealth services. Conducted a risk-benefit analysis and determined that the patient's presenting problems are consistent with the use of telepsychology. Determined that the patient has sufficient knowledge and skills in the use of technology enabling them to adequately benefit from telepsychology. It was determined that this patient was able to be properly treated without an in-person session. Patient verified that they were currently located at the [facility] address that was provided during registration or another [facility] address, if noted below. Verified the following information:  Patient's identification: Yes  Patient location: [place] [address]   Patient's call back number: [phone]   Patient's emergency contact's name and number, as well as permission to contact them if needed: Extended Emergency Contact Information  Primary Emergency Contact: Jessica Donato  [facility] [address] Phone: [phone]  Relation: Parent     Provider location: [place], [address] Consultation  Mynor Spencer, Ph.D.  Psychologist  5/28/2020  3:31 PM      Time spent with Patient: 30 minutes  This is patient's sixth  [facility] appointment.     Reason for Consult:    Chief Complaint   Patient presents with   [facility] Depression

## 2020-05-29 NOTE — PROGRESS NOTES
sertraline, quetiapine, trazodone. Duloxetine, buspar (atleast per past admission in 2011 this was noted to be helpful, but pt stopped on her own recently as saw no benefit), escitalopram. Aripiprazole was noted to help per record by she was taken off of this - pt is not aware why but I suspect d/t sx of tardive dyskinesia  Outpt: has a psychiatrist in [place] in 2017 but stopped going. Used to see [facility], Dr. Tonja Stanford in late 2016. NSSI: cuts herself occasionally, <1x/week. Used to do more often, has done on and off since she was a teenager. Suicide Attempts: attempted 12/15/2016 by overdose.        Current Outpatient Medications   Medication Sig Dispense Refill    desvenlafaxine succinate (PRISTIQ) 25 MG TB24 extended release tablet Take 3 tablets by mouth daily for 7 days, THEN 2 tablets daily for 7 days, THEN 1 tablet daily for 7 days. 42 tablet 0    VORTIoxetine (TRINTELLIX) 10 MG TABS tablet Take 1 tablet by mouth daily 30 tablet 1    meloxicam (MOBIC) 15 MG tablet Take 1 tablet by mouth daily 30 tablet 3    rizatriptan (MAXALT) 10 MG tablet Take 1 tablet by mouth once as needed for Migraine May repeat in 2 hours if needed 30 tablet 3    ondansetron (ZOFRAN ODT) 4 MG disintegrating tablet Take 1 tablet by mouth every 8 hours as needed for Nausea 20 tablet 0    omeprazole (PRILOSEC) 40 MG delayed release capsule TAKE ONE CAPSULE BY MOUTH DAILY ONE HOUR BEFORE EVENING MEAL 30 capsule 0    predniSONE (DELTASONE) 5 MG tablet Take 1 tablet by mouth daily 30 tablet 1    amLODIPine (NORVASC) 5 MG tablet TAKE ONE TABLET BY MOUTH DAILY 30 tablet 1    mirtazapine (REMERON) 30 MG tablet Take 2 tablets by mouth nightly 60 tablet 1    L-Methylfolate 15 MG TABS TAKE ONE TABLET BY MOUTH DAILY 90 tablet 2    levothyroxine (SYNTHROID) 75 MCG tablet Take 1 tablet by mouth daily 90 tablet 1    gabapentin (NEURONTIN) 100 MG capsule Take 1 capsule by mouth 3 times daily for 180 days.  270 capsule 1    hydrOXYzine depression, 20-27 = Severe depression    TOMA 7 SCORE 11/13/2019 10/25/2019   TOMA-7 Total Score 18 21     Interpretation of TOMA-7 score: 5-9 = mild anxiety, 10-14 = moderate anxiety, 15+ = severe anxiety. Recommend referral to behavioral health for scores 10 or greater. Mental Status Exam:   Appearance    alert, cooperative  Motor: constantly shifts body, swaying trunk movements. Speech    spontaneous, normal rate and normal volume +hoarseness,  Mood/Affect    Anxious and Depressed /  dysphoric, anxious and tearful  Thought Process    linear, goal directed and coherent  Thought Content    Worthlessness, hopelessness , denies suicidal ideation  Associations    logical connections  Attention/Concentration    intact  Memory    recent and remote memory intact  Insight/Judgement    fair / fair    Labs:     Office Visit on 05/27/2020   Component Date Value Ref Range Status    CRP 05/27/2020 1.1  0.0 - 5.1 mg/L Final    Comment: WR-CRP Reference Range:  0D-29D      <0.6  30D-199Y    <5.1    CRP is used in the detection and evaluation of infection, tissue injury,  inflammatory disorders and associated diseases. Increases in CRP values  are non-specific and should not be interpreted without a complete  clinical evaluation.       WBC 05/27/2020 15.0* 4.0 - 11.0 K/uL Final    RBC 05/27/2020 4.59  4.00 - 5.20 M/uL Final    Hemoglobin 05/27/2020 11.3* 12.0 - 16.0 g/dL Final    Hematocrit 05/27/2020 36.5  36.0 - 48.0 % Final    MCV 05/27/2020 79.4* 80.0 - 100.0 fL Final    MCH 05/27/2020 24.6* 26.0 - 34.0 pg Final    MCHC 05/27/2020 30.9* 31.0 - 36.0 g/dL Final    RDW 05/27/2020 17.6* 12.4 - 15.4 % Final    Platelets 15/08/6056 565* 135 - 450 K/uL Final    MPV 05/27/2020 7.5  5.0 - 10.5 fL Final    Neutrophils % 05/27/2020 64.0  % Final    Lymphocytes % 05/27/2020 28.6  % Final    Monocytes % 05/27/2020 5.6  % Final    Eosinophils % 05/27/2020 1.1  % Final    Basophils % 05/27/2020 0.7  % Final    Neutrophils

## 2020-05-29 NOTE — TELEPHONE ENCOUNTER
Per Dr. Emily Nelson- \"patient should hold off on getting EMG of feet for the moment, review the reading given to her at her last appt and call us on Monday. Labs will be reviewed and next steps will be given on Monday. Keep taking the meloxicam and prednisone till then. \"

## 2020-05-29 NOTE — TELEPHONE ENCOUNTER
This message came across from [facility]:     \"Just wanted to see if I should still go on June 1, for the EMG testing on feet? Dr. Adorno Session ordered this testing, like two months. Oh and how was the last blood work? And when should I start feeling a little relief, I did start the Meloxicam.  When should I come back for appointment? \"

## 2020-06-03 NOTE — TELEPHONE ENCOUNTER
Spoke with the patient she still has a cough all of her labs are normal except for elevated white count. She is currently on prednisone 5 mg a day and Mobic 15 mg daily. Advised patient to see Bayron Dia if she is not better next week regarding the cough.

## 2020-06-12 NOTE — PROGRESS NOTES
tablet; Refill: 0    3. History of carcinoma  Stable, radiation therapy for laryngeal carcinoma   See #1    4. Laryngeal stenosis  See #1    5. Nicotine use disorder  See #1    6. Hoarseness or changing voice  See #1    7. Post traumatic stress disorder  Stable, uncontrolled  Seeing psychiatry patient reports she is doing ok with her medication changes  She is seeing psychology reports this is going well  Continue with plan    8. Bipolar affective disorder, currently depressed, moderate ([facility]  See #7    9. Self-inflicted injury  Stable, uncontrolled  This is not her first incident  She denies suicidal or homicidal thoughts  Self-inflicted injuries have no signs of infection today, reviewed wound care    Reviewed strict criteria for follow-up and when to seek emergency medical attention    Discussed medications with patient, who voiced understanding of their use and indications. All questions answered. Electronically signed by AMBER Marshall CNP on 6/12/2020 at 11:39 AM     45 min spent with patient- >50 % continuity of care and/or counseling.

## 2020-06-15 NOTE — PROGRESS NOTES
Assessment    no suicidal ideation    History:  Social History:   Social History     Socioeconomic History    Marital status: Single     Spouse name: Not on file    Number of children: 3    Years of education: 15    Highest education level: Not on file   Occupational History     Comment: unemployed    Social Needs    Financial resource strain: Not on file    Food insecurity     Worry: Not on file     Inability: Not on file   [employer] needs     Medical: Not on file     Non-medical: Not on file   Tobacco Use    Smoking status: Current Every Day Smoker     Packs/day: 0.25     Years: 18.00     Pack years: 4.50     Types: Cigarettes     Start date: 10/19/2013    Smokeless tobacco: Never Used   Substance and Sexual Activity    Alcohol use: No     Alcohol/week: 20.0 standard drinks     Types: 10 Cans of beer, 10 Shots of liquor per week     Comment: none for 120 days    Drug use: Yes     Types: Marijuana, Opiates      Comment: currently only marijuana    Sexual activity: Not Currently     Partners: Male   Lifestyle    Physical activity     Days per week: Not on file     Minutes per session: Not on file    Stress: Not on file   Relationships    Social connections     Talks on phone: Not on file     Gets together: Not on file     Attends Yarsani service: Not on file     Active member of club or organization: Not on file     Attends meetings of clubs or organizations: Not on file     Relationship status: Not on file    Intimate partner violence     Fear of current or ex partner: Not on file     Emotionally abused: Not on file     Physically abused: Not on file     Forced sexual activity: Not on file   Other Topics Concern    Not on file   Social History Narrative    Not on file     TOBACCO:   reports that she has been smoking cigarettes. She started smoking about 6 years ago. She has a 4.50 pack-year smoking history.  She has never used smokeless tobacco.  ETOH:   reports no history of alcohol

## 2020-06-15 NOTE — TELEPHONE ENCOUNTER
LVM reminding pt of appt and instructing pt to follow the texted link or call the office to reschedule more convenient time. same

## 2020-06-15 NOTE — TELEPHONE ENCOUNTER
ECC received a call from:    Name of Caller: Ivonne Ignacio    Relationship to patient:self    Organization name: n/a    Best contact number: [phone]    Reason for call: Pt is wanting a later time for her scheduled appt  6/22.  Please advise

## 2020-06-22 NOTE — PROGRESS NOTES
The patient has had a lengthy history regarding dysphonia and chronic dry cough. There has been an extensive work-up including a videostrobe laryngoscopy last month. This was done following 8 weeks of Bactrim showing a slight degree of improvement since her original assessment at the [facility]. Attempts to encourage her to stop smoking has not been successful to date. Following her laryngoscopy, she was placed on a saline nebulizer to be used 3 times a day. She remains concerned about the ongoing throat clearing and feelings of tightness which she also experiences in the posterior neck. On occasion there will be a small amount of clear mucus that is produced, but this never relieves her symptoms. 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 She has responded well to full course radiation and signs of recurrence have not been noted. There have been concomitant issues with depression and anxiety. She believes this is contributing to her inability to stop smoking.      She is allergic to Wellbutrin     Her medications, medical history, surgical history were reviewed  She is also followed for         Severe episode of recurrent major depressive disorder, without psychotic features ([address]    PTSD (post-traumatic stress disorder)    Nicotine use disorder    Neck pain due to malignant neoplastic disease ([address]    Migraine    HTN (hypertension)    History of carcinoma    Edema of larynx    Depression (emotion)    Chronic cough    Change in voice    Cannabis use disorder, moderate, dependence (HCC)    Anemia    Alcohol abuse    Airway obstruction, anatomic    Abnormal findings on diagnostic imaging of lung      Exam of the neck revealed a well-healed tracheostomy scar and postradiation thickening  No adenopathy was palpable in the neck thyroid gland is unremarkable    We had a lengthy

## 2020-06-23 NOTE — TELEPHONE ENCOUNTER
From: Darling Gonazlez  To: Kishorbennett Blake, APRN - CNP  Sent: 6/23/2020 1:51 PM EDT  Subject: Visit John Schneider    Seen Dr. Charles Barraza yesterday, left his office crying because he diagnosed me before even seeing me. Blames all my syptoms on smoking. I feel there's more to my syptoms, that no one seems to understand. Im so upset and crushed that this is how I'm going to feel for the rest of life. I research trying to figure it out, and have little notes scattered every where. Today I'm having flare-ups in both ankles , swelling and achy, itching bad. Elbows are also broke out, And itching. Still can't move neck, it's so sore in shoulder and down spine from the neck spasms still everyday. Appointment with Dr. Charles Barraza yesterday, was no help at all, I already knew everything he said. He didn't even look in ears, or throat. I even told him that the right side of face hurts, even my gums ache. I'm sending a pic of something I came across that tj sounds like what I'm going through. [place]. I'm not a doc but I'm exhausted with being unwell. What are your thoughts? And can you send a prescription, something for the aches. I have nothing to take at all.

## 2020-07-01 NOTE — PROGRESS NOTES
Behavioral Health Consultation  Julia Carlos M.A. Psychology Trainee  Gloria Glover, Ph.D.  Psychologist  7/1/2020  10:36 AM EDT      Time spent with Patient: 30 minutes  This is patient's eighth [facility] appointment. Reason for Consult:  Depression  Referring Provider: Vikash  Feedback given to PCP. TELEHEALTH VISIT -- Audio/Visual (During FOEZQ-49 public health emergency)  }  Pursuant to the emergency declaration under the [address] [place] [address], [facility] waiver authority and the [employer] Act, this Virtual Visit was conducted, with patient's consent, to reduce the patient's risk of exposure to COVID-19 and provide continuity of care for an established patient. Services were provided through a video synchronous discussion virtually to substitute for in-person clinic visit. Pt gave verbal informed consent to participate in telehealth services. Conducted a risk-benefit analysis and determined that the patient's presenting problems are consistent with the use of telepsychology. Determined that the patient has sufficient knowledge and skills in the use of technology enabling them to adequately benefit from telepsychology. It was determined that this patient was able to be properly treated without an in-person session. Patient verified that they were currently located at the [facility] address that was provided during registration.     Verified the following information:  Patient's identification: Yes  Patient location: [place] [address]   Patient's call back number: [phone]   Patient's emergency contact's name and number, as well as permission to contact them if needed: Extended Emergency Contact Information  Primary Emergency Contact: Jessica Donato  [facility] Phone: [phone]  Relation: Parent     Provider location: [place]     S:  Pt seen for f/u of depression. Pt reported worsened mood and sxs. Pt indicated she has been coughing nonstop and feels like the doctors are not listening to her; has been told be a few different drs to quit smoking. Pt feels the problems with her throat and coughing are happening regardless of the smoking so \"whats the point\". Pt has been socially isolating because \"people don't want to hear me or look at me\". Pt endorsed passive thoughts of wanting to be dead. Pt indicated no plan or intent to act on these thoughts, no safety concerns at this time.  Discussed smoking cessation with pt.    O:  MSE:    Appearance: good hygiene   Attitude: tearful and moderate distress  Consciousness: alert  Orientation: oriented to person, place, time, general circumstance  Memory: recent and remote memory intact  Attention/Concentration: intact during session  Psychomotor Activity:psychomotor agitation  Eye Contact: rare  Speech: rapid and slurred  Mood: anxious  Affect: irritable  Perception: within normal limits  Thought Content: all-or-none thinking  Thought Process: perseverative  Insight: fair  Judgment: intact  Ability to understand instructions: Yes  Ability to respond meaningfully: Yes  Morbid Ideation: yes  Suicide Assessment: no suicidal ideation, plan, or intent  Homicidal Ideation: no    History:    Medications:   Current Outpatient Medications   Medication Sig Dispense Refill    mirtazapine (REMERON) 30 MG tablet Take 1 tablet by mouth nightly 30 tablet 1    hydrOXYzine (ATARAX) 50 MG tablet Take 1 tablet by mouth 2 times daily as needed for Anxiety 60 tablet 2    omeprazole (PRILOSEC) 40 MG delayed release capsule TAKE ONE CAPSULE BY MOUTH DAILY ONE HOUR BEFORE EVENING MEAL 30 capsule 0    VORTIoxetine (TRINTELLIX) 10 MG TABS tablet Take 1 tablet by mouth daily 30 tablet 1    meloxicam (MOBIC) 15 MG tablet Take 1 tablet by mouth daily 30 tablet 3    rizatriptan (MAXALT) 10 MG tablet Take 1 tablet by mouth once as needed for Migraine May repeat in 2 hours if needed 30 tablet 3    ondansetron (ZOFRAN ODT) 4 MG disintegrating tablet Take 1 tablet by mouth every 8 hours as needed for Nausea 20 tablet 0    amLODIPine (NORVASC) 5 MG tablet TAKE ONE TABLET BY MOUTH DAILY 30 tablet 1    L-Methylfolate 15 MG TABS TAKE ONE TABLET BY MOUTH DAILY 90 tablet 2    levothyroxine (SYNTHROID) 75 MCG tablet Take 1 tablet by mouth daily 90 tablet 1    gabapentin (NEURONTIN) 100 MG capsule Take 1 capsule by mouth 3 times daily for 180 days. 270 capsule 1    propranolol (INDERAL LA) 120 MG extended release capsule TAKE ONE CAPSULE BY MOUTH DAILY 30 capsule 5    TRI-SPRINTEC 0.18/0.215/0.25 MG-35 MCG TABS TAKE ONE TABLET BY MOUTH DAILY 28 tablet 12    albuterol sulfate HFA (PROVENTIL HFA) 108 (90 Base) MCG/ACT inhaler Inhale 2 puffs into the lungs every 4 hours as needed for Wheezing or Shortness of Breath (Space out to every 6 hours as symptoms improve) 1 Inhaler 5    fluticasone (FLOVENT HFA) 110 MCG/ACT inhaler Inhale 2 puffs into the lungs 2 times daily 1 Inhaler 5    Respiratory Therapy Supplies (NEBULIZER COMPRESSOR) KIT Use Q6 hours as needed with Duoneb. Disp: Nebulizer Machine #1 no refill and disposable Nebulizer tubing and mouthpiece Disp: 3 months supply with 3 refills 1 kit 0    ipratropium-albuterol (DUONEB) 0.5-2.5 (3) MG/3ML SOLN nebulizer solution Inhale 3 mLs into the lungs 4 times daily 120 vial 5    SUMAtriptan (IMITREX) 25 MG tablet TAKE ONE TABLET BY MOUTH AT ONSET OF HEADACHE; MAY REPEAT ONE TABLET IN 2 HOURS IF NEEDED. 9 tablet 5     No current facility-administered medications for this visit.       Social History:   Social History     Socioeconomic History    Marital status: Single     Spouse name: Not on file    Number of children: 3    Years of education: 15    Highest education level: Not on file   Occupational History     Comment: unemployed    Social Needs    Financial resource strain: Not on file    Food insecurity     Worry: Not on file     Inability: Not on file    Transportation needs     Medical: Not on file     Non-medical: Not on file   Tobacco Use    Smoking status: Current Every Day Smoker     Packs/day: 0.25     Years: 18.00     Pack years: 4.50     Types: Cigarettes     Start date: 10/19/2013    Smokeless tobacco: Never Used   Substance and Sexual Activity    Alcohol use: No     Alcohol/week: 20.0 standard drinks     Types: 10 Cans of beer, 10 Shots of liquor per week     Comment: none for 120 days    Drug use: Yes     Types: Marijuana, Opiates      Comment: currently only marijuana    Sexual activity: Not Currently     Partners: Male   Lifestyle    Physical activity     Days per week: Not on file     Minutes per session: Not on file    Stress: Not on file   Relationships    Social connections     Talks on phone: Not on file     Gets together: Not on file     Attends Baptism service: Not on file     Active member of club or organization: Not on file     Attends meetings of clubs or organizations: Not on file     Relationship status: Not on file    Intimate partner violence     Fear of current or ex partner: Not on file     Emotionally abused: Not on file     Physically abused: Not on file     Forced sexual activity: Not on file   Other Topics Concern    Not on file   Social History Narrative    Not on file     TOBACCO:   reports that she has been smoking cigarettes. She started smoking about 6 years ago. She has a 4.50 pack-year smoking history. She has never used smokeless tobacco.  ETOH:   reports no history of alcohol use.   Family History:   Family History   Problem Relation Age of Onset    High Blood Pressure Mother     Depression Mother     High Blood Pressure Father     Depression Father     Diabetes Maternal Uncle     Depression Paternal Grandmother            Diagnosis:    Severe episode of recurrent major depressive disorder, without psychotic features

## 2020-07-07 NOTE — PROGRESS NOTES
Behavioral Health Consultation  Patricia Correa M.A. Psychology Trainee  Archie Nix, Ph.D.  Psychologist  7/7/2020  10:09 AM EDT      Time spent with Patient: 30 minutes  This is patient's ninth [facility] appointment. Reason for Consult:  Depression  Referring Provider: Vikash  Feedback given to PCP. TELEHEALTH VISIT -- Audio/Visual (During [facility])  }  Pursuant to the emergency declaration under the [address] [place] [address] waiver authority and the [employer] Act, this Virtual Visit was conducted, with patient's consent, to reduce the patient's risk of exposure to COVID-19 and provide continuity of care for an established patient. Services were provided through a video synchronous discussion virtually to substitute for in-person clinic visit. Pt gave verbal informed consent to participate in telehealth services. Conducted a risk-benefit analysis and determined that the patient's presenting problems are consistent with the use of telepsychology. Determined that the patient has sufficient knowledge and skills in the use of technology enabling them to adequately benefit from telepsychology. It was determined that this patient was able to be properly treated without an in-person session. Patient verified that they were currently located at the [facility] address that was provided during registration.     Verified the following information:  Patient's identification: Yes  Patient location: [place] [id number]   Patient's call back number: [phone]   Patient's emergency contact's name and number, as well as permission to contact them if needed: Extended Emergency Contact Information  Primary Emergency Contact: Jessica Donato  [facility] [address] Phone: [phone]  Relation: Parent     Provider location: [place]     S:  Pt seen for f/u of depression. Pt reported no change in mood or sxs. Pt indicated she \"doesn't know anymore\". Continues to feel discouraged by drs telling her to stop smoking. Discussed how the pt might present her sxs to a dr. Discussed resources pt can utilize [facility].      O:  MSE:    Appearance: disheveled  Attitude: cooperative and tearful  Consciousness: alert  Orientation: oriented to person, place, time, general circumstance  Memory: recent and remote memory intact  Attention/Concentration: intact during session  Psychomotor Activity:psychomotor agitation  Eye Contact: normal  Speech: normal rate and volume, well-articulated  Mood: irritable  Affect: irritable  Perception: within normal limits  Thought Content: within normal limits  Thought Process: logical, coherent and perseverative  Insight: fair  Judgment: intact  Ability to understand instructions: Yes  Ability to respond meaningfully: Yes  Morbid Ideation: passive thoughts of death  Suicide Assessment: no suicidal ideation, plan, or intent  Homicidal Ideation: no    History:    Medications:   Current Outpatient Medications   Medication Sig Dispense Refill    mirtazapine (REMERON) 30 MG tablet Take 1 tablet by mouth nightly 30 tablet 1    hydrOXYzine (ATARAX) 50 MG tablet Take 1 tablet by mouth 2 times daily as needed for Anxiety 60 tablet 2    omeprazole (PRILOSEC) 40 MG delayed release capsule TAKE ONE CAPSULE BY MOUTH DAILY ONE HOUR BEFORE EVENING MEAL 30 capsule 0    VORTIoxetine (TRINTELLIX) 10 MG TABS tablet Take 1 tablet by mouth daily 30 tablet 1    meloxicam (MOBIC) 15 MG tablet Take 1 tablet by mouth daily 30 tablet 3    rizatriptan (MAXALT) 10 MG tablet Take 1 tablet by mouth once as needed for Migraine May repeat in 2 hours if needed 30 tablet 3    ondansetron (ZOFRAN ODT) 4 MG disintegrating tablet Take 1 tablet by mouth every 8 hours as needed for Nausea 20 tablet 0    amLODIPine (NORVASC) 5 MG tablet TAKE ONE TABLET BY MOUTH DAILY 30 tablet 1    L-Methylfolate 15 MG TABS TAKE ONE TABLET BY MOUTH DAILY 90 tablet 2    levothyroxine (SYNTHROID) 75 MCG tablet Take 1 tablet by mouth daily 90 tablet 1    gabapentin (NEURONTIN) 100 MG capsule Take 1 capsule by mouth 3 times daily for 180 days. 270 capsule 1    propranolol (INDERAL LA) 120 MG extended release capsule TAKE ONE CAPSULE BY MOUTH DAILY 30 capsule 5    TRI-SPRINTEC 0.18/0.215/0.25 MG-35 MCG TABS TAKE ONE TABLET BY MOUTH DAILY 28 tablet 12    albuterol sulfate HFA (PROVENTIL HFA) 108 (90 Base) MCG/ACT inhaler Inhale 2 puffs into the lungs every 4 hours as needed for Wheezing or Shortness of Breath (Space out to every 6 hours as symptoms improve) 1 Inhaler 5    fluticasone (FLOVENT HFA) 110 MCG/ACT inhaler Inhale 2 puffs into the lungs 2 times daily 1 Inhaler 5    Respiratory Therapy Supplies (NEBULIZER COMPRESSOR) KIT Use Q6 hours as needed with Duoneb. Disp: Nebulizer Machine #1 no refill and disposable Nebulizer tubing and mouthpiece Disp: 3 months supply with 3 refills 1 kit 0    ipratropium-albuterol (DUONEB) 0.5-2.5 (3) MG/3ML SOLN nebulizer solution Inhale 3 mLs into the lungs 4 times daily 120 vial 5    SUMAtriptan (IMITREX) 25 MG tablet TAKE ONE TABLET BY MOUTH AT ONSET OF HEADACHE; MAY REPEAT ONE TABLET IN 2 HOURS IF NEEDED. 9 tablet 5     No current facility-administered medications for this visit.       Social History:   Social History     Socioeconomic History    Marital status: Single     Spouse name: Not on file    Number of children: 3    Years of education: 15    Highest education level: Not on file   Occupational History     Comment: unemployed    Social Needs    Financial resource strain: Not on file    Food insecurity     Worry: Not on file     Inability: Not on file   [employer] needs     Medical: Not on file     Non-medical: Not on file   Tobacco Use    Smoking status: Current Every Day Smoker     Packs/day: 0.25 Years: 18.00     Pack years: 4.50     Types: Cigarettes     Start date: 10/19/2013    Smokeless tobacco: Never Used   Substance and Sexual Activity    Alcohol use: No     Alcohol/week: 20.0 standard drinks     Types: 10 Cans of beer, 10 Shots of liquor per week     Comment: none for 120 days    Drug use: Yes     Types: Marijuana, Opiates      Comment: currently only marijuana    Sexual activity: Not Currently     Partners: Male   Lifestyle    Physical activity     Days per week: Not on file     Minutes per session: Not on file    Stress: Not on file   Relationships    Social connections     Talks on phone: Not on file     Gets together: Not on file     Attends Synagogue service: Not on file     Active member of club or organization: Not on file     Attends meetings of clubs or organizations: Not on file     Relationship status: Not on file    Intimate partner violence     Fear of current or ex partner: Not on file     Emotionally abused: Not on file     Physically abused: Not on file     Forced sexual activity: Not on file   Other Topics Concern    Not on file   Social History Narrative    Not on file     TOBACCO:   reports that she has been smoking cigarettes. She started smoking about 6 years ago. She has a 4.50 pack-year smoking history. She has never used smokeless tobacco.  ETOH:   reports no history of alcohol use.   Family History:   Family History   Problem Relation Age of Onset    High Blood Pressure Mother     Depression Mother     High Blood Pressure Father     Depression Father     Diabetes Maternal Uncle     Depression Paternal Grandmother          Diagnosis:    Severe episode of recurrent major depressive disorder, without psychotic features      Diagnosis Date    Alcohol use disorder, moderate, in early remission ([facility] 10/28/2019    Anemia     Anxiety     Back pain     Bipolar affective disorder ([facility]     Depression     Migraine     Opiate abuse, continuous ([facility]     [facility]    PTSD (post-traumatic stress disorder)     Voice hoarseness      Plan:  Pt interventions:  Emphasized self-care as important for managing overall health and Identified maladaptive thoughts

## 2020-07-10 NOTE — PROGRESS NOTES
improvement with the trintellix. No side effects. ROS: no headaches, vision problems, dysuria, abd pain, +muscle tension, cough    Brief Medical Hx:   Hx of laryngeal cancer    Brief Psych Hx:  Hosp: late 2016 [id number], [email or web address] 1/2017-2/2017. Admitted twice at [facility] in 2011 for SIMD.   Diagnoses: bipolar disorder, anxiety, depression, noted to have obsessive compulsive personality traits, PTSD, SIMD, alcohol use disorder   Med trials: venlafaxine, sertraline, quetiapine, trazodone. Duloxetine, buspar (atleast per past admission in 2011 this was noted to be helpful, but pt stopped on her own recently as saw no benefit), escitalopram. Aripiprazole was noted to help per record by she was taken off of this - pt is not aware why but I suspect d/t sx of tardive dyskinesia  Outpt: has a psychiatrist in [place] in 2017 but stopped going. Used to see [facility], Dr. Rosas Myles in late 2016. NSSI: cuts herself occasionally, <1x/week. Used to do more often, has done on and off since she was a teenager.    Suicide Attempts: attempted 12/15/2016 by overdose.        Current Outpatient Medications   Medication Sig Dispense Refill    omeprazole (PRILOSEC) 40 MG delayed release capsule TAKE ONE CAPSULE BY MOUTH DAILY ONE HOUR BEFORE EVENING MEAL 30 capsule 0    amLODIPine (NORVASC) 5 MG tablet TAKE ONE TABLET BY MOUTH DAILY 30 tablet 2    mirtazapine (REMERON) 30 MG tablet Take 1 tablet by mouth nightly 30 tablet 1    hydrOXYzine (ATARAX) 50 MG tablet Take 1 tablet by mouth 2 times daily as needed for Anxiety 60 tablet 2    VORTIoxetine (TRINTELLIX) 10 MG TABS tablet Take 1 tablet by mouth daily 30 tablet 1    meloxicam (MOBIC) 15 MG tablet Take 1 tablet by mouth daily 30 tablet 3    rizatriptan (MAXALT) 10 MG tablet Take 1 tablet by mouth once as needed for Migraine May repeat in 2 hours if needed 30 tablet 3    ondansetron (ZOFRAN ODT) 4 MG disintegrating tablet Take 1 tablet by mouth every 8 hours as needed for Nausea 20 tablet 0    21     Interpretation of Total Score Depression Severity: 1-4 = Minimal depression, 5-9 = Mild depression, 10-14 = Moderate depression, 15-19 = Moderately severe depression, 20-27 = Severe depression    TOMA 7 SCORE 11/13/2019 10/25/2019   TOMA-7 Total Score 18 21     Interpretation of TOMA-7 score: 5-9 = mild anxiety, 10-14 = moderate anxiety, 15+ = severe anxiety. Recommend referral to behavioral health for scores 10 or greater. AIMS score 7/10/2020: 15    Mental Status Exam:   Appearance    alert, cooperative  Motor:  swaying trunk movements. Speech    spontaneous, normal rate and normal volume +hoarseness,  Mood/Affect    Anxious and Depressed /  dysphoric, anxious  Thought Process    linear, goal directed and coherent  Thought Content    Worthlessness, hopelessness , denies suicidal ideation at this time  Associations    logical connections  Attention/Concentration    intact  Memory    recent and remote memory intact  Insight/Judgement    fair / fair    Labs:     Office Visit on 05/27/2020   Component Date Value Ref Range Status    CRP 05/27/2020 1.1  0.0 - 5.1 mg/L Final    Comment: WR-CRP Reference Range:  0D-29D      <0.6  30D-199Y    <5.1    CRP is used in the detection and evaluation of infection, tissue injury,  inflammatory disorders and associated diseases. Increases in CRP values  are non-specific and should not be interpreted without a complete  clinical evaluation.       WBC 05/27/2020 15.0* 4.0 - 11.0 K/uL Final    RBC 05/27/2020 4.59  4.00 - 5.20 M/uL Final    Hemoglobin 05/27/2020 11.3* 12.0 - 16.0 g/dL Final    Hematocrit 05/27/2020 36.5  36.0 - 48.0 % Final    MCV 05/27/2020 79.4* 80.0 - 100.0 fL Final    MCH 05/27/2020 24.6* 26.0 - 34.0 pg Final    MCHC 05/27/2020 30.9* 31.0 - 36.0 g/dL Final    RDW 05/27/2020 17.6* 12.4 - 15.4 % Final    Platelets 03/70/1384 565* 135 - 450 K/uL Final    MPV 05/27/2020 7.5  5.0 - 10.5 fL Final    Neutrophils % 05/27/2020 64.0  % Final    Lymphocytes % 05/27/2020 28.6  % Final    Monocytes % 05/27/2020 5.6  % Final    Eosinophils % 05/27/2020 1.1  % Final    Basophils % 05/27/2020 0.7  % Final    Neutrophils Absolute 05/27/2020 9.6* 1.7 - 7.7 K/uL Final    Lymphocytes Absolute 05/27/2020 4.3  1.0 - 5.1 K/uL Final    Monocytes Absolute 05/27/2020 0.8  0.0 - 1.3 K/uL Final    Eosinophils Absolute 05/27/2020 0.2  0.0 - 0.6 K/uL Final    Basophils Absolute 05/27/2020 0.1  0.0 - 0.2 K/uL Final    Angio Convert Enzyme 05/27/2020 49  9 - 67 U/L Final    Comment: Performed by Sherin Aguilar , [id number] [facility] [phone]  www. Betsy Barraza MD, Lab. Director      Uric Acid, Serum 05/27/2020 4.3  2.6 - 6.0 mg/dL Final       A:  39 yo F with severe depression, alcoholism. There may be a personality d/o component w/ borderline PD with a lot of triggering of these issues by conflict with mother. TD seems to be impairing at this point. It may also be leading to neck stiffness/muscle tightness and this may be impacting recent swallowing issues. 1. MDD recurrent, severe   2. PTSD  3. Tardive dyskinesia   4. Alcohol use disorder, moderate in remission  5. Cannabis use disorder  6. Nicotine use disorder  7. MTHFR reduced activity    P:   Depression / Anxiety  1. Continue mirtazapine 30mg qhs  2. Increase trintellix to 20mg daily  3. Continue hydroxyzine 50mg BID prn for anxiety  4. Continue l-methylfolate 15 mg daily  5. Continue f/u with [facility], [address] [place] [address]  6. Recommended she consider [facility] program at [facility] if she has transportation. I provided her with contact information. Tardive dyskinesia  6. Will start ingrezza for TDs 40mg daily. Avoiding tetrabenazine d/t depression. suicidality risk. Benzodiazepines contraindicated d/t substance abuse history    Nicotine use disorder  7. Counseled on smoking cessation     Social issues  8. Provided list of homeless shelters for women.        Follow-up: RTC in 3 weeks  Safety: RF include mood disorder, PTSD, substance abuse, past self harm, single, social isolation. Pt is moderate risk of future dangerousness to self or others, however at this time, pt is clinically stable, sober, future oriented, denies SI.  Is not an imminent safety risk to self or others.        Nohemy Salazar MD  Psychiatrist

## 2020-07-16 NOTE — PROGRESS NOTES
2020    TELEHEALTH EVALUATION -- Audio/Visual (During LTM-99 public health emergency)    HPI:    Hugh Wasserman (:  1985) has requested an audio/video evaluation for the following concern(s):    Pt complains of persistent chronic cough  She is having a productive cough - she reports it has been worsening over the past week. Sputum is thicker and having increased difficulty expectorating. Denies fever, chills. She continues to smoke. She has a follow up scheduled with ENT for next week. Seeing ENT, seeing pulmonology     Review of Systems  Negative other than HPI     Prior to Visit Medications    Medication Sig Taking?  Authorizing Provider   azithromycin (ZITHROMAX) 250 MG tablet Take 1 tablet by mouth See Admin Instructions for 5 days 500mg on day 1 followed by 250mg on days 2 - 5 Yes AMBER Stone CNP   guaiFENesin (MUCINEX) 600 MG extended release tablet Take 2 tablets by mouth 2 times daily for 10 days Yes AMBER Stone CNP   ondansetron (ZOFRAN-ODT) 4 MG disintegrating tablet DISSOLVE ONE TABLET BY MOUTH EVERY 8 HOURS AS NEEDED FOR NAUSEA  AMBER Stone CNP   Valbenazine Tosylate 40 MG CAPS Take 40 mg by mouth daily  Stephen Purvis MD   VORTIoxetine HBr (TRINTELLIX) 20 MG TABS tablet Take 1 tablet by mouth daily  Jaymie Almanzar MD   omeprazole (PRILOSEC) 40 MG delayed release capsule TAKE ONE CAPSULE BY MOUTH DAILY ONE HOUR BEFORE EVENING MEAL  Arik Salas MD   amLODIPine (NORVASC) 5 MG tablet TAKE ONE TABLET BY MOUTH DAILY  AMBER Stone CNP   mirtazapine (REMERON) 30 MG tablet Take 1 tablet by mouth nightly  Stephen Purvis MD   hydrOXYzine (ATARAX) 50 MG tablet Take 1 tablet by mouth 2 times daily as needed for Anxiety  Stephen Purvis MD   meloxicam (MOBIC) 15 MG tablet Take 1 tablet by mouth daily  Ciarra Torres MD   rizatriptan (MAXALT) 10 MG tablet Take 1 tablet by mouth once as needed for Migraine May repeat in 2 hours if needed  AMBER Brito CNP   L-Methylfolate 15 MG TABS TAKE ONE TABLET BY MOUTH DAILY  Stephen Purvis MD   levothyroxine (SYNTHROID) 75 MCG tablet Take 1 tablet by mouth daily  AMBER Stone CNP   gabapentin (NEURONTIN) 100 MG capsule Take 1 capsule by mouth 3 times daily for 180 days. AMBER Ramires - CNP   propranolol (INDERAL LA) 120 MG extended release capsule TAKE ONE CAPSULE BY MOUTH DAILY  AMBER Stone - CNP   TRI-SPRINTEC 0.18/0.215/0.25 MG-35 MCG TABS TAKE ONE TABLET BY MOUTH DAILY  VENKATA Bustos   albuterol sulfate HFA (PROVENTIL HFA) 108 (90 Base) MCG/ACT inhaler Inhale 2 puffs into the lungs every 4 hours as needed for Wheezing or Shortness of Breath (Space out to every 6 hours as symptoms improve)  Bentley Bamberger, MD   fluticasone (FLOVENT HFA) 110 MCG/ACT inhaler Inhale 2 puffs into the lungs 2 times daily  Bentley Bamberger, MD   Respiratory Therapy Supplies (NEBULIZER COMPRESSOR) KIT Use Q6 hours as needed with Duoneb. Disp: Nebulizer Machine #1 no refill and disposable Nebulizer tubing and mouthpiece Disp: 3 months supply with 3 refills  Bentley Bamberger, MD   ipratropium-albuterol (DUONEB) 0.5-2.5 (3) MG/3ML SOLN nebulizer solution Inhale 3 mLs into the lungs 4 times daily  Jack Harris MD   SUMAtriptan (IMITREX) 25 MG tablet TAKE ONE TABLET BY MOUTH AT ONSET OF HEADACHE; MAY REPEAT ONE TABLET IN 2 HOURS IF NEEDED.   Bentley Bamberger, MD     Social History     Tobacco Use    Smoking status: Current Every Day Smoker     Packs/day: 0.25     Years: 18.00     Pack years: 4.50     Types: Cigarettes     Start date: 10/19/2013    Smokeless tobacco: Never Used   Substance Use Topics    Alcohol use: No     Alcohol/week: 20.0 standard drinks     Types: 10 Cans of beer, 10 Shots of liquor per week     Comment: none for 120 days    Drug use: Yes     Types: Marijuana, Opiates      Comment: currently only marijuana          PHYSICAL EXAMINATION:  [ INSTRUCTIONS:  \"[x]\" Indicates a positive item  \"[]\" Indicates a negative item  -- DELETE ALL ITEMS NOT EXAMINED]  Vital Signs: (As obtained by patient/caregiver or practitioner observation)    No flowsheet data found. Physical Exam  Constitutional:       General: She is not in acute distress. Appearance: She is not ill-appearing or toxic-appearing. HENT:      Head: Normocephalic and atraumatic. Pulmonary:      Effort: Pulmonary effort is normal. No respiratory distress. Comments: Cough + for sputum during VV   Neurological:      General: No focal deficit present. Mental Status: She is alert and oriented to person, place, and time. Mental status is at baseline. Psychiatric:         Mood and Affect: Mood normal.         Behavior: Behavior normal.          Other pertinent observable physical exam findings-     Due to this being a TeleHealth encounter, evaluation of the following organ systems is limited: Vitals/Constitutional/EENT/Resp/CV/GI//MS/Neuro/Skin/Heme-Lymph-Imm. ASSESSMENT/PLAN:  1. Cough  Stable, uncontrolled  Chronic   Since worsening covering with abx  Rx for mucinex since she is out  Reviewed strict criteria for follow up   Refused covid testing sight  Keep appt with ENT next week   Stop smoking   - azithromycin (ZITHROMAX) 250 MG tablet; Take 1 tablet by mouth See Admin Instructions for 5 days 500mg on day 1 followed by 250mg on days 2 - 5  Dispense: 6 tablet; Refill: 0  - guaiFENesin (MUCINEX) 600 MG extended release tablet; Take 2 tablets by mouth 2 times daily for 10 days  Dispense: 40 tablet; Refill: 0    Return if symptoms worsen or fail to improve. An  electronic signature was used to authenticate this note.     --AMBER Huston - CNP on 7/16/2020 at 1:41 PM        Pursuant to the emergency declaration under the [facility], [address] [place] [address] authority and the [employer] [facility] [employer], this Virtual  Visit was conducted, with patient's consent, to reduce the patient's risk of exposure to COVID-19 and provide continuity of care for an established patient. Services were provided through a video synchronous discussion virtually to substitute for in-person clinic visit.

## 2020-07-16 NOTE — TELEPHONE ENCOUNTER
Reason for Disposition   Continuous (nonstop) coughing interferes with work or school and no improvement using cough treatment per Care Advice    Answer Assessment - Initial Assessment Questions  1. ONSET: \"When did the cough begin? \"       1 year  2. SEVERITY: \"How bad is the cough today? \"       Really bad  3. RESPIRATORY DISTRESS: \"Describe your breathing. \"       *No Answer*  4. FEVER: \"Do you have a fever? \" If so, ask: \"What is your temperature, how was it measured, and when did it start? \"      *No Answer*  5. HEMOPTYSIS: \"Are you coughing up any blood? \" If so ask: \"How much? \" (flecks, streaks, tablespoons, etc.)      *No Answer*  6. TREATMENT: \"What have you done so far to treat the cough? \" (e.g., meds, fluids, humidifier)      *No Answer*  7. CARDIAC HISTORY: \"Do you have any history of heart disease? \" (e.g., heart attack, congestive heart failure)       *No Answer*  8. LUNG HISTORY: \"Do you have any history of lung disease? \"  (e.g., pulmonary embolus, asthma, emphysema)      *No Answer*  9. PE RISK FACTORS: \"Do you have a history of blood clots? \" (or: recent major surgery, recent prolonged travel, bedridden)      *No Answer*  10. OTHER SYMPTOMS: \"Do you have any other symptoms? (e.g., runny nose, wheezing, chest pain)        *No Answer*  11. PREGNANCY: \"Is there any chance you are pregnant? \" \"When was your last menstrual period? \"        *No Answer*  12. TRAVEL: \"Have you traveled out of the country in the last month? \" (e.g., travel history, exposures)        *No Answer*    Protocols used: COUGH-ADULT-[place]

## 2020-08-17 NOTE — PROGRESS NOTES
PSYCHIATRY PROGRESS NOTE  TELEHEALTH VISIT    Darling Gonzalez  1985 08/17/20  Face to Face time: 30 min  Provider location: Office  Patient location: in car in private location  PCP: AMBER Prince CNP    CC:   Chief Complaint   Patient presents with    Depression    Anxiety     S:   Pursuant to the emergency declaration under the [facility] [address] and the [email or web address] and [employer] Act, this Virtual  Visit was conducted, with patient's consent, to reduce the patient's risk of exposure to COVID-19 and provide continuity of care for an established patient. Services were provided through a video synchronous discussion virtually to substitute for in-person clinic visit. Doxy. me used for the [employer]. No significant change since last visit. Still very depressed, anhedonia, easily irritated. Feels she'd rather die, feels she doesn't want to be here. Hopeless about her future. Denies any suicidal ideation, intent or plan. Thinks about cutting herself often but has been able to stop herself from doing it for the most part. Did cut herself once after he daughter's b-day party, being upset with her mother, wound is healing without any issues. Still anxious, feels heart race at times. Feels muscle spasm in neck. Was not able to fill ingrezza - says it wasn't covered. I didn't get any notification from the insurance or pharmacy, however. Doesn't feel vistaril helps. Stressors include social isolation, ongoing conflict with mother. Chrissie Perez (oldest daughter, 15) she feels hates her. Has a better relationship with clair (9yo daughter), feels she is the only one she gets enjoyment from. [address] (5yo son) she feels is spoiled by her mother.      No drinking alcohol    ROS: no headaches, vision problems, dysuria, abd pain, +muscle tension, cough    Brief Medical Hx:   Hx of laryngeal cancer    Brief Psych Hx:  Hosp: late 2016 [id number], [email or web address] 1/2017-2/2017. Admitted twice at [facility] in 2011 for SIMD.   Diagnoses: bipolar disorder, anxiety, depression, noted to have obsessive compulsive personality traits, PTSD, SIMD, alcohol use disorder   Med trials: venlafaxine, sertraline, quetiapine, trazodone. Duloxetine, buspar (atleast per past admission in 2011 this was noted to be helpful, but pt stopped on her own recently as saw no benefit), escitalopram. Aripiprazole was noted to help per record by she was taken off of this - pt is not aware why but I suspect d/t sx of tardive dyskinesia  Outpt: has a psychiatrist in [place] in 2017 but stopped going. Used to see [facility], Dr. Ana Florentino in late 2016. NSSI: cuts herself occasionally, <1x/week. Used to do more often, has done on and off since she was a teenager.    Suicide Attempts: attempted 12/15/2016 by overdose.        Current Outpatient Medications   Medication Sig Dispense Refill    omeprazole (PRILOSEC) 40 MG delayed release capsule TAKE ONE CAPSULE BY MOUTH DAILY ONE HOUR BEFORE EVENING MEAL 30 capsule 0    ondansetron (ZOFRAN-ODT) 4 MG disintegrating tablet DISSOLVE ONE TABLET BY MOUTH EVERY 8 HOURS AS NEEDED FOR NAUSEA 20 tablet 0    Valbenazine Tosylate 40 MG CAPS Take 40 mg by mouth daily 30 capsule 0    VORTIoxetine HBr (TRINTELLIX) 20 MG TABS tablet Take 1 tablet by mouth daily 30 tablet 2    amLODIPine (NORVASC) 5 MG tablet TAKE ONE TABLET BY MOUTH DAILY 30 tablet 2    mirtazapine (REMERON) 30 MG tablet Take 1 tablet by mouth nightly 30 tablet 1    hydrOXYzine (ATARAX) 50 MG tablet Take 1 tablet by mouth 2 times daily as needed for Anxiety 60 tablet 2    meloxicam (MOBIC) 15 MG tablet Take 1 tablet by mouth daily 30 tablet 3    rizatriptan (MAXALT) 10 MG tablet Take 1 tablet by mouth once as needed for Migraine May repeat in 2 hours if needed 30 tablet 3    L-Methylfolate 15 MG TABS TAKE ONE TABLET BY MOUTH DAILY 90 tablet 2    levothyroxine (SYNTHROID) 75 MCG depression, 10-14 = Moderate depression, 15-19 = Moderately severe depression, 20-27 = Severe depression    TOMA 7 SCORE 11/13/2019 10/25/2019   TOMA-7 Total Score 18 21     Interpretation of TOMA-7 score: 5-9 = mild anxiety, 10-14 = moderate anxiety, 15+ = severe anxiety. Recommend referral to behavioral health for scores 10 or greater. AIMS score 7/10/2020: 15    Mental Status Exam:   Appearance    alert, cooperative  Motor:  swaying trunk movements. Speech    spontaneous, normal rate and normal volume +hoarseness,  Mood/Affect    Anxious and Depressed /  dysphoric, anxious  Thought Process    linear, goal directed and coherent  Thought Content    Worthlessness, hopelessness , denies suicidal ideation at this time, no intent or plan  Associations    logical connections  Attention/Concentration    intact  Memory    recent and remote memory intact  Insight/Judgement    fair / fair    Labs:     Office Visit on 05/27/2020   Component Date Value Ref Range Status    CRP 05/27/2020 1.1  0.0 - 5.1 mg/L Final    Comment: WR-CRP Reference Range:  0D-29D      <0.6  30D-199Y    <5.1    CRP is used in the detection and evaluation of infection, tissue injury,  inflammatory disorders and associated diseases. Increases in CRP values  are non-specific and should not be interpreted without a complete  clinical evaluation.       WBC 05/27/2020 15.0* 4.0 - 11.0 K/uL Final    RBC 05/27/2020 4.59  4.00 - 5.20 M/uL Final    Hemoglobin 05/27/2020 11.3* 12.0 - 16.0 g/dL Final    Hematocrit 05/27/2020 36.5  36.0 - 48.0 % Final    MCV 05/27/2020 79.4* 80.0 - 100.0 fL Final    MCH 05/27/2020 24.6* 26.0 - 34.0 pg Final    MCHC 05/27/2020 30.9* 31.0 - 36.0 g/dL Final    RDW 05/27/2020 17.6* 12.4 - 15.4 % Final    Platelets 53/53/2443 565* 135 - 450 K/uL Final    MPV 05/27/2020 7.5  5.0 - 10.5 fL Final    Neutrophils % 05/27/2020 64.0  % Final    Lymphocytes % 05/27/2020 28.6  % Final    Monocytes % 05/27/2020 5.6  % Final    Eosinophils % 05/27/2020 1.1  % Final    Basophils % 05/27/2020 0.7  % Final    Neutrophils Absolute 05/27/2020 9.6* 1.7 - 7.7 K/uL Final    Lymphocytes Absolute 05/27/2020 4.3  1.0 - 5.1 K/uL Final    Monocytes Absolute 05/27/2020 0.8  0.0 - 1.3 K/uL Final    Eosinophils Absolute 05/27/2020 0.2  0.0 - 0.6 K/uL Final    Basophils Absolute 05/27/2020 0.1  0.0 - 0.2 K/uL Final    Angio Convert Enzyme 05/27/2020 49  9 - 67 U/L Final    Comment: Performed by [place] [id number], [address] [address] [phone]  www. Alba Ohara MD, Lab. Director      Uric Acid, Serum 05/27/2020 4.3  2.6 - 6.0 mg/dL Final       A:  37 yo F with severe depression, alcoholism. There may be a personality d/o component w/ borderline PD with a lot of triggering of these issues by conflict with mother. TD seems to be impairing at this point. It may also be leading to neck stiffness/muscle tightness. Elliot Mage may not have been covered. 1. MDD recurrent, severe   2. PTSD  3. Tardive dyskinesia   4. Alcohol use disorder, moderate in remission  5. Cannabis use disorder  6. Nicotine use disorder  7. MTHFR reduced activity    P:   Depression / Anxiety  1. Continue mirtazapine 30mg qhs  2. Continue trintellix 20mg daily  3. Continue hydroxyzine 50mg BID prn for anxiety  4. Continue l-methylfolate 15 mg daily  5. Increase gabapentin to 200mg TID then 300mg TID as tolerated, may also consider lyrica trial.   6. Continue f/u with [facility], Dr Louis Sanchez. Asked her to schedule a visit. I also suggested she may need long term therapy weekly. 7. Recommended she consider PHP program at [facility]. She is hesistant d/t groups. I encouraged her to reconsider. Future consideration is spravato if covered. Tardive dyskinesia  6. I will check with the pharmacy regarding Ingrezza coverage issues.        Follow-up: RTC in 3 weeks  Safety: RF include mood disorder, PTSD, substance abuse, past self harm, single, social isolation. Pt is moderate risk of future dangerousness to self or others, however at this time, pt is clinically stable, sober, future oriented, denies SI.  Is not an imminent safety risk to self or others.        Annalee Silva MD  Psychiatrist

## 2020-09-09 NOTE — PROGRESS NOTES
Outpatient Medications   Medication Sig Dispense Refill    gabapentin (NEURONTIN) 300 MG capsule Take 1 capsule by mouth 3 times daily for 90 days. 90 capsule 2    hydrOXYzine (ATARAX) 50 MG tablet Take 1 tablet by mouth 2 times daily as needed for Anxiety 60 tablet 2    mirtazapine (REMERON) 30 MG tablet Take 1 tablet by mouth nightly 30 tablet 2    omeprazole (PRILOSEC) 40 MG delayed release capsule TAKE ONE CAPSULE BY MOUTH DAILY ONE HOUR BEFORE EVENING MEAL 30 capsule 0    ondansetron (ZOFRAN-ODT) 4 MG disintegrating tablet DISSOLVE ONE TABLET BY MOUTH EVERY 8 HOURS AS NEEDED FOR NAUSEA 20 tablet 0    Valbenazine Tosylate 40 MG CAPS Take 40 mg by mouth daily 30 capsule 0    VORTIoxetine HBr (TRINTELLIX) 20 MG TABS tablet Take 1 tablet by mouth daily 30 tablet 2    amLODIPine (NORVASC) 5 MG tablet TAKE ONE TABLET BY MOUTH DAILY 30 tablet 2    meloxicam (MOBIC) 15 MG tablet Take 1 tablet by mouth daily 30 tablet 3    rizatriptan (MAXALT) 10 MG tablet Take 1 tablet by mouth once as needed for Migraine May repeat in 2 hours if needed 30 tablet 3    L-Methylfolate 15 MG TABS TAKE ONE TABLET BY MOUTH DAILY 90 tablet 2    levothyroxine (SYNTHROID) 75 MCG tablet Take 1 tablet by mouth daily 90 tablet 1    propranolol (INDERAL LA) 120 MG extended release capsule TAKE ONE CAPSULE BY MOUTH DAILY 30 capsule 5    TRI-SPRINTEC 0.18/0.215/0.25 MG-35 MCG TABS TAKE ONE TABLET BY MOUTH DAILY 28 tablet 12    albuterol sulfate HFA (PROVENTIL HFA) 108 (90 Base) MCG/ACT inhaler Inhale 2 puffs into the lungs every 4 hours as needed for Wheezing or Shortness of Breath (Space out to every 6 hours as symptoms improve) 1 Inhaler 5    fluticasone (FLOVENT HFA) 110 MCG/ACT inhaler Inhale 2 puffs into the lungs 2 times daily 1 Inhaler 5    Respiratory Therapy Supplies (NEBULIZER COMPRESSOR) KIT Use Q6 hours as needed with Duoneb.  Disp: Nebulizer Machine #1 no refill and disposable Nebulizer tubing and mouthpiece Disp: 3 intact  Insight/Judgement    fair / fair    Labs:     Office Visit on 05/27/2020   Component Date Value Ref Range Status    CRP 05/27/2020 1.1  0.0 - 5.1 mg/L Final    Comment: WR-CRP Reference Range:  0D-29D      <0.6  30D-199Y    <5.1    CRP is used in the detection and evaluation of infection, tissue injury,  inflammatory disorders and associated diseases. Increases in CRP values  are non-specific and should not be interpreted without a complete  clinical evaluation.  WBC 05/27/2020 15.0* 4.0 - 11.0 K/uL Final    RBC 05/27/2020 4.59  4.00 - 5.20 M/uL Final    Hemoglobin 05/27/2020 11.3* 12.0 - 16.0 g/dL Final    Hematocrit 05/27/2020 36.5  36.0 - 48.0 % Final    MCV 05/27/2020 79.4* 80.0 - 100.0 fL Final    MCH 05/27/2020 24.6* 26.0 - 34.0 pg Final    MCHC 05/27/2020 30.9* 31.0 - 36.0 g/dL Final    RDW 05/27/2020 17.6* 12.4 - 15.4 % Final    Platelets 52/59/6070 565* 135 - 450 K/uL Final    MPV 05/27/2020 7.5  5.0 - 10.5 fL Final    Neutrophils % 05/27/2020 64.0  % Final    Lymphocytes % 05/27/2020 28.6  % Final    Monocytes % 05/27/2020 5.6  % Final    Eosinophils % 05/27/2020 1.1  % Final    Basophils % 05/27/2020 0.7  % Final    Neutrophils Absolute 05/27/2020 9.6* 1.7 - 7.7 K/uL Final    Lymphocytes Absolute 05/27/2020 4.3  1.0 - 5.1 K/uL Final    Monocytes Absolute 05/27/2020 0.8  0.0 - 1.3 K/uL Final    Eosinophils Absolute 05/27/2020 0.2  0.0 - 0.6 K/uL Final    Basophils Absolute 05/27/2020 0.1  0.0 - 0.2 K/uL Final    Angio Convert Enzyme 05/27/2020 49  9 - 67 U/L Final    Comment: Performed by [facility],  [place] [address], [address] [place] [phone]  www. April Martinez MD, Lab. Director      Uric Acid, Serum 05/27/2020 4.3  2.6 - 6.0 mg/dL Final       A:  39 yo F with severe depression, alcoholism. There may be a personality d/o component w/ borderline PD with a lot of triggering of these issues by conflict with mother. TD is causing a lot of body movements. A low dose benzo may help but would need to prescribe cautiously given addiction hx. She, however, continues to smoke MJ and I won't be prescribing controlled Rx until she quits. Needs to quit smoking. 1. MDD recurrent, severe   2. PTSD  3. Tardive dyskinesia   4. Alcohol use disorder, moderate in remission  5. Cannabis use disorder  6. Nicotine use disorder  7. MTHFR reduced activity    P:   Depression / Anxiety  1. Continue mirtazapine 30mg qhs  2. Continue trintellix 20mg daily  3. Continue hydroxyzine 50mg BID prn for anxiety  4. Continue l-methylfolate 15 mg daily  5. Continue gabapentin 300mg TID  6. Continue f/u with [facility], Dr Ruben Luther. 7. Will start lithium 150mg BID. She reports prior benefit from this. Discussed r/b/se  8. Start chantix titration pack, resume 1mg BID for smoking cessation. 9. Counseled on MJ use. Future consideration is spravato if covered. Tardive dyskinesia  10. Low dose clonazepam may help, but she needs to stop smoking MJ first.       Medication monitoring:    - Check baseline Renal, TSH today. Li, renal, TSH in 1-2 weeks. Follow-up: RTC in 1 month  Safety: RF include mood disorder, PTSD, substance abuse, past self harm, single, social isolation. Pt is moderate risk of future dangerousness to self or others, however at this time, pt is clinically stable, sober, future oriented, denies SI.  Is not an imminent safety risk to self or others.        Lenny Vivar MD  Psychiatrist

## 2020-09-12 NOTE — ED NOTES
Pt moving constantly while writer was in the room. Pt denies being able to give a urine sample at this time. Blood work sent to lab. Pt placed on the monitor and asked to stay in bed. Pt states she is in constant pain with N/V every day.   Rapid speech, disorganized thoughts     Kasia Owen, MARGARET  09/12/20 [id number]

## 2020-09-13 NOTE — ED PROVIDER NOTES
eMERGENCY dEPARTMENT eNCOUnter      PtName: Raudel Moreno  MRN: [id number]  [place] 1985  Date of evaluation: 9/12/2020  Provider: Erlinda Alfaro DO     CHIEF COMPLAINT       Chief Complaint   Patient presents with    Panic Attack     pt reports this past weekend she had a yo attack since Tuesday, pt reports chest pain, throat pain, unable to move neck, pulling in chest, back and stomach, Right arm going numb, bilateral ear pain, bilateral face pain, pt feels at any \"minute her airway will be blocked\"         HISTORY OF PRESENT ILLNESS   (Location/Symptom, Timing/Onset,Context/Setting, Quality, Duration, Modifying Factors, Severity) Note limiting factors. HPI    Raudel Moreno is a 38 y.o. female who presents to the emergency department with chief complaint of neck pain and chest pain. Feels anxious. Chest pain or throat pain has been there for months. Anterior and posterior neck. History of previous neck cancer. No change in voice. No fever. Also does admit to a cough without associated shortness of breath. Nursing Notes were reviewed. REVIEW OF SYSTEMS    (2+ forlevel 4; 10+ for level 5)     Review of Systems  See hpi for further details. Complete 10 point review of all systems performed and is otherwise negative unless noted above.     PAST MEDICAL HISTORY     Past Medical History:   Diagnosis Date    Alcohol use disorder, moderate, in early remission ([address] 10/28/2019    Anemia     Anxiety     Back pain     Bipolar affective disorder (HCC)     Depression     Migraine     Opiate abuse, continuous ([address]     Primary cancer [facility] l[facility] ([facility] 7/18/2017    PTSD (post-traumatic stress disorder)     Voice hoarseness        SURGICAL HISTORY       Past Surgical History:   Procedure Laterality Date    LARYNGOSCOPY N/A 6/20/2019    MICROLARYNGOSCOPY AND LYSIS OF ADHESIONS OF LARYNX performed by Haja Au MD at [address] [place] [address] Drive,[phone] Left 06/28/2017 MICROLARYNGOSCOPY WITH BIOPSY OF LEFT TRUE VOCAL CORD MASS    OTHER SURGICAL HISTORY  08/10/2017    Tracheostomy    TONSILLECTOMY      TRACHEOSTOMY      TRACHEOTOMY N/A 12/14/2017     TRACHEOTOMY WITH 6DCT [place]                 CURRENT MEDICATIONS       Previous Medications    ALBUTEROL SULFATE HFA (PROVENTIL HFA) 108 (90 BASE) MCG/ACT INHALER    Inhale 2 puffs into the lungs every 4 hours as needed for Wheezing or Shortness of Breath (Space out to every 6 hours as symptoms improve)    AMLODIPINE (NORVASC) 5 MG TABLET    TAKE ONE TABLET BY MOUTH DAILY    FLUTICASONE (FLOVENT HFA) 110 MCG/ACT INHALER    Inhale 2 puffs into the lungs 2 times daily    GABAPENTIN (NEURONTIN) 300 MG CAPSULE    Take 1 capsule by mouth 3 times daily for 90 days. HYDROXYZINE (ATARAX) 50 MG TABLET    Take 1 tablet by mouth 2 times daily as needed for Anxiety    IPRATROPIUM-ALBUTEROL (DUONEB) 0.5-2.5 (3) MG/3ML SOLN NEBULIZER SOLUTION    Inhale 3 mLs into the lungs 4 times daily    L-METHYLFOLATE 15 MG TABS    TAKE ONE TABLET BY MOUTH DAILY    LEVOTHYROXINE (SYNTHROID) 75 MCG TABLET    Take 1 tablet by mouth daily    MELOXICAM (MOBIC) 15 MG TABLET    Take 1 tablet by mouth daily    MIRTAZAPINE (REMERON) 30 MG TABLET    Take 1 tablet by mouth nightly    OMEPRAZOLE (PRILOSEC) 40 MG DELAYED RELEASE CAPSULE    TAKE ONE CAPSULE BY MOUTH DAILY ONE HOUR BEFORE EVENING MEAL    ONDANSETRON (ZOFRAN-ODT) 4 MG DISINTEGRATING TABLET    DISSOLVE ONE TABLET BY MOUTH EVERY 8 HOURS AS NEEDED FOR NAUSEA    PROPRANOLOL (INDERAL LA) 120 MG EXTENDED RELEASE CAPSULE    TAKE ONE CAPSULE BY MOUTH DAILY    RESPIRATORY THERAPY SUPPLIES (NEBULIZER COMPRESSOR) KIT    Use Q6 hours as needed with Duoneb.  Disp: Nebulizer Machine #1 no refill and disposable Nebulizer tubing and mouthpiece Disp: 3 months supply with 3 refills    RIZATRIPTAN (MAXALT) 10 MG TABLET    Take 1 tablet by mouth once as needed for Migraine May repeat in 2 hours if needed    SUMATRIPTAN (IMITREX) 25 MG TABLET    TAKE ONE TABLET BY MOUTH AT ONSET OF HEADACHE; MAY REPEAT ONE TABLET IN 2 HOURS IF NEEDED.     TRI-SPRINTEC 0.18/0.215/0.25 MG-35 MCG TABS    TAKE ONE TABLET BY MOUTH DAILY    VORTIOXETINE HBR (TRINTELLIX) 20 MG TABS TABLET    Take 1 tablet by mouth daily       ALLERGIES     Wellbutrin [bupropion] and Amoxicillin-pot clavulanate    FAMILY HISTORY       Family History   Problem Relation Age of Onset    High Blood Pressure Mother     Depression Mother     High Blood Pressure Father     Depression Father     Diabetes Maternal Uncle     Depression Paternal Grandmother           SOCIAL HISTORY       Social History     Socioeconomic History    Marital status: Single     Spouse name: None    Number of children: 3    Years of education: 15    Highest education level: None   Occupational History     Comment: unemployed    Social Needs    Financial resource strain: None    Food insecurity     Worry: None     Inability: None    Transportation needs     Medical: None     Non-medical: None   Tobacco Use    Smoking status: Current Every Day Smoker     Packs/day: 0.25     Years: 18.00     Pack years: 4.50     Types: Cigarettes     Start date: 10/19/2013    Smokeless tobacco: Never Used   Substance and Sexual Activity    Alcohol use: No     Alcohol/week: 20.0 standard drinks     Types: 10 Cans of beer, 10 Shots of liquor per week     Comment: none for 120 days    Drug use: Yes     Types: Marijuana, Opiates      Comment: currently only marijuana    Sexual activity: Not Currently     Partners: Male   Lifestyle    Physical activity     Days per week: None     Minutes per session: None    Stress: None   Relationships    Social connections     Talks on phone: None     Gets together: None     Attends Caoda[employer] service: None     Active member of club or organization: None     Attends meetings of clubs or organizations: None     Relationship status: None    Intimate partner violence     Fear of current or ex partner: None     Emotionally abused: None     Physically abused: None     Forced sexual activity: None   Other Topics Concern    None   Social History Narrative    None       SCREENINGS           PHYSICAL EXAM    (5+ for level 4, 8+ for level 5)     ED Triage Vitals [09/12/20 1831]   BP Temp Temp Source Pulse Resp SpO2 Height Weight   (!) 155/98 98.6 °F (37 °C) Oral 116 20 100 % 5' 7\" (1.702 m) 150 lb (68 kg)       Physical Exam  Vitals signs and nursing note reviewed. Constitutional:       General: She is not in acute distress. Appearance: Normal appearance. She is not toxic-appearing or diaphoretic. Comments: Patient with a jumpy movements. HENT:      Head: Normocephalic and atraumatic. Right Ear: External ear normal.      Left Ear: External ear normal.      Nose: Nose normal.      Mouth/Throat:      Mouth: Mucous membranes are moist.      Pharynx: Oropharynx is clear. Eyes:      General: No scleral icterus. Right eye: No discharge. Left eye: No discharge. Extraocular Movements: Extraocular movements intact. Conjunctiva/sclera: Conjunctivae normal.      Pupils: Pupils are equal, round, and reactive to light. Neck:      Musculoskeletal: Normal range of motion and neck supple. No neck rigidity. Cardiovascular:      Rate and Rhythm: Normal rate. Pulses: Normal pulses. Comments:  Heart heart rate is 97 on the monitor and pulse ox is 99%. Pulmonary:      Effort: Pulmonary effort is normal. No respiratory distress. Breath sounds: Normal breath sounds. No stridor. Abdominal:      General: Abdomen is flat. There is no distension. Musculoskeletal: Normal range of motion. General: No swelling, tenderness, deformity or signs of injury. Right lower leg: No edema. Left lower leg: No edema. Skin:     General: Skin is warm and dry. Capillary Refill: Capillary refill takes less than 2 seconds.       Coloration: Skin is not jaundiced or pale. Findings: No bruising, erythema, lesion or rash. Neurological:      General: No focal deficit present. Mental Status: She is alert and oriented to person, place, and time. Cranial Nerves: No cranial nerve deficit. Sensory: No sensory deficit. Motor: No weakness. Psychiatric:         Mood and Affect: Mood normal.         Behavior: Behavior normal.         Thought Content: Thought content normal.         Judgment: Judgment normal.         DIAGNOSTIC RESULTS     EKG (Per Emergency Physician):   EKG interpretation by ED physician: Normal axis, sinus tachycardia 114 beats per minute. No ischemic ST changes appreciated. RADIOLOGY (Per Emergency Physician): Interpretation per the Radiologist below, if available at the time of this note:  Xr Chest Portable    Result Date: 9/12/2020  EXAMINATION: ONE XRAY VIEW OF THE CHEST 9/12/2020 7:24 pm COMPARISON: Chest April 5, 2020. HISTORY: ORDERING SYSTEM PROVIDED HISTORY: cp, chronic cough TECHNOLOGIST PROVIDED HISTORY: Reason for exam:->cp, chronic cough Reason for Exam: cp, chronic cough. Acuity: Acute Type of Exam: Initial FINDINGS: Heart is normal in size. Right upper lobe airspace disease. Left lung is clear. No pneumothorax, large pleural effusion or free air. Right upper lobe airspace disease. Repeat chest x-ray after therapy is recommended to ensure resolution.        LABS:  Labs Reviewed   CBC WITH AUTO DIFFERENTIAL - Abnormal; Notable for the following components:       Result Value    Hemoglobin 10.6 (*)     Hematocrit 32.4 (*)     MCV 78.4 (*)     MCH 25.6 (*)     RDW 19.5 (*)     All other components within normal limits    Narrative:     Performed at:  [facility] [address], [place], [facility] [address]   Phone [phone]   COMPREHENSIVE METABOLIC PANEL W/ REFLEX TO MG FOR LOW K - Abnormal; Notable for the following components:    Potassium reflex Magnesium 3.3 (*) Glucose 113 (*)     All other components within normal limits    Narrative:     Performed at:  [facility]  [address] [facility], [address]   Phone [phone]   SALICYLATE LEVEL - Abnormal; Notable for the following components:    Salicylate, Serum <9.8 (*)     All other components within normal limits    Narrative:     Performed at:  [facility] [address], [facility], [address]   Phone [phone]   ACETAMINOPHEN LEVEL - Abnormal; Notable for the following components:    Acetaminophen Level <5 (*)     All other components within normal limits    Narrative:     Performed at:  [facility] [address], [facility], [address]   Phone [phone]   TROPONIN    Narrative:     Performed at:  [facility] [address], [facility], [address]   Phone [phone]   ETHANOL    Narrative:     Performed at:  [facility] [address], [facility], [address]   Phone [phone]   HCG, SERUM, QUALITATIVE    Narrative:     Performed at:  [facility] [address], [facility], [address]   Phone [phone]   MAGNESIUM    Narrative:     Performed at:  [facility] [address], [facility], [address]   Phone [phone]   URINE DRUG SCREEN   URINE RT REFLEX TO CULTURE       All other labs were within normal range or not returned as of this dictation.     EMERGENCY DEPARTMENT COURSE and DIFFERENTIAL DIAGNOSIS/MDM:   Vitals:    Vitals:    09/12/20 1831 09/12/20 1936 09/12/20 1940 09/12/20 2000   BP: (!) 155/98 (!) 142/100  126/85   Pulse: 116  97 91   Resp: 20      Temp: 98.6 °F (37 °C)      TempSrc: Oral      SpO2: 100%  99% 100%   Weight: 150 lb (68 kg)      Height: 5' 7\" (1.702 m)          Medications   LORazepam (ATIVAN) tablet 1 mg (1 mg Oral Given 9/12/20 1934) but occasionally words and phrases are mis-transcribed.)    Form v2016. J.5-cn    Yun Ac DO (electronically signed)  Emergency Medicine Provider              Amador Santos DO  09/12/20 1644

## 2020-09-13 NOTE — ED PROVIDER NOTES
[facility]        Pt Name: Елена Butler  MRN: [id number]  [place] 1985  Date of evaluation: 9/12/2020  Provider: Sherly Brenner PA-C  PCP: AMBER Yanez CNP     I have seen and evaluated this patient with my supervising physician No att. providers found. CHIEF COMPLAINT       Chief Complaint   Patient presents with    Panic Attack     pt reports this past weekend she had a yo attack since Tuesday, pt reports chest pain, throat pain, unable to move neck, pulling in chest, back and stomach, Right arm going numb, bilateral ear pain, bilateral face pain, pt feels at any \"minute her airway will be blocked\"       HISTORY OF PRESENT ILLNESS   (Location, Timing/Onset, Context/Setting, Quality, Duration, Modifying Factors, Severity, Associated Signs and Symptoms)  Note limiting factors. Елена Butler is a 38 y.o. female patient presents emergency department for evaluation of chest pain, throat pain, neck pain that has been ongoing for over a year. Patient states she has had a cough for over 1 year and has seen multiple specialists. Patient states they tell her it is due to her smoking. Patient states she has pain all through her chest back and stomach. She states this feels like a pulling pain. She states it also pulls at her teeth. Patient states she coughs up lots of phlegm. Patient states that her arms occasionally go numb. She has pain to all sides of her neck. Patient states that she \"cannot stand it anymore. \"  Patient states she last used methamphetamine 2 days ago. She states she smokes cigarettes and marijuana. Denies any other drug use. Patient denies any nausea vomiting diarrhea. Denies any recent fever, lightheadedness dizziness or visual disturbance. Patient states she feels her body is poisoning her.     Nursing Notes were all reviewed and agreed with or any disagreements were addressed in the HPI. REVIEW OF SYSTEMS    (2-9 systems for level 4, 10 or more for level 5)     Review of Systems   Constitutional: Negative for fatigue and fever. HENT: Positive for sore throat. Eyes: Negative for visual disturbance. Respiratory: Positive for cough. Negative for shortness of breath. Cardiovascular: Positive for chest pain. Gastrointestinal: Negative for abdominal pain, diarrhea, nausea and vomiting. Genitourinary: Negative. Musculoskeletal: Negative. Skin: Negative. Neurological: Negative. Psychiatric/Behavioral: Positive for agitation. The patient is nervous/anxious. Positives and Pertinent negatives as per HPI. Except as noted above in the ROS, all other systems were reviewed and negative.        PAST MEDICAL HISTORY     Past Medical History:   Diagnosis Date    Alcohol use disorder, moderate, in early remission ([facility] 10/28/2019    Anemia     Anxiety     Back pain     Bipolar affective disorder (HCC)     Depression     Migraine     Opiate abuse, continuous (HCC)     Primary cancer of larynx ([facility] [address] 7/18/2017    PTSD (post-traumatic stress disorder)     Voice hoarseness          SURGICAL HISTORY     Past Surgical History:   Procedure Laterality Date    LARYNGOSCOPY N/A 6/20/2019    MICROLARYNGOSCOPY AND LYSIS OF ADHESIONS OF LARYNX performed by Joao Henao MD at [address] [place] [address] Drive,[phone] Left 06/28/2017    MICROLARYNGOSCOPY WITH BIOPSY OF LEFT TRUE VOCAL CORD MASS    OTHER SURGICAL HISTORY  08/10/2017    Tracheostomy    TONSILLECTOMY      TRACHEOSTOMY      TRACHEOTOMY N/A 12/14/2017     TRACHEOTOMY WITH 6DCT [facility]       Discharge Medication List as of 9/12/2020  9:24 PM      CONTINUE these medications which have NOT CHANGED    Details   omeprazole (PRILOSEC) 40 MG delayed release capsule TAKE ONE CAPSULE BY MOUTH DAILY ONE HOUR BEFORE EVENING MEAL, Disp-30 capsule,R-0Normal      propranolol (INDERAL LA) 120 (DUONEB) 0.5-2.5 (3) MG/3ML SOLN nebulizer solution Inhale 3 mLs into the lungs 4 times daily, Disp-120 vial, R-5Normal      SUMAtriptan (IMITREX) 25 MG tablet TAKE ONE TABLET BY MOUTH AT ONSET OF HEADACHE; MAY REPEAT ONE TABLET IN 2 HOURS IF NEEDED., Disp-9 tablet, R-5Normal               ALLERGIES     Wellbutrin [bupropion] and Amoxicillin-pot clavulanate    FAMILYHISTORY       Family History   Problem Relation Age of Onset    High Blood Pressure Mother     Depression Mother     High Blood Pressure Father     Depression Father     Diabetes Maternal Uncle     Depression Paternal Grandmother           SOCIAL HISTORY       Social History     Tobacco Use    Smoking status: Current Every Day Smoker     Packs/day: 0.25     Years: 18.00     Pack years: 4.50     Types: Cigarettes     Start date: 10/19/2013    Smokeless tobacco: Never Used   Substance Use Topics    Alcohol use: No     Alcohol/week: 20.0 standard drinks     Types: 10 Cans of beer, 10 Shots of liquor per week     Comment: none for 120 days    Drug use: Yes     Types: Marijuana, Opiates      Comment: currently only marijuana       SCREENINGS             PHYSICAL EXAM    (up to 7 for level 4, 8 or more for level 5)     ED Triage Vitals [09/12/20 1831]   BP Temp Temp Source Pulse Resp SpO2 Height Weight   (!) 155/98 98.6 °F (37 °C) Oral 116 20 100 % 5' 7\" (1.702 m) 150 lb (68 kg)       Physical Exam  Vitals signs and nursing note reviewed. Constitutional:       General: She is not in acute distress. Appearance: Normal appearance. She is well-developed. She is not ill-appearing, toxic-appearing or diaphoretic. HENT:      Head: Normocephalic and atraumatic. Nose: Nose normal.      Mouth/Throat:      Mouth: Mucous membranes are moist.      Pharynx: Oropharynx is clear. Eyes:      General:         Right eye: No discharge. Left eye: No discharge.       Conjunctiva/sclera: Conjunctivae normal.      Pupils: Pupils are equal, round, and reactive to light. Neck:      Musculoskeletal: Normal range of motion and neck supple. Cardiovascular:      Rate and Rhythm: Normal rate and regular rhythm. Heart sounds: Normal heart sounds. No murmur. No gallop. Pulmonary:      Effort: Pulmonary effort is normal. No respiratory distress. Breath sounds: Normal breath sounds. No wheezing, rhonchi or rales. Abdominal:      General: Bowel sounds are normal.      Tenderness: There is no abdominal tenderness. There is no right CVA tenderness, left CVA tenderness, guarding or rebound. Musculoskeletal: Normal range of motion. Skin:     General: Skin is warm and dry. Coloration: Skin is not pale. Neurological:      Mental Status: She is alert and oriented to person, place, and time. Psychiatric:         Attention and Perception: She does not perceive auditory or visual hallucinations. Mood and Affect: Mood is anxious. Behavior: Behavior is agitated. Thought Content: Thought content does not include homicidal or suicidal ideation. Thought content does not include homicidal or suicidal plan. Comments: Patient is unable to sit still.   She is constantly rocking around in the bed and grabbing at her neck abdomen and chest.         DIAGNOSTIC RESULTS   LABS:    Labs Reviewed   CBC WITH AUTO DIFFERENTIAL - Abnormal; Notable for the following components:       Result Value    Hemoglobin 10.6 (*)     Hematocrit 32.4 (*)     MCV 78.4 (*)     MCH 25.6 (*)     RDW 19.5 (*)     All other components within normal limits    Narrative:     Performed at:  [facility]  [address],  [place], [address]   Phone [phone]   COMPREHENSIVE METABOLIC PANEL W/ REFLEX TO MG FOR LOW K - Abnormal; Notable for the following components:    Potassium reflex Magnesium 3.3 (*)     Glucose 113 (*)     All other components within normal limits    Narrative:     Performed at:  [facility] x-ray after therapy is   recommended to ensure resolution. No results found. PROCEDURES   Unless otherwise noted below, none     Procedures    CRITICAL CARE TIME   N/A    CONSULTS:  None      EMERGENCY DEPARTMENT COURSE and DIFFERENTIAL DIAGNOSIS/MDM:   Vitals:    Vitals:    09/12/20 1940 09/12/20 2000 09/12/20 2030 09/12/20 2100   BP:  126/85 121/80 111/80   Pulse: 97 91 96 90   Resp:       Temp:       TempSrc:       SpO2: 99% 100% 100% 100%   Weight:       Height:           Patient was given the following medications:  Medications   LORazepam (ATIVAN) tablet 1 mg (1 mg Oral Given 9/12/20 1934)         Patient presents emergency department for evaluation of panic attack and multiple other complaints. Patient states she last used methamphetamine 2 days ago and has not slept since yesterday. Patient has a previous history of neck cancer. She is a current cigarette smoker. She also admits to using marijuana. Patient speaks with normal phonation. She is afebrile. Vitals are stable. Posterior oropharynx nonerythematous nonedematous. Patient has tenderness to bilateral trapezius muscles. No heart rate is regular without murmurs rubs or gallops. Lungs sound clear to auscultation bilaterally. She is not any respiratory distress. Not stridorous or posturing. X-ray shows right upper lobe pneumonia. Patient denies IV drug use. Patient refuses to give urine for testing. Patient does not have leukocytosis on CBC. Patient will be treated for pneumonia with doxycycline. Patient encouraged to follow-up with her primary care doctor to ensure resolution of symptoms. Patient was given 1 mg of Ativan here in the emergency department. She is discharged home with one prescription. Patient's oxygen saturations are good.   I do not believe the patient's symptoms today are due to endocarditis, pulmonary embolism, pulmonary edema, pneumonia, pneumothorax, ACS, CHF, status asthmaticus, acute

## 2020-09-29 NOTE — TELEPHONE ENCOUNTER
Pt called states she is having shoulder and neck pain and doesn't know what is causing the pain and questioning if she should see Dr Ellie Avila or Monica Gramajo for this issue. States she has had for awhile. Addison Billings

## 2020-09-29 NOTE — TELEPHONE ENCOUNTER
Called pt. She is being treated for pneumonia at this time also. ADvised pt she should make an appointment with Bryan Gongora per Dr Denver Zacarias. She has had shoulder and neck pain for about 3 months.

## 2020-10-12 NOTE — TELEPHONE ENCOUNTER
on phone. Discussed disposition and patient agreeable. Discussed potential consequences for not following disposition recommendation. Aware to call back with any concerns or persistent, worsening, or new symptoms develop. Warm transfer to [facility] scheduling for appointment. Instructed to go to  if unable to see PCP. Attention Provider: Thank you for allowing me to participate in the care of your patient. The  patient was connected to triage in response to information provided to the [facility]. Please do not respond through this encounter as the response is not directed to a shared pool.

## 2020-10-13 NOTE — TELEPHONE ENCOUNTER
Scheduled patient with you for tomorrow afternoon at 3:20 for cough. No other symptoms and not likely Covid related. See nurse triage note from 10/13.

## 2020-10-14 NOTE — PROGRESS NOTES
10/14/2020    This is a 38 y.o. female   Chief Complaint   Patient presents with    Cough     coughing and spitting up white bubbles    Spasms     neck and down spine spasms, hard time using right arm/hand     HPI   Patient seen today for c/o chronic cough and muscle spasms. Coughing is chronic in nature and patient started with spasms \"months ago\". States that spasms are now hurting her neck, her right arm is weak, tingling and her \"bones\" hurt. Patient has tried robaxin, ibuprofen/tylenol, samir back and body without relief. Patient has hx of laryngeal cancer, but refuses to quit smoking. States she has been having non-stop coughing fits and sometimes she feels her mucus gets \"stuck\"-- tried mucinex and it did not help. Patient had one episode of coughing in office today--dry.       Past Medical History:   Diagnosis Date    Alcohol use disorder, moderate, in early remission ([facility] [address] 10/28/2019    Anemia     Anxiety     Back pain     Bipolar affective disorder (HCC)     Depression     Migraine     Opiate abuse, continuous (HCC)     Primary cancer of larynx (HCC) 7/18/2017    PTSD (post-traumatic stress disorder)     Voice hoarseness      Past Surgical History:   Procedure Laterality Date    LARYNGOSCOPY N/A 6/20/2019    MICROLARYNGOSCOPY AND LYSIS OF ADHESIONS OF LARYNX performed by Vida Gill MD at [address] [place] [address] Left 06/28/2017    MICROLARYNGOSCOPY WITH BIOPSY OF LEFT TRUE VOCAL CORD MASS    OTHER SURGICAL HISTORY  08/10/2017    Tracheostomy    TONSILLECTOMY      TRACHEOSTOMY      TRACHEOTOMY N/A 12/14/2017     TRACHEOTOMY WITH 6DCT [place]               Social History     Socioeconomic History    Marital status: Single     Spouse name: Not on file    Number of children: 3    Years of education: 12    Highest education level: Not on file   Occupational History     Comment: unemployed    Social Needs    Financial resource strain: Not on file   [place]-Wu 0.5-2.5 (3) MG/3ML SOLN nebulizer solution Inhale 3 mLs into the lungs 4 times daily 120 vial 5     No current facility-administered medications for this visit.         Allergies   Allergen Reactions    Wellbutrin [Bupropion] Itching     Feels like worms crawling on her body; crawling sensation    Amoxicillin-Pot Clavulanate      vomiting       Admission on 09/12/2020, Discharged on 09/12/2020   Component Date Value Ref Range Status    Ventricular Rate 09/12/2020 99  BPM Final    Atrial Rate 09/12/2020 99  BPM Final    P-R Interval 09/12/2020 142  ms Final    QRS Duration 09/12/2020 92  ms Final    Q-T Interval 09/12/2020 376  ms Final    QTc Calculation (Bazett) 09/12/2020 482  ms Final    P Axis 09/12/2020 69  degrees Final    R Axis 09/12/2020 54  degrees Final    T Axis 09/12/2020 67  degrees Final    Diagnosis 09/12/2020 Normal sinus rhythmPossible Left atrial enlargementAbnormal ECGConfirmed by [facility] MD, Alysia Florence ([id number]) on 9/13/2020 2:06:38 PM   Final    WBC 09/12/2020 10.5  4.0 - 11.0 K/uL Final    RBC 09/12/2020 4.13  4.00 - 5.20 M/uL Final    Hemoglobin 09/12/2020 10.6* 12.0 - 16.0 g/dL Final    Hematocrit 09/12/2020 32.4* 36.0 - 48.0 % Final    MCV 09/12/2020 78.4* 80.0 - 100.0 fL Final    MCH 09/12/2020 25.6* 26.0 - 34.0 pg Final    MCHC 09/12/2020 32.7  31.0 - 36.0 g/dL Final    RDW 09/12/2020 19.5* 12.4 - 15.4 % Final    Platelets 01/33/6112 279  135 - 450 K/uL Final    MPV 09/12/2020 7.3  5.0 - 10.5 fL Final    Neutrophils % 09/12/2020 73.6  % Final    Lymphocytes % 09/12/2020 19.0  % Final    Monocytes % 09/12/2020 5.7  % Final    Eosinophils % 09/12/2020 1.1  % Final    Basophils % 09/12/2020 0.6  % Final    Neutrophils Absolute 09/12/2020 7.7  1.7 - 7.7 K/uL Final    Lymphocytes Absolute 09/12/2020 2.0  1.0 - 5.1 K/uL Final    Monocytes Absolute 09/12/2020 0.6  0.0 - 1.3 K/uL Final    Eosinophils Absolute 09/12/2020 0.1  0.0 - 0.6 K/uL Final    Basophils Absolute 09/12/2020 0.1  0.0 - 0.2 K/uL Final    Sodium 09/12/2020 138  136 - 145 mmol/L Final    Potassium reflex Magnesium 09/12/2020 3.3* 3.5 - 5.1 mmol/L Final    Chloride 09/12/2020 104  99 - 110 mmol/L Final    CO2 09/12/2020 23  21 - 32 mmol/L Final    Anion Gap 09/12/2020 11  3 - 16 Final    Glucose 09/12/2020 113* 70 - 99 mg/dL Final    BUN 09/12/2020 9  7 - 20 mg/dL Final    CREATININE 09/12/2020 0.6  0.6 - 1.1 mg/dL Final    GFR Non- 09/12/2020 >60  >60 Final    Comment: >60 mL/min/1.73m2 EGFR, calc. for ages 25 and older using the  MDRD formula (not corrected for weight), is valid for stable  renal function.  GFR  09/12/2020 >60  >60 Final    Comment: Chronic Kidney Disease: less than 60 ml/min/1.73 sq.m. Kidney Failure: less than 15 ml/min/1.73 sq.m. Results valid for patients 18 years and older.       Calcium 09/12/2020 9.2  8.3 - 10.6 mg/dL Final    Total Protein 09/12/2020 7.1  6.4 - 8.2 g/dL Final    Alb 09/12/2020 4.4  3.4 - 5.0 g/dL Final    Albumin/Globulin Ratio 09/12/2020 1.6  1.1 - 2.2 Final    Total Bilirubin 09/12/2020 0.3  0.0 - 1.0 mg/dL Final    Alkaline Phosphatase 09/12/2020 103  40 - 129 U/L Final    ALT 09/12/2020 25  10 - 40 U/L Final    AST 09/12/2020 24  15 - 37 U/L Final    Globulin 09/12/2020 2.7  g/dL Final    Troponin 09/12/2020 <0.01  <0.01 ng/mL Final    Methodology by Troponin T    Ethanol Lvl 09/12/2020 None Detected  mg/dL Final    Comment:    None Detected  Conversion factor:  100 mg/dl = .100 g/dl  For Medical Purposes Only      Salicylate, Serum 47/60/7233 <0.3* 15.0 - 30.0 mg/dL Final    Comment: Therapeutic Range: 15.0-30.0 mg/dL  Toxic: >30.0 mg/dL      Acetaminophen Level 09/12/2020 <5* 10 - 30 ug/mL Final    Comment: Therapeutic Range: 10.0-30.0 ug/mL  Toxic: >=150 ug/mL      hCG Qual 09/12/2020 Negative  Detects HCG level >10 MIU/mL Final    Magnesium 09/12/2020 2.00  1.80 - 2.40 mg/dL Final       Review of Systems   Negative other than HPI    /78   Pulse 98   Temp 96.8 °F (36 °C)   Wt 156 lb (70.8 kg)   SpO2 99%   BMI 24.43 kg/m²      Physical Exam  Vitals signs reviewed. Constitutional:       General: She is not in acute distress. Appearance: She is well-developed. She is not ill-appearing or diaphoretic. HENT:      Head: Normocephalic and atraumatic. Cardiovascular:      Rate and Rhythm: Normal rate and regular rhythm. Heart sounds: Normal heart sounds. No murmur. Pulmonary:      Effort: Pulmonary effort is normal. No respiratory distress. Breath sounds: Normal breath sounds. No wheezing or rhonchi. Abdominal:      General: Bowel sounds are normal.      Palpations: Abdomen is soft. Musculoskeletal:         General: Tenderness (neck) present. Skin:     General: Skin is warm and dry. Neurological:      General: No focal deficit present. Mental Status: She is alert and oriented to person, place, and time. Psychiatric:         Mood and Affect: Mood and affect normal.         Behavior: Behavior normal.         Diagnosis  Assessment and Plan  1. Chronic cough  Stable, chronic, uncontrolled. -Chronic cough, start dextromethorphan-guaiFENesin to decrease coughing spells and dry up mucus.  - dextromethorphan-guaiFENesin  MG/5ML LIQD syrup; Take 5 mLs by mouth every 4 hours as needed (cough)  Dispense: 420 mL; Refill: 0  - needs to stop smoking   - follow up with ENT and pulm as scheduled     2. Muscle spasm of back  Stable, uncontrolled. -Patient with spasms and pain in neck and back, likely related to chronic coughing spells  -Will check Xray to r/o fracture or degenerative changes  -Physical Therapy for stretching.  - Ambulatory referral to Physical Therapy  - XR CERVICAL SPINE (4-5 VIEWS); Future  - Ambulatory referral to Physical Therapy  - XR CERVICAL SPINE (4-5 VIEWS); Future    3. Nicotine use disorder  Cessation encouraged, patient refused    4.  Cannabis use disorder, moderate, dependence (HCC)  Cessation encouraged, patient refused. 5. Neck pain due to malignant neoplastic disease ([facility] [address]  see 1 and 2  -Patient with spasms and pain in neck and back, likely related to chronic coughing spells  -Will check Xray to r/o fracture or degenerative changes  -Physical Therapy for stretching.  - Ambulatory referral to Physical Therapy  - XR CERVICAL SPINE (4-5 VIEWS); Future  - Ambulatory referral to Physical Therapy  - XR CERVICAL SPINE (4-5 VIEWS); Future    6. Chronic neck pain  Stable, uncontrolled. -Patient with spasms and pain in neck and back, likely related to chronic coughing spells  -Will check Xray to r/o fracture or degenerative changes  -Physical Therapy for stretching.  - Ambulatory referral to Physical Therapy  - XR CERVICAL SPINE (4-5 VIEWS); Future  - Ambulatory referral to Physical Therapy  - XR CERVICAL SPINE (4-5 VIEWS);  Future    Follow up in 6 months and PRN    Electronically signed by AMBER Everett CNP on 10/14/2020 at 4:15 PM

## 2020-10-16 NOTE — PROGRESS NOTES
PSYCHIATRY PROGRESS NOTE  TELEHEALTH VISIT      Nasrin Jung  1985  10/16/20   Provider location: Office  Patient location: Home  PCP: AMBER Vanessa CNP    CC:   Chief Complaint   Patient presents with    Depression    Anxiety     S:   Virtual  Visit was conducted, with patient's consent. Services were provided through a video synchronous discussion virtually to substitute for in-person clinic visit. Doxy. me used for the [employer]. Pt reports ongoing problems with her depression and anxiety. She feels the lithium has helped a little with her mood. But she continues to struggle with low mood, irritability, high anxiety, trouble focusing and following through on things, trouble organizing her day. She feels hopeless about her life, cut herself a couple days ago on her left arm (not with intent to end her life), and reports suicidal ideation without any plan or intent to act on the thoughts. Anxiety gives her a feeling \"like I have a pit in my stomach\" frequently and can be associated with muscle tension. Continues to have several physical complaints including coughing frequently - now causing loss of voice, dizziness, rt arm pain with occasionally numbness and weakness. Pt feels she is taking too many medications and is open to cut back on some of them. Still wants something for her panic attacks and feels reliant on MJ to treat her anxiety despite its potential ill effects on her concentration, mood, and resp condition. She has not felt the gabapentin or hydroxyzine to be helpful. She reports periods of 3 days in which she has a lot of energy, increased motivation to clean around her house, reduced sleep (only in 20 min chunks) and feeling more on edge and anxious. Often it is when things build up and her mom is getting on her to get things in order, which she struggles doing. Only took chantix for 1 week.  Feels she can quit on her own and doesn't need it but also doesn't want to quit. ROS: no headaches, vision problems, dysuria, abd pain, +muscle tension and neck spasms, +cough. +rt arm numbness/weakness, +hair loss    Brief Medical Hx:   Hx of laryngeal cancer    Brief Psych Hx:  Hosp: late 2016 [id number], [email or web address] 1/2017-2/2017. Admitted twice at [facility] in 2011 for SIMD.   Diagnoses: bipolar disorder, anxiety, depression, noted to have obsessive compulsive personality traits, PTSD, SIMD, alcohol use disorder   Med trials: venlafaxine, sertraline, quetiapine, trazodone. Duloxetine, buspar (atleast per past admission in 2011 this was noted to be helpful, but pt stopped on her own recently as saw no benefit), escitalopram. Aripiprazole was noted to help per record by she was taken off of this - pt is not aware why but I suspect d/t sx of tardive dyskinesia  Outpt: has a psychiatrist in [facility] in 2017 but stopped going. Used to see [facility], Dr. Carmen Ladd in late 2016. NSSI: cuts herself occasionally, <1x/week. Used to do more often, has done on and off since she was a teenager.    Suicide Attempts: attempted 12/15/2016 by overdose.        Current Outpatient Medications   Medication Sig Dispense Refill    lithium 300 MG capsule Take 1 capsule by mouth 2 times daily (with meals) 60 capsule 0    dextromethorphan-guaiFENesin  MG/5ML LIQD syrup Take 5 mLs by mouth every 4 hours as needed (cough) 420 mL 0    diclofenac sodium (VOLTAREN) 1 % GEL Apply 4 g topically 4 times daily 2 Tube 0    amLODIPine (NORVASC) 5 MG tablet TAKE ONE TABLET BY MOUTH DAILY 30 tablet 1    levothyroxine (SYNTHROID) 75 MCG tablet TAKE ONE TABLET BY MOUTH DAILY 90 tablet 0    omeprazole (PRILOSEC) 40 MG delayed release capsule TAKE ONE CAPSULE BY MOUTH DAILY ONE HOUR BEFORE EVENING MEAL 30 capsule 0    propranolol (INDERAL LA) 120 MG extended release capsule TAKE ONE CAPSULE BY MOUTH DAILY 30 capsule 4    VORTIoxetine HBr (TRINTELLIX) 20 MG TABS tablet Take 1 tablet by mouth daily 30 tablet 2    mirtazapine (REMERON) 30 MG tablet Take 1 tablet by mouth nightly 30 tablet 2    ondansetron (ZOFRAN-ODT) 4 MG disintegrating tablet DISSOLVE ONE TABLET BY MOUTH EVERY 8 HOURS AS NEEDED FOR NAUSEA 20 tablet 0    meloxicam (MOBIC) 15 MG tablet Take 1 tablet by mouth daily 30 tablet 3    rizatriptan (MAXALT) 10 MG tablet Take 1 tablet by mouth once as needed for Migraine May repeat in 2 hours if needed 30 tablet 3    L-Methylfolate 15 MG TABS TAKE ONE TABLET BY MOUTH DAILY 90 tablet 2    TRI-SPRINTEC 0.18/0.215/0.25 MG-35 MCG TABS TAKE ONE TABLET BY MOUTH DAILY 28 tablet 12    albuterol sulfate HFA (PROVENTIL HFA) 108 (90 Base) MCG/ACT inhaler Inhale 2 puffs into the lungs every 4 hours as needed for Wheezing or Shortness of Breath (Space out to every 6 hours as symptoms improve) 1 Inhaler 5    fluticasone (FLOVENT HFA) 110 MCG/ACT inhaler Inhale 2 puffs into the lungs 2 times daily 1 Inhaler 5    Respiratory Therapy Supplies (NEBULIZER COMPRESSOR) KIT Use Q6 hours as needed with Duoneb. Disp: Nebulizer Machine #1 no refill and disposable Nebulizer tubing and mouthpiece Disp: 3 months supply with 3 refills 1 kit 0    ipratropium-albuterol (DUONEB) 0.5-2.5 (3) MG/3ML SOLN nebulizer solution Inhale 3 mLs into the lungs 4 times daily 120 vial 5    SUMAtriptan (IMITREX) 25 MG tablet TAKE ONE TABLET BY MOUTH AT ONSET OF HEADACHE; MAY REPEAT ONE TABLET IN 2 HOURS IF NEEDED. 9 tablet 5     No current facility-administered medications for this visit.         O:  Wt Readings from Last 3 Encounters:   10/14/20 156 lb (70.8 kg)   09/12/20 150 lb (68 kg)   09/09/20 150 lb (68 kg)     Temp Readings from Last 3 Encounters:   10/14/20 96.8 °F (36 °C)   09/12/20 98.6 °F (37 °C) (Oral)   09/09/20 97.6 °F (36.4 °C) (Temporal)     BP Readings from Last 3 Encounters:   10/14/20 132/78   09/12/20 111/80   09/09/20 124/78     Pulse Readings from Last 3 Encounters:   10/14/20 98   09/12/20 90   09/09/20 78     PHQ Scores 11/13/2019 10/25/2019 7/13/2017 5/19/2017 11/1/2016 10/4/2016   PHQ2 Score 6 6 6 0 6 6   PHQ9 Score 27 27 18 0 23 21     Interpretation of Total Score Depression Severity: 1-4 = Minimal depression, 5-9 = Mild depression, 10-14 = Moderate depression, 15-19 = Moderately severe depression, 20-27 = Severe depression    TOMA 7 SCORE 11/13/2019 10/25/2019   TOMA-7 Total Score 18 21     Interpretation of TOMA-7 score: 5-9 = mild anxiety, 10-14 = moderate anxiety, 15+ = severe anxiety. Recommend referral to behavioral health for scores 10 or greater. AIMS score 7/10/2020: 15    Mental Status Exam:   Appearance    alert, cooperative  Motor:  swaying trunk movements and arms. +PMA   Speech    spontaneous, normal rate and normal volume +hoarseness,  Mood/Affect    Anxious and Depressed /  dysphoric, anxious  Thought Process    linear, goal directed and coherent  Thought Content    Worthlessness, hopelessness , denies suicidal ideation at this time, no intent or plan  Associations    logical connections  Attention/Concentration    intact  Memory    recent and remote memory intact  Insight/Judgement    fair / fair    Labs:     Orders Only on 10/14/2020   Component Date Value Ref Range Status    Lithium Lvl 10/14/2020 0.2* 0.6 - 1.2 mmol/L Final    Lithium Dose Amount 10/14/2020 Unknown   Final    Sodium 10/14/2020 141  136 - 145 mmol/L Final    Potassium 10/14/2020 4.4  3.5 - 5.1 mmol/L Final    Chloride 10/14/2020 105  99 - 110 mmol/L Final    CO2 10/14/2020 25  21 - 32 mmol/L Final    Anion Gap 10/14/2020 11  3 - 16 Final    Glucose 10/14/2020 110* 70 - 99 mg/dL Final    BUN 10/14/2020 17  7 - 20 mg/dL Final    CREATININE 10/14/2020 0.6  0.6 - 1.1 mg/dL Final    GFR Non- 10/14/2020 >60  >60 Final    Comment: >60 mL/min/1.73m2 EGFR, calc. for ages 25 and older using the  MDRD formula (not corrected for weight), is valid for stable  renal function.       GFR  10/14/2020 >60  >60 Final    Comment: Chronic Kidney Disease: less than 60 ml/min/1.73 sq.m. Kidney Failure: less than 15 ml/min/1.73 sq.m. Results valid for patients 18 years and older.  Calcium 10/14/2020 9.3  8.3 - 10.6 mg/dL Final    Phosphorus 10/14/2020 4.2  2.5 - 4.9 mg/dL Final    Alb 10/14/2020 4.6  3.4 - 5.0 g/dL Final    TSH 10/14/2020 4.81* 0.27 - 4.20 uIU/mL Final    T4 Free 10/14/2020 1.2  0.9 - 1.8 ng/dL Final       A:  39 yo F with severe depression, hx of alcoholism. There may be a personality d/o component w/ borderline PD with a lot of triggering of these issues by conflict with mother. TD is causing a lot of body movements. A low dose benzo may help but would need to prescribe cautiously given addiction hx. She, however, continues to smoke MJ and I won't be prescribing controlled Rx until she quits. Needs to quit smoking. I do also want to consider the possibility of bipolar illness. 1. MDD recurrent, severe   2. PTSD  3. Tardive dyskinesia   4. Alcohol use disorder, moderate in remission  5. Cannabis use disorder  6. Nicotine use disorder  7. MTHFR reduced activity    P:   Depression / Anxiety  1. Continue mirtazapine 30mg qhs  2. Continue trintellix 20mg daily  3. D/c hydroxyzine and gabapentin. 4. Continue l-methylfolate 15 mg daily  5. Will increase lithium to 300mg BID, check Li and renal in 1 week  6. Continue f/u with [facility], Dr Christina De Guzman. 7. Will ask LILLY Suh about her TSH increase and hairloss. This may worse as we increase lithium and she may need her synthroid dose adjusted. 9. Counseled on MJ use and smoking cessation    Future consideration is spravato if covered. Tardive dyskinesia  10. Low dose clonazepam may help, but she needs to stop smoking MJ first.     Medication monitoring:    - Renal, Li in 1 week, TSH at next visit. Follow-up: RTC in 1 month  Safety: RF include mood disorder, PTSD, substance abuse, past self harm, single, social isolation.  Pt is moderate risk of future dangerousness to self or others, however at this time, pt is clinically stable, sober, future oriented, denies SI.  Is not an imminent safety risk to self or others.        Janelle Khan MD  Psychiatrist

## 2020-10-22 NOTE — TELEPHONE ENCOUNTER
From: Joey Carrasco  To: Cinthia Baer APRN - CNP  Sent: 10/22/2020 2:15 PM EDT  Subject: Visit Follow-Up Question    I know your annoyed with me, but please just understand that I'm miserable everyday. My throat hurts everyday, I can feel and taste infection. It takes all my strength coughing, trying to spit crap out. I'm still spitting up white/pink clots, dark green/gray chunks. I feel like my brain/head has fluid sloshing when I shake my head. Both ears ache even my mouth hurts, like toothache hurts. Nose is fine and clear. My right, arm, shoulder and spine, still hurts and can't use it. Everyday it's a different joint hurting and swollen. Some days I can't keep anything down, even water. I'm sooo nauseous everyday. It feels like I'm being eaten alive, my days are spent hanging over a trash spitting. I cannot keep living like this. I have no life, I can't go anywhere, I can't even talk my voice is so bad. My heart doesn't feel good, my anxiety makes me sick. Some days i have this \"panic\" scared feeling all day, and I can feel my heart beating fast and weird everyday , so much so that I can't move. My hair is falling out. I can feel the pains moving around inside my body. Sometimes it cripples me to where I can't move. I just wish someone would believe me and understand, instead of judging me. I wish they would listen to me, and quit saying\"stop smoking\". I know my body and something else is going on besides \"smoking\" , I know I need to quit but something is attacking me. I have not person that cares. If this is how I feel for the rest of my life, then I will end it myself.  I'm exhausted
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
## 2020-11-11 NOTE — PROGRESS NOTES
TELEHEALTH VISIT -- Audio/Visual (During FQPHX-57 public health emergency)  }  Pursuant to the emergency declaration under the [address] [place] [address], [facility] waiver authority and the [employer] Act, this Virtual Visit was conducted, with patient's consent, to reduce the patient's risk of exposure to COVID-19 and provide continuity of care for an established patient. Services were provided through a video synchronous discussion virtually to substitute for in-person clinic visit. Pt gave verbal informed consent to participate in telehealth services. Conducted a risk-benefit analysis and determined that the patient's presenting problems are consistent with the use of telepsychology. Determined that the patient has sufficient knowledge and skills in the use of technology enabling them to adequately benefit from telepsychology. It was determined that this patient was able to be properly treated without an in-person session. Patient verified that they were currently located at the [facility] address that was provided during registration or another [facility] address, if noted below. Verified the following information:  Patient's identification: Yes  Patient location: [place] [address]   Patient's call back number: [phone]   Patient's emergency contact's name and number, as well as permission to contact them if needed: Extended Emergency Contact Information  Primary Emergency Contact: Jessica Donato  [facility] Phone: [phone]  Relation: Parent     Provider location: [facility], [address] Consultation  Jhonny Shelton, Ph.D.  Psychologist  11/11/2020  9:00 AM      Time spent with Patient: 30 minutes  This is patient's ninth  [facility] appointment.     Reason for Consult:    Chief Complaint   Patient presents with   Laura Stabs Depression Feedback given to PCP. S:  Pt seen for f/u of depression. Pt reported unchanged mood and sxs. Discussed referral to [address] [place] [address] O  and encouraged contacting her insurance to ask about transportation. Highlighted discrepancy btwn stated desire for doctor's to stop assuming sxs are d/t smoking, stating she can quit whenever she wants, but later stated she's too stubborn to quit and worries about gaining weight if she quits. Discussed doing a 6 month \"experiment\" to quit smoking to allow doctors to look deeper and a chance to see if sxs improve. Pt agreed to this. Currently smoking 5-6 cig/day. Wants to slowly cut down and go cold turkey eventually. Mom also smokes. Pt frequently smokes out of boredom. Other options including cleaning room, coloring, crossword puzzles. Dating a josh Sherrell Quinn) she met through friends on facebook. Stated they have been spending time together and he doesn't seem to mind her coughing/spitting. Stated she and mom have been civil toward e/o. Showed cuts on both arms. Stated she did not intend to kill herself, \"I did it get my mind off coughing. \" Difficult to tell over video, but they appear to be relatively shallow and healing well. O:  MSE:    Appearance    alert, cooperative  Appetite normal  Sleep disturbance Yes  Fatigue Yes  Loss of pleasure Yes  Impulsive behavior Yes  Speech    spontaneous, normal rate and normal volume  Mood    Depressed  Affect    depressed affect  Thought Content    intact and cognitive distortions  Thought Process    goal directed and coherent  Associations    logical connections  Insight    Fair  Judgment    Intact  Orientation    oriented to person, place, time, and general circumstances  Memory    recent and remote memory intact  Attention/Concentration    impaired  Morbid ideation Yes, Patient noted vague and passive thoughts of wishing she was no longer alive, but adamantly denied suicidal intent or plan.   Suicide Assessment    no suicidal ideation    History:  Social History:   Social History     Socioeconomic History    Marital status: Single     Spouse name: Not on file    Number of children: 3    Years of education: 15    Highest education level: Not on file   Occupational History     Comment: unemployed    Social Needs    Financial resource strain: Not on file    Food insecurity     Worry: Not on file     Inability: Not on file   [employer] needs     Medical: Not on file     Non-medical: Not on file   Tobacco Use    Smoking status: Current Every Day Smoker     Packs/day: 0.25     Years: 18.00     Pack years: 4.50     Types: Cigarettes     Start date: 10/19/2013    Smokeless tobacco: Never Used   Substance and Sexual Activity    Alcohol use: No     Alcohol/week: 20.0 standard drinks     Types: 10 Cans of beer, 10 Shots of liquor per week     Comment: none for 120 days    Drug use: Yes     Types: Marijuana, Opiates      Comment: currently only marijuana    Sexual activity: Not Currently     Partners: Male   Lifestyle    Physical activity     Days per week: Not on file     Minutes per session: Not on file    Stress: Not on file   Relationships    Social connections     Talks on phone: Not on file     Gets together: Not on file     Attends Cheondoism service: Not on file     Active member of club or organization: Not on file     Attends meetings of clubs or organizations: Not on file     Relationship status: Not on file    Intimate partner violence     Fear of current or ex partner: Not on file     Emotionally abused: Not on file     Physically abused: Not on file     Forced sexual activity: Not on file   Other Topics Concern    Not on file   Social History Narrative    Not on file     TOBACCO:   reports that she has been smoking cigarettes. She started smoking about 7 years ago. She has a 4.50 pack-year smoking history.  She has never used smokeless tobacco.  ETOH:   reports no history of alcohol use.      Diagnosis:  Major Depressive Disorder, recurrent, severe    Plan:  Pt interventions:  Trained in strategies for increasing balanced thinking, Discussed and problem-solved barriers in adhering to behavioral change plan, Motivational Interviewing to increase patient confidence and compliance with adhering to behavioral change plan and Motivational Interviewing to determine importance and readiness for change    Documentation was done using voice recognition dragon software. Every effort was made to ensure accuracy; however, inadvertent, unintentional computerized transcription errors may be present.

## 2020-11-11 NOTE — PATIENT INSTRUCTIONS
1. Call insurance and ask about travel or schedule appointment for a day Lin Whitmore is off work. 2. Goal to cut down to 5 cigarettes/day. Instead of smoking for boredom, try cleaning your room, coloring, crossword puzzles.

## 2020-12-16 NOTE — PROGRESS NOTES
TELEHEALTH VISIT -- Audio/Visual (During WYPOI-15 public health emergency)  }  Pursuant to the emergency declaration under the [facility], [facility] waiver authority and the [employer] Act, this Virtual Visit was conducted, with patient's consent, to reduce the patient's risk of exposure to COVID-19 and provide continuity of care for an established patient. Services were provided through a video synchronous discussion virtually to substitute for in-person clinic visit. Pt gave verbal informed consent to participate in telehealth services. Conducted a risk-benefit analysis and determined that the patient's presenting problems are consistent with the use of telepsychology. Determined that the patient has sufficient knowledge and skills in the use of technology enabling them to adequately benefit from telepsychology. It was determined that this patient was able to be properly treated without an in-person session. Patient verified that they were currently located at the [facility] address that was provided during registration or another [facility] address, if noted below. Verified the following information:  Patient's identification: Yes  Patient location: [place] [address]   Patient's call back number: [phone]   Patient's emergency contact's name and number, as well as permission to contact them if needed: Extended Emergency Contact Information  Primary Emergency Contact: Jessica Donato  Address: [address] Phone: [phone]  Relation: Parent     Provider location: [place] [address] Consultation  Asif Castrejon, Ph.D.  Psychologist  12/16/2020  10:06 AM      Time spent with Patient: 26 minutes  This is patient's tenth  [address] [place] [address] appointment.     Reason for Consult:    Chief Complaint   Patient presents with   [facility] Depression Feedback given to PCP. S:  Pt seen for f/u of depression and PTSD. Pt reported unchanged mood and sxs. Stated she didn't pursue tx at [facility] d/t distance. Stated she did not attempt to quit smoking bc \"it won't matter. \"  Discussed her cognitive distortions about no one caring and self-defeating patterns to her thoughts. Reviewed the need to communicate clearly with PCP regarding her request for a local ENT and explain that the blood clots have largely stopped being expressed. Worked w her on concise, specific communication w medical providers. O:  MSE:    Appearance    alert, cooperative  Appetite abnormal: low  Sleep disturbance Yes  Fatigue Yes  Loss of pleasure Yes  Impulsive behavior Yes  Speech    spontaneous, normal rate and normal volume  Mood    Depressed  Affect    depressed affect  Thought Content    intact, cognitive distortions and all or nothing thinking  Thought Process    goal directed and coherent  Associations    logical connections  Insight    Fair  Judgment    fair  Orientation    oriented to person, place, time, and general circumstances  Memory    recent and remote memory intact  Attention/Concentration    impaired  Morbid ideation Yes, Patient noted vague and passive thoughts of wishing she was no longer alive, but adamantly denied suicidal intent or plan.   Suicide Assessment    no suicidal ideation    History:  Social History:   Social History     Socioeconomic History    Marital status: Single     Spouse name: Not on file    Number of children: 3    Years of education: 15    Highest education level: Not on file   Occupational History     Comment: unemployed    Social Needs    Financial resource strain: Not on file    Food insecurity     Worry: Not on file     Inability: Not on file   [employer] needs     Medical: Not on file     Non-medical: Not on file   Tobacco Use    Smoking status: Current Every Day Smoker     Packs/day: 0.25     Years: 18.00     Pack years: 4.50 Types: Cigarettes     Start date: 10/19/2013    Smokeless tobacco: Never Used   Substance and Sexual Activity    Alcohol use: No     Alcohol/week: 20.0 standard drinks     Types: 10 Cans of beer, 10 Shots of liquor per week     Comment: none for 120 days    Drug use: Yes     Types: Marijuana, Opiates      Comment: currently only marijuana    Sexual activity: Not Currently     Partners: Male   Lifestyle    Physical activity     Days per week: Not on file     Minutes per session: Not on file    Stress: Not on file   Relationships    Social connections     Talks on phone: Not on file     Gets together: Not on file     Attends Judaism service: Not on file     Active member of club or organization: Not on file     Attends meetings of clubs or organizations: Not on file     Relationship status: Not on file    Intimate partner violence     Fear of current or ex partner: Not on file     Emotionally abused: Not on file     Physically abused: Not on file     Forced sexual activity: Not on file   Other Topics Concern    Not on file   Social History Narrative    Not on file     TOBACCO:   reports that she has been smoking cigarettes. She started smoking about 7 years ago. She has a 4.50 pack-year smoking history. She has never used smokeless tobacco.  ETOH:   reports no history of alcohol use. Diagnosis:  1. Severe episode of recurrent major depressive disorder, without psychotic features ([facility]    2. PTSD (post-traumatic stress disorder)          Plan:  Pt interventions:  Practiced assertive communication, Trained in strategies for increasing balanced thinking and Trained in improving communication skills        Documentation was done using voice recognition dragon software. Every effort was made to ensure accuracy; however, inadvertent, unintentional computerized transcription errors may be present.

## 2020-12-18 NOTE — TELEPHONE ENCOUNTER
Last Seen 1/7/2020:    PLAN: Stop smoking!!  Follow-up with recommendations from the otolaryngologist at [facility]. I have given her 6 mg of betamethasone IM to see if the anti-inflammatory effects can reduce her symptoms. FOLLOW-UP: At the [facility].

## 2020-12-22 NOTE — PROGRESS NOTES
MICROLARYNGOSCOPY WITH BIOPSY OF LEFT TRUE VOCAL CORD MASS    OTHER SURGICAL HISTORY  08/10/2017    Tracheostomy    TONSILLECTOMY      TRACHEOSTOMY      TRACHEOTOMY N/A 12/14/2017     TRACHEOTOMY WITH 6DCT [place]                    EXAMINATION:     Vitals:    12/22/20 1326 12/22/20 1444   BP: (!) 154/111 (!) 137/100   Site: Left Upper Arm Left Upper Arm   Position: Sitting Sitting   Cuff Size: Medium Adult Medium Adult   Pulse: 114 97   Temp: 97.8 °F (36.6 °C)    TempSrc: Temporal       VITALS SIGNS: were reviewed. GENERAL APPEARANCE: Well developed, well nourished, no apparent distress, no apparent deformities. ABILITY TO COMMUNICATE/QUALITY OF VOICE:  Communicated without difficulty. Froggy raspy voice. INSPECTION, HEAD AND FACE: Normal overall appearance, with no scars, lesions or masses. EYES:  Extraocular motion was intact, bilaterally. Normal primary gaze alignment. Sclera and conjunctiva clear bilaterally. SINUSES:  The maxillary and frontal sinuses were nontender, bilaterally. SALIVARY GLANDS:  The parotid, submandibular, and sublingual glands were normal bilaterally. EXTERNAL EAR/NOSE:  Normal pinnae and mastoids. Normal external nose. EARS, OTOSCOPY: The TMs and EACs appeared to be normal bilaterally. (+) NOSE:  The nasal septum was deviate to the right . The inferior turbinates were normal.  The nasal mucosa and secretions were normal.  No pus or polyps were seen. LIPS, TEETH AND GUMS:  Unremarkable.  (+) OROPHARYNX/ORAL CAVITY:  Post tonsillectomy. Oral mucosa, hard and soft palates, tongue, and pharynx were normal.  (+) INDIRECT LARYNGOSCOPY:  Visualization was suboptimal due to a strong gag reflex and guarding. The base of tongue and epiglottis appeared to be normal.  Unable to adequately visualize the vocal cords and hypopharynx, and endolarynx. NECK:  No masses or tenderness. No abnormal appearance, asymmetry or abnormal tracheal position.   Laryngeal cartilages and hyoid bone were normal to palpation, with normal laryngeal crepitus. LYMPH NODES, CERVICAL, FACIAL AND SUPRACLAVICULAR:  No lymphadenopathy. THYROID:  No nodules, enlargement, tenderness or masses. IMPRESSION / Charles Ruiz / Juan Rodriguez:       Casimiro Amor was seen today for cough. Diagnoses and all orders for this visit:    Chronic maxillary sinusitis  -     CT SINUS WO CONTRAST    Chronic cough  -     HYDROcodone-chlorpheniramine (TUSSIONEX PENNKINETIC ER) 10-8 MG/5ML SUER; Take 5 mLs by mouth every 12 hours as needed (cough) for up to 24 days. Globus pharyngeus         RECOMMENDATIONS / PLAN:    Return in 2 weeks (on 1/5/2021) for flexible fiberoptic laryngoscopy.

## 2020-12-29 NOTE — TELEPHONE ENCOUNTER
[facility] imaging is calling regarding office  needs completed so they can get the CT precert .

## 2021-01-01 ENCOUNTER — APPOINTMENT (OUTPATIENT)
Dept: GENERAL RADIOLOGY | Age: 36
DRG: 059 | End: 2021-01-01
Payer: COMMERCIAL

## 2021-01-01 ENCOUNTER — VIRTUAL VISIT (OUTPATIENT)
Dept: PSYCHOLOGY | Age: 36
End: 2021-01-01
Payer: COMMERCIAL

## 2021-01-01 ENCOUNTER — TELEPHONE (OUTPATIENT)
Dept: PSYCHOLOGY | Age: 36
End: 2021-01-01

## 2021-01-01 ENCOUNTER — CARE COORDINATION (OUTPATIENT)
Dept: CARE COORDINATION | Age: 36
End: 2021-01-01

## 2021-01-01 ENCOUNTER — OFFICE VISIT (OUTPATIENT)
Dept: PSYCHIATRY | Age: 36
End: 2021-01-01
Payer: COMMERCIAL

## 2021-01-01 ENCOUNTER — APPOINTMENT (OUTPATIENT)
Dept: CT IMAGING | Age: 36
DRG: 059 | End: 2021-01-01
Payer: COMMERCIAL

## 2021-01-01 ENCOUNTER — OFFICE VISIT (OUTPATIENT)
Dept: ENT CLINIC | Age: 36
End: 2021-01-01
Payer: COMMERCIAL

## 2021-01-01 ENCOUNTER — APPOINTMENT (OUTPATIENT)
Dept: GENERAL RADIOLOGY | Age: 36
End: 2021-01-01
Payer: COMMERCIAL

## 2021-01-01 ENCOUNTER — VIRTUAL VISIT (OUTPATIENT)
Dept: PSYCHOLOGY | Age: 36
End: 2021-01-01

## 2021-01-01 ENCOUNTER — HOSPITAL ENCOUNTER (EMERGENCY)
Age: 36
Discharge: HOME | End: 2021-03-18
Payer: COMMERCIAL

## 2021-01-01 ENCOUNTER — HOSPITAL ENCOUNTER (INPATIENT)
Age: 36
LOS: 7 days | DRG: 059 | End: 2021-05-17
Attending: EMERGENCY MEDICINE | Admitting: FAMILY MEDICINE
Payer: COMMERCIAL

## 2021-01-01 ENCOUNTER — HOSPITAL ENCOUNTER (OUTPATIENT)
Dept: CT IMAGING | Age: 36
Discharge: HOME | End: 2021-01-06
Payer: COMMERCIAL

## 2021-01-01 ENCOUNTER — ANESTHESIA (OUTPATIENT)
Dept: OPERATING ROOM | Age: 36
DRG: 059 | End: 2021-01-01
Payer: COMMERCIAL

## 2021-01-01 ENCOUNTER — ANESTHESIA EVENT (OUTPATIENT)
Dept: OPERATING ROOM | Age: 36
DRG: 059 | End: 2021-01-01
Payer: COMMERCIAL

## 2021-01-01 VITALS
SYSTOLIC BLOOD PRESSURE: 116 MMHG | HEIGHT: 66 IN | HEART RATE: 101 BPM | OXYGEN SATURATION: 97 % | WEIGHT: 145.94 LBS | DIASTOLIC BLOOD PRESSURE: 66 MMHG | BODY MASS INDEX: 23.46 KG/M2 | RESPIRATION RATE: 20 BRPM | TEMPERATURE: 98 F

## 2021-01-01 VITALS
WEIGHT: 150 LBS | BODY MASS INDEX: 24.11 KG/M2 | HEIGHT: 66 IN | SYSTOLIC BLOOD PRESSURE: 122 MMHG | HEART RATE: 100 BPM | DIASTOLIC BLOOD PRESSURE: 82 MMHG

## 2021-01-01 VITALS
BODY MASS INDEX: 21.69 KG/M2 | SYSTOLIC BLOOD PRESSURE: 114 MMHG | TEMPERATURE: 98.8 F | HEART RATE: 92 BPM | DIASTOLIC BLOOD PRESSURE: 78 MMHG | WEIGHT: 135 LBS | RESPIRATION RATE: 20 BRPM | HEIGHT: 66 IN | OXYGEN SATURATION: 98 %

## 2021-01-01 VITALS
DIASTOLIC BLOOD PRESSURE: 72 MMHG | SYSTOLIC BLOOD PRESSURE: 122 MMHG | WEIGHT: 141 LBS | TEMPERATURE: 97.4 F | HEIGHT: 66 IN | HEART RATE: 88 BPM | BODY MASS INDEX: 22.66 KG/M2

## 2021-01-01 VITALS
HEART RATE: 100 BPM | DIASTOLIC BLOOD PRESSURE: 97 MMHG | TEMPERATURE: 97.1 F | WEIGHT: 134 LBS | BODY MASS INDEX: 20.99 KG/M2 | SYSTOLIC BLOOD PRESSURE: 145 MMHG

## 2021-01-01 DIAGNOSIS — F33.2 SEVERE EPISODE OF RECURRENT MAJOR DEPRESSIVE DISORDER, WITHOUT PSYCHOTIC FEATURES (HCC): Primary | ICD-10-CM

## 2021-01-01 DIAGNOSIS — M79.10 MYALGIA: ICD-10-CM

## 2021-01-01 DIAGNOSIS — E03.9 ACQUIRED HYPOTHYROIDISM: ICD-10-CM

## 2021-01-01 DIAGNOSIS — Z34.90 PREGNANCY AT EARLY STAGE: ICD-10-CM

## 2021-01-01 DIAGNOSIS — F43.10 PTSD (POST-TRAUMATIC STRESS DISORDER): ICD-10-CM

## 2021-01-01 DIAGNOSIS — R05.9 COUGH: ICD-10-CM

## 2021-01-01 DIAGNOSIS — F10.10 ALCOHOL ABUSE: ICD-10-CM

## 2021-01-01 DIAGNOSIS — J37.0 CHRONIC LARYNGITIS: Primary | ICD-10-CM

## 2021-01-01 DIAGNOSIS — K21.9 LARYNGOPHARYNGEAL REFLUX (LPR): ICD-10-CM

## 2021-01-01 DIAGNOSIS — F60.3 BORDERLINE PERSONALITY DISORDER (HCC): ICD-10-CM

## 2021-01-01 DIAGNOSIS — I10 ESSENTIAL HYPERTENSION: ICD-10-CM

## 2021-01-01 DIAGNOSIS — J31.2 CHRONIC PHARYNGITIS: ICD-10-CM

## 2021-01-01 DIAGNOSIS — G24.01 TARDIVE DYSKINESIA: ICD-10-CM

## 2021-01-01 DIAGNOSIS — R09.89 GLOBUS PHARYNGEUS: ICD-10-CM

## 2021-01-01 DIAGNOSIS — E87.20 LACTIC ACIDOSIS: ICD-10-CM

## 2021-01-01 DIAGNOSIS — I46.9 CARDIOPULMONARY ARREST WITH SUCCESSFUL RESUSCITATION (HCC): Primary | ICD-10-CM

## 2021-01-01 DIAGNOSIS — R05.3 CHRONIC COUGH: ICD-10-CM

## 2021-01-01 DIAGNOSIS — R51.9 ACUTE NONINTRACTABLE HEADACHE, UNSPECIFIED HEADACHE TYPE: Primary | ICD-10-CM

## 2021-01-01 DIAGNOSIS — J32.0 CHRONIC MAXILLARY SINUSITIS: ICD-10-CM

## 2021-01-01 DIAGNOSIS — E87.29 INCREASED ANION GAP METABOLIC ACIDOSIS: ICD-10-CM

## 2021-01-01 DIAGNOSIS — F12.11 CANNABIS USE DISORDER, MILD, IN EARLY REMISSION: ICD-10-CM

## 2021-01-01 LAB
A/G RATIO: 1.3 (ref 1.1–2.2)
A/G RATIO: 1.4 (ref 1.1–2.2)
A/G RATIO: 1.4 (ref 1.1–2.2)
ABO/RH: NORMAL
ABO/RH: NORMAL
ACETAMINOPHEN LEVEL: <5 UG/ML (ref 10–30)
ALBUMIN SERPL-MCNC: 1.9 G/DL (ref 3.4–5)
ALBUMIN SERPL-MCNC: 2.1 G/DL (ref 3.4–5)
ALBUMIN SERPL-MCNC: 2.2 G/DL (ref 3.4–5)
ALBUMIN SERPL-MCNC: 2.2 G/DL (ref 3.4–5)
ALBUMIN SERPL-MCNC: 2.3 G/DL (ref 3.4–5)
ALBUMIN SERPL-MCNC: 2.3 G/DL (ref 3.4–5)
ALBUMIN SERPL-MCNC: 2.5 G/DL (ref 3.4–5)
ALBUMIN SERPL-MCNC: 2.6 G/DL (ref 3.4–5)
ALBUMIN SERPL-MCNC: 3.4 G/DL (ref 3.4–5)
ALBUMIN SERPL-MCNC: 3.9 G/DL (ref 3.4–5)
ALBUMIN SERPL-MCNC: 3.9 G/DL (ref 3.4–5)
ALP BLD-CCNC: 121 U/L (ref 40–129)
ALP BLD-CCNC: 123 U/L (ref 40–129)
ALP BLD-CCNC: 125 U/L (ref 40–129)
ALP BLD-CCNC: 128 U/L (ref 40–129)
ALP BLD-CCNC: 134 U/L (ref 40–129)
ALP BLD-CCNC: 188 U/L (ref 40–129)
ALP BLD-CCNC: 194 U/L (ref 40–129)
ALP BLD-CCNC: 48 U/L (ref 40–129)
ALP BLD-CCNC: 55 U/L (ref 40–129)
ALP BLD-CCNC: 68 U/L (ref 40–129)
ALP BLD-CCNC: 72 U/L (ref 40–129)
ALT SERPL-CCNC: 11 U/L (ref 10–40)
ALT SERPL-CCNC: 12 U/L (ref 10–40)
ALT SERPL-CCNC: 13 U/L (ref 10–40)
ALT SERPL-CCNC: 14 U/L (ref 10–40)
ALT SERPL-CCNC: 15 U/L (ref 10–40)
ALT SERPL-CCNC: 17 U/L (ref 10–40)
ALT SERPL-CCNC: 20 U/L (ref 10–40)
ALT SERPL-CCNC: 27 U/L (ref 10–40)
ALT SERPL-CCNC: 42 U/L (ref 10–40)
AMPHETAMINE SCREEN, URINE: ABNORMAL
AMPHETAMINE SCREEN, URINE: POSITIVE
AMYLASE: 13 U/L (ref 25–115)
ANION GAP SERPL CALCULATED.3IONS-SCNC: 10 MMOL/L (ref 3–16)
ANION GAP SERPL CALCULATED.3IONS-SCNC: 11 MMOL/L (ref 3–16)
ANION GAP SERPL CALCULATED.3IONS-SCNC: 12 MMOL/L (ref 3–16)
ANION GAP SERPL CALCULATED.3IONS-SCNC: 27 MMOL/L (ref 3–16)
ANION GAP SERPL CALCULATED.3IONS-SCNC: 6 MMOL/L (ref 3–16)
ANION GAP SERPL CALCULATED.3IONS-SCNC: 6 MMOL/L (ref 3–16)
ANION GAP SERPL CALCULATED.3IONS-SCNC: 7 MMOL/L (ref 3–16)
ANION GAP SERPL CALCULATED.3IONS-SCNC: 8 MMOL/L (ref 3–16)
ANION GAP SERPL CALCULATED.3IONS-SCNC: 9 MMOL/L (ref 3–16)
ANISOCYTOSIS: ABNORMAL
ANTIBODY SCREEN: NORMAL
ANTIBODY SCREEN: NORMAL
APTT: 29.8 SEC (ref 24.2–36.2)
APTT: 35.2 SEC (ref 24.2–36.2)
APTT: 35.8 SEC (ref 24.2–36.2)
APTT: 36 SEC (ref 24.2–36.2)
APTT: 37.1 SEC (ref 24.2–36.2)
APTT: 39.5 SEC (ref 24.2–36.2)
AST SERPL-CCNC: 11 U/L (ref 15–37)
AST SERPL-CCNC: 12 U/L (ref 15–37)
AST SERPL-CCNC: 12 U/L (ref 15–37)
AST SERPL-CCNC: 124 U/L (ref 15–37)
AST SERPL-CCNC: 13 U/L (ref 15–37)
AST SERPL-CCNC: 13 U/L (ref 15–37)
AST SERPL-CCNC: 14 U/L (ref 15–37)
AST SERPL-CCNC: 15 U/L (ref 15–37)
AST SERPL-CCNC: 19 U/L (ref 15–37)
AST SERPL-CCNC: 19 U/L (ref 15–37)
AST SERPL-CCNC: 37 U/L (ref 15–37)
BACTERIA: ABNORMAL /HPF
BANDED NEUTROPHILS RELATIVE PERCENT: 13 % (ref 0–7)
BARBITURATE SCREEN URINE: ABNORMAL
BARBITURATE SCREEN URINE: ABNORMAL
BASE EXCESS ARTERIAL: -0.2 MMOL/L (ref -3–3)
BASE EXCESS ARTERIAL: -1 (ref -3–3)
BASE EXCESS ARTERIAL: -12 (ref -3–3)
BASE EXCESS ARTERIAL: -2 (ref -3–3)
BASE EXCESS ARTERIAL: -23.5 MMOL/L (ref -3–3)
BASE EXCESS ARTERIAL: -4.1 MMOL/L (ref -3–3)
BASE EXCESS ARTERIAL: -5.4 MMOL/L (ref -3–3)
BASE EXCESS ARTERIAL: -5.6 MMOL/L (ref -3–3)
BASE EXCESS ARTERIAL: -6 MMOL/L (ref -3–3)
BASE EXCESS ARTERIAL: -8.1 MMOL/L (ref -3–3)
BASE EXCESS ARTERIAL: -9.3 MMOL/L (ref -3–3)
BASE EXCESS ARTERIAL: 0 (ref -3–3)
BASE EXCESS ARTERIAL: 0.3 MMOL/L (ref -3–3)
BASE EXCESS ARTERIAL: 0.7 MMOL/L (ref -3–3)
BASE EXCESS ARTERIAL: 2 MMOL/L (ref -3–3)
BASE EXCESS VENOUS: -1 (ref -3–3)
BASOPHILS ABSOLUTE: 0 K/UL (ref 0–0.2)
BASOPHILS ABSOLUTE: 0.1 K/UL (ref 0–0.2)
BASOPHILS ABSOLUTE: 0.1 K/UL (ref 0–0.2)
BASOPHILS RELATIVE PERCENT: 0 %
BASOPHILS RELATIVE PERCENT: 0 %
BASOPHILS RELATIVE PERCENT: 0.2 %
BASOPHILS RELATIVE PERCENT: 0.2 %
BASOPHILS RELATIVE PERCENT: 0.3 %
BASOPHILS RELATIVE PERCENT: 0.3 %
BASOPHILS RELATIVE PERCENT: 0.4 %
BASOPHILS RELATIVE PERCENT: 0.5 %
BASOPHILS RELATIVE PERCENT: 0.5 %
BENZODIAZEPINE SCREEN, URINE: ABNORMAL
BENZODIAZEPINE SCREEN, URINE: ABNORMAL
BILIRUB SERPL-MCNC: 0.3 MG/DL (ref 0–1)
BILIRUB SERPL-MCNC: 0.3 MG/DL (ref 0–1)
BILIRUB SERPL-MCNC: 0.4 MG/DL (ref 0–1)
BILIRUB SERPL-MCNC: 0.6 MG/DL (ref 0–1)
BILIRUB SERPL-MCNC: 0.7 MG/DL (ref 0–1)
BILIRUB SERPL-MCNC: 0.9 MG/DL (ref 0–1)
BILIRUB SERPL-MCNC: <0.2 MG/DL (ref 0–1)
BILIRUBIN DIRECT: 0.3 MG/DL (ref 0–0.3)
BILIRUBIN DIRECT: 0.5 MG/DL (ref 0–0.3)
BILIRUBIN DIRECT: <0.2 MG/DL (ref 0–0.3)
BILIRUBIN URINE: ABNORMAL
BILIRUBIN URINE: NEGATIVE
BILIRUBIN, INDIRECT: 0.4 MG/DL (ref 0–1)
BILIRUBIN, INDIRECT: 0.4 MG/DL (ref 0–1)
BILIRUBIN, INDIRECT: ABNORMAL MG/DL (ref 0–1)
BLOOD BANK DISPENSE STATUS: NORMAL
BLOOD BANK DISPENSE STATUS: NORMAL
BLOOD BANK PRODUCT CODE: NORMAL
BLOOD BANK PRODUCT CODE: NORMAL
BLOOD CULTURE, ROUTINE: NORMAL
BLOOD, URINE: ABNORMAL
BLOOD, URINE: NEGATIVE
BLOOD, URINE: NEGATIVE
BPU ID: NORMAL
BPU ID: NORMAL
BUN BLDV-MCNC: 10 MG/DL (ref 7–20)
BUN BLDV-MCNC: 10 MG/DL (ref 7–20)
BUN BLDV-MCNC: 11 MG/DL (ref 7–20)
BUN BLDV-MCNC: 13 MG/DL (ref 7–20)
BUN BLDV-MCNC: 13 MG/DL (ref 7–20)
BUN BLDV-MCNC: 14 MG/DL (ref 7–20)
BUN BLDV-MCNC: 15 MG/DL (ref 7–20)
BUN BLDV-MCNC: 16 MG/DL (ref 7–20)
BUN BLDV-MCNC: 18 MG/DL (ref 7–20)
BUN BLDV-MCNC: 18 MG/DL (ref 7–20)
BUN BLDV-MCNC: 19 MG/DL (ref 7–20)
BUN BLDV-MCNC: 20 MG/DL (ref 7–20)
BUN BLDV-MCNC: 20 MG/DL (ref 7–20)
BUN BLDV-MCNC: 5 MG/DL (ref 7–20)
BUN BLDV-MCNC: 8 MG/DL (ref 7–20)
BUN BLDV-MCNC: 9 MG/DL (ref 7–20)
CALCIUM IONIZED: 1.1 MMOL/L (ref 1.12–1.32)
CALCIUM IONIZED: 1.11 MMOL/L (ref 1.12–1.32)
CALCIUM IONIZED: 1.12 MMOL/L (ref 1.12–1.32)
CALCIUM IONIZED: 1.13 MMOL/L (ref 1.12–1.32)
CALCIUM IONIZED: 1.16 MMOL/L (ref 1.12–1.32)
CALCIUM IONIZED: 1.18 MMOL/L (ref 1.12–1.32)
CALCIUM IONIZED: 1.19 MMOL/L (ref 1.12–1.32)
CALCIUM IONIZED: 1.21 MMOL/L (ref 1.12–1.32)
CALCIUM IONIZED: 1.21 MMOL/L (ref 1.12–1.32)
CALCIUM IONIZED: 1.25 MMOL/L (ref 1.12–1.32)
CALCIUM IONIZED: 1.3 MMOL/L (ref 1.12–1.32)
CALCIUM IONIZED: 1.37 MMOL/L (ref 1.12–1.32)
CALCIUM SERPL-MCNC: 7.4 MG/DL (ref 8.3–10.6)
CALCIUM SERPL-MCNC: 7.6 MG/DL (ref 8.3–10.6)
CALCIUM SERPL-MCNC: 7.8 MG/DL (ref 8.3–10.6)
CALCIUM SERPL-MCNC: 7.9 MG/DL (ref 8.3–10.6)
CALCIUM SERPL-MCNC: 8.1 MG/DL (ref 8.3–10.6)
CALCIUM SERPL-MCNC: 8.2 MG/DL (ref 8.3–10.6)
CALCIUM SERPL-MCNC: 8.3 MG/DL (ref 8.3–10.6)
CALCIUM SERPL-MCNC: 8.3 MG/DL (ref 8.3–10.6)
CALCIUM SERPL-MCNC: 8.4 MG/DL (ref 8.3–10.6)
CALCIUM SERPL-MCNC: 8.4 MG/DL (ref 8.3–10.6)
CALCIUM SERPL-MCNC: 8.5 MG/DL (ref 8.3–10.6)
CALCIUM SERPL-MCNC: 8.5 MG/DL (ref 8.3–10.6)
CALCIUM SERPL-MCNC: 8.6 MG/DL (ref 8.3–10.6)
CALCIUM SERPL-MCNC: 8.9 MG/DL (ref 8.3–10.6)
CALCIUM SERPL-MCNC: 8.9 MG/DL (ref 8.3–10.6)
CALCIUM SERPL-MCNC: 9 MG/DL (ref 8.3–10.6)
CALCIUM SERPL-MCNC: 9.1 MG/DL (ref 8.3–10.6)
CALCIUM SERPL-MCNC: 9.7 MG/DL (ref 8.3–10.6)
CALCIUM SERPL-MCNC: 9.8 MG/DL (ref 8.3–10.6)
CANNABINOID SCREEN URINE: ABNORMAL
CANNABINOID SCREEN URINE: POSITIVE
CARBOXYHEMOGLOBIN ARTERIAL: 0.4 % (ref 0–1.5)
CARBOXYHEMOGLOBIN ARTERIAL: 0.5 % (ref 0–1.5)
CARBOXYHEMOGLOBIN ARTERIAL: 0.6 % (ref 0–1.5)
CARBOXYHEMOGLOBIN ARTERIAL: 0.7 % (ref 0–1.5)
CARBOXYHEMOGLOBIN ARTERIAL: 0.9 % (ref 0–1.5)
CHLORIDE BLD-SCNC: 103 MMOL/L (ref 99–110)
CHLORIDE BLD-SCNC: 103 MMOL/L (ref 99–110)
CHLORIDE BLD-SCNC: 109 MMOL/L (ref 99–110)
CHLORIDE BLD-SCNC: 113 MMOL/L (ref 99–110)
CHLORIDE BLD-SCNC: 116 MMOL/L (ref 99–110)
CHLORIDE BLD-SCNC: 117 MMOL/L (ref 99–110)
CHLORIDE BLD-SCNC: 119 MMOL/L (ref 99–110)
CHLORIDE BLD-SCNC: 119 MMOL/L (ref 99–110)
CHLORIDE BLD-SCNC: 120 MMOL/L (ref 99–110)
CHLORIDE BLD-SCNC: 121 MMOL/L (ref 99–110)
CHLORIDE BLD-SCNC: 123 MMOL/L (ref 99–110)
CHLORIDE BLD-SCNC: 123 MMOL/L (ref 99–110)
CHLORIDE BLD-SCNC: 125 MMOL/L (ref 99–110)
CHLORIDE BLD-SCNC: 126 MMOL/L (ref 99–110)
CHLORIDE BLD-SCNC: 128 MMOL/L (ref 99–110)
CHLORIDE BLD-SCNC: 129 MMOL/L (ref 99–110)
CHLORIDE BLD-SCNC: 130 MMOL/L (ref 99–110)
CHLORIDE BLD-SCNC: 131 MMOL/L (ref 99–110)
CHLORIDE BLD-SCNC: 132 MMOL/L (ref 99–110)
CHLORIDE BLD-SCNC: 97 MMOL/L (ref 99–110)
CHLORIDE URINE RANDOM: <20 MMOL/L
CLARITY: ABNORMAL
CLARITY: CLEAR
CO2: 16 MMOL/L (ref 21–32)
CO2: 16 MMOL/L (ref 21–32)
CO2: 17 MMOL/L (ref 21–32)
CO2: 17 MMOL/L (ref 21–32)
CO2: 19 MMOL/L (ref 21–32)
CO2: 21 MMOL/L (ref 21–32)
CO2: 22 MMOL/L (ref 21–32)
CO2: 23 MMOL/L (ref 21–32)
CO2: 24 MMOL/L (ref 21–32)
CO2: 25 MMOL/L (ref 21–32)
CO2: 27 MMOL/L (ref 21–32)
CO2: 7 MMOL/L (ref 21–32)
COCAINE METABOLITE SCREEN URINE: ABNORMAL
COCAINE METABOLITE SCREEN URINE: ABNORMAL
COLOR: YELLOW
COMMENT UA: ABNORMAL
CREAT SERPL-MCNC: 0.6 MG/DL (ref 0.6–1.1)
CREAT SERPL-MCNC: 0.9 MG/DL (ref 0.6–1.1)
CREAT SERPL-MCNC: 1 MG/DL (ref 0.6–1.1)
CREAT SERPL-MCNC: 1.1 MG/DL (ref 0.6–1.1)
CREAT SERPL-MCNC: 1.2 MG/DL (ref 0.6–1.1)
CREAT SERPL-MCNC: 1.3 MG/DL (ref 0.6–1.1)
CREAT SERPL-MCNC: 1.3 MG/DL (ref 0.6–1.1)
CREAT SERPL-MCNC: 1.4 MG/DL (ref 0.6–1.1)
CREAT SERPL-MCNC: <0.5 MG/DL (ref 0.6–1.1)
CREATININE URINE: 23.8 MG/DL (ref 28–259)
CULTURE, BLOOD 2: NORMAL
D DIMER: 3600 NG/ML DDU (ref 0–229)
D DIMER: >5250 NG/ML DDU (ref 0–229)
D DIMER: >5250 NG/ML DDU (ref 0–229)
DESCRIPTION BLOOD BANK: NORMAL
DESCRIPTION BLOOD BANK: NORMAL
EKG ATRIAL RATE: 103 BPM
EKG DIAGNOSIS: NORMAL
EKG P AXIS: 67 DEGREES
EKG P-R INTERVAL: 136 MS
EKG Q-T INTERVAL: 334 MS
EKG QRS DURATION: 80 MS
EKG QTC CALCULATION (BAZETT): 437 MS
EKG R AXIS: 46 DEGREES
EKG T AXIS: 55 DEGREES
EKG VENTRICULAR RATE: 103 BPM
EOSINOPHILS ABSOLUTE: 0 K/UL (ref 0–0.6)
EOSINOPHILS ABSOLUTE: 0.1 K/UL (ref 0–0.6)
EOSINOPHILS ABSOLUTE: 0.2 K/UL (ref 0–0.6)
EOSINOPHILS ABSOLUTE: 0.3 K/UL (ref 0–0.6)
EOSINOPHILS RELATIVE PERCENT: 0.4 %
EOSINOPHILS RELATIVE PERCENT: 0.6 %
EOSINOPHILS RELATIVE PERCENT: 0.6 %
EOSINOPHILS RELATIVE PERCENT: 1 %
EOSINOPHILS RELATIVE PERCENT: 1.3 %
EOSINOPHILS RELATIVE PERCENT: 1.6 %
EOSINOPHILS RELATIVE PERCENT: 1.8 %
EOSINOPHILS RELATIVE PERCENT: 2 %
EOSINOPHILS RELATIVE PERCENT: 2.5 %
EOSINOPHILS RELATIVE PERCENT: 2.7 %
EOSINOPHILS RELATIVE PERCENT: 2.7 %
EOSINOPHILS RELATIVE PERCENT: 3.1 %
EPITHELIAL CELLS, UA: 0 /HPF (ref 0–5)
EPITHELIAL CELLS, UA: 1 /HPF (ref 0–5)
EPITHELIAL CELLS, UA: 2 /HPF (ref 0–5)
EPITHELIAL CELLS, UA: 3 /HPF (ref 0–5)
EPITHELIAL CELLS, UA: 6 /HPF (ref 0–5)
ESTIMATED AVERAGE GLUCOSE: 102.5 MG/DL
ETHANOL: NORMAL MG/DL (ref 0–0.08)
FIBRINOGEN: 651 MG/DL (ref 200–397)
FIBRINOGEN: 651 MG/DL (ref 200–397)
FIBRINOGEN: 723 MG/DL (ref 200–397)
GENTAMICIN DOSE AMOUNT: NORMAL
GENTAMICIN RANDOM: 3.6 UG/ML
GFR AFRICAN AMERICAN: 51
GFR AFRICAN AMERICAN: 55
GFR AFRICAN AMERICAN: 55
GFR AFRICAN AMERICAN: >60
GFR NON-AFRICAN AMERICAN: 42
GFR NON-AFRICAN AMERICAN: 46
GFR NON-AFRICAN AMERICAN: 46
GFR NON-AFRICAN AMERICAN: 50
GFR NON-AFRICAN AMERICAN: 56
GFR NON-AFRICAN AMERICAN: >60
GLOBULIN: 1.9 G/DL
GLOBULIN: 2.7 G/DL
GLOBULIN: 2.9 G/DL
GLUCOSE BLD-MCNC: 100 MG/DL (ref 70–99)
GLUCOSE BLD-MCNC: 102 MG/DL (ref 70–99)
GLUCOSE BLD-MCNC: 102 MG/DL (ref 70–99)
GLUCOSE BLD-MCNC: 105 MG/DL (ref 70–99)
GLUCOSE BLD-MCNC: 106 MG/DL (ref 70–99)
GLUCOSE BLD-MCNC: 111 MG/DL (ref 70–99)
GLUCOSE BLD-MCNC: 112 MG/DL (ref 70–99)
GLUCOSE BLD-MCNC: 122 MG/DL (ref 70–99)
GLUCOSE BLD-MCNC: 127 MG/DL (ref 70–99)
GLUCOSE BLD-MCNC: 131 MG/DL (ref 70–99)
GLUCOSE BLD-MCNC: 133 MG/DL (ref 70–99)
GLUCOSE BLD-MCNC: 142 MG/DL (ref 70–99)
GLUCOSE BLD-MCNC: 157 MG/DL (ref 70–99)
GLUCOSE BLD-MCNC: 160 MG/DL (ref 70–99)
GLUCOSE BLD-MCNC: 162 MG/DL (ref 70–99)
GLUCOSE BLD-MCNC: 174 MG/DL (ref 70–99)
GLUCOSE BLD-MCNC: 184 MG/DL (ref 70–99)
GLUCOSE BLD-MCNC: 203 MG/DL (ref 70–99)
GLUCOSE BLD-MCNC: 46 MG/DL (ref 70–99)
GLUCOSE BLD-MCNC: 51 MG/DL (ref 70–99)
GLUCOSE BLD-MCNC: 54 MG/DL (ref 70–99)
GLUCOSE BLD-MCNC: 55 MG/DL (ref 70–99)
GLUCOSE BLD-MCNC: 56 MG/DL (ref 70–99)
GLUCOSE BLD-MCNC: 56 MG/DL (ref 70–99)
GLUCOSE BLD-MCNC: 61 MG/DL (ref 70–99)
GLUCOSE BLD-MCNC: 62 MG/DL (ref 70–99)
GLUCOSE BLD-MCNC: 64 MG/DL (ref 70–99)
GLUCOSE BLD-MCNC: 64 MG/DL (ref 70–99)
GLUCOSE BLD-MCNC: 68 MG/DL (ref 70–99)
GLUCOSE BLD-MCNC: 69 MG/DL (ref 70–99)
GLUCOSE BLD-MCNC: 70 MG/DL (ref 70–99)
GLUCOSE BLD-MCNC: 71 MG/DL (ref 70–99)
GLUCOSE BLD-MCNC: 72 MG/DL (ref 70–99)
GLUCOSE BLD-MCNC: 73 MG/DL (ref 70–99)
GLUCOSE BLD-MCNC: 74 MG/DL (ref 70–99)
GLUCOSE BLD-MCNC: 74 MG/DL (ref 70–99)
GLUCOSE BLD-MCNC: 76 MG/DL (ref 70–99)
GLUCOSE BLD-MCNC: 78 MG/DL (ref 70–99)
GLUCOSE BLD-MCNC: 79 MG/DL (ref 70–99)
GLUCOSE BLD-MCNC: 80 MG/DL (ref 70–99)
GLUCOSE BLD-MCNC: 80 MG/DL (ref 70–99)
GLUCOSE BLD-MCNC: 81 MG/DL (ref 70–99)
GLUCOSE BLD-MCNC: 83 MG/DL (ref 70–99)
GLUCOSE BLD-MCNC: 84 MG/DL (ref 70–99)
GLUCOSE BLD-MCNC: 85 MG/DL (ref 70–99)
GLUCOSE BLD-MCNC: 87 MG/DL (ref 70–99)
GLUCOSE BLD-MCNC: 87 MG/DL (ref 70–99)
GLUCOSE BLD-MCNC: 88 MG/DL (ref 70–99)
GLUCOSE BLD-MCNC: 88 MG/DL (ref 70–99)
GLUCOSE BLD-MCNC: 89 MG/DL (ref 70–99)
GLUCOSE BLD-MCNC: 89 MG/DL (ref 70–99)
GLUCOSE BLD-MCNC: 94 MG/DL (ref 70–99)
GLUCOSE BLD-MCNC: 94 MG/DL (ref 70–99)
GLUCOSE BLD-MCNC: 95 MG/DL (ref 70–99)
GLUCOSE BLD-MCNC: 96 MG/DL (ref 70–99)
GLUCOSE BLD-MCNC: 97 MG/DL (ref 70–99)
GLUCOSE BLD-MCNC: 98 MG/DL (ref 70–99)
GLUCOSE URINE: NEGATIVE MG/DL
GONADOTROPIN, CHORIONIC (HCG) QUANT: 8926 MIU/ML
GONADOTROPIN, CHORIONIC (HCG) QUANT: NORMAL MIU/ML
HBA1C MFR BLD: 5.2 %
HCO3 ARTERIAL: 16.4 MMOL/L (ref 21–29)
HCO3 ARTERIAL: 16.7 MMOL/L (ref 21–29)
HCO3 ARTERIAL: 17.6 MMOL/L (ref 21–29)
HCO3 ARTERIAL: 17.8 MMOL/L (ref 21–29)
HCO3 ARTERIAL: 18.8 MMOL/L (ref 21–29)
HCO3 ARTERIAL: 19.4 MMOL/L (ref 21–29)
HCO3 ARTERIAL: 20.4 MMOL/L (ref 21–29)
HCO3 ARTERIAL: 23.5 MMOL/L (ref 21–29)
HCO3 ARTERIAL: 23.6 MMOL/L (ref 21–29)
HCO3 ARTERIAL: 24.4 MMOL/L (ref 21–29)
HCO3 ARTERIAL: 24.5 MMOL/L (ref 21–29)
HCO3 ARTERIAL: 24.6 MMOL/L (ref 21–29)
HCO3 ARTERIAL: 24.8 MMOL/L (ref 21–29)
HCO3 ARTERIAL: 24.9 MMOL/L (ref 21–29)
HCO3 ARTERIAL: 25.1 MMOL/L (ref 21–29)
HCO3 ARTERIAL: 25.2 MMOL/L (ref 21–29)
HCO3 ARTERIAL: 25.2 MMOL/L (ref 21–29)
HCO3 ARTERIAL: 27.1 MMOL/L (ref 21–29)
HCO3 ARTERIAL: 9.7 MMOL/L (ref 21–29)
HCO3 VENOUS: 25.5 MMOL/L (ref 23–29)
HCT VFR BLD CALC: 23 % (ref 36–48)
HCT VFR BLD CALC: 25 % (ref 36–48)
HCT VFR BLD CALC: 25.1 % (ref 36–48)
HCT VFR BLD CALC: 25.5 % (ref 36–48)
HCT VFR BLD CALC: 26.6 % (ref 36–48)
HCT VFR BLD CALC: 26.8 % (ref 36–48)
HCT VFR BLD CALC: 28.7 % (ref 36–48)
HCT VFR BLD CALC: 28.8 % (ref 36–48)
HCT VFR BLD CALC: 28.9 % (ref 36–48)
HCT VFR BLD CALC: 29.9 % (ref 36–48)
HCT VFR BLD CALC: 30 % (ref 36–48)
HCT VFR BLD CALC: 30.3 % (ref 36–48)
HCT VFR BLD CALC: 31.5 % (ref 36–48)
HCT VFR BLD CALC: 33.8 % (ref 36–48)
HCT VFR BLD CALC: 34.1 % (ref 36–48)
HCT VFR BLD CALC: 34.1 % (ref 36–48)
HCT VFR BLD CALC: 34.5 % (ref 36–48)
HCT VFR BLD CALC: 36.4 % (ref 36–48)
HEMOGLOBIN, ART, EXTENDED: 10.1 G/DL (ref 12–16)
HEMOGLOBIN, ART, EXTENDED: 10.7 G/DL (ref 12–16)
HEMOGLOBIN, ART, EXTENDED: 10.8 G/DL (ref 12–16)
HEMOGLOBIN, ART, EXTENDED: 11.3 G/DL (ref 12–16)
HEMOGLOBIN, ART, EXTENDED: 11.5 G/DL (ref 12–16)
HEMOGLOBIN, ART, EXTENDED: 12.3 G/DL (ref 12–16)
HEMOGLOBIN, ART, EXTENDED: 6.9 G/DL (ref 12–16)
HEMOGLOBIN, ART, EXTENDED: 8.1 G/DL (ref 12–16)
HEMOGLOBIN, ART, EXTENDED: 9.1 G/DL (ref 12–16)
HEMOGLOBIN, ART, EXTENDED: 9.7 G/DL (ref 12–16)
HEMOGLOBIN, ART, EXTENDED: 9.7 G/DL (ref 12–16)
HEMOGLOBIN: 10.4 G/DL (ref 12–16)
HEMOGLOBIN: 10.7 G/DL (ref 12–16)
HEMOGLOBIN: 10.7 G/DL (ref 12–16)
HEMOGLOBIN: 11.1 G/DL (ref 12–16)
HEMOGLOBIN: 11.2 GM/DL (ref 12–16)
HEMOGLOBIN: 11.3 G/DL (ref 12–16)
HEMOGLOBIN: 11.4 GM/DL (ref 12–16)
HEMOGLOBIN: 11.9 G/DL (ref 12–16)
HEMOGLOBIN: 7.5 G/DL (ref 12–16)
HEMOGLOBIN: 8.1 G/DL (ref 12–16)
HEMOGLOBIN: 8.2 G/DL (ref 12–16)
HEMOGLOBIN: 8.3 G/DL (ref 12–16)
HEMOGLOBIN: 8.4 G/DL (ref 12–16)
HEMOGLOBIN: 8.9 G/DL (ref 12–16)
HEMOGLOBIN: 8.9 G/DL (ref 12–16)
HEMOGLOBIN: 9.4 G/DL (ref 12–16)
HEMOGLOBIN: 9.5 G/DL (ref 12–16)
HEMOGLOBIN: 9.6 G/DL (ref 12–16)
HEPATITIS B SURFACE ANTIGEN INTERPRETATION: ABNORMAL
HEPATITIS C ANTIBODY INTERPRETATION: NORMAL
HIV AG/AB: NORMAL
HIV ANTIGEN: NORMAL
HIV-1 ANTIBODY: NORMAL
HIV-2 AB: NORMAL
HPV COMMENT: NORMAL
HPV TYPE 16: NOT DETECTED
HPV TYPE 18: NOT DETECTED
HPVOH (OTHER TYPES): NOT DETECTED
HYALINE CASTS: 11 /LPF (ref 0–8)
HYALINE CASTS: 2 /LPF (ref 0–8)
HYALINE CASTS: 2 /LPF (ref 0–8)
HYALINE CASTS: 4 /LPF (ref 0–8)
HYALINE CASTS: 7 /LPF (ref 0–8)
INR BLD: 1.02 (ref 0.86–1.14)
INR BLD: 1.07 (ref 0.86–1.14)
INR BLD: 1.07 (ref 0.86–1.14)
INR BLD: 1.09 (ref 0.86–1.14)
INR BLD: 1.14 (ref 0.86–1.14)
INR BLD: 1.14 (ref 0.86–1.14)
KETONES, URINE: NEGATIVE MG/DL
LACTATE: 0.61 MMOL/L (ref 0.4–2)
LACTATE: 1.31 MMOL/L (ref 0.4–2)
LACTIC ACID, SEPSIS: 0.5 MMOL/L (ref 0.4–1.9)
LACTIC ACID, SEPSIS: 0.6 MMOL/L (ref 0.4–1.9)
LACTIC ACID, SEPSIS: 0.8 MMOL/L (ref 0.4–1.9)
LACTIC ACID, SEPSIS: 0.9 MMOL/L (ref 0.4–1.9)
LACTIC ACID, SEPSIS: 1.3 MMOL/L (ref 0.4–1.9)
LACTIC ACID, SEPSIS: 21.4 MMOL/L (ref 0.4–1.9)
LACTIC ACID: 0.3 MMOL/L (ref 0.4–2)
LACTIC ACID: 0.4 MMOL/L (ref 0.4–2)
LACTIC ACID: 0.4 MMOL/L (ref 0.4–2)
LEUKOCYTE ESTERASE, URINE: ABNORMAL
LEUKOCYTE ESTERASE, URINE: NEGATIVE
LIPASE: 22 U/L (ref 13–60)
LIPASE: 34 U/L (ref 13–60)
LIPASE: 5 U/L (ref 13–60)
LV EF: 63 %
LVEF MODALITY: NORMAL
LYMPHOCYTES ABSOLUTE: 1 K/UL (ref 1–5.1)
LYMPHOCYTES ABSOLUTE: 1 K/UL (ref 1–5.1)
LYMPHOCYTES ABSOLUTE: 1.1 K/UL (ref 1–5.1)
LYMPHOCYTES ABSOLUTE: 1.2 K/UL (ref 1–5.1)
LYMPHOCYTES ABSOLUTE: 1.2 K/UL (ref 1–5.1)
LYMPHOCYTES ABSOLUTE: 1.4 K/UL (ref 1–5.1)
LYMPHOCYTES ABSOLUTE: 1.9 K/UL (ref 1–5.1)
LYMPHOCYTES ABSOLUTE: 1.9 K/UL (ref 1–5.1)
LYMPHOCYTES ABSOLUTE: 3 K/UL (ref 1–5.1)
LYMPHOCYTES RELATIVE PERCENT: 10.1 %
LYMPHOCYTES RELATIVE PERCENT: 11 %
LYMPHOCYTES RELATIVE PERCENT: 11.2 %
LYMPHOCYTES RELATIVE PERCENT: 11.7 %
LYMPHOCYTES RELATIVE PERCENT: 11.8 %
LYMPHOCYTES RELATIVE PERCENT: 12.5 %
LYMPHOCYTES RELATIVE PERCENT: 14.3 %
LYMPHOCYTES RELATIVE PERCENT: 17.8 %
LYMPHOCYTES RELATIVE PERCENT: 18.7 %
LYMPHOCYTES RELATIVE PERCENT: 20 %
LYMPHOCYTES RELATIVE PERCENT: 30 %
LYMPHOCYTES RELATIVE PERCENT: 9.4 %
Lab: ABNORMAL
Lab: ABNORMAL
MAGNESIUM: 1.6 MG/DL (ref 1.8–2.4)
MAGNESIUM: 1.7 MG/DL (ref 1.8–2.4)
MAGNESIUM: 1.8 MG/DL (ref 1.8–2.4)
MAGNESIUM: 1.8 MG/DL (ref 1.8–2.4)
MAGNESIUM: 1.9 MG/DL (ref 1.8–2.4)
MAGNESIUM: 2 MG/DL (ref 1.8–2.4)
MAGNESIUM: 2 MG/DL (ref 1.8–2.4)
MAGNESIUM: 2.2 MG/DL (ref 1.8–2.4)
MAGNESIUM: 2.2 MG/DL (ref 1.8–2.4)
MAGNESIUM: 2.7 MG/DL (ref 1.8–2.4)
MCH RBC QN AUTO: 26.8 PG (ref 26–34)
MCH RBC QN AUTO: 27 PG (ref 26–34)
MCH RBC QN AUTO: 27.4 PG (ref 26–34)
MCH RBC QN AUTO: 27.6 PG (ref 26–34)
MCH RBC QN AUTO: 27.6 PG (ref 26–34)
MCH RBC QN AUTO: 27.7 PG (ref 26–34)
MCH RBC QN AUTO: 27.9 PG (ref 26–34)
MCH RBC QN AUTO: 28 PG (ref 26–34)
MCH RBC QN AUTO: 28 PG (ref 26–34)
MCH RBC QN AUTO: 28.1 PG (ref 26–34)
MCH RBC QN AUTO: 28.2 PG (ref 26–34)
MCH RBC QN AUTO: 28.2 PG (ref 26–34)
MCH RBC QN AUTO: 28.3 PG (ref 26–34)
MCH RBC QN AUTO: 28.5 PG (ref 26–34)
MCH RBC QN AUTO: 29 PG (ref 26–34)
MCH RBC QN AUTO: 29.2 PG (ref 26–34)
MCH RBC QN AUTO: 29.3 PG (ref 26–34)
MCH RBC QN AUTO: 29.5 PG (ref 26–34)
MCHC RBC AUTO-ENTMCNC: 29.2 G/DL (ref 31–36)
MCHC RBC AUTO-ENTMCNC: 31.4 G/DL (ref 31–36)
MCHC RBC AUTO-ENTMCNC: 31.6 G/DL (ref 31–36)
MCHC RBC AUTO-ENTMCNC: 31.6 G/DL (ref 31–36)
MCHC RBC AUTO-ENTMCNC: 31.8 G/DL (ref 31–36)
MCHC RBC AUTO-ENTMCNC: 31.8 G/DL (ref 31–36)
MCHC RBC AUTO-ENTMCNC: 31.9 G/DL (ref 31–36)
MCHC RBC AUTO-ENTMCNC: 32.4 G/DL (ref 31–36)
MCHC RBC AUTO-ENTMCNC: 32.6 G/DL (ref 31–36)
MCHC RBC AUTO-ENTMCNC: 32.7 G/DL (ref 31–36)
MCHC RBC AUTO-ENTMCNC: 32.8 G/DL (ref 31–36)
MCHC RBC AUTO-ENTMCNC: 32.9 G/DL (ref 31–36)
MCHC RBC AUTO-ENTMCNC: 33.1 G/DL (ref 31–36)
MCHC RBC AUTO-ENTMCNC: 33.2 G/DL (ref 31–36)
MCHC RBC AUTO-ENTMCNC: 33.4 G/DL (ref 31–36)
MCHC RBC AUTO-ENTMCNC: 33.6 G/DL (ref 31–36)
MCV RBC AUTO: 82.8 FL (ref 80–100)
MCV RBC AUTO: 84.3 FL (ref 80–100)
MCV RBC AUTO: 85.2 FL (ref 80–100)
MCV RBC AUTO: 85.5 FL (ref 80–100)
MCV RBC AUTO: 85.5 FL (ref 80–100)
MCV RBC AUTO: 85.8 FL (ref 80–100)
MCV RBC AUTO: 86.4 FL (ref 80–100)
MCV RBC AUTO: 86.9 FL (ref 80–100)
MCV RBC AUTO: 87.1 FL (ref 80–100)
MCV RBC AUTO: 87.1 FL (ref 80–100)
MCV RBC AUTO: 87.4 FL (ref 80–100)
MCV RBC AUTO: 87.5 FL (ref 80–100)
MCV RBC AUTO: 87.5 FL (ref 80–100)
MCV RBC AUTO: 87.6 FL (ref 80–100)
MCV RBC AUTO: 87.9 FL (ref 80–100)
MCV RBC AUTO: 88.6 FL (ref 80–100)
MCV RBC AUTO: 89 FL (ref 80–100)
MCV RBC AUTO: 96.9 FL (ref 80–100)
METAMYELOCYTES RELATIVE PERCENT: 1 %
METAMYELOCYTES RELATIVE PERCENT: 1 %
METHADONE SCREEN, URINE: ABNORMAL
METHADONE SCREEN, URINE: ABNORMAL
METHEMOGLOBIN ARTERIAL: 0.1 %
METHEMOGLOBIN ARTERIAL: 0.1 %
METHEMOGLOBIN ARTERIAL: 0.3 %
METHEMOGLOBIN ARTERIAL: 0.4 %
METHEMOGLOBIN ARTERIAL: 0.4 %
METHEMOGLOBIN ARTERIAL: 0.5 %
METHEMOGLOBIN ARTERIAL: 0.5 %
METHEMOGLOBIN ARTERIAL: 0.6 %
METHEMOGLOBIN ARTERIAL: 0.7 %
MICROSCOPIC EXAMINATION: YES
MONOCYTES ABSOLUTE: 0.2 K/UL (ref 0–1.3)
MONOCYTES ABSOLUTE: 0.3 K/UL (ref 0–1.3)
MONOCYTES ABSOLUTE: 0.4 K/UL (ref 0–1.3)
MONOCYTES ABSOLUTE: 0.4 K/UL (ref 0–1.3)
MONOCYTES ABSOLUTE: 0.5 K/UL (ref 0–1.3)
MONOCYTES ABSOLUTE: 0.5 K/UL (ref 0–1.3)
MONOCYTES ABSOLUTE: 0.6 K/UL (ref 0–1.3)
MONOCYTES RELATIVE PERCENT: 2.8 %
MONOCYTES RELATIVE PERCENT: 2.9 %
MONOCYTES RELATIVE PERCENT: 3 %
MONOCYTES RELATIVE PERCENT: 3.2 %
MONOCYTES RELATIVE PERCENT: 3.3 %
MONOCYTES RELATIVE PERCENT: 3.4 %
MONOCYTES RELATIVE PERCENT: 3.7 %
MONOCYTES RELATIVE PERCENT: 4.2 %
MONOCYTES RELATIVE PERCENT: 4.7 %
MONOCYTES RELATIVE PERCENT: 5 %
MONOCYTES RELATIVE PERCENT: 5.2 %
MONOCYTES RELATIVE PERCENT: 5.4 %
MYELOCYTE PERCENT: 1 %
NEUTROPHILS ABSOLUTE: 10.2 K/UL (ref 1.7–7.7)
NEUTROPHILS ABSOLUTE: 5.4 K/UL (ref 1.7–7.7)
NEUTROPHILS ABSOLUTE: 5.6 K/UL (ref 1.7–7.7)
NEUTROPHILS ABSOLUTE: 6.5 K/UL (ref 1.7–7.7)
NEUTROPHILS ABSOLUTE: 7.7 K/UL (ref 1.7–7.7)
NEUTROPHILS ABSOLUTE: 7.7 K/UL (ref 1.7–7.7)
NEUTROPHILS ABSOLUTE: 7.8 K/UL (ref 1.7–7.7)
NEUTROPHILS ABSOLUTE: 8.1 K/UL (ref 1.7–7.7)
NEUTROPHILS ABSOLUTE: 8.1 K/UL (ref 1.7–7.7)
NEUTROPHILS ABSOLUTE: 8.2 K/UL (ref 1.7–7.7)
NEUTROPHILS ABSOLUTE: 8.3 K/UL (ref 1.7–7.7)
NEUTROPHILS ABSOLUTE: 9.1 K/UL (ref 1.7–7.7)
NEUTROPHILS RELATIVE PERCENT: 61 %
NEUTROPHILS RELATIVE PERCENT: 62 %
NEUTROPHILS RELATIVE PERCENT: 74.8 %
NEUTROPHILS RELATIVE PERCENT: 75.2 %
NEUTROPHILS RELATIVE PERCENT: 78.1 %
NEUTROPHILS RELATIVE PERCENT: 81.8 %
NEUTROPHILS RELATIVE PERCENT: 81.9 %
NEUTROPHILS RELATIVE PERCENT: 81.9 %
NEUTROPHILS RELATIVE PERCENT: 82.4 %
NEUTROPHILS RELATIVE PERCENT: 83.5 %
NEUTROPHILS RELATIVE PERCENT: 85.6 %
NEUTROPHILS RELATIVE PERCENT: 86.6 %
NITRITE, URINE: NEGATIVE
O2 CONTENT ARTERIAL: 10 ML/DL
O2 CONTENT ARTERIAL: 12 ML/DL
O2 CONTENT ARTERIAL: 13 ML/DL
O2 CONTENT ARTERIAL: 14 ML/DL
O2 CONTENT ARTERIAL: 14 ML/DL
O2 CONTENT ARTERIAL: 15 ML/DL
O2 CONTENT ARTERIAL: 16 ML/DL
O2 CONTENT ARTERIAL: 16 ML/DL
O2 CONTENT ARTERIAL: 18 ML/DL
O2 SAT, ARTERIAL: 100 %
O2 SAT, ARTERIAL: 100 % (ref 93–100)
O2 SAT, ARTERIAL: 96 % (ref 93–100)
O2 SAT, ARTERIAL: 97 % (ref 93–100)
O2 SAT, ARTERIAL: 98 %
O2 SAT, ARTERIAL: 99 % (ref 93–100)
O2 SAT, ARTERIAL: 99.3 %
O2 SAT, ARTERIAL: 99.3 %
O2 SAT, ARTERIAL: 99.4 %
O2 SAT, ARTERIAL: 99.6 %
O2 SAT, ARTERIAL: 99.7 %
O2 SAT, ARTERIAL: 99.8 %
O2 SAT, ARTERIAL: 99.9 %
O2 SAT, VEN: 100 %
O2 THERAPY: ABNORMAL
OPIATE SCREEN URINE: ABNORMAL
OPIATE SCREEN URINE: ABNORMAL
ORGANISM: ABNORMAL
OXYCODONE URINE: ABNORMAL
OXYCODONE URINE: ABNORMAL
PCO2 ARTERIAL: 29.3 MMHG (ref 35–45)
PCO2 ARTERIAL: 31.6 MMHG (ref 35–45)
PCO2 ARTERIAL: 33.9 MMHG (ref 35–45)
PCO2 ARTERIAL: 34.2 MMHG (ref 35–45)
PCO2 ARTERIAL: 35.1 MMHG (ref 35–45)
PCO2 ARTERIAL: 35.5 MMHG (ref 35–45)
PCO2 ARTERIAL: 36.1 MMHG (ref 35–45)
PCO2 ARTERIAL: 40.8 MMHG (ref 35–45)
PCO2 ARTERIAL: 41.2 MM HG (ref 35–45)
PCO2 ARTERIAL: 41.4 MM HG (ref 35–45)
PCO2 ARTERIAL: 41.4 MMHG (ref 35–45)
PCO2 ARTERIAL: 41.8 MM HG (ref 35–45)
PCO2 ARTERIAL: 42.1 MM HG (ref 35–45)
PCO2 ARTERIAL: 44.1 MMHG (ref 35–45)
PCO2 ARTERIAL: 44.4 MM HG (ref 35–45)
PCO2 ARTERIAL: 46.1 MM HG (ref 35–45)
PCO2 ARTERIAL: 48.3 MM HG (ref 35–45)
PCO2 ARTERIAL: 53.8 MM HG (ref 35–45)
PCO2 ARTERIAL: 55.5 MMHG (ref 35–45)
PCO2, VEN: 50.7 MM HG (ref 40–50)
PDW BLD-RTO: 15.5 % (ref 12.4–15.4)
PDW BLD-RTO: 15.8 % (ref 12.4–15.4)
PDW BLD-RTO: 16.2 % (ref 12.4–15.4)
PDW BLD-RTO: 16.3 % (ref 12.4–15.4)
PDW BLD-RTO: 16.4 % (ref 12.4–15.4)
PDW BLD-RTO: 16.6 % (ref 12.4–15.4)
PDW BLD-RTO: 16.6 % (ref 12.4–15.4)
PDW BLD-RTO: 16.7 % (ref 12.4–15.4)
PDW BLD-RTO: 17 % (ref 12.4–15.4)
PDW BLD-RTO: 17 % (ref 12.4–15.4)
PDW BLD-RTO: 17.1 % (ref 12.4–15.4)
PDW BLD-RTO: 17.2 % (ref 12.4–15.4)
PDW BLD-RTO: 17.2 % (ref 12.4–15.4)
PDW BLD-RTO: 17.3 % (ref 12.4–15.4)
PDW BLD-RTO: 17.5 % (ref 12.4–15.4)
PDW BLD-RTO: 17.9 % (ref 12.4–15.4)
PERFORMED ON: ABNORMAL
PERFORMED ON: NORMAL
PH ARTERIAL: 6.85 (ref 7.35–7.45)
PH ARTERIAL: 7.15 (ref 7.35–7.45)
PH ARTERIAL: 7.28 (ref 7.35–7.45)
PH ARTERIAL: 7.29 (ref 7.35–7.45)
PH ARTERIAL: 7.3 (ref 7.35–7.45)
PH ARTERIAL: 7.34 (ref 7.35–7.45)
PH ARTERIAL: 7.35 (ref 7.35–7.45)
PH ARTERIAL: 7.36 (ref 7.35–7.45)
PH ARTERIAL: 7.36 (ref 7.35–7.45)
PH ARTERIAL: 7.37 (ref 7.35–7.45)
PH ARTERIAL: 7.38 (ref 7.35–7.45)
PH ARTERIAL: 7.38 (ref 7.35–7.45)
PH ARTERIAL: 7.39 (ref 7.35–7.45)
PH ARTERIAL: 7.4 (ref 7.35–7.45)
PH ARTERIAL: 7.45 (ref 7.35–7.45)
PH UA: 6 (ref 5–8)
PH UA: 6.5
PH UA: 6.5 (ref 5–8)
PH UA: 6.5 (ref 5–8)
PH UA: 7
PH UA: 7 (ref 5–8)
PH UA: 7 (ref 5–8)
PH VENOUS: 7.31 (ref 7.35–7.45)
PH VENOUS: 7.32 (ref 7.35–7.45)
PH VENOUS: 7.33 (ref 7.35–7.45)
PH VENOUS: 7.42 (ref 7.35–7.45)
PH VENOUS: 7.42 (ref 7.35–7.45)
PH VENOUS: 7.44 (ref 7.35–7.45)
PH VENOUS: 7.45 (ref 7.35–7.45)
PH VENOUS: 7.51 (ref 7.35–7.45)
PHENCYCLIDINE SCREEN URINE: ABNORMAL
PHENCYCLIDINE SCREEN URINE: ABNORMAL
PHOSPHORUS: 1.7 MG/DL (ref 2.5–4.9)
PHOSPHORUS: 2.1 MG/DL (ref 2.5–4.9)
PHOSPHORUS: 2.9 MG/DL (ref 2.5–4.9)
PHOSPHORUS: 3.1 MG/DL (ref 2.5–4.9)
PHOSPHORUS: 3.4 MG/DL (ref 2.5–4.9)
PHOSPHORUS: 4.2 MG/DL (ref 2.5–4.9)
PHOSPHORUS: 4.7 MG/DL (ref 2.5–4.9)
PHOSPHORUS: 4.8 MG/DL (ref 2.5–4.9)
PHOSPHORUS: 4.9 MG/DL (ref 2.5–4.9)
PHOSPHORUS: 5.7 MG/DL (ref 2.5–4.9)
PHOSPHORUS: 6 MG/DL (ref 2.5–4.9)
PHOSPHORUS: 6.1 MG/DL (ref 2.5–4.9)
PHOSPHORUS: 6.4 MG/DL (ref 2.5–4.9)
PHOSPHORUS: 6.4 MG/DL (ref 2.5–4.9)
PLATELET # BLD: 127 K/UL (ref 135–450)
PLATELET # BLD: 129 K/UL (ref 135–450)
PLATELET # BLD: 148 K/UL (ref 135–450)
PLATELET # BLD: 167 K/UL (ref 135–450)
PLATELET # BLD: 184 K/UL (ref 135–450)
PLATELET # BLD: 204 K/UL (ref 135–450)
PLATELET # BLD: 254 K/UL (ref 135–450)
PLATELET # BLD: 255 K/UL (ref 135–450)
PLATELET # BLD: 283 K/UL (ref 135–450)
PLATELET # BLD: 31 K/UL (ref 135–450)
PLATELET # BLD: 340 K/UL (ref 135–450)
PLATELET # BLD: 352 K/UL (ref 135–450)
PLATELET # BLD: 376 K/UL (ref 135–450)
PLATELET # BLD: 38 K/UL (ref 135–450)
PLATELET # BLD: 43 K/UL (ref 135–450)
PLATELET # BLD: 54 K/UL (ref 135–450)
PLATELET # BLD: 65 K/UL (ref 135–450)
PLATELET # BLD: 89 K/UL (ref 135–450)
PLATELET SLIDE REVIEW: ABNORMAL
PLATELET SLIDE REVIEW: ABNORMAL
PLATELET SLIDE REVIEW: ADEQUATE
PMV BLD AUTO: 7 FL (ref 5–10.5)
PMV BLD AUTO: 7.1 FL (ref 5–10.5)
PMV BLD AUTO: 7.1 FL (ref 5–10.5)
PMV BLD AUTO: 7.4 FL (ref 5–10.5)
PMV BLD AUTO: 7.4 FL (ref 5–10.5)
PMV BLD AUTO: 7.7 FL (ref 5–10.5)
PMV BLD AUTO: 7.7 FL (ref 5–10.5)
PMV BLD AUTO: 7.8 FL (ref 5–10.5)
PMV BLD AUTO: 7.9 FL (ref 5–10.5)
PMV BLD AUTO: 8 FL (ref 5–10.5)
PMV BLD AUTO: 8.1 FL (ref 5–10.5)
PMV BLD AUTO: 8.5 FL (ref 5–10.5)
PO2 ARTERIAL: 116.9 MM HG (ref 75–108)
PO2 ARTERIAL: 125 MMHG (ref 75–108)
PO2 ARTERIAL: 133 MMHG (ref 75–108)
PO2 ARTERIAL: 164.5 MM HG (ref 75–108)
PO2 ARTERIAL: 168 MMHG (ref 75–108)
PO2 ARTERIAL: 176 MMHG (ref 75–108)
PO2 ARTERIAL: 176 MMHG (ref 75–108)
PO2 ARTERIAL: 186 MM HG (ref 75–108)
PO2 ARTERIAL: 188.7 MM HG (ref 75–108)
PO2 ARTERIAL: 191 MMHG (ref 75–108)
PO2 ARTERIAL: 206 MMHG (ref 75–108)
PO2 ARTERIAL: 221 MMHG (ref 75–108)
PO2 ARTERIAL: 249 MMHG (ref 75–108)
PO2 ARTERIAL: 380 MMHG (ref 75–108)
PO2 ARTERIAL: 417 MM HG (ref 75–108)
PO2 ARTERIAL: 88.2 MM HG (ref 75–108)
PO2 ARTERIAL: 88.8 MM HG (ref 75–108)
PO2 ARTERIAL: 89.9 MM HG (ref 75–108)
PO2 ARTERIAL: 93.3 MMHG (ref 75–108)
PO2, VEN: 179 MM HG
POC HEMATOCRIT: 33 % (ref 36–48)
POC HEMATOCRIT: 33 % (ref 36–48)
POC PATIENT TEMP: 97.8
POC PATIENT TEMP: 97.8
POC POTASSIUM: 3.2 MMOL/L (ref 3.5–5.1)
POC POTASSIUM: 3.4 MMOL/L (ref 3.5–5.1)
POC POTASSIUM: 3.7 MMOL/L (ref 3.5–5.1)
POC POTASSIUM: 3.9 MMOL/L (ref 3.5–5.1)
POC POTASSIUM: 4.1 MMOL/L (ref 3.5–5.1)
POC SAMPLE TYPE: ABNORMAL
POC SAMPLE TYPE: NORMAL
POC SODIUM: 146 MMOL/L (ref 136–145)
POC SODIUM: 147 MMOL/L (ref 136–145)
POC SODIUM: 155 MMOL/L (ref 136–145)
POC SODIUM: 156 MMOL/L (ref 136–145)
POC SODIUM: 157 MMOL/L (ref 136–145)
POTASSIUM SERPL-SCNC: 2.7 MMOL/L (ref 3.5–5.1)
POTASSIUM SERPL-SCNC: 2.7 MMOL/L (ref 3.5–5.1)
POTASSIUM SERPL-SCNC: 2.8 MMOL/L (ref 3.5–5.1)
POTASSIUM SERPL-SCNC: 2.8 MMOL/L (ref 3.5–5.1)
POTASSIUM SERPL-SCNC: 2.9 MMOL/L (ref 3.5–5.1)
POTASSIUM SERPL-SCNC: 3 MMOL/L (ref 3.5–5.1)
POTASSIUM SERPL-SCNC: 3.1 MMOL/L (ref 3.5–5.1)
POTASSIUM SERPL-SCNC: 3.1 MMOL/L (ref 3.5–5.1)
POTASSIUM SERPL-SCNC: 3.3 MMOL/L (ref 3.5–5.1)
POTASSIUM SERPL-SCNC: 3.4 MMOL/L (ref 3.5–5.1)
POTASSIUM SERPL-SCNC: 3.4 MMOL/L (ref 3.5–5.1)
POTASSIUM SERPL-SCNC: 3.5 MMOL/L (ref 3.5–5.1)
POTASSIUM SERPL-SCNC: 3.5 MMOL/L (ref 3.5–5.1)
POTASSIUM SERPL-SCNC: 3.6 MMOL/L (ref 3.5–5.1)
POTASSIUM SERPL-SCNC: 3.7 MMOL/L (ref 3.5–5.1)
POTASSIUM SERPL-SCNC: 3.7 MMOL/L (ref 3.5–5.1)
POTASSIUM SERPL-SCNC: 3.8 MMOL/L (ref 3.5–5.1)
POTASSIUM SERPL-SCNC: 4 MMOL/L (ref 3.5–5.1)
POTASSIUM SERPL-SCNC: 4.1 MMOL/L (ref 3.5–5.1)
POTASSIUM SERPL-SCNC: 4.1 MMOL/L (ref 3.5–5.1)
POTASSIUM SERPL-SCNC: 4.5 MMOL/L (ref 3.5–5.1)
POTASSIUM SERPL-SCNC: 4.5 MMOL/L (ref 3.5–5.1)
POTASSIUM SERPL-SCNC: 4.9 MMOL/L (ref 3.5–5.1)
POTASSIUM, UR: 12 MMOL/L
PROPOXYPHENE SCREEN: ABNORMAL
PROPOXYPHENE SCREEN: ABNORMAL
PROTEIN UA: 100 MG/DL
PROTEIN UA: 100 MG/DL
PROTEIN UA: 30 MG/DL
PROTEIN UA: NEGATIVE MG/DL
PROTEIN UA: NEGATIVE MG/DL
PROTHROMBIN TIME: 11.8 SEC (ref 10–13.2)
PROTHROMBIN TIME: 12.4 SEC (ref 10–13.2)
PROTHROMBIN TIME: 12.4 SEC (ref 10–13.2)
PROTHROMBIN TIME: 12.6 SEC (ref 10–13.2)
PROTHROMBIN TIME: 13.2 SEC (ref 10–13.2)
PROTHROMBIN TIME: 13.2 SEC (ref 10–13.2)
RAPID INFLUENZA  B AGN: NEGATIVE
RAPID INFLUENZA A AGN: NEGATIVE
RBC # BLD: 2.66 M/UL (ref 4–5.2)
RBC # BLD: 2.81 M/UL (ref 4–5.2)
RBC # BLD: 2.84 M/UL (ref 4–5.2)
RBC # BLD: 2.94 M/UL (ref 4–5.2)
RBC # BLD: 2.98 M/UL (ref 4–5.2)
RBC # BLD: 3.05 M/UL (ref 4–5.2)
RBC # BLD: 3.07 M/UL (ref 4–5.2)
RBC # BLD: 3.37 M/UL (ref 4–5.2)
RBC # BLD: 3.37 M/UL (ref 4–5.2)
RBC # BLD: 3.42 M/UL (ref 4–5.2)
RBC # BLD: 3.43 M/UL (ref 4–5.2)
RBC # BLD: 3.48 M/UL (ref 4–5.2)
RBC # BLD: 3.68 M/UL (ref 4–5.2)
RBC # BLD: 3.88 M/UL (ref 4–5.2)
RBC # BLD: 3.9 M/UL (ref 4–5.2)
RBC # BLD: 4.01 M/UL (ref 4–5.2)
RBC # BLD: 4.04 M/UL (ref 4–5.2)
RBC # BLD: 4.4 M/UL (ref 4–5.2)
RBC # BLD: NORMAL 10*6/UL
RBC UA: 1 /HPF (ref 0–4)
RBC UA: 11 /HPF (ref 0–4)
RBC UA: 2 /HPF (ref 0–4)
RBC UA: 60 /HPF (ref 0–4)
RBC UA: NORMAL /HPF (ref 0–4)
RUBELLA ANTIBODY IGG: 31.8 IU/ML
SALICYLATE, SERUM: <0.3 MG/DL (ref 15–30)
SARS-COV-2, NAAT: NOT DETECTED
SARS-COV-2, NAAT: NOT DETECTED
SLIDE REVIEW: ABNORMAL
SODIUM BLD-SCNC: 131 MMOL/L (ref 136–145)
SODIUM BLD-SCNC: 135 MMOL/L (ref 136–145)
SODIUM BLD-SCNC: 136 MMOL/L (ref 136–145)
SODIUM BLD-SCNC: 137 MMOL/L (ref 136–145)
SODIUM BLD-SCNC: 144 MMOL/L (ref 136–145)
SODIUM BLD-SCNC: 145 MMOL/L (ref 136–145)
SODIUM BLD-SCNC: 147 MMOL/L (ref 136–145)
SODIUM BLD-SCNC: 147 MMOL/L (ref 136–145)
SODIUM BLD-SCNC: 150 MMOL/L (ref 136–145)
SODIUM BLD-SCNC: 151 MMOL/L (ref 136–145)
SODIUM BLD-SCNC: 152 MMOL/L (ref 136–145)
SODIUM BLD-SCNC: 153 MMOL/L (ref 136–145)
SODIUM BLD-SCNC: 154 MMOL/L (ref 136–145)
SODIUM BLD-SCNC: 154 MMOL/L (ref 136–145)
SODIUM BLD-SCNC: 155 MMOL/L (ref 136–145)
SODIUM BLD-SCNC: 155 MMOL/L (ref 136–145)
SODIUM BLD-SCNC: 156 MMOL/L (ref 136–145)
SODIUM BLD-SCNC: 156 MMOL/L (ref 136–145)
SODIUM BLD-SCNC: 158 MMOL/L (ref 136–145)
SODIUM BLD-SCNC: 159 MMOL/L (ref 136–145)
SODIUM BLD-SCNC: 160 MMOL/L (ref 136–145)
SODIUM BLD-SCNC: 161 MMOL/L (ref 136–145)
SODIUM URINE: <20 MMOL/L
SPECIFIC GRAVITY UA: 1.01 (ref 1–1.03)
SPECIFIC GRAVITY UA: 1.02 (ref 1–1.03)
TCO2 ARTERIAL: 18 MMOL/L
TCO2 ARTERIAL: 25 MMOL/L
TCO2 ARTERIAL: 25 MMOL/L
TCO2 ARTERIAL: 25.5 MMOL/L
TCO2 ARTERIAL: 26 MMOL/L
TCO2 ARTERIAL: 27 MMOL/L
TCO2 ARTERIAL: 27 MMOL/L
TCO2 ARTERIAL: 39 MMOL/L
TCO2 ARTERIAL: 41.9 MMOL/L
TCO2 ARTERIAL: 42 MMOL/L
TCO2 ARTERIAL: 44.2 MMOL/L
TCO2 ARTERIAL: 46 MMOL/L
TCO2 ARTERIAL: 48.1 MMOL/L
TCO2 ARTERIAL: 57.2 MMOL/L
TCO2 ARTERIAL: 58.3 MMOL/L
TCO2 ARTERIAL: 59.4 MMOL/L
TCO2 ARTERIAL: 63.8 MMOL/L
TCO2 CALC VENOUS: 27 MMOL/L
TOTAL PROTEIN: 4.5 G/DL (ref 6.4–8.2)
TOTAL PROTEIN: 4.7 G/DL (ref 6.4–8.2)
TOTAL PROTEIN: 4.8 G/DL (ref 6.4–8.2)
TOTAL PROTEIN: 5.2 G/DL (ref 6.4–8.2)
TOTAL PROTEIN: 6 G/DL (ref 6.4–8.2)
TOTAL PROTEIN: 6.6 G/DL (ref 6.4–8.2)
TOTAL PROTEIN: 6.8 G/DL (ref 6.4–8.2)
TOTAL SYPHILLIS IGG/IGM: ABNORMAL
TROPONIN: 0.01 NG/ML
TROPONIN: 0.03 NG/ML
TROPONIN: <0.01 NG/ML
TROPONIN: <0.01 NG/ML
TSH SERPL DL<=0.05 MIU/L-ACNC: 2.99 UIU/ML (ref 0.27–4.2)
UREA NITROGEN, UR: 120 MG/DL (ref 800–1666)
URIC ACID, SERUM: 1.9 MG/DL (ref 2.6–6)
URINE CULTURE, ROUTINE: ABNORMAL
URINE CULTURE, ROUTINE: NORMAL
URINE REFLEX TO CULTURE: ABNORMAL
URINE REFLEX TO CULTURE: ABNORMAL
URINE TYPE: ABNORMAL
UROBILINOGEN, URINE: 0.2 E.U./DL
UROBILINOGEN, URINE: 1 E.U./DL
UROBILINOGEN, URINE: 1 E.U./DL
WBC # BLD: 10 K/UL (ref 4–11)
WBC # BLD: 10 K/UL (ref 4–11)
WBC # BLD: 10.1 K/UL (ref 4–11)
WBC # BLD: 10.3 K/UL (ref 4–11)
WBC # BLD: 10.3 K/UL (ref 4–11)
WBC # BLD: 10.6 K/UL (ref 4–11)
WBC # BLD: 10.8 K/UL (ref 4–11)
WBC # BLD: 10.8 K/UL (ref 4–11)
WBC # BLD: 11.2 K/UL (ref 4–11)
WBC # BLD: 11.8 K/UL (ref 4–11)
WBC # BLD: 6.7 K/UL (ref 4–11)
WBC # BLD: 7.1 K/UL (ref 4–11)
WBC # BLD: 7.2 K/UL (ref 4–11)
WBC # BLD: 8.3 K/UL (ref 4–11)
WBC # BLD: 8.3 K/UL (ref 4–11)
WBC # BLD: 9.2 K/UL (ref 4–11)
WBC # BLD: 9.5 K/UL (ref 4–11)
WBC # BLD: 9.9 K/UL (ref 4–11)
WBC UA: 1 /HPF (ref 0–5)
WBC UA: 15 /HPF (ref 0–5)
WBC UA: 27 /HPF (ref 0–5)
WBC UA: 32 /HPF (ref 0–5)
WBC UA: 6 /HPF (ref 0–5)

## 2021-01-01 PROCEDURE — 71045 X-RAY EXAM CHEST 1 VIEW: CPT

## 2021-01-01 PROCEDURE — 6360000002 HC RX W HCPCS: Performed by: INTERNAL MEDICINE

## 2021-01-01 PROCEDURE — 80048 BASIC METABOLIC PNL TOTAL CA: CPT

## 2021-01-01 PROCEDURE — 85379 FIBRIN DEGRADATION QUANT: CPT

## 2021-01-01 PROCEDURE — 81001 URINALYSIS AUTO W/SCOPE: CPT

## 2021-01-01 PROCEDURE — 84702 CHORIONIC GONADOTROPIN TEST: CPT

## 2021-01-01 PROCEDURE — 2580000003 HC RX 258

## 2021-01-01 PROCEDURE — 2700000000 HC OXYGEN THERAPY PER DAY

## 2021-01-01 PROCEDURE — 2580000003 HC RX 258: Performed by: INTERNAL MEDICINE

## 2021-01-01 PROCEDURE — 87077 CULTURE AEROBIC IDENTIFY: CPT

## 2021-01-01 PROCEDURE — 2580000003 HC RX 258: Performed by: PHYSICIAN ASSISTANT

## 2021-01-01 PROCEDURE — 85384 FIBRINOGEN ACTIVITY: CPT

## 2021-01-01 PROCEDURE — 84133 ASSAY OF URINE POTASSIUM: CPT

## 2021-01-01 PROCEDURE — 6360000002 HC RX W HCPCS: Performed by: FAMILY MEDICINE

## 2021-01-01 PROCEDURE — 80076 HEPATIC FUNCTION PANEL: CPT

## 2021-01-01 PROCEDURE — 84484 ASSAY OF TROPONIN QUANT: CPT

## 2021-01-01 PROCEDURE — 84100 ASSAY OF PHOSPHORUS: CPT

## 2021-01-01 PROCEDURE — 36415 COLL VENOUS BLD VENIPUNCTURE: CPT

## 2021-01-01 PROCEDURE — 93306 TTE W/DOPPLER COMPLETE: CPT

## 2021-01-01 PROCEDURE — 85025 COMPLETE CBC W/AUTO DIFF WBC: CPT

## 2021-01-01 PROCEDURE — 87086 URINE CULTURE/COLONY COUNT: CPT

## 2021-01-01 PROCEDURE — 84540 ASSAY OF URINE/UREA-N: CPT

## 2021-01-01 PROCEDURE — 85610 PROTHROMBIN TIME: CPT

## 2021-01-01 PROCEDURE — 87070 CULTURE OTHR SPECIMN AEROBIC: CPT

## 2021-01-01 PROCEDURE — 2100000000 HC CCU R&B

## 2021-01-01 PROCEDURE — 90833 PSYTX W PT W E/M 30 MIN: CPT | Performed by: PSYCHIATRY & NEUROLOGY

## 2021-01-01 PROCEDURE — 84132 ASSAY OF SERUM POTASSIUM: CPT

## 2021-01-01 PROCEDURE — 1036F TOBACCO NON-USER: CPT | Performed by: PSYCHIATRY & NEUROLOGY

## 2021-01-01 PROCEDURE — 2500000003 HC RX 250 WO HCPCS: Performed by: INTERNAL MEDICINE

## 2021-01-01 PROCEDURE — 82436 ASSAY OF URINE CHLORIDE: CPT

## 2021-01-01 PROCEDURE — 82077 ASSAY SPEC XCP UR&BREATH IA: CPT

## 2021-01-01 PROCEDURE — 94761 N-INVAS EAR/PLS OXIMETRY MLT: CPT

## 2021-01-01 PROCEDURE — 2580000003 HC RX 258: Performed by: EMERGENCY MEDICINE

## 2021-01-01 PROCEDURE — 99291 CRITICAL CARE FIRST HOUR: CPT | Performed by: INTERNAL MEDICINE

## 2021-01-01 PROCEDURE — 6370000000 HC RX 637 (ALT 250 FOR IP): Performed by: FAMILY MEDICINE

## 2021-01-01 PROCEDURE — 82803 BLOOD GASES ANY COMBINATION: CPT

## 2021-01-01 PROCEDURE — 96374 THER/PROPH/DIAG INJ IV PUSH: CPT

## 2021-01-01 PROCEDURE — 2580000003 HC RX 258: Performed by: HOSPITALIST

## 2021-01-01 PROCEDURE — 85014 HEMATOCRIT: CPT

## 2021-01-01 PROCEDURE — 85027 COMPLETE CBC AUTOMATED: CPT

## 2021-01-01 PROCEDURE — 94003 VENT MGMT INPAT SUBQ DAY: CPT

## 2021-01-01 PROCEDURE — 6360000002 HC RX W HCPCS

## 2021-01-01 PROCEDURE — G8420 CALC BMI NORM PARAMETERS: HCPCS | Performed by: PSYCHIATRY & NEUROLOGY

## 2021-01-01 PROCEDURE — 95819 EEG AWAKE AND ASLEEP: CPT

## 2021-01-01 PROCEDURE — 87635 SARS-COV-2 COVID-19 AMP PRB: CPT

## 2021-01-01 PROCEDURE — 96365 THER/PROPH/DIAG IV INF INIT: CPT

## 2021-01-01 PROCEDURE — G8427 DOCREV CUR MEDS BY ELIG CLIN: HCPCS | Performed by: PSYCHIATRY & NEUROLOGY

## 2021-01-01 PROCEDURE — 36620 INSERTION CATHETER ARTERY: CPT

## 2021-01-01 PROCEDURE — 99283 EMERGENCY DEPT VISIT LOW MDM: CPT

## 2021-01-01 PROCEDURE — 99233 SBSQ HOSP IP/OBS HIGH 50: CPT | Performed by: PSYCHIATRY & NEUROLOGY

## 2021-01-01 PROCEDURE — 36556 INSERT NON-TUNNEL CV CATH: CPT

## 2021-01-01 PROCEDURE — 85730 THROMBOPLASTIN TIME PARTIAL: CPT

## 2021-01-01 PROCEDURE — 99285 EMERGENCY DEPT VISIT HI MDM: CPT

## 2021-01-01 PROCEDURE — 2500000003 HC RX 250 WO HCPCS: Performed by: FAMILY MEDICINE

## 2021-01-01 PROCEDURE — 87040 BLOOD CULTURE FOR BACTERIA: CPT

## 2021-01-01 PROCEDURE — 95822 EEG COMA OR SLEEP ONLY: CPT | Performed by: PSYCHIATRY & NEUROLOGY

## 2021-01-01 PROCEDURE — 6360000002 HC RX W HCPCS: Performed by: PHYSICIAN ASSISTANT

## 2021-01-01 PROCEDURE — 83036 HEMOGLOBIN GLYCOSYLATED A1C: CPT

## 2021-01-01 PROCEDURE — 86901 BLOOD TYPING SEROLOGIC RH(D): CPT

## 2021-01-01 PROCEDURE — 5A1955Z RESPIRATORY VENTILATION, GREATER THAN 96 CONSECUTIVE HOURS: ICD-10-PCS | Performed by: EMERGENCY MEDICINE

## 2021-01-01 PROCEDURE — 70450 CT HEAD/BRAIN W/O DYE: CPT

## 2021-01-01 PROCEDURE — 82330 ASSAY OF CALCIUM: CPT

## 2021-01-01 PROCEDURE — 90832 PSYTX W PT 30 MINUTES: CPT | Performed by: PSYCHOLOGIST

## 2021-01-01 PROCEDURE — P9016 RBC LEUKOCYTES REDUCED: HCPCS

## 2021-01-01 PROCEDURE — 6370000000 HC RX 637 (ALT 250 FOR IP): Performed by: HOSPITALIST

## 2021-01-01 PROCEDURE — C9113 INJ PANTOPRAZOLE SODIUM, VIA: HCPCS | Performed by: INTERNAL MEDICINE

## 2021-01-01 PROCEDURE — 80170 ASSAY OF GENTAMICIN: CPT

## 2021-01-01 PROCEDURE — 93005 ELECTROCARDIOGRAM TRACING: CPT | Performed by: PHYSICIAN ASSISTANT

## 2021-01-01 PROCEDURE — 99214 OFFICE O/P EST MOD 30 MIN: CPT | Performed by: PSYCHIATRY & NEUROLOGY

## 2021-01-01 PROCEDURE — 83605 ASSAY OF LACTIC ACID: CPT

## 2021-01-01 PROCEDURE — 83735 ASSAY OF MAGNESIUM: CPT

## 2021-01-01 PROCEDURE — 6360000002 HC RX W HCPCS: Performed by: EMERGENCY MEDICINE

## 2021-01-01 PROCEDURE — 6370000000 HC RX 637 (ALT 250 FOR IP): Performed by: INTERNAL MEDICINE

## 2021-01-01 PROCEDURE — 80053 COMPREHEN METABOLIC PANEL: CPT

## 2021-01-01 PROCEDURE — 82947 ASSAY GLUCOSE BLOOD QUANT: CPT

## 2021-01-01 PROCEDURE — 96375 TX/PRO/DX INJ NEW DRUG ADDON: CPT

## 2021-01-01 PROCEDURE — 86403 PARTICLE AGGLUT ANTBDY SCRN: CPT

## 2021-01-01 PROCEDURE — 86923 COMPATIBILITY TEST ELECTRIC: CPT

## 2021-01-01 PROCEDURE — 36430 TRANSFUSION BLD/BLD COMPNT: CPT

## 2021-01-01 PROCEDURE — P9045 ALBUMIN (HUMAN), 5%, 250 ML: HCPCS

## 2021-01-01 PROCEDURE — 80143 DRUG ASSAY ACETAMINOPHEN: CPT

## 2021-01-01 PROCEDURE — 94002 VENT MGMT INPAT INIT DAY: CPT

## 2021-01-01 PROCEDURE — 99223 1ST HOSP IP/OBS HIGH 75: CPT | Performed by: PSYCHIATRY & NEUROLOGY

## 2021-01-01 PROCEDURE — 2580000003 HC RX 258: Performed by: FAMILY MEDICINE

## 2021-01-01 PROCEDURE — 2580000003 HC RX 258: Performed by: OBSTETRICS & GYNECOLOGY

## 2021-01-01 PROCEDURE — 71046 X-RAY EXAM CHEST 2 VIEWS: CPT

## 2021-01-01 PROCEDURE — 70486 CT MAXILLOFACIAL W/O DYE: CPT

## 2021-01-01 PROCEDURE — 71250 CT THORAX DX C-: CPT

## 2021-01-01 PROCEDURE — 83690 ASSAY OF LIPASE: CPT

## 2021-01-01 PROCEDURE — 80307 DRUG TEST PRSMV CHEM ANLYZR: CPT

## 2021-01-01 PROCEDURE — 3600000016 HC SURGERY LEVEL 6 ADDTL 15MIN

## 2021-01-01 PROCEDURE — 82150 ASSAY OF AMYLASE: CPT

## 2021-01-01 PROCEDURE — 37799 UNLISTED PX VASCULAR SURGERY: CPT

## 2021-01-01 PROCEDURE — 87205 SMEAR GRAM STAIN: CPT

## 2021-01-01 PROCEDURE — G8484 FLU IMMUNIZE NO ADMIN: HCPCS | Performed by: PSYCHIATRY & NEUROLOGY

## 2021-01-01 PROCEDURE — 84295 ASSAY OF SERUM SODIUM: CPT

## 2021-01-01 PROCEDURE — 84300 ASSAY OF URINE SODIUM: CPT

## 2021-01-01 PROCEDURE — 82570 ASSAY OF URINE CREATININE: CPT

## 2021-01-01 PROCEDURE — 96366 THER/PROPH/DIAG IV INF ADDON: CPT

## 2021-01-01 PROCEDURE — 6370000000 HC RX 637 (ALT 250 FOR IP): Performed by: PHYSICIAN ASSISTANT

## 2021-01-01 PROCEDURE — P9045 ALBUMIN (HUMAN), 5%, 250 ML: HCPCS | Performed by: INTERNAL MEDICINE

## 2021-01-01 PROCEDURE — 31575 DIAGNOSTIC LARYNGOSCOPY: CPT | Performed by: OTOLARYNGOLOGY

## 2021-01-01 PROCEDURE — 87804 INFLUENZA ASSAY W/OPTIC: CPT

## 2021-01-01 PROCEDURE — 02HV33Z INSERTION OF INFUSION DEVICE INTO SUPERIOR VENA CAVA, PERCUTANEOUS APPROACH: ICD-10-PCS | Performed by: EMERGENCY MEDICINE

## 2021-01-01 PROCEDURE — 3600000006 HC SURGERY LEVEL 6 BASE

## 2021-01-01 PROCEDURE — 36592 COLLECT BLOOD FROM PICC: CPT

## 2021-01-01 PROCEDURE — 80179 DRUG ASSAY SALICYLATE: CPT

## 2021-01-01 PROCEDURE — 2720000010 HC SURG SUPPLY STERILE

## 2021-01-01 PROCEDURE — 86850 RBC ANTIBODY SCREEN: CPT

## 2021-01-01 PROCEDURE — 93010 ELECTROCARDIOGRAM REPORT: CPT | Performed by: INTERNAL MEDICINE

## 2021-01-01 PROCEDURE — 86900 BLOOD TYPING SEROLOGIC ABO: CPT

## 2021-01-01 PROCEDURE — 87186 SC STD MICRODIL/AGAR DIL: CPT

## 2021-01-01 PROCEDURE — 51702 INSERT TEMP BLADDER CATH: CPT

## 2021-01-01 PROCEDURE — 2709999900 HC NON-CHARGEABLE SUPPLY

## 2021-01-01 RX ORDER — MIRTAZAPINE 30 MG/1
TABLET, FILM COATED ORAL
Qty: 30 TABLET | Refills: 0 | Status: SHIPPED | OUTPATIENT
Start: 2021-01-01 | End: 2021-01-01

## 2021-01-01 RX ORDER — ATROPINE SULFATE 0.1 MG/ML
INJECTION INTRAVENOUS
Status: COMPLETED
Start: 2021-01-01 | End: 2021-01-01

## 2021-01-01 RX ORDER — POTASSIUM CHLORIDE 7.45 MG/ML
10 INJECTION INTRAVENOUS
Status: COMPLETED | OUTPATIENT
Start: 2021-01-01 | End: 2021-01-01

## 2021-01-01 RX ORDER — POTASSIUM CHLORIDE 7.45 MG/ML
20 INJECTION INTRAVENOUS ONCE
Status: DISCONTINUED | OUTPATIENT
Start: 2021-01-01 | End: 2021-01-01 | Stop reason: SDUPTHER

## 2021-01-01 RX ORDER — LEVOTHYROXINE SODIUM 0.07 MG/1
TABLET ORAL
Qty: 90 TABLET | Refills: 3 | Status: SHIPPED | OUTPATIENT
Start: 2021-01-01 | End: 2021-01-01 | Stop reason: ALTCHOICE

## 2021-01-01 RX ORDER — 0.9 % SODIUM CHLORIDE 0.9 %
1000 INTRAVENOUS SOLUTION INTRAVENOUS ONCE
Status: COMPLETED | OUTPATIENT
Start: 2021-01-01 | End: 2021-01-01

## 2021-01-01 RX ORDER — SODIUM CHLORIDE 0.9 % (FLUSH) 0.9 %
5-40 SYRINGE (ML) INJECTION PRN
Status: DISCONTINUED | OUTPATIENT
Start: 2021-01-01 | End: 2021-05-18 | Stop reason: HOSPADM

## 2021-01-01 RX ORDER — LABETALOL HYDROCHLORIDE 5 MG/ML
5 INJECTION, SOLUTION INTRAVENOUS EVERY 6 HOURS PRN
Status: DISCONTINUED | OUTPATIENT
Start: 2021-01-01 | End: 2021-05-18 | Stop reason: HOSPADM

## 2021-01-01 RX ORDER — CALCIUM GLUCONATE 20 MG/ML
1000 INJECTION, SOLUTION INTRAVENOUS ONCE
Status: COMPLETED | OUTPATIENT
Start: 2021-01-01 | End: 2021-01-01

## 2021-01-01 RX ORDER — MORPHINE SULFATE 2 MG/ML
1 INJECTION, SOLUTION INTRAMUSCULAR; INTRAVENOUS
Status: DISCONTINUED | OUTPATIENT
Start: 2021-01-01 | End: 2021-05-18 | Stop reason: HOSPADM

## 2021-01-01 RX ORDER — SERTRALINE HYDROCHLORIDE 100 MG/1
100 TABLET, FILM COATED ORAL DAILY
Qty: 30 TABLET | Refills: 1 | Status: SHIPPED | OUTPATIENT
Start: 2021-01-01

## 2021-01-01 RX ORDER — ALBUMIN, HUMAN INJ 5% 5 %
12.5 SOLUTION INTRAVENOUS ONCE
Status: COMPLETED | OUTPATIENT
Start: 2021-01-01 | End: 2021-01-01

## 2021-01-01 RX ORDER — DESMOPRESSIN ACETATE 4 UG/ML
1 INJECTION, SOLUTION INTRAVENOUS; SUBCUTANEOUS ONCE
Status: COMPLETED | OUTPATIENT
Start: 2021-01-01 | End: 2021-01-01

## 2021-01-01 RX ORDER — PROPRANOLOL HYDROCHLORIDE 120 MG/1
CAPSULE, EXTENDED RELEASE ORAL
Qty: 30 CAPSULE | Refills: 3 | Status: SHIPPED | OUTPATIENT
Start: 2021-01-01 | End: 2021-01-01

## 2021-01-01 RX ORDER — ONDANSETRON 2 MG/ML
4 INJECTION INTRAMUSCULAR; INTRAVENOUS EVERY 6 HOURS PRN
Status: DISCONTINUED | OUTPATIENT
Start: 2021-01-01 | End: 2021-05-18 | Stop reason: HOSPADM

## 2021-01-01 RX ORDER — SODIUM CHLORIDE 450 MG/100ML
500 INJECTION, SOLUTION INTRAVENOUS ONCE
Status: COMPLETED | OUTPATIENT
Start: 2021-01-01 | End: 2021-01-01

## 2021-01-01 RX ORDER — POLYVINYL ALCOHOL 14 MG/ML
1 SOLUTION/ DROPS OPHTHALMIC EVERY 4 HOURS
Status: DISCONTINUED | OUTPATIENT
Start: 2021-01-01 | End: 2021-05-18 | Stop reason: HOSPADM

## 2021-01-01 RX ORDER — AMLODIPINE BESYLATE 5 MG/1
TABLET ORAL
Qty: 90 TABLET | Refills: 1 | Status: SHIPPED | OUTPATIENT
Start: 2021-01-01 | End: 2021-01-01

## 2021-01-01 RX ORDER — CLINDAMYCIN PHOSPHATE 900 MG/50ML
900 INJECTION INTRAVENOUS EVERY 8 HOURS
Status: DISCONTINUED | OUTPATIENT
Start: 2021-01-01 | End: 2021-05-18 | Stop reason: HOSPADM

## 2021-01-01 RX ORDER — FENTANYL CITRATE 50 UG/ML
25 INJECTION, SOLUTION INTRAMUSCULAR; INTRAVENOUS
Status: DISCONTINUED | OUTPATIENT
Start: 2021-01-01 | End: 2021-05-18 | Stop reason: HOSPADM

## 2021-01-01 RX ORDER — LABETALOL 200 MG/1
200 TABLET, FILM COATED ORAL 2 TIMES DAILY
COMMUNITY

## 2021-01-01 RX ORDER — DEXTROSE MONOHYDRATE 50 MG/ML
INJECTION, SOLUTION INTRAVENOUS CONTINUOUS
Status: DISCONTINUED | OUTPATIENT
Start: 2021-01-01 | End: 2021-01-01

## 2021-01-01 RX ORDER — PANTOPRAZOLE SODIUM 40 MG/10ML
40 INJECTION, POWDER, LYOPHILIZED, FOR SOLUTION INTRAVENOUS DAILY
Status: DISCONTINUED | OUTPATIENT
Start: 2021-01-01 | End: 2021-05-18 | Stop reason: HOSPADM

## 2021-01-01 RX ORDER — DEXTROSE MONOHYDRATE 25 G/50ML
12.5 INJECTION, SOLUTION INTRAVENOUS PRN
Status: DISCONTINUED | OUTPATIENT
Start: 2021-01-01 | End: 2021-05-18 | Stop reason: HOSPADM

## 2021-01-01 RX ORDER — PROMETHAZINE HYDROCHLORIDE 25 MG/1
12.5 TABLET ORAL EVERY 6 HOURS PRN
Status: DISCONTINUED | OUTPATIENT
Start: 2021-01-01 | End: 2021-05-18 | Stop reason: HOSPADM

## 2021-01-01 RX ORDER — ONDANSETRON 4 MG/1
TABLET, ORALLY DISINTEGRATING ORAL
Qty: 20 TABLET | Refills: 1 | Status: SHIPPED | OUTPATIENT
Start: 2021-01-01

## 2021-01-01 RX ORDER — MIDAZOLAM HYDROCHLORIDE 2 MG/2ML
2 INJECTION, SOLUTION INTRAMUSCULAR; INTRAVENOUS EVERY 30 MIN PRN
Status: DISCONTINUED | OUTPATIENT
Start: 2021-01-01 | End: 2021-05-18 | Stop reason: HOSPADM

## 2021-01-01 RX ORDER — PROPOFOL 10 MG/ML
10 INJECTION, EMULSION INTRAVENOUS
Status: DISCONTINUED | OUTPATIENT
Start: 2021-01-01 | End: 2021-01-01

## 2021-01-01 RX ORDER — LEVOTHYROXINE SODIUM 0.1 MG/1
100 TABLET ORAL DAILY
COMMUNITY

## 2021-01-01 RX ORDER — OMEPRAZOLE 40 MG/1
CAPSULE, DELAYED RELEASE ORAL
Qty: 30 CAPSULE | Refills: 0 | Status: SHIPPED | OUTPATIENT
Start: 2021-01-01 | End: 2021-01-01

## 2021-01-01 RX ORDER — CARBOXYMETHYLCELLULOSE SODIUM 10 MG/ML
1 GEL OPHTHALMIC 4 TIMES DAILY
Status: DISCONTINUED | OUTPATIENT
Start: 2021-01-01 | End: 2021-05-18 | Stop reason: HOSPADM

## 2021-01-01 RX ORDER — INSULIN LISPRO 100 [IU]/ML
0-6 INJECTION, SOLUTION INTRAVENOUS; SUBCUTANEOUS EVERY 4 HOURS
Status: DISCONTINUED | OUTPATIENT
Start: 2021-01-01 | End: 2021-05-18 | Stop reason: HOSPADM

## 2021-01-01 RX ORDER — 0.9 % SODIUM CHLORIDE 0.9 %
30 INTRAVENOUS SOLUTION INTRAVENOUS ONCE
Status: COMPLETED | OUTPATIENT
Start: 2021-01-01 | End: 2021-01-01

## 2021-01-01 RX ORDER — DEXTROSE MONOHYDRATE 50 MG/ML
100 INJECTION, SOLUTION INTRAVENOUS PRN
Status: DISCONTINUED | OUTPATIENT
Start: 2021-01-01 | End: 2021-05-18 | Stop reason: HOSPADM

## 2021-01-01 RX ORDER — DEXTROSE MONOHYDRATE 50 MG/ML
INJECTION, SOLUTION INTRAVENOUS CONTINUOUS
Status: DISCONTINUED | OUTPATIENT
Start: 2021-01-01 | End: 2021-05-18 | Stop reason: HOSPADM

## 2021-01-01 RX ORDER — SODIUM CHLORIDE 9 MG/ML
INJECTION, SOLUTION INTRAVENOUS ONCE
Status: CANCELLED | OUTPATIENT
Start: 2021-01-01

## 2021-01-01 RX ORDER — EPINEPHRINE 0.1 MG/ML
SYRINGE (ML) INJECTION
Status: COMPLETED
Start: 2021-01-01 | End: 2021-01-01

## 2021-01-01 RX ORDER — POTASSIUM CHLORIDE 29.8 MG/ML
20 INJECTION INTRAVENOUS PRN
Status: DISCONTINUED | OUTPATIENT
Start: 2021-01-01 | End: 2021-05-18 | Stop reason: HOSPADM

## 2021-01-01 RX ORDER — MAGNESIUM SULFATE IN WATER 40 MG/ML
2000 INJECTION, SOLUTION INTRAVENOUS PRN
Status: DISCONTINUED | OUTPATIENT
Start: 2021-01-01 | End: 2021-05-18 | Stop reason: HOSPADM

## 2021-01-01 RX ORDER — NICOTINE POLACRILEX 4 MG
15 LOZENGE BUCCAL PRN
Status: DISCONTINUED | OUTPATIENT
Start: 2021-01-01 | End: 2021-05-18 | Stop reason: HOSPADM

## 2021-01-01 RX ORDER — SODIUM CHLORIDE 9 MG/ML
INJECTION, SOLUTION INTRAVENOUS
Status: COMPLETED
Start: 2021-01-01 | End: 2021-01-01

## 2021-01-01 RX ORDER — PROPOFOL 10 MG/ML
5-50 INJECTION, EMULSION INTRAVENOUS CONTINUOUS
Status: DISCONTINUED | OUTPATIENT
Start: 2021-01-01 | End: 2021-01-01 | Stop reason: ALTCHOICE

## 2021-01-01 RX ORDER — SODIUM CHLORIDE 9 MG/ML
INJECTION, SOLUTION INTRAVENOUS
Status: DISPENSED
Start: 2021-01-01 | End: 2021-01-01

## 2021-01-01 RX ORDER — SODIUM CHLORIDE, SODIUM LACTATE, POTASSIUM CHLORIDE, AND CALCIUM CHLORIDE .6; .31; .03; .02 G/100ML; G/100ML; G/100ML; G/100ML
1000 INJECTION, SOLUTION INTRAVENOUS ONCE
Status: COMPLETED | OUTPATIENT
Start: 2021-01-01 | End: 2021-01-01

## 2021-01-01 RX ORDER — DEXTROSE, SODIUM CHLORIDE, AND POTASSIUM CHLORIDE 5; .45; .15 G/100ML; G/100ML; G/100ML
INJECTION INTRAVENOUS CONTINUOUS
Status: DISCONTINUED | OUTPATIENT
Start: 2021-01-01 | End: 2021-01-01

## 2021-01-01 RX ORDER — ALBUMIN, HUMAN INJ 5% 5 %
SOLUTION INTRAVENOUS
Status: COMPLETED
Start: 2021-01-01 | End: 2021-01-01

## 2021-01-01 RX ORDER — 0.9 % SODIUM CHLORIDE 0.9 %
500 INTRAVENOUS SOLUTION INTRAVENOUS ONCE
Status: COMPLETED | OUTPATIENT
Start: 2021-01-01 | End: 2021-01-01

## 2021-01-01 RX ORDER — LABETALOL HYDROCHLORIDE 5 MG/ML
INJECTION, SOLUTION INTRAVENOUS
Status: DISPENSED
Start: 2021-01-01 | End: 2021-01-01

## 2021-01-01 RX ORDER — SODIUM CHLORIDE 9 MG/ML
INJECTION, SOLUTION INTRAVENOUS CONTINUOUS
Status: DISCONTINUED | OUTPATIENT
Start: 2021-01-01 | End: 2021-01-01

## 2021-01-01 RX ORDER — NITROGLYCERIN 20 MG/100ML
5-200 INJECTION INTRAVENOUS CONTINUOUS
Status: DISCONTINUED | OUTPATIENT
Start: 2021-01-01 | End: 2021-01-01 | Stop reason: ALTCHOICE

## 2021-01-01 RX ORDER — MORPHINE SULFATE 2 MG/ML
2 INJECTION, SOLUTION INTRAMUSCULAR; INTRAVENOUS EVERY 5 MIN PRN
Status: DISCONTINUED | OUTPATIENT
Start: 2021-01-01 | End: 2021-05-18 | Stop reason: HOSPADM

## 2021-01-01 RX ORDER — INSULIN LISPRO 100 [IU]/ML
0-6 INJECTION, SOLUTION INTRAVENOUS; SUBCUTANEOUS
Status: DISCONTINUED | OUTPATIENT
Start: 2021-01-01 | End: 2021-01-01

## 2021-01-01 RX ORDER — ACETAMINOPHEN 500 MG
1000 TABLET ORAL ONCE
Status: COMPLETED | OUTPATIENT
Start: 2021-01-01 | End: 2021-01-01

## 2021-01-01 RX ORDER — HEPARIN SODIUM 5000 [USP'U]/ML
15000 INJECTION, SOLUTION INTRAVENOUS; SUBCUTANEOUS ONCE
Status: COMPLETED | OUTPATIENT
Start: 2021-01-01 | End: 2021-01-01

## 2021-01-01 RX ORDER — HYDROXYZINE 50 MG/1
TABLET, FILM COATED ORAL
COMMUNITY
Start: 2020-01-01 | End: 2021-01-01

## 2021-01-01 RX ORDER — INSULIN LISPRO 100 [IU]/ML
0-3 INJECTION, SOLUTION INTRAVENOUS; SUBCUTANEOUS NIGHTLY
Status: DISCONTINUED | OUTPATIENT
Start: 2021-01-01 | End: 2021-01-01

## 2021-01-01 RX ORDER — SODIUM CHLORIDE 0.9 % (FLUSH) 0.9 %
5-40 SYRINGE (ML) INJECTION 2 TIMES DAILY
Status: DISCONTINUED | OUTPATIENT
Start: 2021-01-01 | End: 2021-05-18 | Stop reason: HOSPADM

## 2021-01-01 RX ORDER — CHLORHEXIDINE GLUCONATE 0.12 MG/ML
15 RINSE ORAL 2 TIMES DAILY
Status: DISCONTINUED | OUTPATIENT
Start: 2021-01-01 | End: 2021-05-18 | Stop reason: HOSPADM

## 2021-01-01 RX ORDER — CALCIUM CHLORIDE 100 MG/ML
1000 INJECTION INTRAVENOUS; INTRAVENTRICULAR ONCE
Status: DISCONTINUED | OUTPATIENT
Start: 2021-01-01 | End: 2021-01-01

## 2021-01-01 RX ORDER — METOCLOPRAMIDE HYDROCHLORIDE 5 MG/ML
10 INJECTION INTRAMUSCULAR; INTRAVENOUS ONCE
Status: COMPLETED | OUTPATIENT
Start: 2021-01-01 | End: 2021-01-01

## 2021-01-01 RX ORDER — MINERAL OIL AND WHITE PETROLATUM 150; 830 MG/G; MG/G
OINTMENT OPHTHALMIC EVERY 4 HOURS
Status: DISCONTINUED | OUTPATIENT
Start: 2021-01-01 | End: 2021-01-01 | Stop reason: RX

## 2021-01-01 RX ORDER — SODIUM CHLORIDE 9 MG/ML
INJECTION, SOLUTION INTRAVENOUS PRN
Status: DISCONTINUED | OUTPATIENT
Start: 2021-01-01 | End: 2021-05-18 | Stop reason: HOSPADM

## 2021-01-01 RX ORDER — DIPHENHYDRAMINE HYDROCHLORIDE 50 MG/ML
25 INJECTION INTRAMUSCULAR; INTRAVENOUS ONCE
Status: COMPLETED | OUTPATIENT
Start: 2021-01-01 | End: 2021-01-01

## 2021-01-01 RX ORDER — NITROGLYCERIN 20 MG/100ML
INJECTION INTRAVENOUS
Status: DISCONTINUED
Start: 2021-01-01 | End: 2021-01-01 | Stop reason: HOSPADM

## 2021-01-01 RX ORDER — OMEPRAZOLE 40 MG/1
CAPSULE, DELAYED RELEASE ORAL
Qty: 30 CAPSULE | Refills: 0 | Status: SHIPPED | OUTPATIENT
Start: 2021-01-01

## 2021-01-01 RX ORDER — VORTIOXETINE 20 MG/1
TABLET, FILM COATED ORAL
Qty: 30 TABLET | Refills: 1 | Status: SHIPPED | OUTPATIENT
Start: 2021-01-01 | End: 2021-01-01

## 2021-01-01 RX ORDER — MAGNESIUM HYDROXIDE 1200 MG/15ML
LIQUID ORAL CONTINUOUS PRN
Status: COMPLETED | OUTPATIENT
Start: 2021-01-01 | End: 2021-01-01

## 2021-01-01 RX ORDER — AMLODIPINE BESYLATE 5 MG/1
5 TABLET ORAL DAILY
COMMUNITY

## 2021-01-01 RX ORDER — HEPARIN SODIUM 5000 [USP'U]/ML
15000 INJECTION, SOLUTION INTRAVENOUS; SUBCUTANEOUS ONCE
Status: DISCONTINUED | OUTPATIENT
Start: 2021-01-01 | End: 2021-01-01

## 2021-01-01 RX ADMIN — CALCIUM CHLORIDE 1000 MG: 100 INJECTION, SOLUTION INTRAVENOUS at 00:31

## 2021-01-01 RX ADMIN — POTASSIUM CHLORIDE 20 MEQ: 29.8 INJECTION, SOLUTION INTRAVENOUS at 12:51

## 2021-01-01 RX ADMIN — ENOXAPARIN SODIUM 40 MG: 40 INJECTION SUBCUTANEOUS at 10:40

## 2021-01-01 RX ADMIN — POTASSIUM CHLORIDE 20 MEQ: 29.8 INJECTION, SOLUTION INTRAVENOUS at 08:23

## 2021-01-01 RX ADMIN — Medication 10 ML: at 07:49

## 2021-01-01 RX ADMIN — AMPICILLIN SODIUM 2000 MG: 2 INJECTION, POWDER, FOR SOLUTION INTRAMUSCULAR; INTRAVENOUS at 04:04

## 2021-01-01 RX ADMIN — POLYVINYL ALCOHOL 1 DROP: 14 SOLUTION/ DROPS OPHTHALMIC at 02:07

## 2021-01-01 RX ADMIN — Medication 10 ML: at 08:18

## 2021-01-01 RX ADMIN — CARBOXYMETHYLCELLULOSE SODIUM 1 DROP: 10 GEL OPHTHALMIC at 17:30

## 2021-01-01 RX ADMIN — SODIUM CHLORIDE 1000 ML: 9 INJECTION, SOLUTION INTRAVENOUS at 20:31

## 2021-01-01 RX ADMIN — CHLORHEXIDINE GLUCONATE 15 ML: 1.2 RINSE ORAL at 10:41

## 2021-01-01 RX ADMIN — DEXTROSE MONOHYDRATE: 50 INJECTION, SOLUTION INTRAVENOUS at 00:26

## 2021-01-01 RX ADMIN — HEPARIN SODIUM 15000 UNITS: 5000 INJECTION INTRAVENOUS; SUBCUTANEOUS at 13:09

## 2021-01-01 RX ADMIN — PANTOPRAZOLE SODIUM 40 MG: 40 INJECTION, POWDER, FOR SOLUTION INTRAVENOUS at 08:40

## 2021-01-01 RX ADMIN — SODIUM CHLORIDE, POTASSIUM CHLORIDE, SODIUM LACTATE AND CALCIUM CHLORIDE 1000 ML: 600; 310; 30; 20 INJECTION, SOLUTION INTRAVENOUS at 17:38

## 2021-01-01 RX ADMIN — POLYVINYL ALCOHOL 1 DROP: 14 SOLUTION/ DROPS OPHTHALMIC at 17:56

## 2021-01-01 RX ADMIN — DEXTROSE MONOHYDRATE: 50 INJECTION, SOLUTION INTRAVENOUS at 18:30

## 2021-01-01 RX ADMIN — ENOXAPARIN SODIUM 40 MG: 40 INJECTION SUBCUTANEOUS at 08:07

## 2021-01-01 RX ADMIN — POLYVINYL ALCOHOL 1 DROP: 14 SOLUTION/ DROPS OPHTHALMIC at 20:03

## 2021-01-01 RX ADMIN — CHLORHEXIDINE GLUCONATE 15 ML: 1.2 RINSE ORAL at 20:54

## 2021-01-01 RX ADMIN — CARBOXYMETHYLCELLULOSE SODIUM 1 DROP: 10 GEL OPHTHALMIC at 12:52

## 2021-01-01 RX ADMIN — CLINDAMYCIN PHOSPHATE 900 MG: 900 INJECTION, SOLUTION INTRAVENOUS at 19:17

## 2021-01-01 RX ADMIN — DEXTROSE MONOHYDRATE 12.5 G: 25 INJECTION, SOLUTION INTRAVENOUS at 10:55

## 2021-01-01 RX ADMIN — PANTOPRAZOLE SODIUM 40 MG: 40 INJECTION, POWDER, FOR SOLUTION INTRAVENOUS at 07:32

## 2021-01-01 RX ADMIN — GENTAMICIN SULFATE 300 MG: 40 INJECTION, SOLUTION INTRAMUSCULAR; INTRAVENOUS at 20:43

## 2021-01-01 RX ADMIN — SODIUM CHLORIDE 1974 ML: 9 INJECTION, SOLUTION INTRAVENOUS at 15:40

## 2021-01-01 RX ADMIN — POLYVINYL ALCOHOL 1 DROP: 14 SOLUTION/ DROPS OPHTHALMIC at 05:11

## 2021-01-01 RX ADMIN — VASOPRESSIN 0.04 UNITS/MIN: 20 INJECTION INTRAVENOUS at 20:49

## 2021-01-01 RX ADMIN — POTASSIUM CHLORIDE 20 MEQ: 29.8 INJECTION, SOLUTION INTRAVENOUS at 11:49

## 2021-01-01 RX ADMIN — DEXTROSE MONOHYDRATE 12.5 G: 25 INJECTION, SOLUTION INTRAVENOUS at 10:21

## 2021-01-01 RX ADMIN — POLYVINYL ALCOHOL 1 DROP: 14 SOLUTION/ DROPS OPHTHALMIC at 17:30

## 2021-01-01 RX ADMIN — PROPOFOL 15 MCG/KG/MIN: 10 INJECTION, EMULSION INTRAVENOUS at 18:19

## 2021-01-01 RX ADMIN — PANTOPRAZOLE SODIUM 40 MG: 40 INJECTION, POWDER, FOR SOLUTION INTRAVENOUS at 08:08

## 2021-01-01 RX ADMIN — CARBOXYMETHYLCELLULOSE SODIUM 1 DROP: 10 GEL OPHTHALMIC at 16:40

## 2021-01-01 RX ADMIN — POLYVINYL ALCOHOL 1 DROP: 14 SOLUTION/ DROPS OPHTHALMIC at 13:52

## 2021-01-01 RX ADMIN — Medication 10 ML: at 08:48

## 2021-01-01 RX ADMIN — ALBUMIN, HUMAN INJ 5% 12.5 G: 5 SOLUTION at 02:33

## 2021-01-01 RX ADMIN — Medication 10 ML: at 21:20

## 2021-01-01 RX ADMIN — SODIUM PHOSPHATE, MONOBASIC, MONOHYDRATE 10.53 MMOL: 276; 142 INJECTION, SOLUTION INTRAVENOUS at 15:42

## 2021-01-01 RX ADMIN — CHLORHEXIDINE GLUCONATE 15 ML: 1.2 RINSE ORAL at 19:54

## 2021-01-01 RX ADMIN — POLYVINYL ALCOHOL 1 DROP: 14 SOLUTION/ DROPS OPHTHALMIC at 05:00

## 2021-01-01 RX ADMIN — Medication 10 ML: at 20:25

## 2021-01-01 RX ADMIN — POTASSIUM CHLORIDE 20 MEQ: 29.8 INJECTION, SOLUTION INTRAVENOUS at 01:11

## 2021-01-01 RX ADMIN — HYDROCORTISONE SODIUM SUCCINATE 100 MG: 100 INJECTION, POWDER, FOR SOLUTION INTRAMUSCULAR; INTRAVENOUS at 23:59

## 2021-01-01 RX ADMIN — VASOPRESSIN 0.04 UNITS/MIN: 20 INJECTION INTRAVENOUS at 03:08

## 2021-01-01 RX ADMIN — POTASSIUM CHLORIDE 20 MEQ: 29.8 INJECTION, SOLUTION INTRAVENOUS at 05:58

## 2021-01-01 RX ADMIN — POTASSIUM CHLORIDE 20 MEQ: 29.8 INJECTION, SOLUTION INTRAVENOUS at 00:44

## 2021-01-01 RX ADMIN — SODIUM BICARBONATE: 84 INJECTION, SOLUTION INTRAVENOUS at 14:35

## 2021-01-01 RX ADMIN — POTASSIUM CHLORIDE 20 MEQ: 29.8 INJECTION, SOLUTION INTRAVENOUS at 20:51

## 2021-01-01 RX ADMIN — CARBOXYMETHYLCELLULOSE SODIUM 1 DROP: 10 GEL OPHTHALMIC at 10:21

## 2021-01-01 RX ADMIN — POLYVINYL ALCOHOL 1 DROP: 14 SOLUTION/ DROPS OPHTHALMIC at 12:52

## 2021-01-01 RX ADMIN — INSULIN LISPRO 1 UNITS: 100 INJECTION, SOLUTION INTRAVENOUS; SUBCUTANEOUS at 18:46

## 2021-01-01 RX ADMIN — Medication 10 ML: at 08:43

## 2021-01-01 RX ADMIN — DEXTROSE MONOHYDRATE 12.5 G: 25 INJECTION, SOLUTION INTRAVENOUS at 01:28

## 2021-01-01 RX ADMIN — POLYVINYL ALCOHOL 1 DROP: 14 SOLUTION/ DROPS OPHTHALMIC at 07:40

## 2021-01-01 RX ADMIN — CEFAZOLIN SODIUM 1000 MG: 1 INJECTION, POWDER, FOR SOLUTION INTRAMUSCULAR; INTRAVENOUS at 01:47

## 2021-01-01 RX ADMIN — CARBOXYMETHYLCELLULOSE SODIUM 1 DROP: 10 GEL OPHTHALMIC at 07:40

## 2021-01-01 RX ADMIN — POLYVINYL ALCOHOL 1 DROP: 14 SOLUTION/ DROPS OPHTHALMIC at 21:30

## 2021-01-01 RX ADMIN — CARBOXYMETHYLCELLULOSE SODIUM 1 DROP: 10 GEL OPHTHALMIC at 13:50

## 2021-01-01 RX ADMIN — POLYVINYL ALCOHOL 1 DROP: 14 SOLUTION/ DROPS OPHTHALMIC at 16:53

## 2021-01-01 RX ADMIN — POTASSIUM CHLORIDE 20 MEQ: 29.8 INJECTION, SOLUTION INTRAVENOUS at 05:25

## 2021-01-01 RX ADMIN — POLYVINYL ALCOHOL 1 DROP: 14 SOLUTION/ DROPS OPHTHALMIC at 04:46

## 2021-01-01 RX ADMIN — LABETALOL HYDROCHLORIDE 5 MG: 5 INJECTION INTRAVENOUS at 15:10

## 2021-01-01 RX ADMIN — POTASSIUM CHLORIDE 20 MEQ: 29.8 INJECTION, SOLUTION INTRAVENOUS at 06:06

## 2021-01-01 RX ADMIN — DESMOPRESSIN ACETATE 26.88 MCG: 4 SOLUTION INTRAVENOUS at 08:19

## 2021-01-01 RX ADMIN — POLYVINYL ALCOHOL 1 DROP: 14 SOLUTION/ DROPS OPHTHALMIC at 07:49

## 2021-01-01 RX ADMIN — DIPHENHYDRAMINE HYDROCHLORIDE 25 MG: 50 INJECTION, SOLUTION INTRAMUSCULAR; INTRAVENOUS at 18:58

## 2021-01-01 RX ADMIN — CHLORHEXIDINE GLUCONATE 15 ML: 1.2 RINSE ORAL at 00:53

## 2021-01-01 RX ADMIN — POLYVINYL ALCOHOL 1 DROP: 14 SOLUTION/ DROPS OPHTHALMIC at 13:42

## 2021-01-01 RX ADMIN — CARBOXYMETHYLCELLULOSE SODIUM 1 DROP: 10 GEL OPHTHALMIC at 08:44

## 2021-01-01 RX ADMIN — CARBOXYMETHYLCELLULOSE SODIUM 1 DROP: 10 GEL OPHTHALMIC at 21:06

## 2021-01-01 RX ADMIN — POLYVINYL ALCOHOL 1 DROP: 14 SOLUTION/ DROPS OPHTHALMIC at 01:03

## 2021-01-01 RX ADMIN — POTASSIUM CHLORIDE 20 MEQ: 29.8 INJECTION, SOLUTION INTRAVENOUS at 23:59

## 2021-01-01 RX ADMIN — POLYVINYL ALCOHOL 1 DROP: 14 SOLUTION/ DROPS OPHTHALMIC at 20:54

## 2021-01-01 RX ADMIN — POLYVINYL ALCOHOL 1 DROP: 14 SOLUTION/ DROPS OPHTHALMIC at 19:54

## 2021-01-01 RX ADMIN — POTASSIUM CHLORIDE 20 MEQ: 29.8 INJECTION, SOLUTION INTRAVENOUS at 09:29

## 2021-01-01 RX ADMIN — PROPOFOL 30 MCG/KG/MIN: 10 INJECTION, EMULSION INTRAVENOUS at 21:29

## 2021-01-01 RX ADMIN — POTASSIUM CHLORIDE 20 MEQ: 29.8 INJECTION, SOLUTION INTRAVENOUS at 15:49

## 2021-01-01 RX ADMIN — PANTOPRAZOLE SODIUM 40 MG: 40 INJECTION, POWDER, FOR SOLUTION INTRAVENOUS at 08:48

## 2021-01-01 RX ADMIN — SODIUM BICARBONATE 50 MEQ: 84 INJECTION INTRAVENOUS at 22:16

## 2021-01-01 RX ADMIN — POLYVINYL ALCOHOL 1 DROP: 14 SOLUTION/ DROPS OPHTHALMIC at 10:40

## 2021-01-01 RX ADMIN — CEFAZOLIN SODIUM 1000 MG: 1 INJECTION, POWDER, FOR SOLUTION INTRAMUSCULAR; INTRAVENOUS at 17:30

## 2021-01-01 RX ADMIN — CLINDAMYCIN PHOSPHATE 900 MG: 900 INJECTION, SOLUTION INTRAVENOUS at 10:56

## 2021-01-01 RX ADMIN — SODIUM CHLORIDE: 9 INJECTION, SOLUTION INTRAVENOUS at 11:11

## 2021-01-01 RX ADMIN — CHLORHEXIDINE GLUCONATE 15 ML: 1.2 RINSE ORAL at 07:49

## 2021-01-01 RX ADMIN — CARBOXYMETHYLCELLULOSE SODIUM 1 DROP: 10 GEL OPHTHALMIC at 20:23

## 2021-01-01 RX ADMIN — DEXTROSE MONOHYDRATE: 50 INJECTION, SOLUTION INTRAVENOUS at 07:51

## 2021-01-01 RX ADMIN — CARBOXYMETHYLCELLULOSE SODIUM 1 DROP: 10 GEL OPHTHALMIC at 08:09

## 2021-01-01 RX ADMIN — SODIUM CHLORIDE 1000 ML: 9 INJECTION, SOLUTION INTRAVENOUS at 18:58

## 2021-01-01 RX ADMIN — PANTOPRAZOLE SODIUM 40 MG: 40 INJECTION, POWDER, FOR SOLUTION INTRAVENOUS at 08:18

## 2021-01-01 RX ADMIN — SODIUM CHLORIDE: 9 INJECTION, SOLUTION INTRAVENOUS at 18:07

## 2021-01-01 RX ADMIN — PROPOFOL 10 MCG/KG/MIN: 10 INJECTION, EMULSION INTRAVENOUS at 16:50

## 2021-01-01 RX ADMIN — CARBOXYMETHYLCELLULOSE SODIUM 1 DROP: 10 GEL OPHTHALMIC at 13:00

## 2021-01-01 RX ADMIN — AMPICILLIN SODIUM 2000 MG: 2 INJECTION, POWDER, FOR SOLUTION INTRAMUSCULAR; INTRAVENOUS at 19:44

## 2021-01-01 RX ADMIN — EPINEPHRINE 1 MCG/MIN: 1 INJECTION INTRAMUSCULAR; INTRAVENOUS; SUBCUTANEOUS at 21:07

## 2021-01-01 RX ADMIN — Medication 10 ML: at 08:01

## 2021-01-01 RX ADMIN — Medication 11 MCG/MIN: at 05:05

## 2021-01-01 RX ADMIN — VASOPRESSIN 0.04 UNITS/MIN: 20 INJECTION INTRAVENOUS at 20:51

## 2021-01-01 RX ADMIN — POLYVINYL ALCOHOL 1 DROP: 14 SOLUTION/ DROPS OPHTHALMIC at 12:24

## 2021-01-01 RX ADMIN — Medication 10 ML: at 08:20

## 2021-01-01 RX ADMIN — ALBUMIN (HUMAN) 12.5 G: 12.5 INJECTION, SOLUTION INTRAVENOUS at 16:00

## 2021-01-01 RX ADMIN — POLYVINYL ALCOHOL 1 DROP: 14 SOLUTION/ DROPS OPHTHALMIC at 05:40

## 2021-01-01 RX ADMIN — POLYVINYL ALCOHOL 1 DROP: 14 SOLUTION/ DROPS OPHTHALMIC at 05:07

## 2021-01-01 RX ADMIN — DEXTROSE MONOHYDRATE: 50 INJECTION, SOLUTION INTRAVENOUS at 20:03

## 2021-01-01 RX ADMIN — DEXTROSE MONOHYDRATE 12.5 G: 25 INJECTION, SOLUTION INTRAVENOUS at 13:56

## 2021-01-01 RX ADMIN — EPINEPHRINE 1 MCG/MIN: 1 INJECTION INTRAMUSCULAR; INTRAVENOUS; SUBCUTANEOUS at 19:26

## 2021-01-01 RX ADMIN — DEXTROSE MONOHYDRATE 12.5 G: 25 INJECTION, SOLUTION INTRAVENOUS at 11:18

## 2021-01-01 RX ADMIN — POTASSIUM CHLORIDE 10 MEQ: 7.46 INJECTION, SOLUTION INTRAVENOUS at 05:07

## 2021-01-01 RX ADMIN — PROPOFOL 25 MCG/KG/MIN: 10 INJECTION, EMULSION INTRAVENOUS at 04:25

## 2021-01-01 RX ADMIN — POLYVINYL ALCOHOL 1 DROP: 14 SOLUTION/ DROPS OPHTHALMIC at 00:49

## 2021-01-01 RX ADMIN — INSULIN LISPRO 1 UNITS: 100 INJECTION, SOLUTION INTRAVENOUS; SUBCUTANEOUS at 20:17

## 2021-01-01 RX ADMIN — VASOPRESSIN 0.04 UNITS/MIN: 20 INJECTION INTRAVENOUS at 12:24

## 2021-01-01 RX ADMIN — ENOXAPARIN SODIUM 40 MG: 40 INJECTION SUBCUTANEOUS at 07:32

## 2021-01-01 RX ADMIN — CARBOXYMETHYLCELLULOSE SODIUM 1 DROP: 10 GEL OPHTHALMIC at 13:42

## 2021-01-01 RX ADMIN — CHLORHEXIDINE GLUCONATE 15 ML: 1.2 RINSE ORAL at 20:04

## 2021-01-01 RX ADMIN — Medication 1 MCG/MIN: at 21:00

## 2021-01-01 RX ADMIN — CHLORHEXIDINE GLUCONATE 15 ML: 1.2 RINSE ORAL at 08:17

## 2021-01-01 RX ADMIN — POTASSIUM CHLORIDE, DEXTROSE MONOHYDRATE AND SODIUM CHLORIDE: 150; 5; 450 INJECTION, SOLUTION INTRAVENOUS at 07:29

## 2021-01-01 RX ADMIN — ALBUMIN (HUMAN) 12.5 G: 12.5 INJECTION, SOLUTION INTRAVENOUS at 10:08

## 2021-01-01 RX ADMIN — CARBOXYMETHYLCELLULOSE SODIUM 1 DROP: 10 GEL OPHTHALMIC at 16:53

## 2021-01-01 RX ADMIN — DEXTROSE MONOHYDRATE: 50 INJECTION, SOLUTION INTRAVENOUS at 12:05

## 2021-01-01 RX ADMIN — POTASSIUM CHLORIDE 20 MEQ: 29.8 INJECTION, SOLUTION INTRAVENOUS at 07:20

## 2021-01-01 RX ADMIN — CARBOXYMETHYLCELLULOSE SODIUM 1 DROP: 10 GEL OPHTHALMIC at 17:02

## 2021-01-01 RX ADMIN — PANTOPRAZOLE SODIUM 40 MG: 40 INJECTION, POWDER, FOR SOLUTION INTRAVENOUS at 15:29

## 2021-01-01 RX ADMIN — Medication 10 ML: at 13:08

## 2021-01-01 RX ADMIN — DEXTROSE MONOHYDRATE: 50 INJECTION, SOLUTION INTRAVENOUS at 09:45

## 2021-01-01 RX ADMIN — POLYVINYL ALCOHOL 1 DROP: 14 SOLUTION/ DROPS OPHTHALMIC at 05:04

## 2021-01-01 RX ADMIN — MORPHINE SULFATE 1 MG: 2 INJECTION, SOLUTION INTRAMUSCULAR; INTRAVENOUS at 15:29

## 2021-01-01 RX ADMIN — DEXTROSE MONOHYDRATE 12.5 G: 25 INJECTION, SOLUTION INTRAVENOUS at 19:01

## 2021-01-01 RX ADMIN — POLYVINYL ALCOHOL 1 DROP: 14 SOLUTION/ DROPS OPHTHALMIC at 08:16

## 2021-01-01 RX ADMIN — ALBUMIN (HUMAN) 12.5 G: 12.5 INJECTION, SOLUTION INTRAVENOUS at 02:33

## 2021-01-01 RX ADMIN — CARBOXYMETHYLCELLULOSE SODIUM 1 DROP: 10 GEL OPHTHALMIC at 13:53

## 2021-01-01 RX ADMIN — POLYVINYL ALCOHOL 1 DROP: 14 SOLUTION/ DROPS OPHTHALMIC at 15:37

## 2021-01-01 RX ADMIN — SODIUM CHLORIDE 500 ML: 9 INJECTION, SOLUTION INTRAVENOUS at 09:21

## 2021-01-01 RX ADMIN — SODIUM BICARBONATE: 84 INJECTION, SOLUTION INTRAVENOUS at 21:45

## 2021-01-01 RX ADMIN — POLYVINYL ALCOHOL 1 DROP: 14 SOLUTION/ DROPS OPHTHALMIC at 08:07

## 2021-01-01 RX ADMIN — POTASSIUM CHLORIDE 20 MEQ: 29.8 INJECTION, SOLUTION INTRAVENOUS at 05:52

## 2021-01-01 RX ADMIN — HEPARIN SODIUM 15000 UNITS: 5000 INJECTION INTRAVENOUS; SUBCUTANEOUS at 13:04

## 2021-01-01 RX ADMIN — CARBOXYMETHYLCELLULOSE SODIUM 1 DROP: 10 GEL OPHTHALMIC at 17:00

## 2021-01-01 RX ADMIN — DEXTROSE MONOHYDRATE 12.5 G: 25 INJECTION, SOLUTION INTRAVENOUS at 12:25

## 2021-01-01 RX ADMIN — METOCLOPRAMIDE 10 MG: 5 INJECTION, SOLUTION INTRAMUSCULAR; INTRAVENOUS at 18:58

## 2021-01-01 RX ADMIN — PROPOFOL 15 MCG/KG/MIN: 10 INJECTION, EMULSION INTRAVENOUS at 18:40

## 2021-01-01 RX ADMIN — DEXTROSE MONOHYDRATE 12.5 G: 25 INJECTION, SOLUTION INTRAVENOUS at 16:41

## 2021-01-01 RX ADMIN — POTASSIUM CHLORIDE 20 MEQ: 29.8 INJECTION, SOLUTION INTRAVENOUS at 09:28

## 2021-01-01 RX ADMIN — POTASSIUM CHLORIDE 20 MEQ: 29.8 INJECTION, SOLUTION INTRAVENOUS at 07:13

## 2021-01-01 RX ADMIN — PANTOPRAZOLE SODIUM 40 MG: 40 INJECTION, POWDER, FOR SOLUTION INTRAVENOUS at 07:49

## 2021-01-01 RX ADMIN — DESMOPRESSIN ACETATE 1 MCG: 4 SOLUTION INTRAVENOUS at 09:43

## 2021-01-01 RX ADMIN — CALCIUM GLUCONATE 1000 MG: 20 INJECTION, SOLUTION INTRAVENOUS at 20:47

## 2021-01-01 RX ADMIN — POTASSIUM CHLORIDE 20 MEQ: 29.8 INJECTION, SOLUTION INTRAVENOUS at 16:49

## 2021-01-01 RX ADMIN — DEXTROSE MONOHYDRATE 12.5 G: 25 INJECTION, SOLUTION INTRAVENOUS at 08:44

## 2021-01-01 RX ADMIN — Medication 10 ML: at 19:53

## 2021-01-01 RX ADMIN — CARBOXYMETHYLCELLULOSE SODIUM 1 DROP: 10 GEL OPHTHALMIC at 20:53

## 2021-01-01 RX ADMIN — Medication 10 ML: at 08:16

## 2021-01-01 RX ADMIN — CARBOXYMETHYLCELLULOSE SODIUM 1 DROP: 10 GEL OPHTHALMIC at 20:57

## 2021-01-01 RX ADMIN — CARBOXYMETHYLCELLULOSE SODIUM 1 DROP: 10 GEL OPHTHALMIC at 20:08

## 2021-01-01 RX ADMIN — POLYVINYL ALCOHOL 1 DROP: 14 SOLUTION/ DROPS OPHTHALMIC at 17:47

## 2021-01-01 RX ADMIN — DEXTROSE MONOHYDRATE: 50 INJECTION, SOLUTION INTRAVENOUS at 10:20

## 2021-01-01 RX ADMIN — POLYVINYL ALCOHOL 1 DROP: 14 SOLUTION/ DROPS OPHTHALMIC at 08:28

## 2021-01-01 RX ADMIN — POLYVINYL ALCOHOL 1 DROP: 14 SOLUTION/ DROPS OPHTHALMIC at 04:15

## 2021-01-01 RX ADMIN — POTASSIUM CHLORIDE 20 MEQ: 29.8 INJECTION, SOLUTION INTRAVENOUS at 08:18

## 2021-01-01 RX ADMIN — CARBOXYMETHYLCELLULOSE SODIUM 1 DROP: 10 GEL OPHTHALMIC at 10:05

## 2021-01-01 RX ADMIN — POTASSIUM CHLORIDE 20 MEQ: 29.8 INJECTION, SOLUTION INTRAVENOUS at 06:56

## 2021-01-01 RX ADMIN — POLYVINYL ALCOHOL 1 DROP: 14 SOLUTION/ DROPS OPHTHALMIC at 17:02

## 2021-01-01 RX ADMIN — AMPICILLIN SODIUM 2000 MG: 2 INJECTION, POWDER, FOR SOLUTION INTRAMUSCULAR; INTRAVENOUS at 23:35

## 2021-01-01 RX ADMIN — POLYVINYL ALCOHOL 1 DROP: 14 SOLUTION/ DROPS OPHTHALMIC at 12:30

## 2021-01-01 RX ADMIN — POTASSIUM CHLORIDE 20 MEQ: 29.8 INJECTION, SOLUTION INTRAVENOUS at 18:28

## 2021-01-01 RX ADMIN — POLYVINYL ALCOHOL 1 DROP: 14 SOLUTION/ DROPS OPHTHALMIC at 00:10

## 2021-01-01 RX ADMIN — POTASSIUM CHLORIDE 20 MEQ: 29.8 INJECTION, SOLUTION INTRAVENOUS at 06:27

## 2021-01-01 RX ADMIN — CARBOXYMETHYLCELLULOSE SODIUM 1 DROP: 10 GEL OPHTHALMIC at 15:29

## 2021-01-01 RX ADMIN — Medication 10 ML: at 08:19

## 2021-01-01 RX ADMIN — CHLORHEXIDINE GLUCONATE 15 ML: 1.2 RINSE ORAL at 09:16

## 2021-01-01 RX ADMIN — DEXTROSE MONOHYDRATE 12.5 G: 25 INJECTION, SOLUTION INTRAVENOUS at 10:40

## 2021-01-01 RX ADMIN — CHLORHEXIDINE GLUCONATE 15 ML: 1.2 RINSE ORAL at 20:57

## 2021-01-01 RX ADMIN — CHLORHEXIDINE GLUCONATE 15 ML: 1.2 RINSE ORAL at 20:22

## 2021-01-01 RX ADMIN — POLYVINYL ALCOHOL 1 DROP: 14 SOLUTION/ DROPS OPHTHALMIC at 08:41

## 2021-01-01 RX ADMIN — Medication 4 MCG/MIN: at 12:24

## 2021-01-01 RX ADMIN — VASOPRESSIN 0.04 UNITS/MIN: 20 INJECTION INTRAVENOUS at 05:14

## 2021-01-01 RX ADMIN — POTASSIUM CHLORIDE 20 MEQ: 29.8 INJECTION, SOLUTION INTRAVENOUS at 19:33

## 2021-01-01 RX ADMIN — Medication 10 ML: at 20:57

## 2021-01-01 RX ADMIN — Medication 2 MCG/MIN: at 22:05

## 2021-01-01 RX ADMIN — ACETAMINOPHEN 1000 MG: 500 TABLET, FILM COATED ORAL at 18:27

## 2021-01-01 RX ADMIN — ENOXAPARIN SODIUM 40 MG: 40 INJECTION SUBCUTANEOUS at 08:17

## 2021-01-01 RX ADMIN — DEXTROSE MONOHYDRATE 100 ML/HR: 50 INJECTION, SOLUTION INTRAVENOUS at 17:01

## 2021-01-01 RX ADMIN — POLYVINYL ALCOHOL 1 DROP: 14 SOLUTION/ DROPS OPHTHALMIC at 01:30

## 2021-01-01 RX ADMIN — DEXTROSE MONOHYDRATE: 50 INJECTION, SOLUTION INTRAVENOUS at 02:12

## 2021-01-01 RX ADMIN — POTASSIUM CHLORIDE 20 MEQ: 29.8 INJECTION, SOLUTION INTRAVENOUS at 23:39

## 2021-01-01 RX ADMIN — CLINDAMYCIN PHOSPHATE 900 MG: 900 INJECTION, SOLUTION INTRAVENOUS at 03:00

## 2021-01-01 RX ADMIN — POLYVINYL ALCOHOL 1 DROP: 14 SOLUTION/ DROPS OPHTHALMIC at 01:40

## 2021-01-01 RX ADMIN — DEXTROSE MONOHYDRATE: 50 INJECTION, SOLUTION INTRAVENOUS at 03:17

## 2021-01-01 RX ADMIN — Medication 1 MCG/MIN: at 05:39

## 2021-01-01 RX ADMIN — CHLORHEXIDINE GLUCONATE 15 ML: 1.2 RINSE ORAL at 07:40

## 2021-01-01 RX ADMIN — SODIUM CHLORIDE 500 ML: 4.5 INJECTION, SOLUTION INTRAVENOUS at 09:26

## 2021-01-01 RX ADMIN — POTASSIUM CHLORIDE 10 MEQ: 7.46 INJECTION, SOLUTION INTRAVENOUS at 06:25

## 2021-01-01 RX ADMIN — CHLORHEXIDINE GLUCONATE 15 ML: 1.2 RINSE ORAL at 08:28

## 2021-01-01 RX ADMIN — CEFAZOLIN SODIUM 1000 MG: 1 INJECTION, POWDER, FOR SOLUTION INTRAMUSCULAR; INTRAVENOUS at 14:07

## 2021-01-01 RX ADMIN — MORPHINE SULFATE 1 MG: 2 INJECTION, SOLUTION INTRAMUSCULAR; INTRAVENOUS at 18:52

## 2021-01-01 RX ADMIN — CHLORHEXIDINE GLUCONATE 15 ML: 1.2 RINSE ORAL at 08:07

## 2021-01-01 RX ADMIN — AMPICILLIN SODIUM 2000 MG: 2 INJECTION, POWDER, FOR SOLUTION INTRAMUSCULAR; INTRAVENOUS at 08:25

## 2021-01-01 RX ADMIN — SODIUM CHLORIDE: 9 INJECTION, SOLUTION INTRAVENOUS at 21:19

## 2021-01-01 RX ADMIN — MAGNESIUM SULFATE HEPTAHYDRATE 2000 MG: 40 INJECTION, SOLUTION INTRAVENOUS at 14:02

## 2021-01-01 RX ADMIN — SODIUM BICARBONATE: 84 INJECTION, SOLUTION INTRAVENOUS at 00:18

## 2021-01-01 RX ADMIN — CARBOXYMETHYLCELLULOSE SODIUM 1 DROP: 10 GEL OPHTHALMIC at 20:54

## 2021-01-01 RX ADMIN — HYDROCORTISONE SODIUM SUCCINATE 100 MG: 100 INJECTION, POWDER, FOR SOLUTION INTRAMUSCULAR; INTRAVENOUS at 05:10

## 2021-01-01 RX ADMIN — POLYVINYL ALCOHOL 1 DROP: 14 SOLUTION/ DROPS OPHTHALMIC at 01:09

## 2021-01-01 RX ADMIN — POLYVINYL ALCOHOL 1 DROP: 14 SOLUTION/ DROPS OPHTHALMIC at 23:59

## 2021-01-01 ASSESSMENT — PULMONARY FUNCTION TESTS
PIF_VALUE: 20
PIF_VALUE: 25
PIF_VALUE: 20
PIF_VALUE: 19
PIF_VALUE: 18
PIF_VALUE: 22
PIF_VALUE: 19
PIF_VALUE: 20
PIF_VALUE: 21
PIF_VALUE: 19
PIF_VALUE: 20
PIF_VALUE: 20
PIF_VALUE: 21
PIF_VALUE: 27
PIF_VALUE: 18
PIF_VALUE: 21
PIF_VALUE: 19
PIF_VALUE: 27
PIF_VALUE: 20
PIF_VALUE: 20
PIF_VALUE: 25
PIF_VALUE: 18
PIF_VALUE: 27
PIF_VALUE: 20
PIF_VALUE: 27
PIF_VALUE: 19
PIF_VALUE: 20
PIF_VALUE: 19

## 2021-01-01 ASSESSMENT — PAIN SCALES - GENERAL
PAINLEVEL_OUTOF10: 0
PAINLEVEL_OUTOF10: 8
PAINLEVEL_OUTOF10: 0

## 2021-01-01 ASSESSMENT — ENCOUNTER SYMPTOMS
COUGH: 0
TROUBLE SWALLOWING: 0
VOICE CHANGE: 0
ABDOMINAL PAIN: 0
DIARRHEA: 0
NAUSEA: 0
RHINORRHEA: 0
SHORTNESS OF BREATH: 0
VOMITING: 0
SORE THROAT: 1

## 2021-01-01 ASSESSMENT — LIFESTYLE VARIABLES: SMOKING_STATUS: 1

## 2021-01-05 PROBLEM — J37.0 CHRONIC LARYNGITIS: Status: ACTIVE | Noted: 2021-01-01

## 2021-01-05 NOTE — PROGRESS NOTES
anesthetized and decongested with a 50-50 mixture of 0.05% oxymetazoline solution and topical 4% lidocaine solution by nasal sprayer, twice. After about ten minutes, the flexible fiberoptic nasopharyngolaryngoscope, with camera attached, was inserted through the right and left nare/nasal cavity and advanced through he right nares, to the nasopharynx and then to the hypopharynx and larynx. The [email or web address] video system was used. After adequate endoscopic visualization, the endoscope was removed. The patient appeared to tolerate the procedure well with no evidence of perioperative complications. Juan Francisco Alicia / Blanca Bui / Jose Guadalupe Robertson was seen today for laryngitis. Diagnoses and all orders for this visit:    Chronic laryngitis  Comments: This appears to be related to post radiation effects and patient would be best served seeing a fellowship trained laryngologist.    Chronic pharyngitis    Chronic cough    Globus pharyngeus           RECOMMENDATIONS / PLAN      Patient Instructions   1. Follow up with Dr. Michelle Patel at [facility] regarding your larynx problem. 2. Proceed with CT scan of your sinuses and you may follow up on that by virtual visit.

## 2021-01-05 NOTE — PATIENT INSTRUCTIONS
1. Follow up with Dr. Rsoa Michelle at [facility] regarding your larynx problem. 2. Proceed with CT scan of your sinuses and you may follow up on that by virtual visit.

## 2021-01-11 NOTE — TELEPHONE ENCOUNTER
Medication:   Requested Prescriptions     Pending Prescriptions Disp Refills    TRINTELLIX 20 MG TABS tablet [Pharmacy Med Name: Marcelle Lancaster 20 MG TABLET] 30 tablet 1     Sig: TAKE ONE TABLET BY MOUTH DAILY        Last Filled:      Patient Phone Number: [phone] (home)     Last appt: 11/16/2020   Next appt: 1/10/2021    Last OARRS:   RX Monitoring 12/21/2015   Attestation The Prescription Monitoring Report for this patient was reviewed today. Periodic Controlled Substance Monitoring No signs of potential drug abuse or diversion identified.

## 2021-01-12 NOTE — TELEPHONE ENCOUNTER
Medication:   Requested Prescriptions     Pending Prescriptions Disp Refills    mirtazapine (REMERON) 30 MG tablet [Pharmacy Med Name: MIRTAZAPINE 30 MG TABLET] 30 tablet 0     Sig: TAKE ONE TABLET BY MOUTH ONCE NIGHTLY        Last Filled:      Patient Phone Number: [phone] (home)     Last appt: 11/16/2020   Next appt: Visit date not found    Last OARRS:   RX Monitoring 12/21/2015   Attestation The Prescription Monitoring Report for this patient was reviewed today. Periodic Controlled Substance Monitoring No signs of potential drug abuse or diversion identified.
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
## 2021-02-25 NOTE — PROGRESS NOTES
TELEHEALTH VISIT -- Audio/Visual (During CUFVQ-89 public health emergency)  }  Pursuant to the emergency declaration under the [address] [place] [address], [facility] waiver authority and the [employer] Act, this Virtual Visit was conducted, with patient's consent, to reduce the patient's risk of exposure to COVID-19 and provide continuity of care for an established patient. Services were provided through a video synchronous discussion virtually to substitute for in-person clinic visit. Pt gave verbal informed consent to participate in telehealth services. Conducted a risk-benefit analysis and determined that the patient's presenting problems are consistent with the use of telepsychology. Determined that the patient has sufficient knowledge and skills in the use of technology enabling them to adequately benefit from telepsychology. It was determined that this patient was able to be properly treated without an in-person session. Patient verified that they were currently located at the [facility] address that was provided during registration or another [facility] address, if noted below. Verified the following information:  Patient's identification: Yes  Patient location: [place] [address]   Patient's call back number: [phone]   Patient's emergency contact's name and number, as well as permission to contact them if needed: Extended Emergency Contact Information  Primary Emergency Contact: Jessica Donato  [facility] [address] Phone: [phone]  Relation: Parent     Provider location: [place]  Consultation  Raz Nelson, Ph.D.  Psychologist  2/25/2021  11:29 AM      Time spent with Patient: 30 minutes  This is patient's 12th  [facility] appointment.     Reason for Consult:    Chief Complaint   Patient presents with   [facility] Depression Feedback given to PCP. S:  Pt seen for f/u of depression and PTSD. Pt reported worsened mood and sxs w learning she is pregnant. Boyfriend Bethany Lopez, 99BS) broke up w her, called her an \"Eeyore\" and told her \"you're too much. \" Pt is having strong reaction to this break-up and feels she has been \"left again. \" Stated she doesn't want to be pregnant, but does not plan on having an  or pursuing adoption. Stated she has cut back to 4-5 cig/day, plans to quit entirely. \"trying\" to quit smoking marijuana, but is using it to help control nausea, estimates 1-2g/day. Focused on 3 specific goals: calling OB to get appt, calling Dr. Cleopatra Bowman to reschedule, and resuming BP meds, per Isac Epstein.      O:  MSE:    Appearance    alert, cooperative  Appetite abnormal: low  Sleep disturbance Yes  Fatigue Yes  Loss of pleasure Yes  Impulsive behavior Yes  Speech    spontaneous, normal rate, normal volume and high productivity  Mood    Depressed  Affect    depressed affect  Thought Content    intact and cognitive distortions  Thought Process    linear, goal directed and coherent  Associations    logical connections  Insight    Fair  Judgment    fair  Orientation    oriented to person, place, time, and general circumstances  Memory    recent and remote memory intact  Attention/Concentration    impaired  Morbid ideation No  Suicide Assessment    no suicidal ideation    History:  Social History:   Social History     Socioeconomic History    Marital status: Single     Spouse name: Not on file    Number of children: 3    Years of education: 12    Highest education level: Not on file   Occupational History     Comment: unemployed    Social Needs    Financial resource strain: Not on file    Food insecurity     Worry: Not on file     Inability: Not on file   [employer] needs     Medical: Not on file     Non-medical: Not on file   Tobacco Use    Smoking status: Former Smoker     Packs/day: 1.00     Years: 18.00     Pack years: 18.00     Types: Cigarettes     Start date: 10/19/2013     Quit date: 10/19/2020     Years since quittin.3    Smokeless tobacco: Never Used   Substance and Sexual Activity    Alcohol use: No     Alcohol/week: 20.0 standard drinks     Types: 10 Cans of beer, 10 Shots of liquor per week     Comment: none for 120 days    Drug use: Yes     Types: Marijuana, Opiates      Comment: currently only marijuana    Sexual activity: Not Currently     Partners: Male   Lifestyle    Physical activity     Days per week: Not on file     Minutes per session: Not on file    Stress: Not on file   Relationships    Social connections     Talks on phone: Not on file     Gets together: Not on file     Attends Mandaeism service: Not on file     Active member of club or organization: Not on file     Attends meetings of clubs or organizations: Not on file     Relationship status: Not on file    Intimate partner violence     Fear of current or ex partner: Not on file     Emotionally abused: Not on file     Physically abused: Not on file     Forced sexual activity: Not on file   Other Topics Concern    Not on file   Social History Narrative    Not on file     TOBACCO:   reports that she quit smoking about 4 months ago. Her smoking use included cigarettes. She started smoking about 7 years ago. She has a 18.00 pack-year smoking history. She has never used smokeless tobacco.  ETOH:   reports no history of alcohol use. Diagnosis:  1. Severe episode of recurrent major depressive disorder, without psychotic features ([facility]    2.  PTSD (post-traumatic stress disorder)          Plan:  Pt interventions:  Discussed and problem-solved barriers in adhering to behavioral change plan, Motivational Interviewing to increase patient confidence and compliance with adhering to behavioral change plan, Motivational Interviewing to determine importance and readiness for change and Problem-solving re: pregnancy        Documentation was done using voice recognition dragon software. Every effort was made to ensure accuracy; however, inadvertent, unintentional computerized transcription errors may be present.

## 2021-03-11 NOTE — TELEPHONE ENCOUNTER
Spoke with patient due to her recent pregnancy. She stopped all her psychotropic medications. She did noticed more tearfulness and withdrawal effects but these are improving. Having some restlessness in her legs. We discussed the pregnancy considerations of her medications. We opted to keep her of lithium, deplin (hasn't taken for a long time). trintellix likely would be safe as is similar to SSRIs, however given it is a new and doesn't have as much safety data, we opted to change her to sertraline which she has previously been on and tolerated ok. Mirtazapine has not been associated with major malformations, and considered safe in pregnancy so we will felt the benefit outweighs the risk - she was ok with resuming this as well.
no visual changes/no diplopia

## 2021-03-17 NOTE — PROGRESS NOTES
Feedback given to PCP. S:  Pt seen for f/u of depression. Pt reported worsened mood and sxs. Stated \"everyone\" is talking about her pregnancy behind her back and to her face. Stated boyfriend states she \"mentally drains him\" and they aren't together. Pt is 10 wks pregnant, has been seeing OB and compliant w testing, working on taking BP daily and is mostly successful. She did see Dr. Ni Degroot and has a plan. Her kids are excited about the pregnancy, mom is pessimistic. Proposed PHP, pt was resistant bc she did this before at [facility]. Sited her home environment is too upsetting for external changes to matter.      O:  MSE:    Appearance    alert, cooperative  Appetite low  Sleep disturbance Yes  Fatigue Yes  Loss of pleasure Yes  Impulsive behavior Yes  Speech    spontaneous, normal rate, normal volume and well articulated  Mood    Depressed  Affect    depressed affect  Thought Content    intact, cognitive distortions and all or nothing thinking  Thought Process    goal directed, coherent and tangential  Associations    logical connections, tangential connections  Insight    Fair  Judgment    Intact  Orientation    oriented to person, place, time, and general circumstances  Memory    recent and remote memory intact  Attention/Concentration    impaired  Morbid ideation No  Suicide Assessment    no suicidal ideation    History:  Social History:   Social History     Socioeconomic History    Marital status: Single     Spouse name: Not on file    Number of children: 3    Years of education: 12    Highest education level: Not on file   Occupational History     Comment: unemployed    Social Needs    Financial resource strain: Not on file    Food insecurity     Worry: Not on file     Inability: Not on file   [employer] needs     Medical: Not on file     Non-medical: Not on file   Tobacco Use    Smoking status: Former Smoker     Packs/day: 1.00     Years: 18.00     Pack years: 18.00     Types: Cigarettes Start date: 10/19/2013     Quit date: 10/19/2020     Years since quittin.4    Smokeless tobacco: Never Used   Substance and Sexual Activity    Alcohol use: No     Alcohol/week: 20.0 standard drinks     Types: 10 Cans of beer, 10 Shots of liquor per week     Comment: none for 120 days    Drug use: Yes     Types: Marijuana, Opiates      Comment: currently only marijuana    Sexual activity: Not Currently     Partners: Male   Lifestyle    Physical activity     Days per week: Not on file     Minutes per session: Not on file    Stress: Not on file   Relationships    Social connections     Talks on phone: Not on file     Gets together: Not on file     Attends Islam service: Not on file     Active member of club or organization: Not on file     Attends meetings of clubs or organizations: Not on file     Relationship status: Not on file    Intimate partner violence     Fear of current or ex partner: Not on file     Emotionally abused: Not on file     Physically abused: Not on file     Forced sexual activity: Not on file   Other Topics Concern    Not on file   Social History Narrative    Not on file     TOBACCO:   reports that she quit smoking about 4 months ago. Her smoking use included cigarettes. She started smoking about 7 years ago. She has a 18.00 pack-year smoking history. She has never used smokeless tobacco.  ETOH:   reports no history of alcohol use. Diagnosis:  1. Severe episode of recurrent major depressive disorder, without psychotic features ([facility] [address]    2. PTSD (post-traumatic stress disorder)          Plan:  Pt interventions:  Trained in strategies for increasing balanced thinking, Discussed self-care (sleep, nutrition, rewarding activities, social support, exercise) and Discussed and problem-solved barriers in adhering to behavioral change plan        Documentation was done using voice recognition dragon software.   Every effort was made to ensure accuracy; however, inadvertent, unintentional computerized transcription errors may be present.

## 2021-03-18 NOTE — ED NOTES
Bed: 02  Expected date:   Expected time:   Means of arrival:   Comments:  claudia Mata RN  03/18/21 [id number]

## 2021-03-18 NOTE — ED PROVIDER NOTES
This is an independent NEGRITO patient encounter. I am available for consultation if needed. I did not perform a face-to-face evaluation of this patient and was not asked to see the patient. I was not made aware of any details of the patient's H&P or medical decision making but was asked to review and document this EKG. See my interpretation of the EKG below. I shared my findings and interpretation with the NEGRITO for use in his/her independent management of this patient. See his/her note for details of the patient's history, physical, and all medical decision making.       The 12 lead EKG was interpreted by me as follows:  Rate: tachycardia with a rate of 103  Rhythm: sinus  Axis: normal  Intervals: normal MO, narrow QRS, normal QTc  ST segments: no ST elevations or depressions  T waves: no abnormal inversions  Non-specific T wave changes: present  Prior EKG comparison: EKG dated 9/12/20 is not significantly different          Elizabeth Mckinnon MD  03/18/21 [id number]

## 2021-03-19 NOTE — ED PROVIDER NOTES
[facility]        Pt Name: Halley Oscar  MRN: [id number]  [place] 1985  Date of evaluation: 3/18/2021  Provider: Kindra Ferguson PA-C  PCP: Joleen Pallas, APRN - CNP    NEGRITO. I have evaluated this patient. My supervising physician was available for consultation. CHIEF COMPLAINT       Chief Complaint   Patient presents with    Headache     pt with c/o headache for three weeks- body aches started today, sore throat, pt states she is 10 weeks pregnant. HISTORY OF PRESENT ILLNESS   (Location, Timing/Onset, Context/Setting, Quality, Duration, Modifying Factors, Severity, Associated Signs and Symptoms)  Note limiting factors. Halley Oscar is a 38 y.o. female who presents to the emergency [facility] Offer today for evaluation for a headache, generalized body aches, and sore throat. The patient states that over the past 3 weeks she has been experiencing intermittent headaches, generalized body aches, and a sore throat, however seems to be worse over the past couple of days. The patient states that her headache has been intermittent over the past 3 weeks, she denies falling or injuring her head. This is not the worst headache of her life. This is not an atypical headache. This was not sudden onset or thunderclap in nature. The patient denies any visual changes. She has no numbness, tingling or weakness. She denies feeling dizzy or lightheaded, she is able to ambulate without any difficulty. Patient states that she has been experiencing increasing sore throat, although she denies any drooling or difficulty swallowing. The patient has not had any cough or congestion. She denies any fever or chills. She has no abdominal pain or cramping. The patient states that she is approximately 10 weeks pregnant, G4, P3. She denies any vaginal bleeding or discharge.   She states that she was recently placed on antibiotics for urinary tract infection. She states that she is still taking these. She has no dysuria or hematuria. She denies any abdominal pain, nausea, vomiting or diarrhea. No chest pain or shortness of breath. the patient otherwise has no complaints at this time    Nursing Notes were all reviewed and agreed with or any disagreements were addressed in the HPI. REVIEW OF SYSTEMS    (2-9 systems for level 4, 10 or more for level 5)     Review of Systems   Constitutional: Negative for activity change, appetite change, chills and fever. HENT: Positive for sore throat. Negative for congestion, rhinorrhea, trouble swallowing and voice change. Respiratory: Negative for cough and shortness of breath. Cardiovascular: Negative for chest pain. Gastrointestinal: Negative for abdominal pain, diarrhea, nausea and vomiting. Genitourinary: Negative for difficulty urinating, dysuria and hematuria. Skin: Negative for wound. Neurological: Positive for headaches. Negative for weakness and numbness. Positives and Pertinent negatives as per HPI. Except as noted above in the ROS, all other systems were reviewed and negative.        PAST MEDICAL HISTORY     Past Medical History:   Diagnosis Date    Alcohol use disorder, moderate, in early remission ([address] 10/28/2019    Anemia     Anxiety     Back pain     Bipolar affective disorder (HCC)     Depression     Migraine     Opiate abuse, continuous (HCC)     Primary cancer of larynx ([facility] [address] 7/18/2017    PTSD (post-traumatic stress disorder)     Voice hoarseness          SURGICAL HISTORY     Past Surgical History:   Procedure Laterality Date    LARYNGOSCOPY N/A 6/20/2019    MICROLARYNGOSCOPY AND LYSIS OF ADHESIONS OF LARYNX performed by Leigh Bennett MD at [address] Left 06/28/2017    MICROLARYNGOSCOPY WITH BIOPSY OF LEFT TRUE VOCAL CORD MASS    OTHER SURGICAL HISTORY  08/10/2017    Tracheostomy    TONSILLECTOMY      TRACHEOSTOMY      TRACHEOTOMY N/A 12/14/2017     TRACHEOTOMY WITH 6DCT PAULKAYLAH                   CURRENTMEDICATIONS       Previous Medications    ALBUTEROL SULFATE HFA (PROVENTIL HFA) 108 (90 BASE) MCG/ACT INHALER    Inhale 2 puffs into the lungs every 4 hours as needed for Wheezing or Shortness of Breath (Space out to every 6 hours as symptoms improve)    AMLODIPINE (NORVASC) 5 MG TABLET    TAKE ONE TABLET BY MOUTH DAILY    DEXTROMETHORPHAN-GUAIFENESIN  MG/5ML LIQD SYRUP    Take 5 mLs by mouth every 4 hours as needed (cough)    DICLOFENAC SODIUM (VOLTAREN) 1 % GEL    Apply 4 g topically 4 times daily    FLUTICASONE (FLOVENT HFA) 110 MCG/ACT INHALER    Inhale 2 puffs into the lungs 2 times daily    HYDROXYZINE (ATARAX) 50 MG TABLET        IPRATROPIUM-ALBUTEROL (DUONEB) 0.5-2.5 (3) MG/3ML SOLN NEBULIZER SOLUTION    Inhale 3 mLs into the lungs 4 times daily    LABETALOL (NORMODYNE) 200 MG TABLET    Take 200 mg by mouth 2 times daily    LEVOTHYROXINE (SYNTHROID) 75 MCG TABLET    TAKE ONE TABLET BY MOUTH DAILY    MIRTAZAPINE (REMERON) 30 MG TABLET    TAKE ONE TABLET BY MOUTH ONCE NIGHTLY    OMEPRAZOLE (PRILOSEC) 40 MG DELAYED RELEASE CAPSULE    TAKE ONE CAPSULE BY MOUTH ONE HOUR BEFORE EVERY EVENING MEAL    ONDANSETRON (ZOFRAN-ODT) 4 MG DISINTEGRATING TABLET    DISSOLVE ONE TABLET BY MOUTH EVERY 8 HOURS AS NEEDED FOR NAUSEA    PROPRANOLOL (INDERAL LA) 120 MG EXTENDED RELEASE CAPSULE    TAKE ONE CAPSULE BY MOUTH DAILY    RESPIRATORY THERAPY SUPPLIES (NEBULIZER COMPRESSOR) KIT    Use Q6 hours as needed with Duoneb.  Disp: Nebulizer Machine #1 no refill and disposable Nebulizer tubing and mouthpiece Disp: 3 months supply with 3 refills    SERTRALINE (ZOLOFT) 50 MG TABLET    Take 1 tablet by mouth daily         ALLERGIES     Wellbutrin [bupropion] and Amoxicillin-pot clavulanate    FAMILYHISTORY       Family History   Problem Relation Age of Onset    High Blood Pressure Mother     Depression Mother     High Blood Pressure Father     Depression Father     Diabetes Maternal Uncle     Depression Paternal Grandmother           SOCIAL HISTORY       Social History     Tobacco Use    Smoking status: Former Smoker     Packs/day: 1.00     Years: 18.00     Pack years: 18.00     Types: Cigarettes     Start date: 10/19/2013     Quit date: 10/19/2020     Years since quittin.4    Smokeless tobacco: Never Used   Substance Use Topics    Alcohol use: No     Alcohol/week: 20.0 standard drinks     Types: 10 Cans of beer, 10 Shots of liquor per week     Comment: none for 120 days    Drug use: Yes     Types: Marijuana, Opiates      Comment: currently only marijuana       SCREENINGS             PHYSICAL EXAM    (up to 7 for level 4, 8 or more for level 5)     ED Triage Vitals [21 1736]   BP Temp Temp Source Pulse Resp SpO2 Height Weight   (!) 135/90 98.8 °F (37.1 °C) Oral 114 20 98 % 5' 6\" (1.676 m) 135 lb (61.2 kg)       Physical Exam  Vitals signs and nursing note reviewed. Constitutional:       Appearance: She is well-developed. She is not diaphoretic. HENT:      Head: Normocephalic and atraumatic. Right Ear: External ear normal.      Left Ear: External ear normal.      Nose: Nose normal.      Mouth/Throat:      Pharynx: Posterior oropharyngeal erythema present. Comments: Posterior oropharynx is mildly erythematous. Tonsils are noted to be surgically absent. Uvula is midline. No PTA. No trismus. Tolerating oral secretions well. Eyes:      General:         Right eye: No discharge. Left eye: No discharge. Neck:      Musculoskeletal: Normal range of motion and neck supple. Trachea: No tracheal deviation. Comments: Overlying scarring noted to the neck, no erythema, warmth, edema or abscess noted. Cardiovascular:      Rate and Rhythm: Regular rhythm. Tachycardia present. Heart sounds: No murmur. Pulmonary:      Effort: Pulmonary effort is normal. No respiratory distress.       Breath sounds: Normal breath sounds. No wheezing or rales. Abdominal:      General: Bowel sounds are normal. There is no distension. Palpations: Abdomen is soft. Tenderness: There is no abdominal tenderness. There is no guarding. Musculoskeletal: Normal range of motion. Skin:     General: Skin is warm and dry. Neurological:      Mental Status: She is alert and oriented to person, place, and time.    Psychiatric:         Behavior: Behavior normal.         DIAGNOSTIC RESULTS   LABS:    Labs Reviewed   CBC WITH AUTO DIFFERENTIAL - Abnormal; Notable for the following components:       Result Value    Hemoglobin 10.7 (*)     Hematocrit 33.8 (*)     RDW 17.9 (*)     Neutrophils Absolute 8.1 (*)     All other components within normal limits    Narrative:     Performed at:  [facility] [address]   Phone [phone]   COMPREHENSIVE METABOLIC PANEL - Abnormal; Notable for the following components:    Sodium 135 (*)     CREATININE <0.5 (*)     AST 14 (*)     All other components within normal limits    Narrative:     Performed at:  [facility] [address] [facility] [address]   Phone [phone]   URINE RT REFLEX TO CULTURE - Abnormal; Notable for the following components:    Clarity, UA CLOUDY (*)     Leukocyte Esterase, Urine SMALL (*)     All other components within normal limits    Narrative:     Performed at:  [facility]  [place], [place]   Phone [phone]   MICROSCOPIC URINALYSIS - Abnormal; Notable for the following components:    Bacteria, UA 2+ (*)     WBC, UA 6 (*)     Epithelial Cells, UA 6 (*)     All other components within normal limits    Narrative:     Performed at:  [facility] [address] [facility]  [place], [place]   Phone [phone], RAPID    Narrative:     Performed at:  [facility]  [address]   Phone [phone]   RAPID INFLUENZA A/B ANTIGENS    Narrative:     Performed at:  [facility]  [address]   Phone [phone]   LIPASE    Narrative:     Performed at:  [facility]  [address], [address]   Phone [phone]   HCG, QUANTITATIVE, PREGNANCY    Narrative:     Performed at:  [facility]  [address], [address]   Phone [phone]       All other labs were within normal range or not returned as of this dictation. EKG: All EKG's are interpreted by the Emergency Department Physician in the absence of a cardiologist.  Please see their note for interpretation of EKG. RADIOLOGY:   Non-plain film images such as CT, Ultrasound and MRI are read by the radiologist. Plain radiographic images are visualized and preliminarily interpreted by the ED Provider with the below findings:        Interpretation per the Radiologist below, if available at the time of this note:    XR CHEST (2 VW)   Final Result   Stable chronic changes with no acute abnormality seen. Xr Chest (2 Vw)    Result Date: 3/18/2021  EXAMINATION: TWO XRAY VIEWS OF THE CHEST 3/18/2021 6:11 pm COMPARISON: 09/12/2020 HISTORY: ORDERING SYSTEM PROVIDED HISTORY: Chest Discomfort TECHNOLOGIST PROVIDED HISTORY: Reason for exam:->Chest Discomfort Reason for Exam: pt with c/o headache for three weeks- body aches started today, sore throat, pt states she is 10 weeks pregnant Acuity: Acute Type of Exam: Initial FINDINGS: The heart is normal.  The pulmonary vessels are normal.  The lungs are mildly hyperinflated and emphysematous. No consolidation or effusion is seen. There is mild linear scarring along the lung bases which is unchanged. The bones are intact. Stable chronic changes with no acute abnormality seen. PROCEDURES   Unless otherwise noted below, none     Procedures    CRITICAL CARE TIME   N/A    CONSULTS:  None      EMERGENCY DEPARTMENT COURSE and DIFFERENTIAL DIAGNOSIS/MDM:   Vitals:    Vitals:    03/18/21 2031 03/18/21 2032 03/18/21 2034 03/18/21 2112   BP: 113/73      Pulse:   102 92   Resp:       Temp:       TempSrc:       SpO2: 96% 98%     Weight:       Height:           Patient was given the following medications:  Medications   0.9 % sodium chloride bolus (0 mLs Intravenous Stopped 3/18/21 2053)   acetaminophen (TYLENOL) tablet 1,000 mg (1,000 mg Oral Given 3/18/21 1827)   metoclopramide (REGLAN) injection 10 mg (10 mg Intravenous Given 3/18/21 1858)   diphenhydrAMINE (BENADRYL) injection 25 mg (25 mg Intravenous Given 3/18/21 1858)           Briefly, this is a 75-year-old female who presents to the emergency department today for evaluation for headache, generalized body aches, and sore throat, intermittent over the past several days has been worsening. Has had total symptoms for 3 weeks. On physical exam, she is mildly tachycardic, however lungs are clear to auscultation bilaterally, her abdomen is benign. EKG as documented by my attending, please see his note for further details    Patient is approximately 10 weeks pregnant, G4, P3, her abdomen is benign. She has no abdominal pain, vaginal bleeding or discharge. Patient tells me that she does have a urinary tract infection she is on antibiotics as prescribed by Dr. Tasha Humphries, her OB. Urine does show small excites and 6 white blood cells, patient is currently being treated. Her posterior oropharynx is mildly erythematous, tonsils aresurgically absent, rapid strep is negative. Her rapid Covid is negative. Her CBC shows no evidence of leukocytosis, there is a mild anemia. Her CMP is unremarkable. [place]  [place] [id number], chest x-ray is negative    Fetal heart tones are 158.   Patient was given fluids, Tylenol, Reglan and Benadryl in the ED. Upon reevaluation, the patient states that she is feeling much better, heart rate has improved and she is been able to tolerate p.o. intake in the ED. I feel that her symptoms today could secondary to pregnancy, or possibly underlying viral illness. As her vital signs are stable at this time, she is feeling better I feel that she can be managed as an outpatient. Recommended that she follow-up with her primary care physician as well as her OB within the next 2 to 3 days. She is to return to the ED for any new or worsening symptoms. The patient voiced understanding is agreeable with plan. Stable for discharge. My suspicion is low at this time for pneumonia, full effusion, pneumothorax, intrauterine fetal demise, pyelonephritis, acute surgical abdomen or other emergent etiology. FINAL IMPRESSION      1. Acute nonintractable headache, unspecified headache type    2.  Myalgia          DISPOSITION/PLAN   DISPOSITION Discharge - Pending Orders Complete 03/18/2021 09:12:26 PM      PATIENT REFERREDTO:  AMBER Moon - CNP  [facility]  [phone]    Schedule an appointment as soon as possible for a visit in 2 days      [facility] Emergency Department  [address] [place] [address]  [phone]    As needed, If symptoms worsen      DISCHARGE MEDICATIONS:  New Prescriptions    No medications on file       DISCONTINUED MEDICATIONS:  Discontinued Medications    No medications on file              (Please note that portions of this note were completed with a voice recognition program.  Efforts were made to edit the dictations but occasionally words are mis-transcribed.)    Ebenezer Weeks PA-C (electronically signed)            Ebenezer Weeks PA-C  03/18/21 9513

## 2021-03-19 NOTE — ED NOTES
Discharge instructions given, patient acknowledged understanding, patient ambulated out of ed upon discharge      Cristobal Henson RN  03/18/21 2120
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
## 2021-04-08 NOTE — PROGRESS NOTES
TELEHEALTH VISIT -- Audio/Visual (During [id number]-62 public health emergency)  }  Pursuant to the emergency declaration under the [address] [place] [address], [facility] waiver authority and the [employer] Act, this Virtual Visit was conducted, with patient's consent, to reduce the patient's risk of exposure to COVID-19 and provide continuity of care for an established patient. Services were provided through a video synchronous discussion virtually to substitute for in-person clinic visit. Pt gave verbal informed consent to participate in telehealth services. Conducted a risk-benefit analysis and determined that the patient's presenting problems are consistent with the use of telepsychology. Determined that the patient has sufficient knowledge and skills in the use of technology enabling them to adequately benefit from telepsychology. It was determined that this patient was able to be properly treated without an in-person session. Patient verified that they were currently located at the [facility] address that was provided during registration or another [facility] address, if noted below. Verified the following information:  Patient's identification: Yes  Patient location: [place] [id number]   Patient's call back number: [phone]   Patient's emergency contact's name and number, as well as permission to contact them if needed: Extended Emergency Contact Information  Primary Emergency Contact: Jessica Donato  [facility] [address] Phone: [phone]  Relation: Parent     Provider location: [facility]  Kylie Morales, Ph.D.  Psychologist  4/8/2021  10:39 AM      Time spent with Patient: 28 minutes  This is patient's 14th  [facility] appointment.     Reason for Consult:    Chief Complaint   Patient presents with   Luis Antonio Layer Depression Comment: unemployed    Social Needs    Financial resource strain: Not on file    Food insecurity     Worry: Not on file     Inability: Not on file   [employer] needs     Medical: Not on file     Non-medical: Not on file   Tobacco Use    Smoking status: Former Smoker     Packs/day: 1.00     Years: 18.00     Pack years: 18.00     Types: Cigarettes     Start date: 10/19/2013     Quit date: 10/19/2020     Years since quittin.4    Smokeless tobacco: Never Used   Substance and Sexual Activity    Alcohol use: No     Alcohol/week: 20.0 standard drinks     Types: 10 Cans of beer, 10 Shots of liquor per week     Comment: none for 120 days    Drug use: Yes     Types: Marijuana, Opiates      Comment: currently only marijuana    Sexual activity: Not Currently     Partners: Male   Lifestyle    Physical activity     Days per week: Not on file     Minutes per session: Not on file    Stress: Not on file   Relationships    Social connections     Talks on phone: Not on file     Gets together: Not on file     Attends Episcopal service: Not on file     Active member of club or organization: Not on file     Attends meetings of clubs or organizations: Not on file     Relationship status: Not on file    Intimate partner violence     Fear of current or ex partner: Not on file     Emotionally abused: Not on file     Physically abused: Not on file     Forced sexual activity: Not on file   Other Topics Concern    Not on file   Social History Narrative    Not on file     TOBACCO:   reports that she quit smoking about 5 months ago. Her smoking use included cigarettes. She started smoking about 7 years ago. She has a 18.00 pack-year smoking history. She has never used smokeless tobacco.  ETOH:   reports no history of alcohol use. Diagnosis:  1. Severe episode of recurrent major depressive disorder, without psychotic features ([facility] [address]    2. Alcohol abuse    3.  PTSD (post-traumatic stress disorder)          Plan:  Pt interventions:  Trained in strategies for increasing balanced thinking, Supportive techniques, Identified maladaptive thoughts and Safety planning re: reasons for living        Documentation was done using voice recognition dragon software. Every effort was made to ensure accuracy; however, inadvertent, unintentional computerized transcription errors may be present.

## 2021-04-26 NOTE — PROGRESS NOTES
TELEHEALTH VISIT -- Audio/Visual (During [id number]-86 public health emergency)  }  Pursuant to the emergency declaration under the [address] [place] [address], [facility] waiver authority and the [employer] Act, this Virtual Visit was conducted, with patient's consent, to reduce the patient's risk of exposure to COVID-19 and provide continuity of care for an established patient. Services were provided through a video synchronous discussion virtually to substitute for in-person clinic visit. Pt gave verbal informed consent to participate in telehealth services. Conducted a risk-benefit analysis and determined that the patient's presenting problems are consistent with the use of telepsychology. Determined that the patient has sufficient knowledge and skills in the use of technology enabling them to adequately benefit from telepsychology. It was determined that this patient was able to be properly treated without an in-person session. Patient verified that they were currently located at the [facility] address that was provided during registration or another [facility] address, if noted below. Verified the following information:  Patient's identification: Yes  Patient location: [place] [address]   Patient's call back number: [phone]   Patient's emergency contact's name and number, as well as permission to contact them if needed: Extended Emergency Contact Information  Primary Emergency Contact: Jessica Donato  [facility] [address] Phone: [phone]  Relation: Parent     Provider location: [facility] Consultation  Fernando Grijalva, Ph.D.  Psychologist  4/26/2021  9:35 AM      Time spent with Patient: 30 minutes  This is patient's 15th  [facility] appointment.     Reason for Consult:    Chief Complaint   Patient presents with   [facility]  Smoking status: Former Smoker     Packs/day: 1.00     Years: 18.00     Pack years: 18.00     Types: Cigarettes     Start date: 10/19/2013     Quit date: 10/19/2020     Years since quittin.5    Smokeless tobacco: Never Used   Substance and Sexual Activity    Alcohol use: No     Alcohol/week: 20.0 standard drinks     Types: 10 Cans of beer, 10 Shots of liquor per week     Comment: none for 120 days    Drug use: Yes     Types: Marijuana, Opiates      Comment: currently only marijuana    Sexual activity: Not Currently     Partners: Male   Lifestyle    Physical activity     Days per week: Not on file     Minutes per session: Not on file    Stress: Not on file   Relationships    Social connections     Talks on phone: Not on file     Gets together: Not on file     Attends Gnosticist service: Not on file     Active member of club or organization: Not on file     Attends meetings of clubs or organizations: Not on file     Relationship status: Not on file    Intimate partner violence     Fear of current or ex partner: Not on file     Emotionally abused: Not on file     Physically abused: Not on file     Forced sexual activity: Not on file   Other Topics Concern    Not on file   Social History Narrative    Not on file     TOBACCO:   reports that she quit smoking about 6 months ago. Her smoking use included cigarettes. She started smoking about 7 years ago. She has a 18.00 pack-year smoking history. She has never used smokeless tobacco.  ETOH:   reports no history of alcohol use. Diagnosis:  1. Severe episode of recurrent major depressive disorder, without psychotic features ([facility] [address]    2. PTSD (post-traumatic stress disorder)          Plan:  Pt interventions:  Trained in strategies for increasing balanced thinking and Collaboratively set goals with pt re: preparing for baby        Documentation was done using voice recognition dragon software.   Every effort was made to ensure accuracy; however, inadvertent, unintentional computerized transcription errors may be present.

## 2021-04-30 PROBLEM — F60.3 BORDERLINE PERSONALITY DISORDER (HCC): Status: ACTIVE | Noted: 2021-01-01

## 2021-04-30 NOTE — PROGRESS NOTES
PSYCHIATRY PROGRESS NOTE        Jamey Foster  1985 04/30/21   PCP: AMBER Poole - CNP    CC:   Chief Complaint   Patient presents with    1 Month Follow-Up     S:   The struggle with significant depression and anxiety without any significant change. She is compliant with the sertraline and mirtazapine. Denies any adverse side effects. She continues to deal with a lot of emotional distress as a result of the relationship with the father of her baby, Jair Parks. They are not in a committed relationship, however he does sleep with her and then she feels he leaves and they do not have much of a conversation otherwise. She feels he criticizes her for how emotional she can be, and overly talkative she can be. She frequently feels a sense of abandonment from him, and feels he treats her poorly but she continues to desire the relationship and continues to do reach out to him despite this. She questions why she subjects herself to this level of emotional abuse. She complains largely of her excessive coughing, shortness of breath and feels exhausted and tired due to these issues. She did see pulmonology at [facility] and they did try her on antibiotics initially but this has not been helpful. She does not follow-up with them again until July. She feels tired of living this way, but does not voice any specific plans or intent to harm herself. She did cut herself on 1 occasion, on her arms and back of her legs, as a means to relieve stress. ROS: + Coughing, + muscle tension in neck, + productive cough, + shortness of breath    Brief Medical Hx:   Hx of laryngeal cancer    Brief Psych Hx:  Hosp: late 2016 [id number], [email or web address] 1/2017-2/2017. Admitted twice at [facility] in 2011 for SIMD.   Diagnoses: bipolar disorder, anxiety, depression, noted to have obsessive compulsive personality traits, PTSD, SIMD, alcohol use disorder   Med trials: venlafaxine, sertraline, quetiapine, trazodone.  Duloxetine, buspar (atleast per past 01/05/21 100       AIMS score 7/10/2020: 15    Mental Status Exam:   Appearance    alert, cooperative  Motor:  swaying trunk movements and arms.   Very restless  Speech    spontaneous, normal rate and normal volume +hoarseness,  Mood/Affect    Anxious and Depressed /  dysphoric, anxious, tearful often  Thought Process    linear, goal directed and coherent  Thought Content    Worthlessness, hopelessness , no suicidal ideation at this time, no intent or plan  Associations    logical connections  Attention/Concentration    intact  Memory    recent and remote memory intact  Insight/Judgement    fair / fair    Labs:     Admission on 03/18/2021, Discharged on 03/18/2021   Component Date Value Ref Range Status    WBC 03/18/2021 10.8  4.0 - 11.0 K/uL Final    RBC 03/18/2021 4.01  4.00 - 5.20 M/uL Final    Hemoglobin 03/18/2021 10.7* 12.0 - 16.0 g/dL Final    Hematocrit 03/18/2021 33.8* 36.0 - 48.0 % Final    MCV 03/18/2021 84.3  80.0 - 100.0 fL Final    MCH 03/18/2021 26.8  26.0 - 34.0 pg Final    MCHC 03/18/2021 31.8  31.0 - 36.0 g/dL Final    RDW 03/18/2021 17.9* 12.4 - 15.4 % Final    Platelets 82/32/2092 340  135 - 450 K/uL Final    MPV 03/18/2021 7.4  5.0 - 10.5 fL Final    Neutrophils % 03/18/2021 74.8  % Final    Lymphocytes % 03/18/2021 17.8  % Final    Monocytes % 03/18/2021 5.4  % Final    Eosinophils % 03/18/2021 1.6  % Final    Basophils % 03/18/2021 0.4  % Final    Neutrophils Absolute 03/18/2021 8.1* 1.7 - 7.7 K/uL Final    Lymphocytes Absolute 03/18/2021 1.9  1.0 - 5.1 K/uL Final    Monocytes Absolute 03/18/2021 0.6  0.0 - 1.3 K/uL Final    Eosinophils Absolute 03/18/2021 0.2  0.0 - 0.6 K/uL Final    Basophils Absolute 03/18/2021 0.0  0.0 - 0.2 K/uL Final    Sodium 03/18/2021 135* 136 - 145 mmol/L Final    Potassium 03/18/2021 4.1  3.5 - 5.1 mmol/L Final    Chloride 03/18/2021 103  99 - 110 mmol/L Final    CO2 03/18/2021 21  21 - 32 mmol/L Final    Anion Gap 03/18/2021 11  3 - 16 Final    Glucose 03/18/2021 98  70 - 99 mg/dL Final    BUN 03/18/2021 10  7 - 20 mg/dL Final    CREATININE 03/18/2021 <0.5* 0.6 - 1.1 mg/dL Final    GFR Non- 03/18/2021 >60  >60 Final    Comment: >60 mL/min/1.73m2 EGFR, calc. for ages 25 and older using the  MDRD formula (not corrected for weight), is valid for stable  renal function.  GFR  03/18/2021 >60  >60 Final    Comment: Chronic Kidney Disease: less than 60 ml/min/1.73 sq.m. Kidney Failure: less than 15 ml/min/1.73 sq.m. Results valid for patients 18 years and older.  Calcium 03/18/2021 9.7  8.3 - 10.6 mg/dL Final    Total Protein 03/18/2021 6.8  6.4 - 8.2 g/dL Final    Albumin 03/18/2021 3.9  3.4 - 5.0 g/dL Final    Albumin/Globulin Ratio 03/18/2021 1.3  1.1 - 2.2 Final    Total Bilirubin 03/18/2021 <0.2  0.0 - 1.0 mg/dL Final    Alkaline Phosphatase 03/18/2021 72  40 - 129 U/L Final    ALT 03/18/2021 20  10 - 40 U/L Final    AST 03/18/2021 14* 15 - 37 U/L Final    Globulin 03/18/2021 2.9  g/dL Final    Lipase 03/18/2021 34.0  13.0 - 60.0 U/L Final    Color, UA 03/18/2021 YELLOW  Straw/Yellow Final    Clarity, UA 03/18/2021 CLOUDY* Clear Final    Glucose, Ur 03/18/2021 Negative  Negative mg/dL Final    Bilirubin Urine 03/18/2021 Negative  Negative Final    Ketones, Urine 03/18/2021 Negative  Negative mg/dL Final    Specific Gravity, UA 03/18/2021 1.016  1.005 - 1.030 Final    Blood, Urine 03/18/2021 Negative  Negative Final    pH, UA 03/18/2021 6.5  5.0 - 8.0 Final    Protein, UA 03/18/2021 Negative  Negative mg/dL Final    Urobilinogen, Urine 03/18/2021 0.2  <2.0 E.U./dL Final    Nitrite, Urine 03/18/2021 Negative  Negative Final    Leukocyte Esterase, Urine 03/18/2021 SMALL* Negative Final    Microscopic Examination 03/18/2021 YES   Final    Urine Type 03/18/2021 Voided   Final    Urine received in a container without preservatives.     Urine Reflex to Culture 03/18/2021 Not Indicated   Final    hCG Quant 03/18/2021 63371.0  <5.0 mIU/mL Final    Comment: Male: <5.0 mIU/ml    Pregnant:  Note:  HCG Interpretation is based on gestational age vs. LMP. Gestational Age          Expected HCG values (mIU/ml)  0.2-1 week                            5-50    1-2 weeks                             2-3 weeks                       100-5000    3-4 weeks                     500-10,000    4-5 weeks                    1000-50,000    5-6 weeks                 10,000-100,000    6-8 weeks                 15,000-200,000    2-3 months                10,000-100,000      SARS-CoV-2, NAAT 03/18/2021 Not Detected  Not Detected Final    Comment: Rapid NAAT:   Negative results should be treated as presumptive and,  if inconsistent with clinical signs and symptoms or necessary for  patient management, should be tested with an alternative molecular  assay. Negative results do not preclude SARS-CoV-2 infection and  should not be used as the sole basis for patient management decisions. This test has been authorized by the FDA under an Emergency Use  Authorization (EUA) for use by authorized laboratories.     Fact sheet for Healthcare Providers:  BuildHer.es  Fact sheet for Patients: BuildHer.es    METHODOLOGY: Isothermal Nucleic Acid Amplification      Rapid Influenza A Ag 03/18/2021 Negative  Negative Final    Rapid Influenza B Ag 03/18/2021 Negative  Negative Final    Ventricular Rate 03/18/2021 103  BPM Final    Atrial Rate 03/18/2021 103  BPM Final    P-R Interval 03/18/2021 136  ms Final    QRS Duration 03/18/2021 80  ms Final    Q-T Interval 03/18/2021 334  ms Final    QTc Calculation (Bazett) 03/18/2021 437  ms Final    P Axis 03/18/2021 67  degrees Final    R Axis 03/18/2021 46  degrees Final    T Axis 03/18/2021 55  degrees Final    Diagnosis 03/18/2021 Sinus tachycardiaPossible Left atrial enlargementAbnormal ECGConfirmed by SHEMAR MD, Naty Corey ([id number]) on 3/19/2021 12:41:19 PM   Final    Bacteria, UA 03/18/2021 2+* None Seen /HPF Final    Hyaline Casts, UA 03/18/2021 2  0 - 8 /LPF Final    WBC, UA 03/18/2021 6* 0 - 5 /HPF Final    RBC, UA 03/18/2021 1  0 - 4 /HPF Final    Epithelial Cells, UA 03/18/2021 6* 0 - 5 /HPF Final    Comment: Urinalysis microscopic performed using the  automated methodology (AUWI analyzer). A:  37 yo F with depression and anxiety. Seems to be a major component of borderline personality disorder. Having a lot of distress due to interpersonal issues with father of the baby and ongoing physical issues related to coughing and breathing. 1. MDD recurrent, severe   2. Borderline personality disorder  3. PTSD  4. Tardive dyskinesia   4. Alcohol use disorder, moderate in sustained remission  5. Cannabis use disorder, in early remission  6. Nicotine use disorder, in remission  7. MTHFR reduced activity    P:   1. Continue mirtazapine 30mg qhs  2. Continue sertraline 100mg daily  3. Continue f/u with [facility], Dr Natalie Cedillo. Follow-up: RTC in 6 weeks    This note will not be viewable in MyChart for the following reason(s). This is a Psychotherapy Note.       Paul Shukla MD  Psychiatrist

## 2021-05-10 PROBLEM — I46.9 CARDIAC ARREST (HCC): Status: ACTIVE | Noted: 2021-01-01

## 2021-05-10 NOTE — CONSULTS
[facility]   Consult Note      Reason for Consult: Cardiac arrest, respiratory failure   Requesting Physician: Dr Kera Jewell    Subjective:   [facility] / HPI:                The patient is a 38 y.o. female with significant past medical history of:      Diagnosis Date    Alcohol use disorder, moderate, in early remission ([facility] 10/28/2019    Anemia     Anxiety     Back pain     [facility]     Depression     Migraine     Opiate abuse, continuous ([facility]     [facility] cancer of larynx ([facility] 7/18/2017    PTSD (post-traumatic stress disorder)     Voice hoarseness      Patient was found down by her mother at home today. She was felt to be without pulse and CPR was started by the mother after she called 46. EMT arrived on the scene and the patient was intubated and resuscitated. She was found to be in PEA. Because of her arrest is presently unknown. 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 She had external beam radiation for this. She also had tracheostomy for a time and that has been removed. The patient had been evaluated recently for hemoptysis. This is reportedly a negative evaluation. Her mother tells me that she had been dealing with depression. She is pregnant.       Past Surgical History:        Procedure Laterality Date    LARYNGOSCOPY N/A 6/20/2019    MICROLARYNGOSCOPY AND LYSIS OF ADHESIONS OF LARYNX performed by Amrit Means MD at [address] [facility] Left 06/28/2017    MICROLARYNGOSCOPY WITH BIOPSY OF LEFT TRUE VOCAL CORD MASS    OTHER SURGICAL HISTORY  08/10/2017    Tracheostomy    TONSILLECTOMY      TRACHEOSTOMY      TRACHEOTOMY N/A 12/14/2017     TRACHEOTOMY WITH 6DJOCY SLADE               Current Medications:    Current Facility-Administered Medications: propofol injection, 10 mcg/kg/min, Intravenous, Titrated  lactated ringers bolus, 1,000 mL, Intravenous, Once  norepinephrine (LEVOPHED) 16 mg in dextrose 5% 250 mL infusion, 2-100 mcg/min, Intravenous, Continuous    Allergies   Allergen Reactions    Wellbutrin [Bupropion] Itching     Feels like worms crawling on her body; crawling sensation    Amoxicillin-Pot Clavulanate      vomiting       Social History:    TOBACCO:   reports that she quit smoking about 6 months ago. Her smoking use included cigarettes. She started smoking about 7 years ago. She has a 18.00 pack-year smoking history. She has never used smokeless tobacco.  ETOH:   reports no history of alcohol use. Patient currently lives independently  Environmental/chemical exposure: None known    Family History:       Problem Relation Age of Onset    High Blood Pressure Mother     Depression Mother     High Blood Pressure Father     Depression Father     Diabetes Maternal Uncle     Depression Paternal Grandmother      REVIEW OF SYSTEMS:    ADIN is unobtainable due to his critical illness. Objective:   PHYSICAL EXAM:      VITALS:  BP (!) 91/52   Pulse 130   Resp 20   Ht 5' 6\" (1.676 m)   Wt 145 lb (65.8 kg)   LMP  (LMP Unknown)   SpO2 100%   BMI 23.40 kg/m²      24HR INTAKE/OUTPUT:  No intake or output data in the 24 hours ending 05/10/21 1743  CONSTITUTIONAL: Nonresponsive. Intubated on mechanical ventilation. She is on some propofol. NECK: Healed tracheostomy site with evidence of prior external beam radiation   LUNGS: Intubated, no increased work of breathing and clear to auscultation. No accessory muscle use  CARDIOVASCULAR: S1 and S2, no edema and no JVD  ABDOMEN:  normal bowel sounds, non-distended and no masses palpated, and no tenderness to palpation. No hepatospleenomegaly  LYMPHADENOPATHY:  no axillary or supraclavicular adenopathy. No cervical adnenopathy  PSYCHIATRIC: Nonresponsive on mechanical ventilation  MUSCULOSKELETAL: No obvious misalignment or effusion of the joints. No clubbing, cyanosis of the digits.   SKIN:  normal skin color, texture, turgor and no redness, warmth, or swelling. No palpable nodules    DATA:    Old records have been reviewed    CBC:  Recent Labs     05/10/21  1555   WBC 10.1   RBC 2.98*   HGB 8.4*   HCT 28.8*      MCV 96.9   MCH 28.3   MCHC 29.2*   RDW 16.7*      BMP:  Recent Labs     05/10/21  1555   *   K 4.5   CL 97*   CO2 7*   BUN 5*   CREATININE <0.5*   CALCIUM 7.8*   GLUCOSE 162*      ABG:  Recent Labs     05/10/21  1612   PHART 6.849*   WVZ5HQY 55.5*   PO2ART 380.0*   ZGO2ARI 9.7*   H5DTLJIO 99.3   BEART -23.5*     Procalcitonin  No results for input(s): PROCAL in the last 72 hours. No results found for: BNP  Lab Results   Component Value Date    TROPONINI <0.01 05/10/2021           Radiology Review:  All pertinent images / reports were reviewed as a part of this visit. Assessment:     1. Status post cardiac arrest with return of spontaneous circulation  2. Acute respiratory failure  3. 16-week gestation  4. Profound metabolic acidosis      Plan:     I have reviewed laboratories, medical records and images for this visit  Chest x-ray reveals good position of the endotracheal tube. Mild pulmonary edema is noted. Initial ABG is 6.8 5/56/380. Current ventilator settings are tidal volume 500, FiO2 0.5 and PEEP 5. Recommend repeat ABG. If she remains profoundly acidemic, may benefit from bicarb infusion. She does have an increased anion gap metabolic acidosis. Lactic acid is 21. Her troponin is 0.01. Drug screen was positive for amphetamine. Acetaminophen and salicylate levels are low. Ethanol level was negative. She has been seen by OB. Estimated fetal gestation is about 16 weeks. Return of spontaneous circulation but no obvious neurologic function, hypothermia would be indicated. Difficult to know the impact of this on the fetus. I did discuss risks of hypothermia with the patient's mother. Considering the benefits of the patient, she is willing to try hypothermia.   This will be started

## 2021-05-10 NOTE — Clinical Note
Hi Dr. Eric Harris, it appears you are the attending today for Ms. Tootie Cuevas. I have been the treating psychologists for her for several months and I am skeptical that she would have intentionally or unintentionally OD'ed. Please see my note from the morning of her admission. She did endorse passive morbid ideation, but adamantly denied suicidal ideation and was future-oriented. She also had noticeable wheezing/whistling and reported being SOB, for which I encouraged her to seek medical care, but she declined bc she said she isn't trusted by medical providers and they assume she is on drugs bc of her hx. To my knowledge, she has been clean from all drugs but marijuana for several yrs and stopping using THC upon learning of her pregnancy. Please call me at [phone] if you would like other collateral information. Thanks!

## 2021-05-10 NOTE — ED NOTES
Arrived by [facility] rt unwitnessed arrest. Was found down by mother; mom began CPR. PEA upon EMS arrival @ 1500; intubated with 7.0, 22 at lip & 18g to right ac with 4 rounds of epi & 2 narcan admin. Dr Nahun Vazquez, RT, Jeremie Carbajal, RN & this nurse at bedside. Pulse at present in ER; bilateral breath sounds heard & placed on vent by Cynthia Ann, RT. Hx of iv drug use & throat cancer; believed to be 13 weeks pregnant. OB called. EKG obtained & monitors in place.        Aliyah Anderson, RN  05/10/21 Caryn Evans RN  05/10/21 1921

## 2021-05-10 NOTE — PROGRESS NOTES
TELEHEALTH VISIT -- Audio/Visual (During ZCNPX-67 public health emergency)  }  Pursuant to the emergency declaration under the [address] [place] [address] waiver authority and the [employer] Act, this Virtual Visit was conducted, with patient's consent, to reduce the patient's risk of exposure to COVID-19 and provide continuity of care for an established patient. Services were provided through a video synchronous discussion virtually to substitute for in-person clinic visit. Pt gave verbal informed consent to participate in telehealth services. Conducted a risk-benefit analysis and determined that the patient's presenting problems are consistent with the use of telepsychology. Determined that the patient has sufficient knowledge and skills in the use of technology enabling them to adequately benefit from telepsychology. It was determined that this patient was able to be properly treated without an in-person session. Patient verified that they were currently located at the [facility] address that was provided during registration or another [facility] address, if noted below. Verified the following information:  Patient's identification: Yes  Patient location: [place] [address]   Patient's call back number: [phone]   Patient's emergency contact's name and number, as well as permission to contact them if needed: Extended Emergency Contact Information  Primary Emergency Contact: Jessica Donato  [facility] [place] [facility] [address] Phone: [phone]  Relation: Parent     Provider location: [facility] Consultation  Angeli Barney, Ph.D.  Psychologist  5/10/2021  9:22 AM      Time spent with Patient: 25 minutes  This is patient's 16th  [facility] appointment.     Reason for Consult:    Chief Complaint   Patient presents with   [employer] Depression Feedback given to PCP. S:  Pt seen for f/u of depression, PTSD, and borderline traits. Pt reported unchanged mood and sxs. \"I don't want to be here no more. I'm not going to kill myself, but I have no quality of life. \" Stated the pain in her throat makes it difficult to eat or drink. Stated she has has started to wheeze and her breath whistles some times, which was evident during the visit. Encouraged her to f/u w medical team or go to ED, but pt declined. Discussed rel w baby's father. Stated she attempted to be more positive, per his request, but hasn't changed his bx toward her. ID'ed w relfections that she feels abandoned by most people. \"I don't have 1 friend. \" Feeling \"worthless\" bc she cannot keep up w pace of life (cleaning, etc) d/t illness and having no perceived support. Reported continuing to smoke 2-3 cig/day. Completely stopped smoking marijuana. Discussed her children: Kimmy Renee, and Jamari Hernandez.      O:  MSE:    Appearance    alert, cooperative  Appetite abnormal: low  Sleep disturbance Yes  Fatigue Yes  Loss of pleasure Yes  Impulsive behavior Yes  Speech    spontaneous, normal rate and normal volume  Mood    Depressed  Affect    depressed affect  Thought Content    intact, cognitive distortions and all or nothing thinking  Thought Process    goal directed and coherent  Associations    logical connections  Insight    Fair  Judgment    fair  Orientation    oriented to person, place, time, and general circumstances  Memory    recent and remote memory intact  Attention/Concentration    impaired  Morbid ideation Yes  Suicide Assessment    no suicidal ideation    History:  Social History:   Social History     Socioeconomic History    Marital status: Single     Spouse name: Not on file    Number of children: 3    Years of education: 12    Highest education level: Not on file   Occupational History     Comment: unemployed    Social Needs    Financial resource strain: Not on file   [place] thinking, Trained in improving communication skills and Discussed and problem-solved barriers in adhering to behavioral change plan        Documentation was done using voice recognition dragon software. Every effort was made to ensure accuracy; however, inadvertent, unintentional computerized transcription errors may be present.

## 2021-05-10 NOTE — PROGRESS NOTES
05/10/21 1656   Vent Patient Data   Plateau Pressure 17 MCX56   Static Compliance 41 mL/cmH2O   Dynamic Compliance 23.22 mL/cmH2O

## 2021-05-10 NOTE — TELEPHONE ENCOUNTER
LVM 15 mins after appt time apologizing for delay d/t technical issues. Sent doxy. me invite and called 1 more time. Phone went directly to  both calls.

## 2021-05-10 NOTE — ED NOTES
Bed: 02  Expected date:   Expected time:   Means of arrival:   Comments:  Carrie Aguilar RN  05/10/21 2328

## 2021-05-10 NOTE — CONSULTS
Cardiovascular Consultation     Attending Physician: Marzena Miguel MD    PATIENT: Swapna Judd  : 1985  MRN: [id number]    Reason for Consultation:   Chief Complaint   Patient presents with    Cardiac Arrest     Arrived by [facility] rt unwitnessed arrest.  Was found down by mother; mom began CPR. PEA upon EMS arrival @ 1500; 4 rounds of epi & 2 narcan admin. Pulse at present. Hx of iv drug use; believed to be 13 weeks pregnant. History of present illness:   Ms. Swapna Judd is a 38 y.o. female patient with a history notable for bipolar affective disorder, polysubstance abuse, and laryngeal cancer who presented by EMS from home following witnessed arrest. Tayler's mother is in the ER and states that her daughter had come to her asking her to punch her chest to help her clear from her throat. She states that she has \"struggled with her airway ever since the trach for throat cancer\". Her mother states that she was with a high pitched sound alternating with chronic coughing at the time. No reported chest pain, palpitations, diaphoresis, N. States she had noticed a high resting heart rate at times and recently completed echocardiogram. Lianet De Guzman knew she was approximately 13 weeks pregnant. Her older daughter is present as well. Her mother reports her daughter going down in front of her and starting CPR. By OOH reports, there was no shockable rhythm and eventual ROSC was achieved with management for PEA arrest. In ER, patient is mechanically ventilated.     Medical History:      Diagnosis Date    Alcohol use disorder, moderate, in early remission ([facility] 10/28/2019    Anemia     Anxiety     Back pain     Bipolar affective disorder (HCC)     Depression     Migraine     Opiate abuse, continuous ([address]     Primary cancer of larynx ([facility] 2017    PTSD (post-traumatic stress disorder)     Voice hoarseness        Surgical History:      Procedure Laterality Date    LARYNGOSCOPY N/A 2019    MICROLARYNGOSCOPY AND LYSIS OF ADHESIONS OF LARYNX performed by Bal Gibson MD at [address] Left 2017    MICROLARYNGOSCOPY WITH BIOPSY OF LEFT TRUE VOCAL CORD MASS    OTHER SURGICAL HISTORY  08/10/2017    Tracheostomy    TONSILLECTOMY      TRACHEOSTOMY      TRACHEOTOMY N/A 2017     TRACHEOTOMY WITH 6DCT [place]                 Social History:  Social History     Socioeconomic History    Marital status: Single     Spouse name: Not on file    Number of children: 3    Years of education: 15    Highest education level: Not on file   Occupational History     Comment: unemployed    Social Needs    Financial resource strain: Not on file    Food insecurity     Worry: Not on file     Inability: Not on file   [employer] needs     Medical: Not on file     Non-medical: Not on file   Tobacco Use    Smoking status: Former Smoker     Packs/day: 1.00     Years: 18.00     Pack years: 18.00     Types: Cigarettes     Start date: 10/19/2013     Quit date: 10/19/2020     Years since quittin.5    Smokeless tobacco: Never Used   Substance and Sexual Activity    Alcohol use: No     Alcohol/week: 20.0 standard drinks     Types: 10 Cans of beer, 10 Shots of liquor per week     Comment: none for 120 days    Drug use: Yes     Types: Marijuana, Opiates , Methamphetamines, IV    Sexual activity: Not Currently     Partners: Male   Lifestyle    Physical activity     Days per week: Not on file     Minutes per session: Not on file    Stress: Not on file   Relationships    Social connections     Talks on phone: Not on file     Gets together: Not on file     Attends [employer] service: Not on file     Active member of club or organization: Not on file     Attends meetings of clubs or organizations: Not on file     Relationship status: Not on file    Intimate partner violence     Fear of current or ex partner: Not on file     Emotionally abused: reviewed the below testing personally:    EK/10/2021  Sinus tachycardia  Normal Qtc  Nonspecific ST and T abnormality     2021 TTE ()  Study Conclusions  - Left ventricle: The cavity size is normal. Wall thickness is normal. Systolic function was normal.    The estimated ejection fraction was in the range of 55% to 60%. Wall motion was normal; there were    no regional wall motion abnormalities. Left ventricular diastolic function parameters were normal.  - Right ventricle: Systolic function was normal. TAPSE: 1.9cm. Tricuspid annular systolic velocity:    67QV/R.  - Mitral valve: Mild regurgitation.  - Pulmonary arteries: Systolic pressure could not be accurately estimated. - Inferior vena cava: The vessel was normal in size. The respirophasic diameter changes were in the    normal range (>= 50%), consistent with normal central venous pressure. 5/10/2021 TTE   Overall left ventricular systolic function appears normal.   Ejection fraction is visually estimated to be 60-65%. No regional wall motion abnormalities are noted. Cannot determine diastology due to E/A fusion from sinus tachycardia. Normal function of all valves. 5/10/21 CXR  Support apparatus in adequate position as above       Perihilar and interstitial opacities which may represent pulmonary vascular   congestion, pulmonary edema.  Multifocal pneumonia is also in the differential       Possible ingested foreign body versus artifact. Troponin <0.01   Amphetamine positive     Impression/Recommendations    Ms. Greyson Ritter is a 38 y.o. female patient    OOH arrest  Acute hypoxic respiratory failure- ventilator dependent  Lactic acidosis   Hx. Laryngeal cancer (XRT 2017)  Bipolar affective disorder  Intrauterine pregnancy   Hypothyroidism  Hypertension   Hx. Polysubstance abuse with overdose       Hypoxia and PEA arrest reported out of hospital.  ECG is with nonspecific changes following resuscitation.  No ST elevations or ventricular ectopy in hospital.   Echocardiogram is unchanged from last month: normal LV EF and wall motion. Hypothermia protocol and post arrest critical care management per primary and Pulmonary teams. Severe acidemia. No immediate indications for cardiac cath. Critical care time spent in direct management of this patient was 40 minutes, exclusive of separately documented procedures. Time spent includes but was not limited to directly managing the unstable patient, reviewing diagnostics, speaking with medical staff, and developing a treatment plan. Thank you for allowing me to participate in the care of your patient. Please do not hesitate to call. Nicholas Witt DO, [facility] [place]  Interventional Cardiology     o: [phone]  [address] [place] [address]., [address] [facility], [address] [place] [address]      NOTE:  This report was transcribed using voice recognition software. Every effort was made to ensure accuracy; however, inadvertent computerized transcription errors may be present.

## 2021-05-10 NOTE — ED PROVIDER NOTES
[facility]  86. PROVIDER NOTE    Patient Identification  Pt Name: Eduin De Dios  MRN: [id number]  Beth 1985  Date of evaluation: 5/10/2021  Provider: Farzaneh Kelly MD  PCP: AMBER Connell - CNP    Chief Complaint  Cardiac Arrest (Arrived by [facility] rt unwitnessed arrest.  Was found down by mother; mom began CPR. PEA upon EMS arrival @ 1500; 4 rounds of epi & 2 narcan admin. Pulse at present. Hx of iv drug use; believed to be 13 weeks pregnant.    )      HPI  (History provided by EMS, limited by patient low GCS and critical presentation)  This is a 38 y.o. female who was brought in by EMS transportation for cardiac arrest after being found down at home. Just prior to arrival, patient was found down by her mother. It is unknown how long she was down for. Mom began CPR immediately. On arrival, EMS found the patient to be in PEA. CPR was continued. In route, patient received 4 rounds of epinephrine and 2 rounds of Narcan. She was also intubated successfully by paramedics. On arrival, patient had return of spontaneous circulation. Patient has a history of IV drug abuse, but is clean per history. Of note, she is also known to be pregnant, somewhere between 12 and 15 weeks, although this is unconfirmed. Further history is unobtainable. .     ROS  Unobtainable    I have reviewed the following nursing documentation:  Allergies:  Wellbutrin [bupropion] and Amoxicillin-pot clavulanate    Past medical history:   Past Medical History:   Diagnosis Date    Alcohol use disorder, moderate, in early remission ([facility] 10/28/2019    Anemia     Anxiety     Back pain     Bipolar affective disorder (HCC)     Depression     Migraine     Opiate abuse, continuous ([facility]     [facility] 7/18/2017    PTSD (post-traumatic stress disorder)     Voice hoarseness      Past surgical history:   Past Surgical History:   Procedure Laterality Date    LARYNGOSCOPY N/A 6/20/2019    MICROLARYNGOSCOPY AND LYSIS OF ADHESIONS OF LARYNX performed by Moshe Gottron, MD at [address] Left 06/28/2017    MICROLARYNGOSCOPY WITH BIOPSY OF LEFT TRUE VOCAL CORD MASS    OTHER SURGICAL HISTORY  08/10/2017    Tracheostomy    TONSILLECTOMY      TRACHEOSTOMY      TRACHEOTOMY N/A 12/14/2017     TRACHEOTOMY WITH 6DCT [place]                 Home medications:   Current Discharge Medication List      CONTINUE these medications which have NOT CHANGED    Details   amLODIPine (NORVASC) 5 MG tablet Take 5 mg by mouth daily      levothyroxine (SYNTHROID) 100 MCG tablet Take 100 mcg by mouth Daily      omeprazole (PRILOSEC) 40 MG delayed release capsule TAKE ONE CAPSULE BY MOUTH DAILY ONE HOUR BEFORE EVERY EVENING MEAL  Qty: 30 capsule, Refills: 0    Associated Diagnoses: Cough; Laryngopharyngeal reflux (LPR)      sertraline (ZOLOFT) 100 MG tablet Take 1 tablet by mouth daily  Qty: 30 tablet, Refills: 1      mirtazapine (REMERON) 30 MG tablet TAKE ONE TABLET BY MOUTH ONCE NIGHTLY  Qty: 30 tablet, Refills: 2      labetalol (NORMODYNE) 200 MG tablet Take 200 mg by mouth 2 times daily      ondansetron (ZOFRAN-ODT) 4 MG disintegrating tablet DISSOLVE ONE TABLET BY MOUTH EVERY 8 HOURS AS NEEDED FOR NAUSEA  Qty: 20 tablet, Refills: 1             Social history:  reports that she quit smoking about 6 months ago. Her smoking use included cigarettes. She started smoking about 7 years ago. She has a 18.00 pack-year smoking history. She has never used smokeless tobacco. She reports current drug use. Drugs: Marijuana, Opiates , Methamphetamines, and IV. She reports that she does not drink alcohol.     Family history:    Family History   Problem Relation Age of Onset    High Blood Pressure Mother     Depression Mother     High Blood Pressure Father     Depression Father     Diabetes Maternal Uncle     Depression Paternal Grandmother        Exam  ED Triage Vitals   BP Temp Temp src Pulse GFR Non-African American >60 >60    GFR African American >60 >60    Calcium 7.8 (L) 8.3 - 10.6 mg/dL    Total Protein 4.5 (L) 6.4 - 8.2 g/dL    Albumin 2.6 (L) 3.4 - 5.0 g/dL    Albumin/Globulin Ratio 1.4 1.1 - 2.2    Total Bilirubin <0.2 0.0 - 1.0 mg/dL    Alkaline Phosphatase 48 40 - 129 U/L    ALT 14 10 - 40 U/L    AST 37 15 - 37 U/L    Globulin 1.9 g/dL   Urinalysis Reflex to Culture    Specimen: Urine, clean catch   Result Value Ref Range    Color, UA YELLOW Straw/Yellow    Clarity, UA CLOUDY (A) Clear    Glucose, Ur Negative Negative mg/dL    Bilirubin Urine Negative Negative    Ketones, Urine Negative Negative mg/dL    Specific Gravity, UA 1.021 1.005 - 1.030    Blood, Urine Negative Negative    pH, UA 7.0 5.0 - 8.0    Protein, UA Negative Negative mg/dL    Urobilinogen, Urine 0.2 <2.0 E.U./dL    Nitrite, Urine Negative Negative    Leukocyte Esterase, Urine Negative Negative    Microscopic Examination YES     Urine Type Voided     Urine Reflex to Culture Not Indicated    HCG, Quantitative, Pregnancy   Result Value Ref Range    hCG Quant 8926.0 <5.0 mIU/mL   Blood Gas, Arterial   Result Value Ref Range    pH, Arterial 6.849 (LL) 7.350 - 7.450    pCO2, Arterial 55.5 (H) 35.0 - 45.0 mmHg    pO2, Arterial 380.0 (H) 75.0 - 108.0 mmHg    HCO3, Arterial 9.7 (L) 21.0 - 29.0 mmol/L    Base Excess, Arterial -23.5 (L) -3.0 - 3.0 mmol/L    Hemoglobin, Art, Extended 10.1 (L) 12.0 - 16.0 g/dL    O2 Sat, Arterial 99.3 >92 %    Carboxyhgb, Arterial 0.7 0.0 - 1.5 %    Methemoglobin, Arterial 0.7 <1.5 %    TCO2, Arterial 25.5 Not Established mmol/L    O2 Content, Arterial 15 Not Established mL/dL    O2 Therapy Unknown    Troponin   Result Value Ref Range    Troponin <0.01 <0.01 ng/mL   Lipase   Result Value Ref Range    Lipase 22.0 13.0 - 60.0 U/L   Lactate, Sepsis   Result Value Ref Range    Lactic Acid, Sepsis 21.4 (HH) 0.4 - 1.9 mmol/L   Salicylate   Result Value Ref Range    Salicylate, Serum <0.9 (L) 15.0 - 30.0 mg/dL sign of prognosis for the patient's long-term improvement. Of note, she she did show increasing movement, some of that eventually bordering on purposeful, which is reassuring. Patient later developed hypotension despite fluid resuscitation. I ordered and initiated a Levophed drip to be run through the patient's central line. I consulted Dr. Katherine Guido, hospitalist through [address] [place] [address] for admission. She reviewed the patient's history, physical exam, labs, imaging studies, and emergency department course and has decided to admit Adonay Harris for further evaluation and treatment. As I have deemed necessary from their history, physical, and studies, I have considered and evaluated Adonay Ratel for the following diagnoses:  ACUTE CORONARY SYNDROME, INTRACRANIAL HEMORRHAGE, MALIGNANT DYSRHYTHMIA, MENINGITIS, ENCEPHALITIS, PNEUMONIA, PULMONARY EMBOLISM, SEPSIS, SUBARACHNOID HEMORRHAGE, SUBDURAL HEMATOMA, STROKE, TIA, ENDOCARDITIS, DRUG OVERDOSE, MEDICATION OVERDOSE, ACUTE INTOXICATION, URINARY TRACT INFECTION    The total Critical Care time is 90 minutes which excludes separately billable procedures. Final Impression  1. Cardiopulmonary arrest with successful resuscitation ([address]    2. Increased anion gap metabolic acidosis    3. Lactic acidosis    4. Pregnancy at early stage        Blood pressure 86/61, pulse 115, temperature (!) 92.9 °F (33.8 °C), temperature source Bladder, resp. rate 18, height 5' 6\" (1.676 m), weight 148 lb 2.4 oz (67.2 kg), SpO2 100 %, not currently breastfeeding. Disposition:  Admit to ICU        This chart was generated using the [address] [place] [address] dictation system. I created this record but it may contain dictation errors given the limitations of this technology.        Brandon Palacio MD  05/11/21 [id number]

## 2021-05-10 NOTE — CONSULTS
Department of Gynecology  Consult Note      Reason for Consult:  Cardiac arrest, currently pregnant  Requesting Physician:      CHIEF COMPLAINT:   The patient was admitted to the ER with cardiac arrest from a suspected drug overdose. She was noted to be pregnant at approximately 14-16 weeks gestation. I was asked to come and evaluate the pregnancy while the patient was being treated and unconscious. History obtained from medical staff present    HISTORY OF PRESENT ILLNESS:     The patient is a 38 y.o. female with significant past medical history of pregnancy who presents with cardiac arrest.    Past Medical History:        Diagnosis Date    Alcohol use disorder, moderate, in early remission ([address] 10/28/2019    Anemia     Anxiety     Back pain     Bipolar affective disorder ([address]     Depression     Migraine     Opiate abuse, continuous ([address]     Primary cancer of l[facility] ([facility] 7/18/2017    PTSD (post-traumatic stress disorder)     Voice hoarseness      Past Surgical History:        Procedure Laterality Date    LARYNGOSCOPY N/A 6/20/2019    MICROLARYNGOSCOPY AND LYSIS OF ADHESIONS OF LARYNX performed by Elva Loaiza MD at [place] [address] Left 06/28/2017    MICROLARYNGOSCOPY WITH BIOPSY OF LEFT TRUE VOCAL CORD MASS    OTHER SURGICAL HISTORY  08/10/2017    Tracheostomy    TONSILLECTOMY      TRACHEOSTOMY      TRACHEOTOMY N/A 12/14/2017     TRACHEOTOMY WITH 6DCT [place]                 Past Gynecological History:    1. Last menstrual period:    2. Menses:   3. Contraception:   4. Sexually transmitted disease history:         A.  Number of sexual partners in the last 6 months:     meds:  Current Facility-Administered Medications:     0.9 % sodium chloride IV bolus 1,974 mL, 30 mL/kg, Intravenous, Once, Avery Mchugh MD    Current Outpatient Medications:     omeprazole (PRILOSEC) 40 MG delayed release capsule, TAKE ONE CAPSULE BY MOUTH DAILY ONE HOUR BEFORE EVERY EVENING MEAL, Disp: 30 capsule, Rfl: 0    sertraline (ZOLOFT) 100 MG tablet, Take 1 tablet by mouth daily, Disp: 30 tablet, Rfl: 1    mirtazapine (REMERON) 30 MG tablet, TAKE ONE TABLET BY MOUTH ONCE NIGHTLY, Disp: 30 tablet, Rfl: 2    labetalol (NORMODYNE) 200 MG tablet, Take 200 mg by mouth 2 times daily, Disp: , Rfl:     ondansetron (ZOFRAN-ODT) 4 MG disintegrating tablet, DISSOLVE ONE TABLET BY MOUTH EVERY 8 HOURS AS NEEDED FOR NAUSEA, Disp: 20 tablet, Rfl: 1    levothyroxine (SYNTHROID) 75 MCG tablet, TAKE ONE TABLET BY MOUTH DAILY, Disp: 90 tablet, Rfl: 3    albuterol sulfate HFA (PROVENTIL HFA) 108 (90 Base) MCG/ACT inhaler, Inhale 2 puffs into the lungs every 4 hours as needed for Wheezing or Shortness of Breath (Space out to every 6 hours as symptoms improve), Disp: 1 Inhaler, Rfl: 5       Allergies: Wellbutrin [bupropion] and Amoxicillin-pot clavulanate     Social History:  Patient not conscious    Family History:       Problem Relation Age of Onset    High Blood Pressure Mother     Depression Mother     High Blood Pressure Father     Depression Father     Diabetes Maternal Uncle     Depression Paternal Grandmother         PHYSICAL EXAM:    Vitals:  Ht 5' 6\" (1.676 m)   Wt 145 lb (65.8 kg)   LMP  (LMP Unknown)   BMI 23.40 kg/m²     Fundal height consistent with 16 week pregnancy. Bedside ultrasound performed, consistent with previable pregnancy of approximately 16 weeks. Cardiac activity noted but in the 90s on ultrasound. DATA:  No results for input(s): WBC, HGB, HCT, PLT in the last 72 hours. No results for input(s): NA, K, CL, CO2, BUN, CREATININE, CALCIUM, AST, ALT in the last 72 hours. Invalid input(s): MAGNES    Labs:      IMPRESSION/RECOMMENDATIONS:      39 yo gravid patient with cardiac arrest.   1. Fetus previable, focus on life saving maternal treatment at this time, no intervention for the fetus at this time.    2. Confirm heart tones daily    Active Problems:    * No active

## 2021-05-11 NOTE — PROCEDURES
[address] [place] [address] PROCEDURE NOTE    Patient name: Carla Rahman . YOB: 1985       Procedure: Emergent arterial line placement for hemodynamics monitoring    Pt was not consented for the procedure because she was unconscious. Pt was sterilely prepped and draped. Ultrasound guidance was used and left femoral artery was entered without difficulty, good blood return. No immediate complications. Appropriate waveform was noticed on the monitor.      Estimated blood loss - 2 mL    Kailey Leija MD   5/10/2021 11:43 PM

## 2021-05-11 NOTE — PROGRESS NOTES
P Pulmonary and Critical Care  Progress note      Subjective: Patient remains critically ill. Currently undergoing therapeutic hypothermia after suffering with PEA cardiac arrest yesterday. Remains on low-dose sedation in order to maintain synchrony with the ventilator. Poor cranial reflexes seen. No seizure activity seen. Has also developed central diabetes insipidus. Patient is currently 16 weeks pregnant.       Past Surgical History:        Procedure Laterality Date    LARYNGOSCOPY N/A 6/20/2019    MICROLARYNGOSCOPY AND LYSIS OF ADHESIONS OF LARYNX performed by Cristal Kerr MD at [address] [facility] [address] Left 06/28/2017    MICROLARYNGOSCOPY WITH BIOPSY OF LEFT TRUE VOCAL CORD MASS    OTHER SURGICAL HISTORY  08/10/2017    Tracheostomy    TONSILLECTOMY      TRACHEOSTOMY      TRACHEOTOMY N/A 12/14/2017     TRACHEOTOMY WITH 6DCT SHILEY               Current Medications:    Current Facility-Administered Medications: norepinephrine (LEVOPHED) 16 mg in dextrose 5% 250 mL infusion, 2-100 mcg/min, Intravenous, Continuous  sodium bicarbonate 100 mEq in dextrose 5 % 1,000 mL infusion, , Intravenous, Continuous  insulin lispro (1 Unit Dial) 0-6 Units, 0-6 Units, Subcutaneous, TID WC  insulin lispro (1 Unit Dial) 0-3 Units, 0-3 Units, Subcutaneous, Nightly  promethazine (PHENERGAN) tablet 12.5 mg, 12.5 mg, Oral, Q6H PRN **OR** ondansetron (ZOFRAN) injection 4 mg, 4 mg, Intravenous, Q6H PRN  chlorhexidine (PERIDEX) 0.12 % solution 15 mL, 15 mL, Mouth/Throat, BID  perflutren lipid microspheres (DEFINITY) injection 1.65 mg, 1.5 mL, Intravenous, ONCE PRN  0.9 % sodium chloride infusion, , Intravenous, Continuous  enoxaparin (LOVENOX) injection 40 mg, 40 mg, Subcutaneous, Daily  polyvinyl alcohol (LIQUIFILM TEARS) 1.4 % ophthalmic solution 1 drop, 1 drop, Both Eyes, Q4H **AND** lubrifresh P.M. (artificial tears) ophthalmic ointment, , Both Eyes, Q4H  propofol injection, 5-50 mcg/kg/min, Intravenous, Continuous  fentaNYL (SUBLIMAZE) injection 25 mcg, 25 mcg, Intravenous, Q1H PRN  hydrocortisone sodium succinate PF (SOLU-CORTEF) injection 100 mg, 100 mg, Intravenous, Q8H  potassium chloride 20 mEq/50 mL IVPB (Central Line), 20 mEq, Intravenous, PRN  magnesium sulfate 2000 mg in 50 mL IVPB premix, 2,000 mg, Intravenous, PRN  sodium phosphate 10.53 mmol in dextrose 5 % 250 mL IVPB, 0.16 mmol/kg, Intravenous, PRN **OR** sodium phosphate 21.06 mmol in dextrose 5 % 250 mL IVPB, 0.32 mmol/kg, Intravenous, PRN  midazolam PF (VERSED) injection 2 mg, 2 mg, Intravenous, Q30 Min PRN    Allergies   Allergen Reactions    Wellbutrin [Bupropion] Itching     Feels like worms crawling on her body; crawling sensation    Amoxicillin-Pot Clavulanate      vomiting       Social History:    TOBACCO:   reports that she quit smoking about 6 months ago. Her smoking use included cigarettes. She started smoking about 7 years ago. She has a 18.00 pack-year smoking history. She has never used smokeless tobacco.  ETOH:   reports no history of alcohol use. Patient currently lives independently  Environmental/chemical exposure: None known    Family History:       Problem Relation Age of Onset    High Blood Pressure Mother     Depression Mother     High Blood Pressure Father     Depression Father     Diabetes Maternal Uncle     Depression Paternal Grandmother      REVIEW OF SYSTEMS:    ADIN is unobtainable due to his critical illness. Objective:   PHYSICAL EXAM:      VITALS:  BP (!) 79/46   Pulse 97   Temp (!) 91.6 °F (33.1 °C) (Bladder)   Resp 16   Ht 5' 6\" (1.676 m)   Wt 148 lb 2.4 oz (67.2 kg)   LMP  (LMP Unknown)   SpO2 100%   BMI 23.91 kg/m²      24HR INTAKE/OUTPUT:      Intake/Output Summary (Last 24 hours) at 5/11/2021 1448  Last data filed at 5/11/2021 1435  Gross per 24 hour   Intake 1100.03 ml   Output 6900 ml   Net -5799.97 ml     CONSTITUTIONAL: Nonresponsive. Intubated on mechanical ventilation.   She is on some propofol. NECK: Healed tracheostomy site with evidence of prior external beam radiation   LUNGS: Intubated, no increased work of breathing and clear to auscultation. No accessory muscle use  CARDIOVASCULAR: S1 and S2, no edema and no JVD  ABDOMEN:  normal bowel sounds, non-distended and no masses palpated, and no tenderness to palpation. No hepatospleenomegaly  LYMPHADENOPATHY:  no axillary or supraclavicular adenopathy. No cervical adnenopathy  PSYCHIATRIC: Nonresponsive on mechanical ventilation  MUSCULOSKELETAL: No obvious misalignment or effusion of the joints. No clubbing, cyanosis of the digits. SKIN:  normal skin color, texture, turgor and no redness, warmth, or swelling. No palpable nodules  Neuro, unresponsive to verbal, tactile or noxious stimuli. No corneal, gag or cough reflex seen. Plantars are down going. DATA:    Old records have been reviewed    CBC:  Recent Labs     05/10/21  1555 05/10/21  2215 05/11/21  0400   WBC 10.1 9.2 6.7   RBC 2.98* 3.68* 4.04   HGB 8.4* 10.4* 11.3*   HCT 28.8* 31.5* 34.5*    254 283   MCV 96.9 85.5 85.5   MCH 28.3 28.2 28.1   MCHC 29.2* 32.9 32.8   RDW 16.7* 15.8* 15.5*      BMP:  Recent Labs     05/10/21  2115 05/11/21  0400 05/11/21  1100    152* 156*   K 3.1* 3.4* 3.5    126* 131*   CO2 17* 16* 16*   BUN 10 11 11   CREATININE <0.5* <0.5* <0.5*   CALCIUM 7.9* 9.8 9.1   GLUCOSE 89 100* 157*      ABG:  Recent Labs     05/10/21  2115 05/11/21  0400 05/11/21  1100   PHART 7.381 7.392 7.292*   TXK9EPZ 31.6* 29.3* 33.9*   PO2ART 206.0* 249.0* 191.0*   ADS4JPU 18.8* 17.8* 16.4*   R3JUDUFU 99.9 99.8 99.7   BEART -5.4* -6.0* -9.3*     Procalcitonin  No results for input(s): PROCAL in the last 72 hours. No results found for: BNP  Lab Results   Component Value Date    TROPONINI <0.01 05/11/2021           Radiology Review:  All pertinent images / reports were reviewed as a part of this visit. Assessment:     1.  Status post cardiac arrest with return antibiotics. ENDO:  Currently on low-dose insulin sliding scale. Adri Eagle HEME & ONC:    Seen to have no leukocytosis. Adri Eagle DVT PROPHY:   LMWH   GI PROPHY:  Protonix   CODE STATUS  Full Code   PLAN OF CARE  discussed her critical state with her mother by the bedside. All questions were answered to her satisfaction. Prognosis remains guarded. Total critical care time caring for this patient with life threatening illness, including direct patient contact, management of life support systems, review of data including imaging and labs, discussions with other team members and physicians is at least 45 minutes so far today, excluding procedures.   Sonia Helton MD   Pulmonary Critical Care and Sleep Medicine  [address] [facility]   [address]  5/10/2021, 3:05 PM

## 2021-05-11 NOTE — CONSULTS
In patient Neurology consult        [facility] Neurology      MD Song Roldan  1985    Date of Service: 5/11/2021    Referring Physician: Vi Martinez MD      Reason for the consult and CC: Acute cardiac arrest and anoxic brain insult    HPI:   Most of the history was obtained from chart reviewing and discussion with the patient's nurse as the patient is currently intubated and sedated. The patient is a 38y.o. years old female with history of depression, alcohol abuse, substance abuse and bipolar who happens to be 16 weeks pregnant, who was admitted to the hospital last night with acute encephalopathy after cardiac arrest.  Symptoms started last night at home. She became suddenly unresponsive after telling her mother that she cannot breathe. Mother performed CPR and paramedics arrived. 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  The patient was eventually intubated in the ED after multiple attempts. Duration was several minutes and degree was severe. No other triggers or other associated symptoms. No witnessed seizure. Initial CT head showed cerebral edema. Blood test reviewed and showed sodium 136 and creatinine 0.5. White count 6.7. The patient is currently mildly sedated with propofol and under hypothermia protocol. Current temperature 32. Poor brainstem function. UDS was positive for amphetamine.   According to report, she has history of depression and suicidal ideation in the past.    Family History   Problem Relation Age of Onset    High Blood Pressure Mother     Depression Mother     High Blood Pressure Father     Depression Father     Diabetes Maternal Uncle     Depression Paternal Grandmother        Past Medical History:   Diagnosis Date    Alcohol use disorder, moderate, in early remission ([facility] [address] 10/28/2019    Anemia     Anxiety     Back pain     Bipolar affective disorder ([facility] [address]     Depression     Migraine     [facility]piate abuse, continuous [facility] [address]     Primary cancer of larynx ([facility] [address] 2017    PTSD (post-traumatic stress disorder)     Voice hoarseness      Past Surgical History:   Procedure Laterality Date    LARYNGOSCOPY N/A 2019    MICROLARYNGOSCOPY AND LYSIS OF ADHESIONS OF LARYNX performed by Pao Parsons MD at [address] Left 2017    MICROLARYNGOSCOPY WITH BIOPSY OF LEFT TRUE VOCAL CORD MASS    OTHER SURGICAL HISTORY  08/10/2017    Tracheostomy    TONSILLECTOMY      TRACHEOSTOMY      TRACHEOTOMY N/A 2017     TRACHEOTOMY WITH 6DCT SHILEY               Social History     Tobacco Use    Smoking status: Former Smoker     Packs/day: 1.00     Years: 18.00     Pack years: 18.00     Types: Cigarettes     Start date: 10/19/2013     Quit date: 10/19/2020     Years since quittin.5    Smokeless tobacco: Never Used   Substance Use Topics    Alcohol use: No     Alcohol/week: 20.0 standard drinks     Types: 10 Cans of beer, 10 Shots of liquor per week     Comment: none for 120 days    Drug use: Yes     Types: Marijuana, Opiates , Methamphetamines, IV     Allergies   Allergen Reactions    Wellbutrin [Bupropion] Itching     Feels like worms crawling on her body; crawling sensation    Amoxicillin-Pot Clavulanate      vomiting     Current Facility-Administered Medications   Medication Dose Route Frequency Provider Last Rate Last Admin    norepinephrine (LEVOPHED) 16 mg in dextrose 5% 250 mL infusion  2-100 mcg/min Intravenous Continuous Nevin Covarrubias MD 1.9 mL/hr at 21 1200 2 mcg/min at 21 1200    promethazine (PHENERGAN) tablet 12.5 mg  12.5 mg Oral Q6H PRN Abdirahman Silva MD        Or    ondansetron (ZOFRAN) injection 4 mg  4 mg Intravenous Q6H PRN Abdirahman Silva MD        chlorhexidine (PERIDEX) 0.12 % solution 15 mL  15 mL Mouth/Throat BID Antonia Tejada MD   15 mL at 21 1041    perflutren lipid microspheres (DEFINITY) and COVID-19 restrictions. Neck: supple  Cardiovascular: No lower leg edema with good pulsation. Mental Status:   AAO x0. No eye-opening to verbal or painful stimulation. Attention and concentration: Unresponsive  Language: Intubated  Recent and remote memory: Intubated  Fund of knowledge: Intubated  Cranial Nerves:   II: Pupils: 3 mm and poorly reactive  III,IV,VI: No gaze preference. OCR present  V: Absent corneal.  No facial grimacing with painful stimulation. VII: Face is symmetric. VIII: No nystagmus with OCR  IX: Gag reflex: absent   X:  cough reflex: absent  XI: no shoulder shrug in response to painful stimulation  XII: Midline, no spontaneous breathing  Musculoskeletal: no motor response to verbal or painful stimulation in both arms and legs. Tone: normal  Reflexes: 1+ in both arms and legs  Planters: Flexion response  Coordination: No abnormal movement  Sensation: No response to painful stimulation or withdrawal to pain  Gait/Posture: Cannot be examined due to intubation. Data:  LABS:   Lab Results   Component Value Date     05/11/2021    K 3.5 05/11/2021    K 3.3 09/12/2020     05/11/2021    CO2 16 05/11/2021    BUN 11 05/11/2021    CREATININE <0.5 05/11/2021    GFRAA >60 05/11/2021    GFRAA >60 11/14/2011    LABGLOM >60 05/11/2021    GLUCOSE 157 05/11/2021    PHOS 2.1 05/11/2021    MG 2.20 05/11/2021    CALCIUM 9.1 05/11/2021     Lab Results   Component Value Date    WBC 6.7 05/11/2021    RBC 4.04 05/11/2021    HGB 11.3 05/11/2021    HCT 34.5 05/11/2021    MCV 85.5 05/11/2021    RDW 15.5 05/11/2021     05/11/2021     Lab Results   Component Value Date    INR 1.02 05/10/2021    PROTIME 11.8 05/10/2021       Neuroimaging were independently reviewed by me  Reviewed notes from different physicians  Reviewed lab and blood testing    Impression:  Acute anoxic brain insult secondary to cardiac arrest, severe.   Acute respiratory failure with hypoxia  Substance abuse  Alcohol abuse  16-week pregnant  Hypernatremia  Cerebral edema secondary to anoxic insult  Ischemic liver injury      Recommendation: We will get EEG to rule out nonconvulsive seizures  Continue hypothermia protocol  Wean sedation  Respiratory support  Continue work-up for hypernatremia  DT precautions  Follow LFT and ammonia level  Continue Solu-Cortef  Blood pressure monitor  Poor prognosis giving severe brain injury  We will follow after she is normothermic and off sedation  Discussed with ICU nurse    MDM: High      Thank you for referring such patient. If you have any questions regarding my consult note, please don't hesitate to call me. Adolfo Stein MD  [phone]    This dictation was generated by voice recognition computer software.  Although all attempts are made to edit the dictation for accuracy, there may be errors in the  transcription that are not intended

## 2021-05-11 NOTE — FLOWSHEET NOTE
4 Eyes Skin Assessment     NAME:  Jamey Foster  YOB: 1985  MEDICAL RECORD NUMBER:  [id number]    The patient is being assess for  Admission    I agree that 2 RN's have performed a thorough Head to Toe Skin Assessment on the patient. ALL assessment sites listed below have been assessed. Areas assessed by both nurses:    Head, Face, Ears, Shoulders, Back, Chest, Arms, Elbows, Hands, Sacrum. Buttock, Coccyx, Ischium and Legs. Feet and Heels        Does the Patient have a Wound?  Other multiple scars from cutting self on bilateral wrists, right posterior thigh; trach scar;        Emiliano Prevention initiated:  Yes   Wound Care Orders initiated:  No    Pressure Injury (Stage 3,4, Unstageable, DTI, NWPT, and Complex wounds) if present place consult order under [de-identified] No    New and Established Ostomies if present place consult order under : No      Nurse 1 eSignature: Electronically signed by Familia Kraft RN on 5/11/21 at 7:27 AM EDT    **SHARE this note so that the co-signing nurse is able to place an eSignature**    Nurse 2 eSignature: Electronically signed by Nito Catherine RN on 5/11/21 at 3:52 PM EDT

## 2021-05-11 NOTE — PLAN OF CARE
Problem: Non-Violent Restraints  Goal: Removal from restraints as soon as assessed to be safe  Outcome: Completed  Goal: No harm/injury to patient while restraints in use  Outcome: Completed  Goal: Patient's dignity will be maintained  Outcome: Completed     Problem: Airway Clearance - Ineffective:  Goal: Ability to maintain a clear airway will improve  Outcome: Met This Shift     Problem: Anxiety/Stress:  Goal: Level of anxiety will decrease  Outcome: Ongoing     Problem: Aspiration:  Goal: Absence of aspiration  Outcome: Met This Shift     Problem:  Bowel Function - Altered:  Goal: Bowel elimination is within specified parameters  Outcome: Ongoing     Problem: Cardiac Output - Decreased:  Goal: Hemodynamic stability will improve  Outcome: Met This Shift     Problem: Fluid Volume - Imbalance:  Goal: Absence of imbalanced fluid volume signs and symptoms  Outcome: Ongoing     Problem: Gas Exchange - Impaired:  Goal: Levels of oxygenation will improve  Outcome: Met This Shift     Problem: Mental Status - Impaired:  Goal: Mental status will be restored to baseline  Outcome: Not Met This Shift     Problem: Nutrition Deficit:  Goal: Ability to achieve adequate nutritional intake will improve  Outcome: Not Met This Shift     Problem: Pain:  Goal: Pain level will decrease  Outcome: Met This Shift  Goal: Recognizes and communicates pain  Outcome: Not Met This Shift  Goal: Control of acute pain  Outcome: Ongoing  Goal: Control of chronic pain  Outcome: Ongoing     Problem: Serum Glucose Level - Abnormal:  Goal: Ability to maintain appropriate glucose levels will improve to within specified parameters  Outcome: Met This Shift     Problem: Skin Integrity - Impaired:  Goal: Will show no infection signs and symptoms  Outcome: Met This Shift  Goal: Absence of new skin breakdown  Outcome: Met This Shift     Problem: Tissue Perfusion, Altered:  Goal: Circulatory function within specified parameters  Outcome: Met This Shift Problem: Tissue Perfusion - Cardiopulmonary, Altered:  Goal: Absence of angina  Outcome: Met This Shift  Goal: Hemodynamic stability will improve  Outcome: Met This Shift

## 2021-05-11 NOTE — PROGRESS NOTES
Pt remains sedated on ventilator. Pupils round, non reactive. No response to verbal stimuli. Pt not initiating any spontaneous breaths over ventilator settings, no gag and/or cough assessed. Pt does appear to have response to pain by plantar flexion.

## 2021-05-11 NOTE — H&P
HOSPITALISTS HISTORY AND PHYSICAL    5/10/2021 9:43 PM    Patient Information:  Rudy Sánchez is a 38 y.o. female [id number]  PCP:  AMBER Malagon CNP (Tel: [phone] )    Chief complaint:    Chief Complaint   Patient presents with    Cardiac Arrest     Arrived by  EMS rt unwitnessed arrest.  Was found down by mother; mom began CPR. PEA upon EMS arrival @ 1500; 4 rounds of epi & 2 narcan admin. Pulse at present. Hx of iv drug use; believed to be 13 weeks pregnant. History of Present Illness:  Misti Vega is a 38 y.o. female is brought in from home after cardiac arrest . She has h/o vocal cord carcinoma, Depression, alcohol abuse, PTSD. She  was treated with trach and radiation in 2017 . She was found unresponsive by the mother who started performing CPR on her. EMS gave epi on site. With return of spontaneous circulation. She was intubated and sedated in the ED and has been started on pressure support. Labs revealed lactic acidosis and academia . Blood etoh level is undetectable. Tox screen is positive for amphetamine. Per mother no previous suicidal attempts    THe pt is 14-16 weeks pregnant . REVIEW OF SYSTEMS:   Unable to obtain     Past Medical History:   has a past medical history of Alcohol use disorder, moderate, in early remission ([address] Anemia, Anxiety, Back pain, Bipolar affective disorder ([address] Depression, Migraine, Opiate abuse, continuous ([address] Primary cancer of larynx ([address] PTSD (post-traumatic stress disorder), and Voice hoarseness. Past Surgical History:   has a past surgical history that includes Tonsillectomy; other surgical history (Left, 06/28/2017); other surgical history (08/10/2017); tracheostomy; Tracheotomy (N/A, 12/14/2017); and laryngoscopy (N/A, 6/20/2019). Medications:  No current facility-administered medications on file prior to encounter.       Current Outpatient Medications on File Prior to Encounter   Medication Sig Dispense Refill    amLODIPine (NORVASC) 5 MG tablet Take 5 mg by mouth daily      levothyroxine (SYNTHROID) 100 MCG tablet Take 100 mcg by mouth Daily      omeprazole (PRILOSEC) 40 MG delayed release capsule TAKE ONE CAPSULE BY MOUTH DAILY ONE HOUR BEFORE EVERY EVENING MEAL 30 capsule 0    sertraline (ZOLOFT) 100 MG tablet Take 1 tablet by mouth daily 30 tablet 1    mirtazapine (REMERON) 30 MG tablet TAKE ONE TABLET BY MOUTH ONCE NIGHTLY 30 tablet 2    labetalol (NORMODYNE) 200 MG tablet Take 200 mg by mouth 2 times daily      ondansetron (ZOFRAN-ODT) 4 MG disintegrating tablet DISSOLVE ONE TABLET BY MOUTH EVERY 8 HOURS AS NEEDED FOR NAUSEA 20 tablet 1     Current Facility-Administered Medications   Medication Dose Route Frequency Provider Last Rate Last Admin    promethazine (PHENERGAN) tablet 12.5 mg  12.5 mg Oral Q6H PRN Suni Souaz MD        Or    ondansetron (ZOFRAN) injection 4 mg  4 mg Intravenous Q6H PRN Suni Souza MD        chlorhexidine (PERIDEX) 0.12 % solution 15 mL  15 mL Mouth/Throat BID Suni Souza MD        perflutren lipid microspheres (DEFINITY) injection 1.65 mg  1.5 mL Intravenous ONCE PRN Suni Souza MD        0.9 % sodium chloride infusion   Intravenous Continuous Suni Souza  mL/hr at 05/10/21 2119 New Bag at 05/10/21 2119    [START ON 5/11/2021] enoxaparin (LOVENOX) injection 40 mg  40 mg Subcutaneous Daily Suni Souza MD        polyvinyl alcohol (LIQUIFILM TEARS) 1.4 % ophthalmic solution 1 drop  1 drop Both Eyes Q4H Antonia Tejada MD        And    lubrifresh P.M. (artificial tears) ophthalmic ointment   Both Eyes Q4H Suni Souza MD        propofol injection  5-50 mcg/kg/min Intravenous Continuous Suni Souza MD 11.8 mL/hr at 05/10/21 2129 30 mcg/kg/min at 05/10/21 2129    fentaNYL (SUBLIMAZE) injection 25 mcg  25 mcg Intravenous Q1H PRN Suni Souza MD        hydrocortisone sodium succinate PF (SOLU-CORTEF) injection 100 mg  100 mg Intravenous Rashida Appiah MD        sodium bicarbonate 50 mEq in dextrose 5 % 1,000 mL infusion   Intravenous Continuous Ebony Bone MD           Allergies: Allergies   Allergen Reactions    Wellbutrin [Bupropion] Itching     Feels like worms crawling on her body; crawling sensation    Amoxicillin-Pot Clavulanate      vomiting        Social History:   reports that she quit smoking about 6 months ago. Her smoking use included cigarettes. She started smoking about 7 years ago. She has a 18.00 pack-year smoking history. She has never used smokeless tobacco. She reports current drug use. Drugs: Marijuana, Opiates , Methamphetamines, and IV. She reports that she does not drink alcohol. Family History:  family history includes Depression in her father, mother, and paternal grandmother; Diabetes in her maternal uncle; High Blood Pressure in her father and mother. ,     Physical Exam:  BP (!) 88/57   Pulse 110   Temp 92.4 °F (33.6 °C) (Bladder)   Resp 18   Ht 5' 6\" (1.676 m)   Wt 145 lb (65.8 kg)   LMP  (LMP Unknown)   SpO2 100%   BMI 23.40 kg/m²     General appearance:  Intubated and sedated  Eyes: Sclera clear, pupils equal  ENT: Moist mucus membranes, no thrush. Trachea midline. Cardiovascular: Regular rhythm, normal S1, S2. No murmur, gallop, rub.  No edema in lower extremities  Respiratory: Clear to auscultation bilaterally, no wheeze, good inspiratory effort  Gastrointestinal: Abdomen soft, non-tender, not distended, normal bowel sounds  Musculoskeletal: No cyanosis in digits, neck supple  Neurology: sedated  Psychiatry:   Skin: Warm, dry, normal turgor, no rash    Labs:  CBC:   Lab Results   Component Value Date    WBC 10.1 05/10/2021    RBC 2.98 05/10/2021    HGB 8.4 05/10/2021    HCT 28.8 05/10/2021    MCV 96.9 05/10/2021    MCH 28.3 05/10/2021    MCHC 29.2 05/10/2021    RDW 16.7 05/10/2021     05/10/2021    MPV 8.1 05/10/2021     BMP:    Lab Results   Component Value Date     05/10/2021    K 4.5 05/10/2021    K 3.3 09/12/2020    CL 97 05/10/2021    CO2 7 05/10/2021    BUN 5 05/10/2021    CREATININE <0.5 05/10/2021    CALCIUM 7.8 05/10/2021    GFRAA >60 05/10/2021    GFRAA >60 11/14/2011    LABGLOM >60 05/10/2021    GLUCOSE 162 05/10/2021       Chest Xray:   EKG:        Problem List  Active Problems:    Cardiac arrest [facility])  Resolved Problems:    * No resolved hospital problems. *        Assessment/Plan:     Cardiac arrest  Respiratory failure    1. Intubated and sedated  Bicarb infusion   Hypothermia protocol   Pressure support   Solu cortef    16 weeks pregnant   Management per OB   No intervention for fetus at this time    Admit as inpatient. I anticipate hospitalization spanning more than two midnights for investigation and treatment of the above medically necessary diagnoses.       Yenifer Garcia MD    5/10/2021 9:43 PM

## 2021-05-11 NOTE — PROGRESS NOTES
Comprehensive Nutrition Assessment    Type and Reason for Visit:  Initial(vent)    Nutrition Recommendations/Plan:   1. Continue NPO status  2. Do not consider tf until pt is completely rewarmed    Nutrition Assessment:  Pt is currently intubated with diprivan running at 3.2 ml/hr providing 84 cals. Pt also on norepinephrine at 4 mcg/min. Pt has NG tube to suction. Pt is also 16 weeks pregnant. Pt currently undergoing [facility] protocol. Malnutrition Assessment:  Malnutrition Status:  No malnutrition      Estimated Daily Nutrient Needs:  Energy (kcal):  8884-4031 cals (25-30 cals/67kg); Weight Used for Energy Requirements:  Current     Protein (g):   gms (1.2-2.0 gms/67kg); Weight Used for Protein Requirements:  Current        Fluid (ml/day):   ; Method Used for Fluid Requirements:  1 ml/kcal      Nutrition Related Findings:  I/O's: -6L. Na 152H      Wounds:  None       Current Nutrition Therapies:    Diet NPO Effective Now    Anthropometric Measures:  · Height: 5' 6\" (167.6 cm)  · Current Body Weight: 148 lb (67.1 kg)   · Ideal Body Weight: 130 lbs; % Ideal Body Weight 113.8 %   · BMI: 23.9  · BMI Categories: Normal Weight (BMI 18.5-24. 9)       Nutrition Diagnosis:   · Inadequate energy intake related to inadequate protein-energy intake as evidenced by NPO or clear liquid status due to medical condition, intubation      Nutrition Interventions:   Food and/or Nutrient Delivery:  Continue NPO  Nutrition Education/Counseling:  Education not indicated   Coordination of Nutrition Care:  Continue to monitor while inpatient    Goals:  Initiate TF once pt is hemodynamically stable and completely rewarmed       Nutrition Monitoring and Evaluation:   Behavioral-Environmental Outcomes:  None Identified   Food/Nutrient Intake Outcomes:  Enteral Nutrition Intake/Tolerance  Physical Signs/Symptoms Outcomes:  Weight, Biochemical Data(Na level)     Discharge Planning:     Too soon to determine     Electronically signed by Kevin Mckeon RD, CNSC, LD on 5/11/21 at 11:56 AM EDT    Contact: [phone]

## 2021-05-11 NOTE — PLAN OF CARE
Nutrition Problem #1: Inadequate energy intake  Intervention: Food and/or Nutrient Delivery: Continue NPO  Nutritional Goals: Initiate TF once pt is hemodynamically stable and completely rewarmed

## 2021-05-11 NOTE — FLOWSHEET NOTE
[facility] called and notified of patient arrival. Update provided.  Will follow, per coordinator Erskin Form.

## 2021-05-11 NOTE — PROGRESS NOTES
B/P and HR elevated 180's/114, . MD notified, orders received. 5mg Labetalol given IVP per PRN order.

## 2021-05-11 NOTE — PROGRESS NOTES
Hospitalist Progress Note      PCP: Lisa Mccall, APRN - CNP    Date of Admission: 5/10/2021        Hospital Course:      38 y.o. female is brought in from home after cardiac arrest . She has h/o vocal cord carcinoma, Depression, alcohol abuse, PTSD. She  was treated with trach and radiation in 2017 . She was found unresponsive by the mother who started performing CPR on her. EMS gave epi on site. With return of spontaneous circulation. She was intubated and sedated in the ED and has been started on pressure support. Labs revealed lactic acidosis and academia . Blood etoh level is undetectable. Tox screen is positive for amphetamine. Per mother no previous suicidal attempts    THe pt is 14-16 weeks pregnant . Subjective:   Patient remains unresponsive, sedated on the vent. . On hypothermia protocol      Medications:  Reviewed    Infusion Medications    norepinephrine 4 mcg/min (05/11/21 0837)    sodium chloride Stopped (05/11/21 0111)    propofol 15 mcg/kg/min (05/11/21 0839)     Scheduled Medications    chlorhexidine  15 mL Mouth/Throat BID    enoxaparin  40 mg Subcutaneous Daily    polyvinyl alcohol  1 drop Both Eyes Q4H    And    artificial tears   Both Eyes Q4H    hydrocortisone sodium succinate PF  100 mg Intravenous Q8H     PRN Meds: promethazine **OR** ondansetron, perflutren lipid microspheres, fentanNYL, potassium chloride, magnesium sulfate, sodium phosphate IVPB **OR** sodium phosphate IVPB, midazolam      Intake/Output Summary (Last 24 hours) at 5/11/2021 0848  Last data filed at 5/11/2021 0800  Gross per 24 hour   Intake 754.2 ml   Output 6725 ml   Net -5970.8 ml       Exam:    BP (!) 79/46   Pulse 91   Temp (!) 91.6 °F (33.1 °C) (Bladder)   Resp 16   Ht 5' 6\" (1.676 m)   Wt 148 lb 2.4 oz (67.2 kg)   LMP  (LMP Unknown)   SpO2 100%   BMI 23.91 kg/m²     General appearance: Intubated and sedated  HEENT: Pupils equal, round, and reactive to light.  Conjunctivae/corneas clear.  Neck: Suppl. No jugular venous distention. Trachea midline. Respiratory:  Normal respiratory effort. Clear to auscultation, bilaterally without Rales/Wheezes/Rhonchi. Cardiovascular: Regular rate and rhythm with normal S1/S2 without murmurs, rubs or gallops. Abdomen: Soft, non-tender, non-distended with normal bowel sounds. Musculoskeletal: No clubbing, cyanosis or edema bilaterally. Neurologic: Unresponsive, sedated. . Limited exam  Capillary Refill: Brisk,< 3 seconds   Peripheral Pulses: +2 palpable, equal bilaterally       Labs:   Recent Labs     05/10/21  1555 05/10/21  2215 05/11/21  0400   WBC 10.1 9.2 6.7   HGB 8.4* 10.4* 11.3*   HCT 28.8* 31.5* 34.5*    254 283     Recent Labs     05/10/21  1555 05/10/21  2115 05/11/21  0400   * 136 152*   K 4.5 3.1* 3.4*   CL 97* 109 126*   CO2 7* 17* 16*   BUN 5* 10 11   CREATININE <0.5* <0.5* <0.5*   CALCIUM 7.8* 7.9* 9.8   PHOS  --  3.4 1.7*     Recent Labs     05/10/21  1555 05/11/21  0400   AST 37 124*   ALT 14 42*   BILIDIR  --  <0.2   BILITOT <0.2 <0.2   ALKPHOS 48 68     Recent Labs     05/10/21  2215   INR 1.02     Recent Labs     05/10/21  1555 05/10/21  2215 05/11/21  0400   TROPONINI <0.01 0.03* 0.01       Assessment/Plan:    -Cardiac arrest, PEA with ROSC. Aura Mack  Suspect related to drug overdose  -Acute respiratory failure-requiring intubation  -Documented history of polysubstance abuse including meth--urine drug screen is positive for methamphetamine  -Anoxic encephalopathy  -Hypotension-likely related sedation-on low-dose mechanical ventilation  -Hypernatremia  -CVA metabolic acidosis--improving with better infusion  -Pregnancy-18 weeks--OB following--fetus viable but no intervention at this time  -Mild transaminitis--likely ischemic liver injury    Plan  Patient is undergoing therapeutic hypothermia protocol  Continue with clinical ventilation and sedation  Hypotonic fluids /Free water flushes for hypernatremia, monitor sodium levels  Prognosis is guarded    DVT Prophylaxis: Lovenox  Diet: Diet NPO Effective Now  Code Status: Full Code            Clay Doll MD

## 2021-05-11 NOTE — FLOWSHEET NOTE
Received pt from ER staff via bed and monitor. Bedside update received as previous shift gave report.  w/o ectopy  Sedated on vent w/ Propofol gtt @ 20ug/kg/min. No pupil reaction noted but pt did move left leg when manipulating into position. Dr Víctor Gayle paged with pt arrival to place arterial line per TTM protocol. Awaiting MD bedside assessment prior to initiation of therapy as pt hypothermic (33.4C)upon arrival. No shivering noted. Mckinnon catheter exchanged for temperature sensing option per TTM protocol  OGT placed per protocol.

## 2021-05-12 NOTE — PLAN OF CARE
Problem: Cardiac Output - Decreased:  Goal: Hemodynamic stability will improve  Description: Hemodynamic stability will improve  Outcome: Ongoing  Note: Pulse rate and rhythm, peripheral pulses, and capillary refill assessed every shift with assessment. General color and body temperature monitored throughout shift and with vitals. Assess for edema with head to toe assessment. Administer treatments and medications as ordered. Monitor patient's weight.

## 2021-05-12 NOTE — PROGRESS NOTES
Adonay Harris  Neurology Follow-up  [facility]    Date of Service: 5/12/2021    Subjective:   CC: Follow up today regarding: Anoxic brain insult. Events noted. Chart and lab reviewed. The patient is about the same. Poor brainstem function. Still hypothermic. EEG showed no epileptiform discharges. Occasional isolated myoclonus with stimulation. Other review of system was limited. ROS: limited due to intubation      family history includes Depression in her father, mother, and paternal grandmother; Diabetes in her maternal uncle; High Blood Pressure in her father and mother.   Past Medical History:   Diagnosis Date    Alcohol use disorder, moderate, in early remission ([facility] [address] 10/28/2019    Anemia     Anxiety     Back pain     Bipolar affective disorder (HCC)     Depression     Migraine     Opiate abuse, continuous (HCC)     Primary cancer of larynx (HCC) 7/18/2017    PTSD (post-traumatic stress disorder)     Voice hoarseness      Current Facility-Administered Medications   Medication Dose Route Frequency Provider Last Rate Last Admin    sodium chloride 0.9 % infusion             sodium chloride flush 0.9 % injection 5-40 mL  5-40 mL Intravenous PRN Darryle Innocent, MD   10 mL at 05/12/21 1308    dextrose 5 % solution   Intravenous Continuous Lena Anderson  mL/hr at 05/12/21 1205 New Bag at 05/12/21 1205    sodium chloride flush 0.9 % injection 5-40 mL  5-40 mL Intravenous BID Charly Edwards MD        norepinephrine (LEVOPHED) 16 mg in dextrose 5% 250 mL infusion  2-100 mcg/min Intravenous Continuous Dionicia Osgood, MD   Stopped at 05/12/21 1622    sodium bicarbonate 100 mEq in dextrose 5 % 1,000 mL infusion   Intravenous Continuous Lena Anderson MD   Stopped at 05/12/21 0945    insulin lispro (1 Unit Dial) 0-6 Units  0-6 Units Subcutaneous TID  Lena Anderson MD   1 Units at 05/11/21 1846    insulin lispro (1 Unit Dial) 0-3 Units  0-3 Units Subcutaneous Nightly Lelia Carney MD   1 Units at 05/11/21 2017    carboxymethylcellulose PF (THERATEARS) 1 % ophthalmic gel 1 drop  1 drop Both Eyes 4x Daily Caryle Bos, MD   1 drop at 05/12/21 1353    labetalol (NORMODYNE;TRANDATE) injection 5 mg  5 mg Intravenous Q6H PRN Lelia Carney MD   5 mg at 05/11/21 1510    morphine (PF) injection 1 mg  1 mg Intravenous Q2H PRN Lelia Carney MD   1 mg at 05/11/21 1852    pantoprazole (PROTONIX) injection 40 mg  40 mg Intravenous Daily Lelia Carney MD   40 mg at 05/12/21 0808    promethazine (PHENERGAN) tablet 12.5 mg  12.5 mg Oral Q6H PRN Abdirahman Silva MD        Or    ondansetron (ZOFRAN) injection 4 mg  4 mg Intravenous Q6H PRN Abdirahman Silva MD        chlorhexidine (PERIDEX) 0.12 % solution 15 mL  15 mL Mouth/Throat BID Antonia Tejada MD   15 mL at 05/12/21 0807    perflutren lipid microspheres (DEFINITY) injection 1.65 mg  1.5 mL Intravenous ONCE PRN Abdirahman Silva MD        0.9 % sodium chloride infusion   Intravenous Continuous Nevin Covarrubias MD 75 mL/hr at 05/11/21 1111 New Bag at 05/11/21 1111    enoxaparin (LOVENOX) injection 40 mg  40 mg Subcutaneous Daily Antonia Tejada MD   40 mg at 05/12/21 0807    polyvinyl alcohol (LIQUIFILM TEARS) 1.4 % ophthalmic solution 1 drop  1 drop Both Eyes Q4H Antonia Tejada MD   1 drop at 05/12/21 1230    propofol injection  5-50 mcg/kg/min Intravenous Continuous Abdirahman Silva MD   Stopped at 05/11/21 2305    fentaNYL (SUBLIMAZE) injection 25 mcg  25 mcg Intravenous Q1H PRN Abdirahman Silva MD        potassium chloride 20 mEq/50 mL IVPB (Central Line)  20 mEq Intravenous PRN Nevin Covarrubias MD 50 mL/hr at 05/12/21 1649 20 mEq at 05/12/21 1649    magnesium sulfate 2000 mg in 50 mL IVPB premix  2,000 mg Intravenous PRN Nevin Covarrubias MD   Stopped at 05/12/21 1609    sodium phosphate 10.53 mmol in dextrose 5 % 250 mL IVPB  0.16 mmol/kg Intravenous PRN Nevin Covarrubias MD   Stopped at 05/11/21 1821    Or    sodium phosphate 21.06 mmol in dextrose 5 % 250 mL IVPB  0.32 mmol/kg Intravenous PRN Nguyen Evans MD        midazolam PF (VERSED) injection 2 mg  2 mg Intravenous Q30 Min PRN Nguyen Evans MD         Allergies   Allergen Reactions    Wellbutrin [Bupropion] Itching     Feels like worms crawling on her body; crawling sensation    Amoxicillin-Pot Clavulanate      vomiting      reports that she quit smoking about 6 months ago. Her smoking use included cigarettes. She started smoking about 7 years ago. She has a 18.00 pack-year smoking history. She has never used smokeless tobacco. She reports current drug use. Drugs: Marijuana, Opiates , Methamphetamines, and IV. She reports that she does not drink alcohol. Objective:    Exam:   Vitals:    05/12/21 1545 05/12/21 1600 05/12/21 1615 05/12/21 1625   BP:       Pulse: 125 124 123 123   Resp: 13 17 17 19   Temp:   98.6 °F (37 °C)    TempSrc:   Bladder    SpO2: 96% 95% 95% 96%   Weight:       Height:         General appearance and observation: intubated   Mental Status:   AAO x0. No eye-opening to verbal or motor stimulation. Attention and concentration: Unresponsive  Cranial Nerves:   II: Pupils: Dilated and fixed on the left and 3 mm on the right both are nonreactive  III,IV,VI: No gaze preference. V: Absent corneal.    VII: Face is symmetric. VIII: No nystagmus   IX: Gag reflex: absent  X:  Cough reflex: Absent  XII: No spontaneous breathing  Musculoskeletal: no motor response to verbal or painful stimulation in both arms and legs.    Tone: normal  Reflexes: 1+ in both arms and legs  Planters: mute  Coordination: No abnormal movement  Sensation: No response to painful stimulation or withdrawal to pain        Data:  LABS:   Lab Results   Component Value Date     05/12/2021    K 3.3 05/12/2021    K 3.3 09/12/2020     05/12/2021    CO2 27 05/12/2021    BUN 15 05/12/2021    CREATININE <0.5 05/12/2021    GFRAA >60 05/12/2021    GFRAA >60 11/14/2011    LABGLOM >60 05/12/2021    GLUCOSE 102 05/12/2021    PHOS 3.1 05/12/2021    MG 1.60 05/12/2021    CALCIUM 7.8 05/12/2021     Lab Results   Component Value Date    WBC 10.3 05/12/2021    RBC 3.37 05/12/2021    HGB 9.5 05/12/2021    HCT 28.7 05/12/2021    MCV 85.2 05/12/2021    RDW 16.2 05/12/2021     05/12/2021     Lab Results   Component Value Date    INR 1.02 05/10/2021    PROTIME 11.8 05/10/2021       EEG and labs were independently reviewed by me and discussed with her family  Reviewed notes from different physicians. Impression: No change  Acute anoxic brain insult secondary to cardiac arrest, severe. Acute respiratory failure with hypoxia  Substance abuse  Alcohol abuse  16-week pregnant  Hypernatremia  Cerebral edema secondary to anoxic insult  Ischemic liver injury      Recommendation  Discussed with her family  Poor prognosis and outcome giving poor brainstem function and diffuse encephalopathy on EEG  Continue warming  Respiratory support  No sedation  No need to give Keppra at this point for isolated myoclonus  Concern for severe anoxic insult with history cerebral edema  We will have further discussion with her family in 25 to 48 hours regarding likely terminal extubation. Discussed with her nurse    MDM: Gokul Montoya MD   [phone]      This dictation was generated by voice recognition computer software. Although all attempts are made to edit the dictation for accuracy, there may be errors in the transcription that are not intended.

## 2021-05-12 NOTE — PROGRESS NOTES
Assessment completed. See flowchart. ART line leveled and zeroed. K+ replaced. 3.3 on redraw. Mag replaced. NA of 154 started on D5 @125 per Dr. Steven Godinez. Still waiting results for EEG. At times pt does show muscle jerk. Pupils non reactive to light. Size 3 on right and 4 on left pupil. Size 4 on left and size 3 on right pupils @1200. Mother and brother at bedside. Low urine output. Patient encouraged to use call light with any needs. call light in reach, bed alarm on. Will continue to monitor.

## 2021-05-12 NOTE — PLAN OF CARE
Problem: Airway Clearance - Ineffective:  Goal: Ability to maintain a clear airway will improve  Description: Ability to maintain a clear airway will improve  Outcome: Ongoing     Problem: Aspiration:  Goal: Absence of aspiration  Description: Absence of aspiration  Outcome: Ongoing     Problem: Cardiac Output - Decreased:  Goal: Hemodynamic stability will improve  Description: Hemodynamic stability will improve  Outcome: Ongoing     Problem: Nutrition Deficit:  Goal: Ability to achieve adequate nutritional intake will improve  Description: Ability to achieve adequate nutritional intake will improve  Outcome: Ongoing     Problem: Fluid Volume - Imbalance:  Goal: Absence of imbalanced fluid volume signs and symptoms  Description: Absence of imbalanced fluid volume signs and symptoms  Outcome: Ongoing     Problem: Serum Glucose Level - Abnormal:  Goal: Ability to maintain appropriate glucose levels will improve to within specified parameters  Description: Ability to maintain appropriate glucose levels will improve to within specified parameters  Outcome: Ongoing     Problem: Skin Integrity - Impaired:  Goal: Will show no infection signs and symptoms  Description: Will show no infection signs and symptoms  Outcome: Ongoing

## 2021-05-12 NOTE — PROGRESS NOTES
Hospitalist Progress Note      PCP: Ana Balbuena, APRN - CNP    Date of Admission: 5/10/2021        Hospital Course:      38 y.o. female is brought in from home after cardiac arrest . She has h/o vocal cord carcinoma, Depression, alcohol abuse, PTSD. She  was treated with trach and radiation in 2017 . She was found unresponsive by the mother who started performing CPR on her. EMS gave epi on site. With return of spontaneous circulation. She was intubated and sedated in the ED and has been started on pressure support. Labs revealed lactic acidosis and academia . Blood etoh level is undetectable. Tox screen is positive for amphetamine. Per mother no previous suicidal attempts    THe pt is 14-16 weeks pregnant . Subjective:       Patient is undergoing rewarming following therapeutic hypothermia protocol. .. Remains unresponsive, has been off sedation since early this morning. Adri Eagle Has been noted to have episodes of myoclonus.     Medications:  Reviewed    Infusion Medications    sodium chloride      norepinephrine 4 mcg/min (05/12/21 9198)    sodium bicarbonate infusion 100 mL/hr at 05/12/21 0018    sodium chloride 75 mL/hr at 05/11/21 1111    propofol Stopped (05/11/21 2306)     Scheduled Medications    insulin lispro  0-6 Units Subcutaneous TID WC    insulin lispro  0-3 Units Subcutaneous Nightly    carboxymethylcellulose PF  1 drop Both Eyes 4x Daily    pantoprazole  40 mg Intravenous Daily    chlorhexidine  15 mL Mouth/Throat BID    enoxaparin  40 mg Subcutaneous Daily    polyvinyl alcohol  1 drop Both Eyes Q4H     PRN Meds: sodium chloride flush, labetalol, morphine, promethazine **OR** ondansetron, perflutren lipid microspheres, fentanNYL, potassium chloride, magnesium sulfate, sodium phosphate IVPB **OR** sodium phosphate IVPB, midazolam      Intake/Output Summary (Last 24 hours) at 5/12/2021 0843  Last data filed at 5/12/2021 0700  Gross per 24 hour   Intake 909.06 ml   Output 1590 ml   Net -680.94 ml       Exam:    /61   Pulse 107   Temp 95.7 °F (35.4 °C) (Bladder)   Resp 14   Ht 5' 6\" (1.676 m)   Wt 151 lb 14.4 oz (68.9 kg)   LMP  (LMP Unknown)   SpO2 99%   BMI 24.52 kg/m²     General appearance: Intubated , unresponsive  HEENT: Pupils equal, round, and reactive to light. Conjunctivae/corneas clear. Neck: Suppl. No jugular venous distention. Trachea midline. Respiratory:  Normal respiratory effort. Clear to auscultation, bilaterally without Rales/Wheezes/Rhonchi. Cardiovascular: Regular rate and rhythm with normal S1/S2 without murmurs, rubs or gallops. Abdomen: Soft, non-tender, non-distended with normal bowel sounds. Musculoskeletal: No clubbing, cyanosis or edema bilaterally. Neurologic: Unresponsive, sedated. . Limited exam  Capillary Refill: Brisk,< 3 seconds   Peripheral Pulses: +2 palpable, equal bilaterally       Labs:   Recent Labs     05/10/21  2215 05/11/21  0400 05/12/21  0400   WBC 9.2 6.7 10.3   HGB 10.4* 11.3* 9.5*   HCT 31.5* 34.5* 28.7*    283 255     Recent Labs     05/11/21  1706 05/11/21  2123 05/11/21 2123 05/12/21  0203 05/12/21  0400 05/12/21  0600   * 153*  --   --  153*  --    K 3.3* 2.8*   < > 2.7* 2.7* 3.1*   * 125*  --   --  123*  --    CO2 17* 19*  --   --  22  --    BUN 11 11  --   --  13  --    CREATININE <0.5* <0.5*  --   --  <0.5*  --    CALCIUM 8.9 8.4  --   --  7.9*  --    PHOS 4.9 4.2  --   --  2.9  --     < > = values in this interval not displayed. Recent Labs     05/10/21  1555 05/11/21  0400   AST 37 124*   ALT 14 42*   BILIDIR  --  <0.2   BILITOT <0.2 <0.2   ALKPHOS 48 68     Recent Labs     05/10/21  2215   INR 1.02     Recent Labs     05/10/21  2215 05/11/21  0400 05/11/21  1100   TROPONINI 0.03* 0.01 <0.01       Assessment/Plan:    -Cardiac arrest, PEA with ROSC. Hanna Neighbours  Unclear etiology but suspect related to drug overdose  -Acute respiratory failure-requiring intubation  -Documented history of polysubstance abuse including meth--urine drug screen is positive for methamphetamine  -Severe anoxic encephalopathy. ..  Patient remains comatose with minimal brainstem reflexes, having myoclonus  -Hypotension-likely related sedation-on low-dose levophed  -Hypernatremia/Central diabetes insipidus---had polyuria on presentation with rising sodium  -severe hypokalemia  -Severe metabolic acidosis--improved with bicarb infusion  -Pregnancy-16-18 weeks--OB following--fetus viable but no intervention   -Mild transaminitis--likely ischemic liver injury    Plan  Patient undergoing rewarming  Continue neurochecks while off sedation   Prognosis remains very poor  EEG done today--await report  Continue hypotonic fluids for hyponatremia  Replete potassium  Closely monitor electrolytes    DVT Prophylaxis: Lovenox  Diet: Diet NPO Effective Now  Code Status: Full Code            Delmis Feng MD

## 2021-05-12 NOTE — FLOWSHEET NOTE
EEG at bedside. Having difficulty obtaining appropriate waveform due to interference. All surrounding pieces of equipment evaluated and conclusion was TTM machine causing increased interference. BD support line called and spoke with a [place] [employer] who said the machine could be unplugged without issue but would have to turn it off or alarm would consistently go off. TTM therapy turned off for optimal waveform evaluation.

## 2021-05-12 NOTE — PROGRESS NOTES
(LOVENOX) injection 40 mg, 40 mg, Subcutaneous, Daily  polyvinyl alcohol (LIQUIFILM TEARS) 1.4 % ophthalmic solution 1 drop, 1 drop, Both Eyes, Q4H **AND** [DISCONTINUED] lubrifresh P.M. (artificial tears) ophthalmic ointment, , Both Eyes, Q4H  propofol injection, 5-50 mcg/kg/min, Intravenous, Continuous  fentaNYL (SUBLIMAZE) injection 25 mcg, 25 mcg, Intravenous, Q1H PRN  potassium chloride 20 mEq/50 mL IVPB (Central Line), 20 mEq, Intravenous, PRN  magnesium sulfate 2000 mg in 50 mL IVPB premix, 2,000 mg, Intravenous, PRN  sodium phosphate 10.53 mmol in dextrose 5 % 250 mL IVPB, 0.16 mmol/kg, Intravenous, PRN **OR** sodium phosphate 21.06 mmol in dextrose 5 % 250 mL IVPB, 0.32 mmol/kg, Intravenous, PRN  midazolam PF (VERSED) injection 2 mg, 2 mg, Intravenous, Q30 Min PRN    Allergies   Allergen Reactions    Wellbutrin [Bupropion] Itching     Feels like worms crawling on her body; crawling sensation    Amoxicillin-Pot Clavulanate      vomiting       Social History:    TOBACCO:   reports that she quit smoking about 6 months ago. Her smoking use included cigarettes. She started smoking about 7 years ago. She has a 18.00 pack-year smoking history. She has never used smokeless tobacco.  ETOH:   reports no history of alcohol use. Patient currently lives independently  Environmental/chemical exposure: None known    Family History:       Problem Relation Age of Onset    High Blood Pressure Mother     Depression Mother     High Blood Pressure Father     Depression Father     Diabetes Maternal Uncle     Depression Paternal Grandmother      REVIEW OF SYSTEMS:    ROS is unobtainable due to his critical illness.        Objective:   PHYSICAL EXAM:      VITALS:  /61   Pulse 106   Temp 96.3 °F (35.7 °C) (Bladder)   Resp 16   Ht 5' 6\" (1.676 m)   Wt 151 lb 14.4 oz (68.9 kg)   LMP  (LMP Unknown)   SpO2 99%   BMI 24.52 kg/m²      24HR INTAKE/OUTPUT:      Intake/Output Summary (Last 24 hours) at 5/12/2021 [address] [place] [address] Road filed at 5/12/2021 1100  Gross per 24 hour   Intake 1149.06 ml   Output 1540 ml   Net -390.94 ml     CONSTITUTIONAL: Nonresponsive. Intubated on mechanical ventilation. NECK: Healed tracheostomy site with evidence of prior external beam radiation   LUNGS: Intubated, no increased work of breathing and clear to auscultation. No accessory muscle use  CARDIOVASCULAR: S1 and S2, no edema and no JVD  ABDOMEN:  normal bowel sounds, non-distended and no masses palpated, and no tenderness to palpation. No hepatospleenomegaly  LYMPHADENOPATHY:  no axillary or supraclavicular adenopathy. No cervical adnenopathy  PSYCHIATRIC: Nonresponsive on mechanical ventilation  MUSCULOSKELETAL: No obvious misalignment or effusion of the joints. No clubbing, cyanosis of the digits. SKIN:  normal skin color, texture, turgor and no redness, warmth, or swelling. No palpable nodules  Neuro, unresponsive to verbal, tactile or noxious stimuli. Pupils are unequal and nonreactive to light. No corneal, gag or cough reflex seen. Plantars are down going. DATA:    Old records have been reviewed    CBC:  Recent Labs     05/10/21  2215 05/11/21  0400 05/12/21  0400   WBC 9.2 6.7 10.3   RBC 3.68* 4.04 3.37*   HGB 10.4* 11.3* 9.5*   HCT 31.5* 34.5* 28.7*    283 255   MCV 85.5 85.5 85.2   MCH 28.2 28.1 28.2   MCHC 32.9 32.8 33.2   RDW 15.8* 15.5* 16.2*      BMP:  Recent Labs     05/11/21 2123 05/11/21 2123 05/12/21  0400 05/12/21  0600 05/12/21  1100   *  --  153*  --  154*   K 2.8*   < > 2.7* 3.1* 2.9*   *  --  123*  --  120*   CO2 19*  --  22  --  27   BUN 11  --  13  --  15   CREATININE <0.5*  --  <0.5*  --  <0.5*   CALCIUM 8.4  --  7.9*  --  7.8*   GLUCOSE 184*  --  133*  --  102*    < > = values in this interval not displayed.       ABG:  Recent Labs     05/11/21  1707 05/11/21  2123 05/12/21  0400   PHART 7.297* 7.384 7.452*   VTD2FDR 36.1 34.2* 35.1   PO2ART 176.0* 133.0* 125.0*   [id number] 17.6* 20.4* 24.5 Remains hyponatremic due to strength and GI. Was receiving D5 1/2 amps of bicarbonate. Bicarbonate has improved. We will stop the bicarbonate and continue with D5 water at 125 cc/h. Electrolytes are being replaced as needed. GI/: NPO at this time. Will place NG tube   Patient is 14 to 16 weeks pregnant. Viability of the fetus is questionable. OB/GYN on board. ID:  Low suspicion for acute infections. Not on any antibiotics. ENDO:  Currently on low-dose insulin sliding scale. Bridgette Fernandez HEME & ONC:    Seen to have no leukocytosis. Bridgette Fernandez DVT PROPHY:   LMWH   GI PROPHY:  Protonix   CODE STATUS  Full Code   PLAN OF CARE  discussed her critical state again with her mother by the bedside. All questions were answered to her satisfaction. Prognosis remains guarded. Total critical care time caring for this patient with life threatening illness, including direct patient contact, management of life support systems, review of data including imaging and labs, discussions with other team members and physicians is at least 40 minutes so far today, excluding procedures.   Mart Kothari MD   Pulmonary Critical Care and Sleep Medicine  [facility]   Cristiana Esparza, Nadia Bull, [address]  5/10/2021, 11:51 AM

## 2021-05-12 NOTE — PROCEDURES
5/12/2021     Patient: Enio Freed    MR Number: [id number]  YOB: 1985  Date of Visit: 5/12/2021    Clinical History:    The patient is a 38y.o. years old female with cardiac arrest and isolated myoclonus. Medications reviewed. Method: The EEG was performed utilizing the international 10/20 of electrode placements of both referential and bipolar montages. The patient is in coma through out the recording. Findings: The background of the EEG showed generalized diffuse slowing with low amplitude throughout the recording mixed with faster activity. The background was not reactive but continuous. The background was compromised by EMG and muscle tension artifact. No EEG seizures. .      Impression: This EEG is abnormal. The generalized diffuse slowing is suggestive of severe diffuse encephalopathy. There is no evidence of epileptiform discharges, focal, or lateralizing abnormalities.         Doug Stewart MD      Board certified in clinical neurophysiology

## 2021-05-12 NOTE — PROGRESS NOTES
Spoke with OB charge RN regarding need to assess fetal heart tones, according to the Dr. present on the unit it is not a necessity because it will not change current patient care.

## 2021-05-12 NOTE — FLOWSHEET NOTE
Fetal heart tones 140's per doppler at this time. Pt unresponsive and unable to verbalize fetal movement.

## 2021-05-13 NOTE — PROGRESS NOTES
Occupational History     Comment: unemployed    Social Needs    Financial resource strain: None    Food insecurity     Worry: None     Inability: None    Transportation needs     Medical: None     Non-medical: None   Tobacco Use    Smoking status: Former Smoker     Packs/day: 1.00     Years: 18.00     Pack years: 18.00     Types: Cigarettes     Start date: 10/19/2013     Quit date: 10/19/2020     Years since quittin.5    Smokeless tobacco: Never Used   Substance and Sexual Activity    Alcohol use: No     Alcohol/week: 20.0 standard drinks     Types: 10 Cans of beer, 10 Shots of liquor per week     Comment: none for 120 days    Drug use: Yes     Types: Marijuana, Opiates , Methamphetamines, IV    Sexual activity: Not Currently     Partners: Male   Lifestyle    Physical activity     Days per week: None     Minutes per session: None    Stress: None   Relationships    Social connections     Talks on phone: None     Gets together: None     Attends Anabaptism service: None     Active member of club or organization: None     Attends meetings of clubs or organizations: None     Relationship status: None    Intimate partner violence     Fear of current or ex partner: None     Emotionally abused: None     Physically abused: None     Forced sexual activity: None   Other Topics Concern    None   Social History Narrative    None        PHYSICAL EXAMINATION      /76   Pulse 121   Temp 98.6 °F (37 °C) (Bladder)   Resp 16   Ht 5' 6\" (1.676 m)   Wt 151 lb 14.4 oz (68.9 kg)   LMP  (LMP Unknown)   SpO2 96%   BMI 24.52 kg/m²   This is a well-nourished patient in on the respirator. Patient is unresponsive. Unable to participate in any mental status testing. Pupils 3 to 4 mm and not reactive to light. Oculocephalic reflexes are absent. Corneal reflexes are absent. Facial symmetry could not be determined due to intubation. Tongue was in the midline.   Minimal posturing response on painful stimuli. Deep tendon reflexes absent. Plantar reflexes showed no response. Could not perform sensory testing or coordination testing. Gait could not be tested. No carotid bruit. DATA :  LABS:  General Labs:    CBC:   Lab Results   Component Value Date    WBC 11.2 05/13/2021    RBC 3.37 05/13/2021    HGB 9.4 05/13/2021    HCT 28.9 05/13/2021    MCV 85.8 05/13/2021    MCH 28.0 05/13/2021    MCHC 32.6 05/13/2021    RDW 16.6 05/13/2021     05/13/2021    MPV 7.1 05/13/2021     BMP:    Lab Results   Component Value Date     05/13/2021    K 3.6 05/13/2021    K 3.3 09/12/2020     05/13/2021    CO2 25 05/13/2021    BUN 9 05/13/2021    LABALBU 3.4 05/11/2021    CREATININE 0.6 05/13/2021    CALCIUM 8.3 05/13/2021    GFRAA >60 05/13/2021    GFRAA >60 11/14/2011    LABGLOM >60 05/13/2021    GLUCOSE 95 05/13/2021     RADIOLOGY REVIEW:  I have reviewed radiology image(s) and reports(s) of: CT scan of the head      IMPRESSION :  Severe anoxic encephalopathy  No pupillary corneal or oculocephalic reflexes. EEG showed diffuse severe slowing. Patient Active Problem List   Diagnosis    Migraine    Anemia    Depression (emotion)    History of carcinoma    Airway obstruction, anatomic    HTN (hypertension)    Change in voice    Neck pain due to malignant neoplastic disease ([address]    Edema of larynx    Severe episode of recurrent major depressive disorder, without psychotic features (HCC)    PTSD (post-traumatic stress disorder)    Alcohol abuse    Cannabis use disorder, moderate, dependence ([address]    Nicotine use disorder    Chronic cough    Abnormal findings on diagnostic imaging of lung    Chronic laryngitis    Borderline personality disorder ([address]    Cardiac arrest ([address]    Anoxic brain damage ([address]    Acute respiratory failure with hypoxia ([address]    Substance abuse ([address]    Hypernatremia       RECOMMENDATIONS :  Discussed with patient's nurse  Patient is critically ill.   Very poor prognosis for any functional recovery. Dr. Ander Groves will follow this patient tomorrow. Please note a portion of this chart was generated using dragon dictation software. Although every effort was made to ensure the accuracy of this automated transcription, some errors in transcription may have occurred.          Jodie Sánchez M.D.

## 2021-05-13 NOTE — PROGRESS NOTES
05/13/21 0844   Vent Patient Data   Plateau Pressure 17 PDF70   Static Compliance 41 mL/cmH2O   Dynamic Compliance 35.2 mL/cmH2O

## 2021-05-13 NOTE — PROGRESS NOTES
Hospitalist Progress Note      PCP: Elo Liu, APRN - CNP    Date of Admission: 5/10/2021        Hospital Course:      38 y.o. female is brought in from home after cardiac arrest . She has h/o vocal cord carcinoma, Depression, alcohol abuse, PTSD. She  was treated with trach and radiation in 2017 . She was found unresponsive by the mother who started performing CPR on her. EMS gave epi on site. With return of spontaneous circulation. She was intubated and sedated in the ED and has been started on pressure support. Labs revealed lactic acidosis and academia . Blood etoh level is undetectable. Tox screen is positive for amphetamine. Per mother no previous suicidal attempts    THe pt is 14-16 weeks pregnant . Subjective:     Patient has completed rewarming protocol. .. Remains unresponsive, has been off sedation since early this morning. Danielle Montero Has been noted to have episodes of myoclonus.     Medications:  Reviewed    Infusion Medications    dextrose 125 mL/hr at 05/13/21 1020    norepinephrine Stopped (05/12/21 1622)    sodium bicarbonate infusion Stopped (05/12/21 0945)    sodium chloride 75 mL/hr at 05/11/21 1111    propofol Stopped (05/12/21 0507)     Scheduled Medications    sodium chloride flush  5-40 mL Intravenous BID    insulin lispro  0-6 Units Subcutaneous TID WC    insulin lispro  0-3 Units Subcutaneous Nightly    carboxymethylcellulose PF  1 drop Both Eyes 4x Daily    pantoprazole  40 mg Intravenous Daily    chlorhexidine  15 mL Mouth/Throat BID    enoxaparin  40 mg Subcutaneous Daily    polyvinyl alcohol  1 drop Both Eyes Q4H     PRN Meds: sodium chloride flush, labetalol, morphine, promethazine **OR** ondansetron, perflutren lipid microspheres, fentanNYL, potassium chloride, magnesium sulfate, sodium phosphate IVPB **OR** sodium phosphate IVPB, midazolam      Intake/Output Summary (Last 24 hours) at 5/13/2021 1054  Last data filed at 5/13/2021 1027  Gross per 24 hour   Intake 3970.21 ml   Output 4850 ml   Net -879.79 ml       Exam:    /76   Pulse 120   Temp 98.6 °F (37 °C) (Bladder)   Resp 16   Ht 5' 6\" (1.676 m)   Wt 151 lb 14.4 oz (68.9 kg)   LMP  (LMP Unknown)   SpO2 97%   BMI 24.52 kg/m²     General appearance: Intubated , unresponsive  HEENT: Pupils equal, round, and reactive to light. Conjunctivae/corneas clear. Neck: Suppl. No jugular venous distention. Trachea midline. Respiratory:  Normal respiratory effort. Clear to auscultation, bilaterally without Rales/Wheezes/Rhonchi. Cardiovascular: Regular rate and rhythm with normal S1/S2 without murmurs, rubs or gallops. Abdomen: Soft, non-tender, non-distended with normal bowel sounds. Musculoskeletal: No clubbing, cyanosis or edema bilaterally. Neurologic: Unresponsive, sedated. . Limited exam  Capillary Refill: Brisk,< 3 seconds   Peripheral Pulses: +2 palpable, equal bilaterally       Labs:   Recent Labs     05/11/21 0400 05/12/21  0400 05/13/21  0440   WBC 6.7 10.3 11.2*   HGB 11.3* 9.5* 9.4*   HCT 34.5* 28.7* 28.9*    255 204     Recent Labs     05/11/21 2123 05/11/21 2123 05/12/21  0400 05/12/21  0400 05/12/21  1100 05/12/21  1500 05/12/21  1813 05/13/21  0440   *  --  153*  --  154*  --   --  150*   K 2.8*   < > 2.7*   < > 2.9* 3.3* 3.7 3.4*   *  --  123*  --  120*  --   --  117*   CO2 19*  --  22  --  27  --   --  25   BUN 11  --  13  --  15  --   --  9   CREATININE <0.5*  --  <0.5*  --  <0.5*  --   --  0.6   CALCIUM 8.4  --  7.9*  --  7.8*  --   --  8.3   PHOS 4.2  --  2.9  --  3.1  --   --   --     < > = values in this interval not displayed. Recent Labs     05/10/21  1555 05/11/21  0400   AST 37 124*   ALT 14 42*   BILIDIR  --  <0.2   BILITOT <0.2 <0.2   ALKPHOS 48 68     Recent Labs     05/10/21  2215   INR 1.02     Recent Labs     05/10/21  2215 05/11/21  0400 05/11/21  1100   TROPONINI 0.03* 0.01 <0.01       Assessment/Plan:    -Cardiac arrest, PEA with ROSC. Sterling Stuart  Unclear etiology but suspect related to drug overdose  -Patient is undergoing the warming process. Heart rate remained stable. -  -No response patient has been off of sedation. -Appreciate critical care recommendation  -EEG noted diffuse slowing  -Await neurology recommendation as well.  -Overall prognosis is guarded.       Acute hypoxic respiratory failure  -Needing intubation secondary to drug abuse.  -Patient is currently intubated off any sedation still with minimal improvement  -Patient remains comatose with minimal brainstem reflexes at times having myoclonus      Pregnancy  -Patient is about 18-week pregnant  -OB following.  -Fetus viable but no intervention at this time given critical care  -We will further defer to OB/GYN      DVT Prophylaxis: Lovenox  Diet: Diet NPO Effective Now  Code Status: Full Code            Krystina Gallegos MD

## 2021-05-13 NOTE — PROGRESS NOTES
Too soon to determine     Electronically signed by Robert Engel RD, CNSC, LD on 5/13/21 at 10:52 AM EDT    Contact: [phone]

## 2021-05-13 NOTE — PROGRESS NOTES
[facility]  Progress note      Subjective: Patient remains critically ill. Has been rewarmed for the last 24 hours. Mentation remains extremely poor. Does not respond to verbal, tactile or noxious stimuli. No brainstem reflexes are seen. On full mechanical ventilatory support.        Past Surgical History:        Procedure Laterality Date    LARYNGOSCOPY N/A 6/20/2019    MICROLARYNGOSCOPY AND LYSIS OF ADHESIONS OF LARYNX performed by Chary Martínez MD at [address] [place] [address] Street Left 06/28/2017    MICROLARYNGOSCOPY WITH BIOPSY OF LEFT TRUE VOCAL CORD MASS    OTHER SURGICAL HISTORY  08/10/2017    Tracheostomy    TONSILLECTOMY      TRACHEOSTOMY      TRACHEOTOMY N/A 12/14/2017     TRACHEOTOMY WITH 6DCT SHILEY               Current Medications:    Current Facility-Administered Medications: sodium chloride flush 0.9 % injection 5-40 mL, 5-40 mL, Intravenous, PRN  dextrose 5 % solution, , Intravenous, Continuous  sodium chloride flush 0.9 % injection 5-40 mL, 5-40 mL, Intravenous, BID  norepinephrine (LEVOPHED) 16 mg in dextrose 5% 250 mL infusion, 2-100 mcg/min, Intravenous, Continuous  sodium bicarbonate 100 mEq in dextrose 5 % 1,000 mL infusion, , Intravenous, Continuous  insulin lispro (1 Unit Dial) 0-6 Units, 0-6 Units, Subcutaneous, TID WC  insulin lispro (1 Unit Dial) 0-3 Units, 0-3 Units, Subcutaneous, Nightly  carboxymethylcellulose PF (THERATEARS) 1 % ophthalmic gel 1 drop, 1 drop, Both Eyes, 4x Daily  labetalol (NORMODYNE;TRANDATE) injection 5 mg, 5 mg, Intravenous, Q6H PRN  morphine (PF) injection 1 mg, 1 mg, Intravenous, Q2H PRN  pantoprazole (PROTONIX) injection 40 mg, 40 mg, Intravenous, Daily  promethazine (PHENERGAN) tablet 12.5 mg, 12.5 mg, Oral, Q6H PRN **OR** ondansetron (ZOFRAN) injection 4 mg, 4 mg, Intravenous, Q6H PRN  chlorhexidine (PERIDEX) 0.12 % solution 15 mL, 15 mL, Mouth/Throat, BID  perflutren lipid microspheres (DEFINITY) injection 1.65 mg, 1.5 mL, Intravenous, ONCE PRN  0.9 % sodium chloride infusion, , Intravenous, Continuous  enoxaparin (LOVENOX) injection 40 mg, 40 mg, Subcutaneous, Daily  polyvinyl alcohol (LIQUIFILM TEARS) 1.4 % ophthalmic solution 1 drop, 1 drop, Both Eyes, Q4H **AND** [DISCONTINUED] lubrifresh P.M. (artificial tears) ophthalmic ointment, , Both Eyes, Q4H  propofol injection, 5-50 mcg/kg/min, Intravenous, Continuous  fentaNYL (SUBLIMAZE) injection 25 mcg, 25 mcg, Intravenous, Q1H PRN  potassium chloride 20 mEq/50 mL IVPB (Central Line), 20 mEq, Intravenous, PRN  magnesium sulfate 2000 mg in 50 mL IVPB premix, 2,000 mg, Intravenous, PRN  sodium phosphate 10.53 mmol in dextrose 5 % 250 mL IVPB, 0.16 mmol/kg, Intravenous, PRN **OR** sodium phosphate 21.06 mmol in dextrose 5 % 250 mL IVPB, 0.32 mmol/kg, Intravenous, PRN  midazolam PF (VERSED) injection 2 mg, 2 mg, Intravenous, Q30 Min PRN    Allergies   Allergen Reactions    Wellbutrin [Bupropion] Itching     Feels like worms crawling on her body; crawling sensation    Amoxicillin-Pot Clavulanate      vomiting       Social History:    TOBACCO:   reports that she quit smoking about 6 months ago. Her smoking use included cigarettes. She started smoking about 7 years ago. She has a 18.00 pack-year smoking history. She has never used smokeless tobacco.  ETOH:   reports no history of alcohol use. Patient currently lives independently  Environmental/chemical exposure: None known    Family History:       Problem Relation Age of Onset    High Blood Pressure Mother     Depression Mother     High Blood Pressure Father     Depression Father     Diabetes Maternal Uncle     Depression Paternal Grandmother      REVIEW OF SYSTEMS:    ROS is unobtainable due to his critical illness.        Objective:   PHYSICAL EXAM:      VITALS:  /76   Pulse 122   Temp 98.6 °F (37 °C) (Bladder)   Resp 16   Ht 5' 6\" (1.676 m)   Wt 151 lb 14.4 oz (68.9 kg)   LMP  (LMP Unknown)   SpO2 97%   BMI 24.52 kg/m²      24HR INTAKE/OUTPUT:      Intake/Output Summary (Last 24 hours) at 5/13/2021 0923  Last data filed at 5/13/2021 0837  Gross per 24 hour   Intake 3970.12 ml   Output 4080 ml   Net -109.88 ml     CONSTITUTIONAL: Nonresponsive. Intubated on mechanical ventilation. NECK: Healed tracheostomy site with evidence of prior external beam radiation   LUNGS: Intubated, no increased work of breathing and clear to auscultation. No accessory muscle use  CARDIOVASCULAR: S1 and S2, no edema and no JVD  ABDOMEN:  normal bowel sounds, non-distended and no masses palpated, and no tenderness to palpation. No hepatospleenomegaly  LYMPHADENOPATHY:  no axillary or supraclavicular adenopathy. No cervical adnenopathy  PSYCHIATRIC: Nonresponsive on mechanical ventilation  MUSCULOSKELETAL: No obvious misalignment or effusion of the joints. No clubbing, cyanosis of the digits. SKIN:  normal skin color, texture, turgor and no redness, warmth, or swelling. No palpable nodules  Neuro, unresponsive to verbal, tactile or noxious stimuli. Pupils are nonreactive to light. No corneal, gag or cough reflex seen. Plantars are down going. DATA:    Old records have been reviewed    CBC:  Recent Labs     05/11/21  0400 05/12/21  0400 05/13/21  0440   WBC 6.7 10.3 11.2*   RBC 4.04 3.37* 3.37*   HGB 11.3* 9.5* 9.4*   HCT 34.5* 28.7* 28.9*    255 204   MCV 85.5 85.2 85.8   MCH 28.1 28.2 28.0   MCHC 32.8 33.2 32.6   RDW 15.5* 16.2* 16.6*      BMP:  Recent Labs     05/12/21  0400 05/12/21  0400 05/12/21  1100 05/12/21  1500 05/12/21  1813 05/13/21  0440   *  --  154*  --   --  150*   K 2.7*   < > 2.9* 3.3* 3.7 3.4*   *  --  120*  --   --  117*   CO2 22  --  27  --   --  25   BUN 13  --  15  --   --  9   CREATININE <0.5*  --  <0.5*  --   --  0.6   CALCIUM 7.9*  --  7.8*  --   --  8.3   GLUCOSE 133*  --  102*  --   --  95    < > = values in this interval not displayed.       ABG:  Recent Labs     05/11/21  2123 05/12/21  3237 05/13/21  0440   PHART 7.384 7.452* 7.397   OSH1KLG 34.2* 35.1 44.1   PO2ART 133.0* 125.0* 93.3   MCP8EBM 20.4* 24.5 27.1   Z2JUEMSY 99.3 99.4 98.0   BEART -4.1* 0.7 2.0     Procalcitonin  No results for input(s): PROCAL in the last 72 hours. No results found for: BNP  Lab Results   Component Value Date    TROPONINI <0.01 05/11/2021           Radiology Review:  All pertinent images / reports were reviewed as a part of this visit. Assessment:     1. Status post cardiac arrest with return of spontaneous circulation, unknown downtime  2. Severe anoxic encephalopathy  3. Acute respiratory failure with need for mechanical ventilation  4. Central diabetes insipidus  5. 16-week gestation    Plan:     PLAN:    NEURO:     Pain: None  Sedation: None  Patient suffered with PEA cardiac arrest and had unknown downtime. Mental status remains poor with a high suspicion for anoxic encephalopathy. EEG done yesterday showed diffuse slowing. No brainstem reflexes seen. As per neurology very poor chances of recovery. Initial CT had already showing severe anoxic injury with cerebral edema. Patient has also developed central diabetes insipidus, suspicious for severe brain injury. CARDIO:   Hemodynamically stable now and off of vasopressors. Transthoracic Echo done on 5/10/2021 showed: Preserved EF of 60-65% with no regional wall motion abnormalities. Cardiology on board. RESP:    On Full mechanical ventilatory support  Current settings are: TV: 450, RR: 18, FiO2: 30, PEEP: 5  Endotracheal secretions are minimal  ABG stable. Patient does have a history of laryngeal cancer with tracheostomy in the past.  Suspect tracheal stenosis which led to difficult intubation this time. RENAL:   Recent Labs     05/13/21 0440   CREATININE 0.6   [unfilled]  urine output has stabilized. Remains hypernatremic due to central DI. Continue with D5 water at 125 cc/h. Electrolytes are being replaced as needed.    We will start her on trickle tube feeds. GI/: NPO at this time. We will start her on trickle tube feeds. Patient is 14 to 16 weeks pregnant. Viability of the fetus is questionable. OB/GYN on board. ID:  Low suspicion for acute infections. Not on any antibiotics. ENDO:  Currently on low-dose insulin sliding scale. Nadyne Blanche HEME & ONC:    Seen to have no leukocytosis. Nadyne [place] DVT PROPHY:   LMWH   GI PROPHY:  Protonix   CODE STATUS  Full Code   PLAN OF CARE  Prognosis remains guarded. May have to get ethics committee involved due to the complicated nature of the situation. Total critical care time caring for this patient with life threatening illness, including direct patient contact, management of life support systems, review of data including imaging and labs, discussions with other team members and physicians is at least 35 minutes so far today, excluding procedures.   Lindy Vaca MD   Pulmonary Critical Care and Sleep Medicine  [facility] AT [address] [place], [address]  5/10/2021, 9:23 AM

## 2021-05-13 NOTE — PLAN OF CARE
Problem: Discharge Planning:  Goal: Participates in care planning  Description: Participates in care planning  Outcome: Ongoing  Goal: Discharged to appropriate level of care  Description: Discharged to appropriate level of care  Outcome: Ongoing     Problem: Airway Clearance - Ineffective:  Goal: Ability to maintain a clear airway will improve  Description: Ability to maintain a clear airway will improve  Outcome: Ongoing     Problem: Anxiety/Stress:  Goal: Level of anxiety will decrease  Description: Level of anxiety will decrease  Outcome: Ongoing     Problem: Aspiration:  Goal: Absence of aspiration  Description: Absence of aspiration  Outcome: Ongoing     Problem: Bowel Function - Altered:  Goal: Bowel elimination is within specified parameters  Description: Bowel elimination is within specified parameters  Outcome: Ongoing     Problem: Cardiac Output - Decreased:  Goal: Hemodynamic stability will improve  Description: Hemodynamic stability will improve  5/13/2021 0744 by AMBER Carrera - CNP  Outcome: Ongoing  5/12/2021 2138 by Simeon Garcia RN  Outcome: Ongoing  Note: Pulse rate and rhythm, peripheral pulses, and capillary refill assessed every shift with assessment. General color and body temperature monitored throughout shift and with vitals. Assess for edema with head to toe assessment. Administer treatments and medications as ordered. Monitor patient's weight.       Problem: Fluid Volume - Imbalance:  Goal: Absence of imbalanced fluid volume signs and symptoms  Description: Absence of imbalanced fluid volume signs and symptoms  Outcome: Ongoing     Problem: Gas Exchange - Impaired:  Goal: Levels of oxygenation will improve  Description: Levels of oxygenation will improve  Outcome: Ongoing     Problem: Mental Status - Impaired:  Goal: Mental status will be restored to baseline  Description: Mental status will be restored to baseline  Outcome: Ongoing     Problem: Nutrition Deficit:  Goal: Ability to achieve adequate nutritional intake will improve  Description: Ability to achieve adequate nutritional intake will improve  Outcome: Ongoing     Problem: Pain:  Goal: Pain level will decrease  Description: Pain level will decrease  5/13/2021 0744 by AMBER Call CNP  Outcome: Ongoing  5/12/2021 2138 by Sameer Thompson RN  Outcome: Ongoing  Goal: Recognizes and communicates pain  Description: Recognizes and communicates pain  Outcome: Ongoing  Goal: Control of acute pain  Description: Control of acute pain  Outcome: Ongoing  Goal: Control of chronic pain  Description: Control of chronic pain  Outcome: Ongoing     Problem: Serum Glucose Level - Abnormal:  Goal: Ability to maintain appropriate glucose levels will improve to within specified parameters  Description: Ability to maintain appropriate glucose levels will improve to within specified parameters  Outcome: Ongoing     Problem: Skin Integrity - Impaired:  Goal: Will show no infection signs and symptoms  Description: Will show no infection signs and symptoms  Outcome: Ongoing  Goal: Absence of new skin breakdown  Description: Absence of new skin breakdown  Outcome: Ongoing     Problem: Sleep Pattern Disturbance:  Goal: Appears well-rested  Description: Appears well-rested  Outcome: Ongoing     Problem: Tissue Perfusion, Altered:  Goal: Circulatory function within specified parameters  Description: Circulatory function within specified parameters  Outcome: Ongoing     Problem: Tissue Perfusion - Cardiopulmonary, Altered:  Goal: Absence of angina  Description: Absence of angina  Outcome: Ongoing  Goal: Hemodynamic stability will improve  Description: Hemodynamic stability will improve  Outcome: Ongoing     Problem: Nutrition  Goal: Optimal nutrition therapy  Outcome: Ongoing     Problem: Falls - Risk of:  Goal: Will remain free from falls  Description: Will remain free from falls  5/13/2021 0744 by AMBER Call CNP  Outcome: Ongoing  5/12/2021 2138 by Iris Ho RN  Outcome: Ongoing  Note: Pt remains free of falls. Fall risk protocol in place. Bed locked and level with transducer. Room door left open. Pt intubated. Will continue to monitor.    Goal: Absence of physical injury  Description: Absence of physical injury  Outcome: Ongoing     Problem: Skin Integrity:  Goal: Will show no infection signs and symptoms  Description: Will show no infection signs and symptoms  Outcome: Ongoing  Goal: Absence of new skin breakdown  Description: Absence of new skin breakdown  Outcome: Ongoing

## 2021-05-13 NOTE — PLAN OF CARE
Problem: Cardiac Output - Decreased:  Goal: Hemodynamic stability will improve  Description: Hemodynamic stability will improve  5/12/2021 2138 by Simeon Garcia RN  Outcome: Ongoing  Note: Pulse rate and rhythm, peripheral pulses, and capillary refill assessed every shift with assessment. General color and body temperature monitored throughout shift and with vitals. Assess for edema with head to toe assessment. Administer treatments and medications as ordered. Monitor patient's weight. Problem: Falls - Risk of:  Goal: Will remain free from falls  Description: Will remain free from falls  5/12/2021 2138 by Simeon Garcia RN  Outcome: Ongoing  Note: Pt remains free of falls. Fall risk protocol in place. Bed locked and level with transducer. Room door left open. Pt intubated. Will continue to monitor.

## 2021-05-14 NOTE — CONSULTS
MD Elida Betts MD Rheta Ban, MD               Office: [phone]                      Fax: [phone]             [address] [place] [address]                   NEPHROLOGY INITIAL CONSULT NOTE:     PATIENT NAME: Thomas Ahmadi  : 1985  MRN: [id number]  REASON FOR CONSULT: For evaluation and management of hypernatremia . (My recommendations will be communicated by way of shared medical record.)      RECOMMENDATIONS:   -Check osmolarity, urine lites, to r/o DI due to polyuria  - Change IVF to D5 + 0.4% Saline + KCL  - Give DDAVP (noted polyuria), needs ADH action.   - BMP Q4 hrs   - Goal Na ~ 8-10 change in 24 hrs   -Levophed to keep MAP > 65.  - at higher risk for decompensation, needing closer monitoring.   -Follow-up with neurology, reportedly poor prognosis, with anoxic encephalopathy    D/C plan from renal stand point:  -Unclear, critically ill    Discussed with patient's family at bedside also    IMPRESSION:       Hypernatremia-dehydration severe, worsening  -Very high free water deficits,  -? Central diabetes insipidus  -? Might contribute with encephalopathy    GIORGIO (on non CKD: ): Severe, worsening  - poly-uria d/to above   - BL Scr- <0.5    -: Etiology of GIORGIO -prerenal, presumed       Associated problems:   - Volume status: hypo-volemic  : BP: Hypotension, shock, needing Levophed  - Azotemia: Prerenal, follow  - Electrolytes: K: Hypokalemia, needing repletion, due to auto diuresis  - Acid-Base: Stable  - Anemia: Mild, follow      Other major problems: Management per primary and other consulting teams.       Cardiac cardiac status,  Possible anoxic brain injury,  Cerebral edema  Acute respiratory failure, hypoxia-needing mechanical intubation    History of depression,  Substance abuse, alcohol abuse    16-week pregnant    Shock liver,    Hospital Problems           Last Modified POA    Cardiac arrest ([facility] 5/10/2021 Yes    Anoxic brain damage ([facility] 5/11/2021 Yes    Acute respiratory failure with hypoxia ([facility] 5/11/2021 Yes    Substance abuse ([facility] 5/11/2021 Yes    Hypernatremia 5/11/2021 Yes        : other supportive care :   - Check daily renal function panel with electrolytes-phosphorus  - Strict monitoring of I/Os, daily weight  - Renal feeds/diet  - Current medications reviewed. - Nephrotoxic medications have been discontinued. - Dose adjusted and appropriate. - Dose meds for eGFR <15 mL/min/1.73m2 during GIORGIO    - Avoid heavy opioids due to renal failure - may use very low dose dilaudid / fentanyl with close monitoring of CNS and respiratory depression. Please refer to the orders. High Complexity. Multiple complex problems. Discussed with patient's treatment team-   Time spent > 30 (~35) minutes. Thank you for allowing me to participate in this patient's care. Please do not hesitate to contact me anytime. We will follow along with you. Sherly Montez MD  [facility] [id number] [place] [address] [place]: [phone] or [employer]  Fax: [phone]        ========================================================   ========================================================       CHIEF COMPLAINT:   Chief Complaint   Patient presents with    Cardiac Arrest     Arrived by [facility] rt unwitnessed arrest.  Was found down by mother; mom began CPR. PEA upon EMS arrival @ 1500; 4 rounds of epi & 2 narcan admin. Pulse at present. Hx of iv drug use; believed to be 13 weeks pregnant.          History Obtained From:  spouse, reason patient could not give history:  on ventilator + treatment team + Electronic Medical Records    HPI: Ms. Avel Kurtz is a 38 y.o. female with significant past medical history as below:   Past Medical History:   Diagnosis Date    Alcohol use disorder, moderate, in early remission ([facility] 10/28/2019    Anemia     Anxiety     Back pain     Bipolar affective disorder ([facility]     Depression     Migraine     [facility], [facility]     [facility] of [facility] 7/18/2017    PTSD (post-traumatic stress disorder)     Voice hoarseness       Presents with Cardiac Arrest (Arrived by [facility] rt unwitnessed arrest.  Was found down by mother; mom began CPR. PEA upon EMS arrival @ 1500; 4 rounds of epi & 2 narcan admin. Pulse at present. Hx of iv drug use; believed to be 13 weeks pregnant.    )    Admitted with Cardiac arrest ([facility] [I46.9]   Found to have GIORGIO , so we are called for that. Regarding: hypernatremia  · Duration: acute on chronic  · Location: kidneys  · Severity: Severe   · Timing: continous  · Context (ex: related to condition):  , refer to my assessment / impression. · Modifying factors (ex: medications, interventions):  , refer to my plan / recommendation. · Associated signs & symptoms (ex: edema, SOB): As mentioned above in CC and HPI      Past medical, Surgical, Social, Family medical history reviewed by me.      PAST MEDICAL HISTORY:   Past Medical History:   Diagnosis Date    Alcohol use disorder, moderate, in early remission ([facility] 10/28/2019    Anemia     Anxiety     Back pain     Bipolar affective disorder (HCC)     Depression     Migraine     Opiate abuse, continuous (HCC)     Primary cancer of larynx (HCC) 7/18/2017    PTSD (post-traumatic stress disorder)     Voice hoarseness      PAST SURGICAL HISTORY:   Past Surgical History:   Procedure Laterality Date    LARYNGOSCOPY N/A 6/20/2019    MICROLARYNGOSCOPY AND LYSIS OF ADHESIONS OF LARYNX performed by Evon Wright MD at [address] Left 06/28/2017    MICROLARYNGOSCOPY WITH BIOPSY OF LEFT TRUE VOCAL CORD MASS    OTHER SURGICAL HISTORY  08/10/2017    Tracheostomy    TONSILLECTOMY      TRACHEOSTOMY      TRACHEOTOMY N/A 12/14/2017     TRACHEOTOMY WITH 6DJOCY SLADE               FAMILY HISTORY:   Family History   Problem Relation Age of Onset    High Blood Pressure Mother     Depression Mother    Luis Antonio Hanna Blood Pressure Father     Depression Father     Diabetes Maternal Uncle     Depression Paternal Grandmother      SOCIAL HISTORY:   Social History     Socioeconomic History    Marital status: Single     Spouse name: None    Number of children: 3    Years of education: 15    Highest education level: None   Occupational History     Comment: unemployed    Social Needs    Financial resource strain: None    Food insecurity     Worry: None     Inability: None    Transportation needs     Medical: None     Non-medical: None   Tobacco Use    Smoking status: Former Smoker     Packs/day: 1.00     Years: 18.00     Pack years: 18.00     Types: Cigarettes     Start date: 10/19/2013     Quit date: 10/19/2020     Years since quittin.5    Smokeless tobacco: Never Used   Substance and Sexual Activity    Alcohol use: No     Alcohol/week: 20.0 standard drinks     Types: 10 Cans of beer, 10 Shots of liquor per week     Comment: none for 120 days    Drug use: Yes     Types: Marijuana, Opiates , Methamphetamines, IV    Sexual activity: Not Currently     Partners: Male   Lifestyle    Physical activity     Days per week: None     Minutes per session: None    Stress: None   Relationships    Social connections     Talks on phone: None     Gets together: None     Attends Quaker service: None     Active member of club or organization: None     Attends meetings of clubs or organizations: None     Relationship status: None    Intimate partner violence     Fear of current or ex partner: None     Emotionally abused: None     Physically abused: None     Forced sexual activity: None   Other Topics Concern    None   Social History Narrative    None          MEDICATIONS: reviewed by me. Medications Prior to Admission:  No current facility-administered medications on file prior to encounter.       Current Outpatient Medications on File Prior to Encounter   Medication Sig Dispense Refill    amLODIPine (NORVASC) 5 MG tablet Take 5 mg by mouth daily      levothyroxine (SYNTHROID) 100 MCG tablet Take 100 mcg by mouth Daily      omeprazole (PRILOSEC) 40 MG delayed release capsule TAKE ONE CAPSULE BY MOUTH DAILY ONE HOUR BEFORE EVERY EVENING MEAL 30 capsule 0    sertraline (ZOLOFT) 100 MG tablet Take 1 tablet by mouth daily 30 tablet 1    mirtazapine (REMERON) 30 MG tablet TAKE ONE TABLET BY MOUTH ONCE NIGHTLY 30 tablet 2    labetalol (NORMODYNE) 200 MG tablet Take 200 mg by mouth 2 times daily      ondansetron (ZOFRAN-ODT) 4 MG disintegrating tablet DISSOLVE ONE TABLET BY MOUTH EVERY 8 HOURS AS NEEDED FOR NAUSEA 20 tablet 1         Current Facility-Administered Medications:     nitroGLYCERIN 50 mg in dextrose 5% 250 mL infusion, 5-200 mcg/min, Intravenous, Continuous, Vidhya Young MD    dextrose 5 % and 0.45 % NaCl with KCl 20 mEq infusion, , Intravenous, Continuous, Jeni Taylor MD, Last Rate: 100 mL/hr at 05/14/21 0729, New Bag at 05/14/21 0729    desmopressin (DDAVP) injection 1 mcg, 1 mcg, Intravenous, Once, Jeni Taylor MD    insulin lispro (1 Unit Dial) 0-6 Units, 0-6 Units, Subcutaneous, Q4H, Seema Feng MD    glucose (GLUTOSE) 40 % oral gel 15 g, 15 g, Oral, PRN, Anthony Moyer MD    dextrose 50 % IV solution, 12.5 g, Intravenous, PRN, Anthony Moyer MD    glucagon (rDNA) injection 1 mg, 1 mg, Intramuscular, PRN, Anthony Moyer MD    dextrose 5 % solution, 100 mL/hr, Intravenous, PRN, Anthony Moyer MD    sodium chloride flush 0.9 % injection 5-40 mL, 5-40 mL, Intravenous, PRN, Juju Serrano MD, 10 mL at 05/12/21 1308    sodium chloride flush 0.9 % injection 5-40 mL, 5-40 mL, Intravenous, BID, Arpita Cavazos MD, 10 mL at 05/14/21 0801    norepinephrine (LEVOPHED) 16 mg in dextrose 5% 250 mL infusion, 2-100 mcg/min, Intravenous, Continuous, Vidhya Yonug MD, Last Rate: 7.5 mL/hr at 05/14/21 0740, 8 mcg/min at 05/14/21 0740    carboxymethylcellulose PF (THERATEARS) 1 % ophthalmic gel 1 drop, 1 drop, Both Eyes, 4x Daily, Stacie Will MD, 1 drop at 05/14/21 0740    labetalol (NORMODYNE;TRANDATE) injection 5 mg, 5 mg, Intravenous, Q6H PRN, Lorrie Wright MD, 5 mg at 05/11/21 1510    morphine (PF) injection 1 mg, 1 mg, Intravenous, Q2H PRN, Lorrie Wright MD, 1 mg at 05/11/21 1852    pantoprazole (PROTONIX) injection 40 mg, 40 mg, Intravenous, Daily, Lorrie Wright MD, 40 mg at 05/14/21 0732    promethazine (PHENERGAN) tablet 12.5 mg, 12.5 mg, Oral, Q6H PRN **OR** ondansetron (ZOFRAN) injection 4 mg, 4 mg, Intravenous, Q6H PRN, Laila Narvaez MD    chlorhexidine (PERIDEX) 0.12 % solution 15 mL, 15 mL, Mouth/Throat, BID, Antonia Tejada MD, 15 mL at 05/14/21 0740    perflutren lipid microspheres (DEFINITY) injection 1.65 mg, 1.5 mL, Intravenous, ONCE PRN, Laila Narvaez MD    enoxaparin (LOVENOX) injection 40 mg, 40 mg, Subcutaneous, Daily, Laila Narvaez MD, 40 mg at 05/14/21 0732    polyvinyl alcohol (LIQUIFILM TEARS) 1.4 % ophthalmic solution 1 drop, 1 drop, Both Eyes, Q4H, 1 drop at 05/14/21 0740 **AND** [DISCONTINUED] lubrifresh P.M. (artificial tears) ophthalmic ointment, , Both Eyes, Q4H, Antonia Tejada MD    propofol injection, 5-50 mcg/kg/min, Intravenous, Continuous, Laila Narvaez MD, Stopped at 05/12/21 0507    fentaNYL (SUBLIMAZE) injection 25 mcg, 25 mcg, Intravenous, Q1H PRN, Laila Narvaez MD    potassium chloride 20 mEq/50 mL IVPB (Central Line), 20 mEq, Intravenous, PRN, Pantera Dixon MD, Last Rate: 50 mL/hr at 05/14/21 0818, 20 mEq at 05/14/21 0818    magnesium sulfate 2000 mg in 50 mL IVPB premix, 2,000 mg, Intravenous, PRN, Pantera Dixon MD, Stopped at 05/12/21 1609    sodium phosphate 10.53 mmol in dextrose 5 % 250 mL IVPB, 0.16 mmol/kg, Intravenous, PRN, Stopped at 05/11/21 1821 **OR** sodium phosphate 21.06 mmol in dextrose 5 % 250 mL IVPB, 0.32 mmol/kg, Intravenous, PRN, Pantera Dixon MD    midazolam PF (VERSED) injection 2 mg, 2 mg, Intravenous, Q30 Min PRN, Angel Eva Campbell MD      Allergies reviewed by me: Wellbutrin [bupropion] and Amoxicillin-pot clavulanate    REVIEW OF SYSTEMS:       Review of systems not obtained due to patient factors - mental status       PHYSICAL EXAM:  Recent vital signs and recent I/Os reviewed by me.      Wt Readings from Last 3 Encounters:   05/14/21 147 lb 4.3 oz (66.8 kg)   04/30/21 150 lb (68 kg)   03/24/21 141 lb (64 kg)     BP Readings from Last 3 Encounters:   05/14/21 103/67   04/30/21 122/82   03/24/21 122/72     Patient Vitals for the past 24 hrs:   BP Temp Temp src Pulse Resp SpO2 Weight   05/14/21 0845 -- -- -- 133 16 96 % --   05/14/21 0830 -- -- -- 135 16 93 % --   05/14/21 0815 103/67 -- -- 137 16 91 % --   05/14/21 0715 -- -- -- 132 16 95 % --   05/14/21 0700 -- -- -- 138 16 93 % --   05/14/21 0645 -- -- -- 144 16 93 % --   05/14/21 0630 -- -- -- 127 16 95 % --   05/14/21 0615 -- -- -- 126 16 95 % --   05/14/21 0600 -- -- -- 127 16 95 % --   05/14/21 0557 (!) 98/58 -- -- 127 16 96 % --   05/14/21 0545 -- 98.6 °F (37 °C) Bladder 128 16 95 % --   05/14/21 0530 -- -- -- 126 16 95 % --   05/14/21 0515 -- -- -- 126 16 97 % --   05/14/21 0500 -- -- -- 128 16 96 % --   05/14/21 0445 -- -- -- 133 16 94 % 147 lb 4.3 oz (66.8 kg)   05/14/21 0430 -- -- -- 141 22 -- --   05/14/21 0415 -- -- -- 119 14 -- --   05/14/21 0400 -- -- -- 120 16 92 % --   05/14/21 0355 (!) 90/57 98.6 °F (37 °C) Bladder 119 16 98 % --   05/14/21 0348 -- -- -- 119 16 100 % --   05/14/21 0345 -- -- -- 119 16 99 % --   05/14/21 0330 -- -- -- 119 16 99 % --   05/14/21 0315 -- -- -- 119 16 100 % --   05/14/21 0300 -- -- -- 119 16 99 % --   05/14/21 0245 -- -- -- 120 16 99 % --   05/14/21 0230 -- -- -- 120 16 99 % --   05/14/21 0215 -- -- -- 121 16 99 % --   05/14/21 0200 -- 98.8 °F (37.1 °C) Bladder 119 16 100 % --   05/14/21 0145 -- -- -- 118 16 99 % --   05/14/21 0130 -- -- -- 119 16 99 % --   05/14/21 0115 -- -- -- 119 16 98 % --   05/14/21 0100 -- -- -- 120 16 99 % -- 05/14/21 0045 -- -- -- 122 16 100 % --   05/14/21 0030 -- -- -- 121 16 97 % --   05/14/21 0025 104/61 98.6 °F (37 °C) Bladder 123 16 99 % --   05/14/21 0015 -- -- -- 120 16 97 % --   05/14/21 0000 -- -- -- 119 16 96 % --   05/13/21 2345 -- -- -- 119 16 98 % --   05/13/21 2337 -- -- -- 119 16 95 % --   05/13/21 2330 -- -- -- 118 16 94 % --   05/13/21 2315 -- -- -- 119 16 94 % --   05/13/21 2300 -- -- -- 119 16 97 % --   05/13/21 2245 -- -- -- 120 16 95 % --   05/13/21 2230 -- -- -- 120 16 94 % --   05/13/21 2225 108/62 -- -- 121 18 92 % --   05/13/21 2215 -- -- -- 119 16 98 % --   05/13/21 2200 -- -- -- 120 16 98 % --   05/13/21 2145 -- -- -- 120 16 98 % --   05/13/21 2130 95/61 -- -- 120 16 98 % --   05/13/21 2115 -- -- -- 119 16 94 % --   05/13/21 2100 (!) 83/48 98.4 °F (36.9 °C) Bladder 121 16 92 % --   05/13/21 2045 (!) 77/48 -- -- 122 16 91 % --   05/13/21 2030 (!) 84/54 -- -- 123 16 91 % --   05/13/21 2026 -- -- -- 123 16 92 % --   05/13/21 2015 (!) 85/47 -- -- 123 16 92 % --   05/13/21 2005 (!) 91/46 98.6 °F (37 °C) Bladder 123 16 93 % --   05/13/21 2004 -- -- -- 122 16 93 % --   05/13/21 2003 -- -- -- 124 16 91 % --   05/13/21 2002 (!) 83/46 -- -- 123 16 92 % --   05/13/21 2000 -- -- -- 123 16 92 % --   05/13/21 1845 -- -- -- 123 16 91 % --   05/13/21 1830 -- -- -- 125 16 92 % --   05/13/21 1815 -- -- -- -- -- 92 % --   05/13/21 1730 -- -- -- 122 16 (!) 89 % --   05/13/21 1715 -- -- -- 123 16 94 % --   05/13/21 1700 -- -- -- 124 16 96 % --   05/13/21 1645 -- -- -- 123 16 -- --   05/13/21 1630 -- -- -- 123 16 -- --   05/13/21 1615 -- -- -- 123 16 -- --   05/13/21 1515 -- -- -- 123 16 -- --   05/13/21 1445 -- -- -- 121 16 -- --   05/13/21 1415 -- -- -- 121 16 94 % --   05/13/21 1400 -- -- -- 121 16 93 % --   05/13/21 1345 -- -- -- 121 16 95 % --   05/13/21 1245 -- -- -- 121 16 96 % --   05/13/21 1232 -- -- -- 122 16 96 % --   05/13/21 1215 -- -- -- 120 16 97 % --   05/13/21 1200 -- -- -- 120 16 97 % -- 05/13/21 1145 -- -- -- 121 16 97 % --   05/13/21 1115 -- -- -- 118 16 98 % --   05/13/21 1100 -- -- -- 118 16 98 % --   05/13/21 1045 -- -- -- 119 16 97 % --   05/13/21 1000 -- -- -- 120 16 97 % --   05/13/21 0915 -- -- -- 120 16 97 % --   05/13/21 0900 -- -- -- 121 16 97 % --       Intake/Output Summary (Last 24 hours) at 5/14/2021 0857  Last data filed at 5/14/2021 0830  Gross per 24 hour   Intake 3403.15 ml   Output 5325 ml   Net -1921.85 ml   Constitutional: Poorly responsive, Intubated and  habitus  Eyes: Conjunctiva clear and Noscleral icterus  Ear, Nose, and Throat: dry oral mucosa; ET to vent  Neck: Trachea midline, No jugular venous distension  Cardiovascular: Regular rate and ryhthm, normal S1 and S2,    Respiratory: Mechanically ventilated; Lung sounds notable for synchronous vent-associated breath sounds  Abdomen: Soft, non-tender, non-distended. Normal bowel sounds. : Mckinnon catheter is inserted, draining yellow colored urine   Skin: no rash,  bruises on visible body area  Musculoskeletal: No clubbing or cyanosis of digits. and Normocephalic. Extremitties: trace peripheral edema, no deformities. Neurologic:Unable to obtain as intubated/sedated. Psychiatric: Unable to obtain as intubated/sedated. DATA:  Diagnostic tests reviewed by me for today's visit:   (AS NEEDED FOR MY EVALUATION AND MANAGEMENT).        Recent Labs     05/12/21  0400 05/13/21  0440 05/14/21  0445   WBC 10.3 11.2* 10.8   HCT 28.7* 28.9* 34.1*    204 184     Iron Saturation:  No components found for: PERCENTFE  FERRITIN:  No results found for: FERRITIN  IRON:    Lab Results   Component Value Date    IRON 45 05/14/2020     TIBC:    Lab Results   Component Value Date    TIBC 399 05/14/2020       Recent Labs     05/11/21 2123 05/11/21 2123 05/12/21  0400 05/12/21  0400 05/12/21  1100 05/12/21  1500 05/12/21  1813 05/13/21  0440 05/13/21  1230 05/14/21  0445   *  --  153*  --  154*  --   --  150*  --  161*   K 2.8*   < > 2.7*   < > 2.9* 3.3* 3.7 3.4* 3.6 2.9*   *  --  123*  --  120*  --   --  117*  --  128*   CO2 19*  --  22  --  27  --   --  25  --  23   BUN 11  --  13  --  15  --   --  9  --  13   CREATININE <0.5*  --  <0.5*  --  <0.5*  --   --  0.6  --  1.1    < > = values in this interval not displayed. Recent Labs     05/11/21  1100 05/11/21  1706 05/11/21  2123 05/12/21  0400 05/12/21  1100 05/13/21  0440 05/14/21  0445   CALCIUM 9.1 8.9 8.4 7.9* 7.8* 8.3 9.0   MG 2.20 2.00 1.80 1.70* 1.60*  --   --    PHOS 2.1* 4.9 4.2 2.9 3.1  --   --      No results for input(s): PH, PCO2, PO2 in the last 72 hours.     Invalid input(s): Isacc Shania    ABG:  No results found for: PH, PCO2, PO2, HCO3, BE, THGB, TCO2, O2SAT  VBG:    Lab Results   Component Value Date    PHVEN 7.454 05/12/2021    CZJ3FTB 50.3 12/09/2017    BEVEN -5.0 12/09/2017    Q0DVINUJ 85 12/09/2017       LDH:  No results found for: LDH  Uric Acid:    Lab Results   Component Value Date    LABURIC 1.9 03/16/2021       PT/INR:    Lab Results   Component Value Date    PROTIME 11.8 05/10/2021    INR 1.02 05/10/2021     Warfarin PT/INR:  No components found for: PTPATWAR, PTINRWAR  PTT:    Lab Results   Component Value Date    APTT 29.8 05/10/2021   [APTT}  Last 3 Troponin:    Lab Results   Component Value Date    TROPONINI <0.01 05/11/2021    TROPONINI 0.01 05/11/2021    TROPONINI 0.03 05/10/2021       U/A:    Lab Results   Component Value Date    NITRITE neg 04/05/2012    COLORU YELLOW 05/10/2021    PROTEINU Negative 05/10/2021    PHUR 7.0 05/10/2021    PHUR 7.0 05/10/2021    WBCUA 1 05/10/2021    RBCUA 0-2 05/10/2021    MUCUS Trace 07/28/2012    BACTERIA 2+ 03/18/2021    CLARITYU CLOUDY 05/10/2021    SPECGRAV 1.021 05/10/2021    LEUKOCYTESUR Negative 05/10/2021    UROBILINOGEN 0.2 05/10/2021    BILIRUBINUR Negative 05/10/2021    BILIRUBINUR neg 04/05/2012    BILIRUBINUR NEGATIVE 11/14/2011    BLOODU Negative 05/10/2021    GLUCOSEU Negative 05/10/2021    GLUCOSEU NEGATIVE 11/14/2011    AMORPHOUS 1+ 07/28/2012     Microalbumen/Creatinine ratio:  No components found for: RUCREAT  24 Hour Urine for Protein:  No components found for: RAWUPRO, UHRS3, JGBZ83VJ, UTV3  24 Hour Urine for Creatinine Clearance:  No components found for: CREAT4, UHRS10, UTV10  Urine Toxicology:  No components found for: Cristino Berrios, ICOCAINE, IMARTHC, IOPIATES, IPHENCYC    HgBA1c:    Lab Results   Component Value Date    LABA1C 5.2 03/09/2021     RPR:    Lab Results   Component Value Date    RPR Non-Reactive 12/08/2011     HIV:  No results found for: HIV  KRISTA:    Lab Results   Component Value Date    KRISTA Negative 05/14/2020     RF:    Lab Results   Component Value Date    RF <10.0 05/14/2020     DSDNA:  No components found for: DNA  AMYLASE:  No results found for: AMYLASE  LIPASE:    Lab Results   Component Value Date    LIPASE 22.0 05/10/2021     Fibrinogen Level:  No components found for: FIB       BELOW MENTIONED RADIOLOGY STUDY RESULTS BY ME (AS NEEDED FOR MY EVALUATION AND MANAGEMENT). Echo Complete 2d W Doppler W Color    Result Date: 5/10/2021  Transthoracic Echocardiography Report (TTE)  Demographics   Patient Name       Blossom Cunningham   Date of Study      05/10/2021         Gender              Female   Patient Number     [id number]         Date of Birth       1985   Visit Number       [id number]          Age                 38 year(s)   Accession Number   [id number]         Room Number         0002   Corporate ID       [id number]           Sonographer         Kathie Fagan                                                            RDCS, RVT, BS   Ordering Physician Carter Mcdowell   Interpreting        Kirit Malone, [facility]        Physician           DO OG, [facility]  Procedure Type of Study   TTE procedure:ECHOCARDIOGRAM COMPLETE 2D W DOPPLER W COLOR.   Procedure Date Date: 05/10/2021 Start: 04:56 PM Study Location: [facility] - Echo Lab Technical Quality: Adequate visualization Indications:Cardiac arrest. Patient Status: STAT Height: 66 inches Weight: 145 pounds BSA: 1.74 m2 BMI: 23.4 kg/m2 Rhythm: Sinus tachycardia HR: 130 bpm BP: 91/54 mmHg  Conclusions   Summary  Overall left ventricular systolic function appears normal.  Ejection fraction is visually estimated to be 60-65%. No regional wall motion abnormalities are noted. Cannot determine diastology due to E/A fusion from sinus tachycardia. Normal function of all valves. Signature   ------------------------------------------------------------------  Electronically signed by Eric Brooks, [facility]  (Interpreting physician) on 05/10/2021 at 05:58 PM  ------------------------------------------------------------------   Findings   Left Ventricle  Left ventricular cavity size and wall thickness are normal.  Overall left ventricular systolic function appears normal.  Ejection fraction is visually estimated to be 60-65%. No regional wall motion abnormalities are noted. Cannot determine diastology due to E/A fusion from sinus tachycardia. Average E/e': 7.5. Mitral Valve  The mitral valve normal in structure and function. No evidence of mitral regurgitation or stenosis. Left Atrium  The left atrium is normal in size. Aortic Valve  The aortic valve is structurally normal. There is no significant aortic  valve regurgitation or stenosis. Tricuspid aortic valve. Aorta  The aortic root is normal in size. Right Ventricle  The right ventricle is normal in size and function. TAPSE: 1.7 cm. RV s' velocity: 16.5 cm/s. Tricuspid Valve  The tricuspid valve is normal in structure and function. There is no  significant tricuspid valve regurgitation or stenosis. Right Atrium  The right atrial size is normal.   Pulmonic Valve  The pulmonic valve is not well visualized. There is no evidence of pulmonic valve regurgitation or stenosis.    Pericardial Effusion  No pericardial effusion noted. Pleural Effusion  No pleural effusion. Miscellaneous  The inferior vena cava appears normal in size with normal respiratory  variation. M-Mode/2D Measurements (cm)                LA Area: 15.6 cm2  LVOT: 1.9 cm LA volume/Index: 41.9 ml /24 ml/m2  Doppler Measurements   AV Peak Velocity: 142 cm/s     MV Peak E-Wave: 105 cm/s  AV Peak Gradient: 8.07 mmHg  AV Mean Gradient: 5 mmHg  LVOT Peak Velocity: 128 cm/s  AV Area (Continuity):2.24 cm2   E' Septal Velocity: 15.1 cm/s  E' Lateral Velocity: 13.3 cm/s  PV Peak Velocity: 89 cm/s  PV Peak Gradient: 3.17 mmHg   Aortic Valve   Peak Velocity: 142 cm/s     Mean Velocity: 101 cm/s  Peak Gradient: 8.07 mmHg    Mean Gradient: 5 mmHg  Area (continuity): 2.24 cm2  AV VTI: 25.4 cm  Aorta   LVOT Diameter: 1.9 cm      Ct Head Wo Contrast    Result Date: 5/10/2021  EXAMINATION: CT OF THE HEAD WITHOUT CONTRAST  5/10/2021 4:42 pm TECHNIQUE: CT of the head was performed without the administration of intravenous contrast. Dose modulation, iterative reconstruction, and/or weight based adjustment of the mA/kV was utilized to reduce the radiation dose to as low as reasonably achievable. COMPARISON: CT sinus 01/06/2021. HISTORY: ORDERING SYSTEM PROVIDED HISTORY: arrest TECHNOLOGIST PROVIDED HISTORY: Has a \"code stroke\" or \"stroke alert\" been called? ->No Reason for exam:->arrest Decision Support Exception - unselect if not a suspected or confirmed emergency medical condition->Emergency Medical Condition (MA) Is the patient pregnant? ->Yes Reason for Exam: Arrest. Cardiac Arrest (Arrived by [facility] rt unwitnessed arrest. Was found down by mother; mom began CPR. PEA upon EMS arrival @ 1500; 4 rounds of epi & 2 narcan admin. Pulse at present. Hx of iv drug use; believed to be 13 weeks pregnant. ). Acuity: Acute Type of Exam: Initial FINDINGS: BRAIN/VENTRICLES: Artifact degraded images. No acute hemorrhage. Diffuse poor delineation of gray-white differentiation. Artifact partially obscures the justin. Artifact partially obscures the inferior cerebellum. Ventricles are within normal limits in size. There is no midline shift. Diffuse sulcal effacement and crowding of the basal cisterns. ORBITS: Visualized orbits appear unremarkable on this unenhanced exam. SINUSES: Mild mucosal thickening of the ethmoid and sphenoid sinuses. Visualized mastoid air cells appear clear. SOFT TISSUES/SKULL: No depressed calvarial fracture identified. 1. Diffuse anoxic injury and cerebral edema. MRI would better evaluate. 2. No acute hemorrhage or midline shift. 3. Other findings as described. Critical results were called by Dr. Ml Bello DO to [address] [place] [address] on 5/10/2021 at 17:36. Xr Chest Portable    Result Date: 5/12/2021  EXAMINATION: ONE XRAY VIEW OF THE CHEST 5/12/2021 5:47 am COMPARISON: 05/10/2021 HISTORY: ORDERING SYSTEM PROVIDED HISTORY: OG verifcation TECHNOLOGIST PROVIDED HISTORY: Reason for exam:->OG verifcation Reason for Exam:  OG verifcation Acuity: Unknown Type of Exam: Unknown FINDINGS: Endotracheal tube extends to the level of the clavicles. Right internal jugular central venous catheter is again seen. Nasogastric tube extends to the abdominal left upper quadrant. Prominence of central pulmonary vascularity is again demonstrated centrally. Decreased heterogeneous opacity within the lateral lungs. No significant pleural effusion. No pneumothorax. Cardiac and mediastinal silhouettes are similar to prior. Nasogastric tube extends to the abdominal left upper quadrant. Pulmonary vascular congestion, slightly decreased as compared to prior.      Xr Chest Portable    Result Date: 5/10/2021  EXAMINATION: ONE XRAY VIEW OF THE CHEST 5/10/2021 4:39 pm COMPARISON: 03/18/2021 HISTORY: ORDERING SYSTEM PROVIDED HISTORY: code TECHNOLOGIST PROVIDED HISTORY: Reason for exam:->code Reason for Exam: code Acuity: Unknown Type of Exam: Unknown FINDINGS: Study limited by overlying support and monitoring apparatus Endotracheal tube is in the mid to upper trachea approximately 4.3 cm above the stuart Right-sided IJ line projects over the superior vena cava Heart size is within normal limits. Pulmonary vascularity is congested and is indistinct. Diffuse increased ground-glass and interstitial opacities in a perihilar distribution noted. This is accentuated by low lung volumes. Gaseous distention of the stomach is noted. Foreign body within the stomach versus is artifact noted. Osseous structures demonstrate no acute abnormality     Support apparatus in adequate position as above Perihilar and interstitial opacities which may represent pulmonary vascular congestion, pulmonary edema. Multifocal pneumonia is also in the differential Possible ingested foreign body versus artifact.

## 2021-05-14 NOTE — PROGRESS NOTES
Brief Nutrition Note      Nutrition Recommendations/Plan:   1. Initiate Osmolite 1.2 (standard low fiber formula) at 15 mL/hr. 2. Recommend 30 mL H20 q 4 hours for tube maintenance or defer to nephrology. 3. Ensure head of bed is 30 - 45 degrees during continuous gastric feeding. Turn off the feeding if head of bed is lowered less than 30 degrees. 4. Monitor for tolerance (bowel habits, N/V, cramping, abdominal exam findings: distended, firm, tense, guarded, discomfort). Nutrition Assessment:  Pt remains intubated, off sedation and on low dose pressor support. Will begin trickle tube feed of Osomolite 1.2. Estimated Daily Nutrient Needs:  Energy (kcal):  5706 - 2010; Weight Used for Energy Requirements:  Current(67kg)     Protein (g):  80 - 134; Weight Used for Protein Requirements:  Current(1.2 - 2.0g/67kg)        Fluid (ml/day):   ; Method Used for Fluid Requirements:  1 ml/kcal      Nutrition Related Findings:  K+ 2.9, Na+ 161      Current Nutrition Therapies:    Diet NPO Effective Now    Additional Calorie Sources:   D5% / .45% NS w/ KCl @ 100mL/hr provides 408 kcal    Anthropometric Measures:  · Height: 5' 6\" (167.6 cm)  · Current Body Weight: 147 lb (66.7 kg)   · Ideal Body Weight: 130 lbs; % Ideal Body Weight 113.1 %   · BMI: 23.7  · BMI Categories: Overweight (BMI 25.0-29. 9)           Electronically signed by Josh Perez RD, MUKUND on 5/14/21 at 11:07 AM EDT  Contact: [phone]

## 2021-05-14 NOTE — PROGRESS NOTES
Selina Jain  Neurology Follow-up  [facility] Neurology    Date of Service: 5/14/2021    Subjective:   CC: Follow up today regarding: Anoxic brain insult. Events noted. Chart and lab reviewed. The patient is about the same. Long discussion with her family today. No brainstem function today. Normothermic. No sedation involved. Other review of system was unremarkable. ROS: limited due to intubation      family history includes Depression in her father, mother, and paternal grandmother; Diabetes in her maternal uncle; High Blood Pressure in her father and mother.   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    PTSD (post-traumatic stress disorder)     Voice hoarseness      Current Facility-Administered Medications   Medication Dose Route Frequency Provider Last Rate Last Admin    nitroGLYCERIN 50 mg in dextrose 5% 250 mL infusion  5-200 mcg/min Intravenous Continuous Garrick Ayala MD        dextrose 5 % and 0.45 % NaCl with KCl 20 mEq infusion   Intravenous Continuous Ruchi Zapien  mL/hr at 05/14/21 0729 New Bag at 05/14/21 0729    insulin lispro (1 Unit Dial) 0-6 Units  0-6 Units Subcutaneous Q4H Seema Feng MD        glucose (GLUTOSE) 40 % oral gel 15 g  15 g Oral PRN Seema Feng MD        dextrose 50 % IV solution  12.5 g Intravenous PRN Bianca Miller MD        glucagon (rDNA) injection 1 mg  1 mg Intramuscular PRN Seema Feng MD        dextrose 5 % solution  100 mL/hr Intravenous PRN Seema Feng MD        sodium chloride flush 0.9 % injection 5-40 mL  5-40 mL Intravenous PRN Kwesi Zepeda MD   10 mL at 05/12/21 1308    sodium chloride flush 0.9 % injection 5-40 mL  5-40 mL Intravenous BID Wil Lopez MD 10 mL at 05/14/21 0801    norepinephrine (LEVOPHED) 16 mg in dextrose 5% 250 mL infusion  2-100 mcg/min Intravenous Continuous Lin Vences MD 3.8 mL/hr at 05/14/21 1001 4 mcg/min at 05/14/21 1001    carboxymethylcellulose PF (THERATEARS) 1 % ophthalmic gel 1 drop  1 drop Both Eyes 4x Daily Vi Martinez MD   1 drop at 05/14/21 0740    labetalol (NORMODYNE;TRANDATE) injection 5 mg  5 mg Intravenous Q6H PRN Irving So MD   5 mg at 05/11/21 1510    morphine (PF) injection 1 mg  1 mg Intravenous Q2H PRN Irving So MD   1 mg at 05/11/21 1852    pantoprazole (PROTONIX) injection 40 mg  40 mg Intravenous Daily Irving So MD   40 mg at 05/14/21 0732    promethazine (PHENERGAN) tablet 12.5 mg  12.5 mg Oral Q6H PRN Josiane Chauhan MD        Or    ondansetron (ZOFRAN) injection 4 mg  4 mg Intravenous Q6H PRN Josiane Chauhan MD        chlorhexidine (PERIDEX) 0.12 % solution 15 mL  15 mL Mouth/Throat BID Antonia Tejada MD   15 mL at 05/14/21 0740    perflutren lipid microspheres (DEFINITY) injection 1.65 mg  1.5 mL Intravenous ONCE PRN Josiane Chauhan MD        enoxaparin (LOVENOX) injection 40 mg  40 mg Subcutaneous Daily Antonia Tejada MD   40 mg at 05/14/21 0732    polyvinyl alcohol (LIQUIFILM TEARS) 1.4 % ophthalmic solution 1 drop  1 drop Both Eyes Q4H Antonia Tejada MD   1 drop at 05/14/21 0740    propofol injection  5-50 mcg/kg/min Intravenous Continuous Josiane Chauhan MD   Stopped at 05/12/21 0507    fentaNYL (SUBLIMAZE) injection 25 mcg  25 mcg Intravenous Q1H PRN Josiane Chauhan MD        potassium chloride 20 mEq/50 mL IVPB (Central Line)  20 mEq Intravenous PRN Lin Vences MD 50 mL/hr at 05/14/21 0928 20 mEq at 05/14/21 0928    magnesium sulfate 2000 mg in 50 mL IVPB premix  2,000 mg Intravenous PRN Lin Vences MD   Stopped at 05/12/21 1609    sodium phosphate 10.53 mmol in dextrose 5 % 250 mL IVPB  0.16 mmol/kg Intravenous PRN Lin Vences MD   Stopped at 05/11/21 1821    Or    sodium phosphate 21.06 mmol in dextrose 5 % 250 mL IVPB  0.32 mmol/kg Intravenous PRN Inez Baca MD        midazolam PF (VERSED) injection 2 mg  2 mg Intravenous Q30 Min PRN Inez Baca MD         Allergies   Allergen Reactions    Wellbutrin [Bupropion] Itching     Feels like worms crawling on her body; crawling sensation    Amoxicillin-Pot Clavulanate      vomiting      reports that she quit smoking about 6 months ago. Her smoking use included cigarettes. She started smoking about 7 years ago. She has a 18.00 pack-year smoking history. She has never used smokeless tobacco. She reports current drug use. Drugs: Marijuana, Opiates , Methamphetamines, and IV. She reports that she does not drink alcohol. Objective:    Exam:   Vitals:    05/14/21 0901 05/14/21 0915 05/14/21 0945 05/14/21 1000   BP:       Pulse: 131 130 135 132   Resp: 16 16 16 16   Temp:       TempSrc:       SpO2: 98% 99% 95% 98%   Weight:       Height:         General appearance and observation: intubated   Mental Status:   AAO x0. No eye-opening to verbal or motor stimulation. Attention and concentration: Unresponsive  Cranial Nerves:   II: Pupils: Dilated and fixed bilaterally. III,IV,VI: No gaze preference. V: Absent corneal.    VII: Face is symmetric. VIII: No nystagmus   IX: Gag reflex: absent  X:  Cough reflex: Absent  XII: No spontaneous breathing  Musculoskeletal: no motor response to verbal or painful stimulation in both arms and legs.    Tone: normal  Planters: mute  Coordination: No abnormal movement  Sensation: No response to painful stimulation or withdrawal to pain   no change in exam      Data:  LABS:   Lab Results   Component Value Date     05/14/2021    K 2.9 05/14/2021    K 3.3 09/12/2020     05/14/2021    CO2 23 05/14/2021    BUN 13 05/14/2021    CREATININE 1.1 05/14/2021    GFRAA >60 05/14/2021    GFRAA >60 11/14/2011    LABGLOM 56 05/14/2021    GLUCOSE 88 05/14/2021    PHOS 3.1 05/12/2021    MG 1.60 05/12/2021    CALCIUM 9.0 05/14/2021     Lab Results   Component Value Date    WBC 10.8 05/14/2021    RBC 3.90 05/14/2021    HGB 11.1 05/14/2021    HCT 34.1 05/14/2021    MCV 87.6 05/14/2021    RDW 16.7 05/14/2021     05/14/2021     Lab Results   Component Value Date    INR 1.02 05/10/2021    PROTIME 11.8 05/10/2021       Reviewed notes from different physicians. Impression: No change  Acute anoxic brain insult secondary to cardiac arrest, severe. Acute respiratory failure with hypoxia  Substance abuse  Alcohol abuse  16-week pregnant  Hypernatremia  Cerebral edema secondary to anoxic insult  Ischemic liver injury      Recommendation    Long discussion with her family today regarding severe brain insult and possibility of brain death. The patient has other multiorgan failure with a significant hypernatremia in addition to s her 14-week pregnant baby and at the committee has been consulted for possibility of terminal extubation with life fetus. Discussed prognosis and outcome with her family details  Chance of recovery is very unlikely  Continue current supportive care  Ethic committee and OB/GYN to follow regarding outcome and prognosis of her pregnancy with brain death  Nothing to add from neurology at this point  We will sign off  Discussed in details with ICU nurse. MDM: High. Very complicated case    Claude Michelle MD   [phone]      This dictation was generated by voice recognition computer software. Although all attempts are made to edit the dictation for accuracy, there may be errors in the transcription that are not intended.

## 2021-05-14 NOTE — PROGRESS NOTES
Hospitalist Progress Note      PCP: Anle Broderick APRN - CNP    Date of Admission: 5/10/2021        Hospital Course:      38 y.o. female is brought in from home after cardiac arrest . She has h/o vocal cord carcinoma, Depression, alcohol abuse, PTSD. She  was treated with trach and radiation in 2017 . She was found unresponsive by the mother who started performing CPR on her. EMS gave epi on site. With return of spontaneous circulation. She was intubated and sedated in the ED and has been started on pressure support. Labs revealed lactic acidosis and academia . Blood etoh level is undetectable. Tox screen is positive for amphetamine. Per mother no previous suicidal attempts    THe pt is 14-16 weeks pregnant .     Subjective:     Remain unresponsive is hemodynamically unstable with waxing and weaning hypertension to hypotension    Medications:  Reviewed    Infusion Medications    nitroGLYCERIN      dextrose 5% and 0.45% NaCl with KCl 20 mEq 100 mL/hr at 05/14/21 0707    dextrose      norepinephrine 8 mcg/min (05/14/21 0740)    propofol Stopped (05/12/21 0507)     Scheduled Medications    insulin lispro  0-6 Units Subcutaneous Q4H    sodium chloride flush  5-40 mL Intravenous BID    carboxymethylcellulose PF  1 drop Both Eyes 4x Daily    pantoprazole  40 mg Intravenous Daily    chlorhexidine  15 mL Mouth/Throat BID    enoxaparin  40 mg Subcutaneous Daily    polyvinyl alcohol  1 drop Both Eyes Q4H     PRN Meds: glucose, dextrose, glucagon (rDNA), dextrose, sodium chloride flush, labetalol, morphine, promethazine **OR** ondansetron, perflutren lipid microspheres, fentanNYL, potassium chloride, magnesium sulfate, sodium phosphate IVPB **OR** sodium phosphate IVPB, midazolam      Intake/Output Summary (Last 24 hours) at 5/14/2021 0753  Last data filed at 5/14/2021 0708  Gross per 24 hour   Intake 3403.15 ml   Output 5775 ml   Net -2371.85 ml       Exam:    BP (!) 98/58   Pulse 132   Temp 98.6 °F (37 °C) (Bladder)   Resp 16   Ht 5' 6\" (1.676 m)   Wt 147 lb 4.3 oz (66.8 kg)   LMP  (LMP Unknown)   SpO2 95%   BMI 23.77 kg/m²     General appearance: Intubated , unresponsive  HEENT: Pupils equal, round, and non reactive to light. Conjunctivae/corneas clear. Neck: Suppl. No jugular venous distention. Trachea midline. Respiratory:  Intubated and sedated,no wheezing coarse breath sounds  Cardiovascular: Regular rate and rhythm with normal S1/S2 without murmurs, rubs or gallops. Abdomen: Soft, non-tender, non-distended with normal bowel sounds. Musculoskeletal: No clubbing, cyanosis or edema bilaterally. Neurologic: Unresponsive, does not with draw to pain  Peripheral Pulses: +2 palpable, equal bilaterally       Labs:   Recent Labs     05/12/21 0400 05/13/21 0440 05/14/21 0445   WBC 10.3 11.2* 10.8   HGB 9.5* 9.4* 11.1*   HCT 28.7* 28.9* 34.1*    204 184     Recent Labs     05/11/21 2123 05/11/21 2123 05/12/21  0400 05/12/21  0400 05/12/21  1100 05/12/21  1100 05/13/21  0440 05/13/21  1230 05/14/21  0445   *  --  153*  --  154*  --  150*  --  161*   K 2.8*   < > 2.7*   < > 2.9*   < > 3.4* 3.6 2.9*   *  --  123*  --  120*  --  117*  --  128*   CO2 19*  --  22  --  27  --  25  --  23   BUN 11  --  13  --  15  --  9  --  13   CREATININE <0.5*  --  <0.5*  --  <0.5*  --  0.6  --  1.1   CALCIUM 8.4  --  7.9*  --  7.8*  --  8.3  --  9.0   PHOS 4.2  --  2.9  --  3.1  --   --   --   --     < > = values in this interval not displayed. No results for input(s): AST, ALT, BILIDIR, BILITOT, ALKPHOS in the last 72 hours. No results for input(s): INR in the last 72 hours. Recent Labs     05/11/21  1100   TROPONINI <0.01       Assessment/Plan:    -Cardiac arrest, PEA with ROSC. Sterling Stuart Unclear etiology but suspect related to drug overdose  -No response patient has been off of sedation.   -Appreciate critical care recommendation  -EEG noted diffuse slowing  -Await neurology recommendation as well.  -Overall prognosis is guarded.       Acute hypoxic respiratory failure  -Needing intubation secondary to drug abuse.  -Patient is currently intubated off any sedation still with minimal improvement  -Patient remains comatose with minimal brainstem reflexes at times having myoclonus      Hemodynamically unstable: likely secondary to anoxic brain injury: BP hypotensive to hypertensive  - will titrate levophed to keep map>65    Hypernatremia:   - get nephrology input having significant urine output  - d5w q4h rbmp    Pregnancy  -Patient is about 18-week pregnant  -OB following.  -Fetus viable but no intervention at this time given critical care  -We will further defer to OB/GYN      DVT Prophylaxis: Lovenox  Diet: Diet NPO Effective Now  Code Status: Full Code    I spent 35 minutes providing critical care services to this hemodynamically unstable patient        Aga Alfredo MD

## 2021-05-14 NOTE — PROGRESS NOTES
Pt BP into the 200's/100's two times this shift with stimulation. Secure message sent to hosp, order received for Nitro gtt if needed and Nephro consult. Aware of Na of 161.

## 2021-05-14 NOTE — CONSULTS
MD Jose Hernández MD Brendan Dotter, MD                Office: [phone]                      Fax: [phone]               [email or web address]                   Nephrology consult received Orders placed. Brief Summary Note -- full consult report will follow. Chart reviewed. .   Thank you for allowing me to participate in this patient's care. Please do not hesitate to contact me anytime. We will follow along with you. Shay Garcia MD  [facility] [address] [place] [address]   [phone] or [employer]    =====================================================           Active Problems:    Cardiac arrest [facility])    Anoxic brain damage ([facility] [address]    Acute respiratory failure with hypoxia ([facility] [address]    Substance abuse ([facility] [address]    Hypernatremia  Resolved Problems:    * No resolved hospital problems. *          Labs reviewed by me     CBC:   Recent Labs     05/12/21  0400 05/13/21  0440 05/14/21  0445   WBC 10.3 11.2* 10.8   HGB 9.5* 9.4* 11.1*   HCT 28.7* 28.9* 34.1*   MCV 85.2 85.8 87.6    204 184     BMP:   Recent Labs     05/11/21  2123 05/11/21  2123 05/12/21  0400 05/12/21  0400 05/12/21  1100 05/12/21  1100 05/13/21  0440 05/13/21  1230 05/14/21  0445   *  --  153*  --  154*  --  150*  --  161*   K 2.8*   < > 2.7*   < > 2.9*   < > 3.4* 3.6 2.9*   *  --  123*  --  120*  --  117*  --  128*   CO2 19*  --  22  --  27  --  25  --  23   PHOS 4.2  --  2.9  --  3.1  --   --   --   --    BUN 11  --  13  --  15  --  9  --  13   CREATININE <0.5*  --  <0.5*  --  <0.5*  --  0.6  --  1.1    < > = values in this interval not displayed. Magnesium:   Lab Results   Component Value Date    MG 1.60 05/12/2021    MG 1.70 05/12/2021    MG 1.80 05/11/2021       Arterial Blood Gasses  No results for input(s): PH, PCO2, PO2 in the last 72 hours.     Invalid input(s): Janece Handler    UA:No results for input(s): NITRITE, [address], [address] [facility], LABCAST, [address], [address] [place] [address], MUCUS, TRICHOMONAS, YEAST, BACTERIA, CLARITYU, SPECGRAV, LEUKOCYTESUR, UROBILINOGEN, BILIRUBINUR, BLOODU, GLUCOSEU, AMORPHOUS in the last 72 hours. Invalid input(s): Cheryl Pea    LIVER PROFILE: No results for input(s): AST, ALT, LIPASE, BILIDIR, BILITOT, ALKPHOS in the last 72 hours. Invalid input(s): AMYLASE,  ALB  PT/INR:    Lab Results   Component Value Date    PROTIME 11.8 05/10/2021    PROTIME 11.1 04/05/2020    INR 1.02 05/10/2021    INR 0.96 04/05/2020     PTT:    Lab Results   Component Value Date    APTT 29.8 05/10/2021     RKISTA:    Lab Results   Component Value Date    KRISTA Negative 05/14/2020           RADIOLOGY:    Echo Complete 2d W Doppler W Color    Result Date: 5/10/2021  Transthoracic Echocardiography Report (TTE)  Demographics   Patient Name       Landen Black   Date of Study      05/10/2021         Gender              Female   Patient Number     [id number]         Date of Birth       1985   Visit Number       [id number]          Age                 38 year(s)   Accession Number   [id number]         Room Number         0002   Corporate ID       [id number]           Sonographer         En Jimenez RDCS, RVT, BS   Ordering Physician Kathrin Vargas   Interpreting        [facility]        Physician           DO OG, [facility]  Procedure Type of Study   TTE procedure:ECHOCARDIOGRAM COMPLETE 2D W DOPPLER W COLOR. Procedure Date Date: 05/10/2021 Start: 04:56 PM Study Location: [facility] Echo Lab Technical Quality: Adequate visualization Indications:Cardiac arrest. Patient Status: STAT Height: 66 inches Weight: 145 pounds BSA: 1.74 m2 BMI: 23.4 kg/m2 Rhythm: Sinus tachycardia HR: 130 bpm BP: 91/54 mmHg  Conclusions   Summary  Overall left ventricular systolic function appears normal.  Ejection fraction is visually estimated to be 60-65%.   No regional wall motion abnormalities are noted. Cannot determine diastology due to E/A fusion from sinus tachycardia. Normal function of all valves. Signature   ------------------------------------------------------------------  Electronically signed by Bridgette Velez, [facility]  (Interpreting physician) on 05/10/2021 at 05:58 PM  ------------------------------------------------------------------   Findings   Left Ventricle  Left ventricular cavity size and wall thickness are normal.  Overall left ventricular systolic function appears normal.  Ejection fraction is visually estimated to be 60-65%. No regional wall motion abnormalities are noted. Cannot determine diastology due to E/A fusion from sinus tachycardia. Average E/e': 7.5. Mitral Valve  The mitral valve normal in structure and function. No evidence of mitral regurgitation or stenosis. Left Atrium  The left atrium is normal in size. Aortic Valve  The aortic valve is structurally normal. There is no significant aortic  valve regurgitation or stenosis. Tricuspid aortic valve. Aorta  The aortic root is normal in size. Right Ventricle  The right ventricle is normal in size and function. TAPSE: 1.7 cm. RV s' velocity: 16.5 cm/s. Tricuspid Valve  The tricuspid valve is normal in structure and function. There is no  significant tricuspid valve regurgitation or stenosis. Right Atrium  The right atrial size is normal.   Pulmonic Valve  The pulmonic valve is not well visualized. There is no evidence of pulmonic valve regurgitation or stenosis. Pericardial Effusion  No pericardial effusion noted. Pleural Effusion  No pleural effusion. Miscellaneous  The inferior vena cava appears normal in size with normal respiratory  variation.   M-Mode/2D Measurements (cm)                LA Area: 15.6 cm2  LVOT: 1.9 cm LA volume/Index: 41.9 ml /24 ml/m2  Doppler Measurements   AV Peak Velocity: 142 cm/s     MV Peak E-Wave: 105 cm/s  AV Peak Gradient: 8.07 mmHg  AV Mean Gradient: 5 mmHg TISSUES/SKULL: No depressed calvarial fracture identified. 1. Diffuse anoxic injury and cerebral edema. MRI would better evaluate. 2. No acute hemorrhage or midline shift. 3. Other findings as described. Critical results were called by Dr. Ml Bello DO to [address] [place] [address] on 5/10/2021 at 17:36. Xr Chest Portable    Result Date: 5/12/2021  EXAMINATION: ONE XRAY VIEW OF THE CHEST 5/12/2021 5:47 am COMPARISON: 05/10/2021 HISTORY: ORDERING SYSTEM PROVIDED HISTORY: OG verifcation TECHNOLOGIST PROVIDED HISTORY: Reason for exam:->OG verifcation Reason for Exam:  OG verifcation Acuity: Unknown Type of Exam: Unknown FINDINGS: Endotracheal tube extends to the level of the clavicles. Right internal jugular central venous catheter is again seen. Nasogastric tube extends to the abdominal left upper quadrant. Prominence of central pulmonary vascularity is again demonstrated centrally. Decreased heterogeneous opacity within the lateral lungs. No significant pleural effusion. No pneumothorax. Cardiac and mediastinal silhouettes are similar to prior. Nasogastric tube extends to the abdominal left upper quadrant. Pulmonary vascular congestion, slightly decreased as compared to prior. Xr Chest Portable    Result Date: 5/10/2021  EXAMINATION: ONE XRAY VIEW OF THE CHEST 5/10/2021 4:39 pm COMPARISON: 03/18/2021 HISTORY: ORDERING SYSTEM PROVIDED HISTORY: code TECHNOLOGIST PROVIDED HISTORY: Reason for exam:->code Reason for Exam: code Acuity: Unknown Type of Exam: Unknown FINDINGS: Study limited by overlying support and monitoring apparatus Endotracheal tube is in the mid to upper trachea approximately 4.3 cm above the stuart Right-sided IJ line projects over the superior vena cava Heart size is within normal limits. Pulmonary vascularity is congested and is indistinct. Diffuse increased ground-glass and interstitial opacities in a perihilar distribution noted. This is accentuated by low lung volumes.  Gaseous distention of the stomach is noted. Foreign body within the stomach versus is artifact noted. Osseous structures demonstrate no acute abnormality     Support apparatus in adequate position as above Perihilar and interstitial opacities which may represent pulmonary vascular congestion, pulmonary edema. Multifocal pneumonia is also in the differential Possible ingested foreign body versus artifact. Imaging Results.   Chest X Ray reviwed by me

## 2021-05-14 NOTE — CONSULTS
Palliative Care:  a 38 y.o. female is brought in from home after cardiac arrest . She has h/o vocal cord carcinoma, Depression, alcohol abuse, PTSD. She  was treated with trach and radiation in 2017 . She was found unresponsive by the mother who started performing CPR on her. EMS gave epi on site. With return of spontaneous circulation. She was intubated and sedated in the ED and has been started on pressure support. Labs revealed lactic acidosis and academia . Blood etoh level is undetectable. Tox screen is positive for amphetamine. Per mother no previous suicidal attempts. Admitted 5/10/21. Palliative and Ethics committee consult ordered 5/13/21. Gynecology following: The pt is approx. 16-18  weeks pregnant EDC: 10/10/21. Team is confirming heart tones daily. Neurology following: No brainstem function noted as of 5/14/21. Recommendations: The patient has other multiorgan failure with a significant hypernatremia in addition to s her 14-week pregnant baby and at the committee has been consulted for possibility of terminal extubation with life fetus. Discussed prognosis and outcome with her family details  Chance of recovery is very unlikely. Continue current supportive care. Ethic committee and OB/GYN to follow regarding outcome and prognosis of her pregnancy with brain death. Nephrology following: Monitoring hypernatremia-dehydration and GIORGIO. Will monitor daily renal function panel with electrolytes-phosphorus. Cardiology following: ECG is with nonspecific changes following resuscitation. No ST elevations or ventricular ectopy in hospital. Notes severe acidemia. No immediate indications for cardiac cath. Dietitian following: As of 5/14 recommendation/plan: Initiate Osmolite 1.2 (standard low fiber formula) at 15 mL/hr. Recommend 30 mL H20 q 4 hours for tube maintenance or defer to nephrology.     CBC:        Recent Labs     05/12/21  0400 05/13/21  0440 05/14/21  0445   WBC 10.3 11.2* 10.8   RBC 3.37* 3.37* 3.90*   HGB 9.5* 9.4* 11.1*   HCT 28.7* 28.9* 34.1*    204 184   MCV 85.2 85.8 87.6   MCH 28.2 28.0 28.5   MCHC 33.2 32.6 32.6   RDW 16.2* 16.6* 16.7*      BMP:         Recent Labs     05/13/21  0440 05/13/21  1230 05/14/21  0445 05/14/21  1045   *  --  161* 159*   K 3.4* 3.6 2.9* 4.1   *  --  128* 130*   CO2 25  --  23 22   BUN 9  --  13 14   CREATININE 0.6  --  1.1 1.1   CALCIUM 8.3  --  9.0 8.4   GLUCOSE 95  --  88 74      ABG:        Recent Labs     05/11/21 2123 05/12/21  0400 05/13/21  0440   PHART 7.384 7.452* 7.397   WMI5DYN 34.2* 35.1 44.1   PO2ART 133.0* 125.0* 93.3   DBO2LCT 20.4* 24.5 27.1   L1IDTHGJ 99.3 99.4 98.0   BEART -4.1* 0.7 2.0       Past Medical History:   has a past medical history of Alcohol use disorder, moderate, in early remission ([facility] [address] Anemia, Anxiety, Back pain, Bipolar affective disorder ([facility] [address] Depression, Migraine, Opiate abuse, continuous ([facility] [address] Primary cancer of larynx ([facility] [address] PTSD (post-traumatic stress disorder), and Voice hoarseness. Past Surgical History:   has a past surgical history that includes Tonsillectomy; other surgical history (Left, 06/28/2017); other surgical history (08/10/2017); tracheostomy; Tracheotomy (N/A, 12/14/2017); and laryngoscopy (N/A, 6/20/2019). Advance Directives:   DNR-CCA as of 5/14/21. Mother of patient acting DPOA. Problem Severity: Pain/Other Symptoms:   Unresponsive. No sedative medications in place. Intubated/ventilator dependent as of 5/10/21. Bed Mobility/Toileting/Transfer:  Full assist. Mck[place]on in place.         Performance Status:        Palliative Performance Scale:  100% []Full Normal activity & work No evidence of disease  90%   [] Full Normal activity & work Some evidence of disease  80%   [] Full Normal activity with Effort Some evidence of disease  70%   [] Reduced Unable Normal Job/Work Significant disease Full Normal or reduced  60%   [] Ambulation reduced; Significant disease; Can't do hobbies/housework; intake normal   or reduced; occasional assist; LOC full/confusion  50%   [] Mainly sit/lie; Extensive disease; Can't do any work; Considerable assist; intake normal  Or reduced; LOC full/confusion  40%   [] Mainly in bed; Extensive disease; Mainly assist; intake normal or reduced; occasional assist; LOC full/confusion  30%   [] Bed Bound; Extensive disease; Total care; intake reduced; LOC full/confusion  20%   [] Bed Bound; Extensive disease; Total care; intake minimal; Drowsy/coma  10%   [x] Bed Bound; Extensive disease; Total care; Mouth care only; Drowsy/coma    PPS 10%    Symptom Assessment: Appetite/Nausea/Bowels/Fatigue:    lbs. Albumin level  3.4. Full assist with all bodily functions. Social History:   reports that she quit smoking about 6 months ago. Her smoking use included cigarettes. She started smoking about 7 years ago. She has a 18.00 pack-year smoking history. She has never used smokeless tobacco. She reports current drug use. Drugs: Marijuana, Opiates , Methamphetamines, and IV. She reports that she does not drink alcohol. Family History:  family history includes Depression in her father, mother, and paternal grandmother; Diabetes in her maternal uncle; High Blood Pressure in her father and mother. Psychological/Spiritual:  Single. Mother of (3) living children. Was living in her mother's home whom has custody of children. Family Discussion:      Mother and brother of patient in room. Patient unresponsive. No distress noted. Mother willing to share events of patient prior status of admission. Mother is grieving the events that took place in performing CPR. Is able to process and understand current physical status of patient and grave prognosis. Discussed ACP/Code status. Education provided on all variables. Mother understands patient will not recover from current status.  Mother does want all measures to attempt to maintain patient at a stable status to allow the unborn child an opportunity for survival.  Wishes for code status at this time is DNR-CCA. No compression/cardioversion measures. Discussed Yarsani. Non-Temple. Agrees for  spiritual support. Ethics consult has been placed. General discussion with mother of patient initiated. Will notify committee on Monday 5/17/21 to formulate a potential plan:    1. To have all physicians on board for an opportunity to review overall status and goals as a united TEAM.    2. Determine if patient could be prolonged with maternal homeostasis and ventilatory support; to maintain the fetus alive until gestational age of the fetus can predict the successful delivery of a viable child (female per ultrasound). Perfect Serve to physician regarding code status order and initiate of ethics consult to better support family and team caring for this patient. Nurse Lv Paige and MARITZA Walter updated on these discussion. Will notify Nurse Manager Coni Araya, Director of  Trev Castillo and  Beckie Jarquin via in house e-mail.

## 2021-05-14 NOTE — PLAN OF CARE
Problem: Cardiac Output - Decreased:  Goal: Hemodynamic stability will improve  Description: Hemodynamic stability will improve  Outcome: Ongoing  Note: Pulse rate and rhythm, peripheral pulses, and capillary refill assessed every shift with assessment. General color and body temperature monitored throughout shift and with vitals. Assess for edema with head to toe assessment. Administer treatments and medications as ordered. Monitor patient's weight. Problem: Falls - Risk of:  Goal: Will remain free from falls  Description: Will remain free from falls  Outcome: Ongoing  Note: Pt remains free of falls. Fall risk protocol in place. Bed locked in lowest position. Bed alarm engaged, room door left open. Will continue to monitor.

## 2021-05-14 NOTE — PROGRESS NOTES
[facility] Pulmonary and Critical Care  Progress note      Subjective: Patient remains critically ill. Neurological status remains poor. Does not respond to verbal, tactile or noxious stimuli. No brainstem reflexes are seen. On full mechanical ventilatory support. Remains hypernatremic.       Past Surgical History:        Procedure Laterality Date    LARYNGOSCOPY N/A 6/20/2019    MICROLARYNGOSCOPY AND LYSIS OF ADHESIONS OF LARYNX performed by Chary Martínez MD at [facility] 87 Left 06/28/2017    MICROLARYNGOSCOPY WITH BIOPSY OF LEFT TRUE VOCAL CORD MASS    OTHER SURGICAL HISTORY  08/10/2017    Tracheostomy    TONSILLECTOMY      TRACHEOSTOMY      TRACHEOTOMY N/A 12/14/2017     TRACHEOTOMY WITH 6DCT SHILEY               Current Medications:    Current Facility-Administered Medications: nitroGLYCERIN 50 mg in dextrose 5% 250 mL infusion, 5-200 mcg/min, Intravenous, Continuous  dextrose 5 % and 0.45 % NaCl with KCl 20 mEq infusion, , Intravenous, Continuous  insulin lispro (1 Unit Dial) 0-6 Units, 0-6 Units, Subcutaneous, Q4H  glucose (GLUTOSE) 40 % oral gel 15 g, 15 g, Oral, PRN  dextrose 50 % IV solution, 12.5 g, Intravenous, PRN  glucagon (rDNA) injection 1 mg, 1 mg, Intramuscular, PRN  dextrose 5 % solution, 100 mL/hr, Intravenous, PRN  sodium chloride flush 0.9 % injection 5-40 mL, 5-40 mL, Intravenous, PRN  sodium chloride flush 0.9 % injection 5-40 mL, 5-40 mL, Intravenous, BID  norepinephrine (LEVOPHED) 16 mg in dextrose 5% 250 mL infusion, 2-100 mcg/min, Intravenous, Continuous  carboxymethylcellulose PF (THERATEARS) 1 % ophthalmic gel 1 drop, 1 drop, Both Eyes, 4x Daily  labetalol (NORMODYNE;TRANDATE) injection 5 mg, 5 mg, Intravenous, Q6H PRN  morphine (PF) injection 1 mg, 1 mg, Intravenous, Q2H PRN  pantoprazole (PROTONIX) injection 40 mg, 40 mg, Intravenous, Daily  promethazine (PHENERGAN) tablet 12.5 mg, 12.5 mg, Oral, Q6H PRN **OR** ondansetron (ZOFRAN) injection 4 mg, 4 mg, Intravenous, Q6H PRN  chlorhexidine (PERIDEX) 0.12 % solution 15 mL, 15 mL, Mouth/Throat, BID  perflutren lipid microspheres (DEFINITY) injection 1.65 mg, 1.5 mL, Intravenous, ONCE PRN  enoxaparin (LOVENOX) injection 40 mg, 40 mg, Subcutaneous, Daily  polyvinyl alcohol (LIQUIFILM TEARS) 1.4 % ophthalmic solution 1 drop, 1 drop, Both Eyes, Q4H **AND** [DISCONTINUED] lubrifresh P.M. (artificial tears) ophthalmic ointment, , Both Eyes, Q4H  propofol injection, 5-50 mcg/kg/min, Intravenous, Continuous  fentaNYL (SUBLIMAZE) injection 25 mcg, 25 mcg, Intravenous, Q1H PRN  potassium chloride 20 mEq/50 mL IVPB (Central Line), 20 mEq, Intravenous, PRN  magnesium sulfate 2000 mg in 50 mL IVPB premix, 2,000 mg, Intravenous, PRN  sodium phosphate 10.53 mmol in dextrose 5 % 250 mL IVPB, 0.16 mmol/kg, Intravenous, PRN **OR** sodium phosphate 21.06 mmol in dextrose 5 % 250 mL IVPB, 0.32 mmol/kg, Intravenous, PRN  midazolam PF (VERSED) injection 2 mg, 2 mg, Intravenous, Q30 Min PRN    Allergies   Allergen Reactions    Wellbutrin [Bupropion] Itching     Feels like worms crawling on her body; crawling sensation    Amoxicillin-Pot Clavulanate      vomiting       Social History:    TOBACCO:   reports that she quit smoking about 6 months ago. Her smoking use included cigarettes. She started smoking about 7 years ago. She has a 18.00 pack-year smoking history. She has never used smokeless tobacco.  ETOH:   reports no history of alcohol use. Patient currently lives independently  Environmental/chemical exposure: None known    Family History:       Problem Relation Age of Onset    High Blood Pressure Mother     Depression Mother     High Blood Pressure Father     Depression Father     Diabetes Maternal Uncle     Depression Paternal Grandmother      REVIEW OF SYSTEMS:    ROS is unobtainable due to his critical illness.        Objective:   PHYSICAL EXAM:      VITALS:  /67   Pulse 124   Temp 98.6 °F (37 °C) (Bladder)   Resp 16    5' 6\" (1.676 m)   Wt 147 lb 4.3 oz (66.8 kg)   LMP  (LMP Unknown)   SpO2 97%   BMI 23.77 kg/m²      24HR INTAKE/OUTPUT:      Intake/Output Summary (Last 24 hours) at 5/14/2021 1434  Last data filed at 5/14/2021 1250  Gross per 24 hour   Intake 3409.05 ml   Output 4025 ml   Net -615.95 ml     CONSTITUTIONAL: Nonresponsive. Intubated on mechanical ventilation. NECK: Healed tracheostomy site with evidence of prior external beam radiation   LUNGS: Intubated, no increased work of breathing and clear to auscultation. No accessory muscle use  CARDIOVASCULAR: S1 and S2, no edema and no JVD  ABDOMEN:  normal bowel sounds, non-distended and no masses palpated, and no tenderness to palpation. No hepatospleenomegaly  LYMPHADENOPATHY:  no axillary or supraclavicular adenopathy. No cervical adnenopathy  PSYCHIATRIC: Nonresponsive on mechanical ventilation  MUSCULOSKELETAL: No obvious misalignment or effusion of the joints. No clubbing, cyanosis of the digits. SKIN:  normal skin color, texture, turgor and no redness, warmth, or swelling. No palpable nodules  Neuro, unresponsive to verbal, tactile or noxious stimuli. Pupils are nonreactive to light. No corneal, gag or cough reflex seen. Plantars are down going.     DATA:    Old records have been reviewed    CBC:  Recent Labs     05/12/21  0400 05/13/21  0440 05/14/21  0445   WBC 10.3 11.2* 10.8   RBC 3.37* 3.37* 3.90*   HGB 9.5* 9.4* 11.1*   HCT 28.7* 28.9* 34.1*    204 184   MCV 85.2 85.8 87.6   MCH 28.2 28.0 28.5   MCHC 33.2 32.6 32.6   RDW 16.2* 16.6* 16.7*      BMP:  Recent Labs     05/13/21  0440 05/13/21  1230 05/14/21  0445 05/14/21  1045   *  --  161* 159*   K 3.4* 3.6 2.9* 4.1   *  --  128* 130*   CO2 25  --  23 22   BUN 9  --  13 14   CREATININE 0.6  --  1.1 1.1   CALCIUM 8.3  --  9.0 8.4   GLUCOSE 95  --  88 74      ABG:  Recent Labs     05/11/21 2123 05/12/21  0400 05/13/21  0440   PHART 7.384 7.452* 7.397   FED2UNO 34.2* 35.1 44.1 PO2ART 133.0* 125.0* 93.3   YWE8OFF 20.4* 24.5 27.1   Y9IVEKJQ 99.3 99.4 98.0   BEART -4.1* 0.7 2.0     Procalcitonin  No results for input(s): PROCAL in the last 72 hours. No results found for: BNP  Lab Results   Component Value Date    TROPONINI <0.01 05/11/2021           Radiology Review:  All pertinent images / reports were reviewed as a part of this visit. Assessment:     1. Status post cardiac arrest with return of spontaneous circulation, unknown downtime  2. Severe anoxic encephalopathy  3. Acute respiratory failure with need for mechanical ventilation  4. Central diabetes insipidus  5. 16-week gestation    Plan:     PLAN:    NEURO:     Pain: None  Sedation: None  Patient suffered with PEA cardiac arrest and had unknown downtime. Mental status remains poor with a high suspicion for anoxic encephalopathy/brain death. EEG done yesterday showed diffuse slowing. No brainstem reflexes seen. As per neurology very poor chances of recovery. Patient has also developed central diabetes insipidus, suspicious for severe brain injury. CARDIO:   Hemodynamically unstable. Off and on on vasopressors. Transthoracic Echo done on 5/10/2021 showed: Preserved EF of 60-65% with no regional wall motion abnormalities. RESP:    On Full mechanical ventilatory support  Current settings are: TV: 450, RR: 18, FiO2: 30, PEEP: 5  Endotracheal secretions are minimal  ABG stable. Patient does have a history of laryngeal cancer with tracheostomy in the past.  Suspect tracheal stenosis which led to difficult intubation this time. If decision is made to keep the patient on prolonged ventilatory support, she will need repeat tracheostomy with the help of ENT. RENAL:   Recent Labs     05/14/21  1045   CREATININE 1.1   [unfilled]  urine output has stabilized. Remains hypernatremic due to central DI. Nephrology has been consulted. Recommending D5 half  cc/h.    Electrolytes are being replaced as needed. GI/: NPO at this time. We will start her on trickle tube feeds. Patient is 14 to 16 weeks pregnant. OB/GYN on board. ID:  Low suspicion for acute infections. Not on any antibiotics. ENDO:  Currently on low-dose insulin sliding scale. Donovan Rich HEME & ONC:    Seen to have no leukocytosis. Donovan Rich DVT PROPHY:   LMWH   GI PROPHY:  Protonix   CODE STATUS  Full Code   PLAN OF CARE  Prognosis remains guarded. I had a long discussion with patient's mother and brother by the bedside. all their questions were answered to their satisfaction. Family needs to have a discussion with ethics committee regarding goals of care. Total critical care time caring for this patient with life threatening illness, including direct patient contact, management of life support systems, review of data including imaging and labs, discussions with other team members and physicians is at least 35 minutes so far today, excluding procedures.   Kang Petersen MD   Pulmonary Critical Care and Sleep Medicine  [facility] AT [place] [id number], Nadia Bull, [address]  5/10/2021, 2:34 PM

## 2021-05-15 NOTE — PROGRESS NOTES
[facility] Hospital Note    Patient Active Problem List   Diagnosis    Migraine    Anemia    Depression (emotion)    History of carcinoma    Airway obstruction, anatomic    HTN (hypertension)    Change in voice    Neck pain due to malignant neoplastic disease ([address]    Edema of larynx    Severe episode of recurrent major depressive disorder, without psychotic features (HCC)    PTSD (post-traumatic stress disorder)    Alcohol abuse    Cannabis use disorder, moderate, dependence ([address]    Nicotine use disorder    Chronic cough    Abnormal findings on diagnostic imaging of lung    Chronic laryngitis    Borderline personality disorder ([address]    Cardiac arrest ([address]    Anoxic brain damage ([address]    Acute respiratory failure with hypoxia ([address]    Substance abuse ([address]    Hypernatremia       Past Medical History:   has a past medical history of Alcohol use disorder, moderate, in early remission ([address] Anemia, Anxiety, Back pain, Bipolar affective disorder ([address] Depression, Migraine, Opiate abuse, continuous ([address] Primary cancer of larynx ([address] PTSD (post-traumatic stress disorder), and Voice hoarseness. Past Social History:   reports that she quit smoking about 6 months ago. Her smoking use included cigarettes. She started smoking about 7 years ago. She has a 18.00 pack-year smoking history. She has never used smokeless tobacco. She reports current drug use. Drugs: Marijuana, Opiates , Methamphetamines, and IV. She reports that she does not drink alcohol. Subjective:  On vent. Planning to withdraw care. Urine output slowed s/p Desmopressin.   CVP low    Review of Systems unable to obtain    Objective:      BP (!) 95/50   Pulse 126   Temp 98.8 °F (37.1 °C) (Core)   Resp 16   Ht 5' 6\" (1.676 m)   Wt 145 lb 1 oz (65.8 kg)   LMP  (LMP Unknown)   SpO2 97%   BMI 23.41 kg/m²     Wt Readings from Last 3 Encounters:   05/15/21 145 lb 1 oz (65.8 kg)   04/30/21 150 lb (68 kg)   03/24/21 141 lb (64 kg)       BP Readings from Last 3 Encounters:   05/15/21 (!) 95/50   04/30/21 122/82   03/24/21 122/72     Chest- coarse  Heart-regular  Abd-soft  Ext- no edema    Labs  Hemoglobin   Date Value Ref Range Status   05/15/2021 11.2 (L) 12.0 - 16.0 gm/dL Final     Hematocrit   Date Value Ref Range Status   05/15/2021 30.3 (L) 36.0 - 48.0 % Final     WBC   Date Value Ref Range Status   05/15/2021 8.3 4.0 - 11.0 K/uL Final     Platelets   Date Value Ref Range Status   05/15/2021 148 135 - 450 K/uL Final     Lab Results   Component Value Date    CREATININE 1.1 05/15/2021    BUN 18 05/15/2021     (H) 05/15/2021    K 3.3 (L) 05/15/2021     (H) 05/15/2021    CO2 23 05/15/2021     RECOMMENDATIONS:   Plan for withdrawal of care  With better SNa and lower urine output, decrease D5W  Bolus 1/2NSS with lower CVP  Replace K  Discussed with nurse     IMPRESSION:      Hypernatremia-dehydration severe, better  -Very high free water deficits,  -? Central diabetes insipidus  -? Might contribute with encephalopathy     GIORGIO (on non CKD: ): better  - poly-uria d/to above   - BL Scr- <0.5    -: Etiology of GIORGIO -prerenal, presumed         Associated problems:   - Volume status: hypo-volemic  : BP: Hypotension, shock, needing Levophed  - Azotemia: Prerenal, follow  - Electrolytes: K: Hypokalemia, needing repletion, due to auto diuresis  - Acid-Base: Stable  - Anemia: Mild, follow        Other major problems: Management per primary and other consulting teams. Cardiac cardiac status,  Possible anoxic brain injury,  Cerebral edema  Acute respiratory failure, hypoxia-needing mechanical intubation     History of depression,  Substance abuse, alcohol abuse     16-week pregnant.   Now with fetal demise per OB     Shock liver              Hospital Problems            Last Modified POA     Cardiac arrest ([facility] 5/10/2021 Yes     Anoxic brain damage ([facility] 5/11/2021 Yes     Acute respiratory failure with hypoxia ([facility] 5/11/2021 Yes     Substance abuse ([facility] 5/11/2021 Yes     Hypernatremia 5/11/2021 Yes           Christina Dixon MD

## 2021-05-15 NOTE — PROGRESS NOTES
bp remains labile 80s-90s  Levo at 14 mcg/min  Dr Hosea Johnson notified  Neuro unchanged. Update given to family.

## 2021-05-15 NOTE — PLAN OF CARE
Problem: Anxiety/Stress:  Goal: Level of anxiety will decrease  Description: Level of anxiety will decrease  5/14/2021 2020 by Alma Medina RN  Outcome: Ongoing  5/14/2021 0759 by Jennette Nageotte, APRN - CNP  Outcome: Ongoing   Not able to assess anxiety. Pt with minimal reflexes.

## 2021-05-15 NOTE — CONSULTS
Obstetrics consult note    Called to see patient for vaginal bleeding. Upon exam approximately 75cc of blood on bed. No FHT's by doppler. Bedside US performed with no FM or FHT's. Vaginal exam: fetal parts in upper vagina with ruptured membrane. Pt's BP elevated  A/p: suspected abruption with fetal demise  Pt is a DNR. Expectant management. Thank you for this consult request.  Please call if any further questions.

## 2021-05-15 NOTE — PROGRESS NOTES
Follow-up visit with patient's family as referred by Jaylen Martinez and nurses. Patient unresponsive. Visited with patient's mother, Malina, and brother, Armen. Mother shared patient's health history in relationship to current concerns for patient's well-being. \"I don't want Shayne Coppola to suffer. She's been through enough. If something happens, I don't want any extraordinary measures,\" mother stressed. Mother reminisced about patient while sharing pictures of patient with her three children, \". .in happier times. \"  Charley, for mother and patient's brother, is a source of strength, confidence, and peace. \"Tayler (patient) is in [facility]" she said. Mother invited me to pray with she and her son, patient's borther. When asked what I should pray about, both responded, \"[place] for God's will. His wisdom is way beyond what we can even begin to understand. \"  Family supportive of one another and mother expressed an awareness of the importance of self-care as she talked about returning home for the evening. Follow-up  visits requested by mother with affirmation from patient's brother for continued visits.  to follow. No further needs expressed at this time.

## 2021-05-15 NOTE — PROGRESS NOTES
Will start epi as ordered  abgs noted  Glucose noted. Dextrose given  Latest update given to family.

## 2021-05-15 NOTE — PROGRESS NOTES
(1.676 m)   Wt 145 lb 1 oz (65.8 kg)   LMP  (LMP Unknown)   SpO2 96%   BMI 23.41 kg/m²     General appearance: Intubated , unresponsive  HEENT: Pupils equal, round, and non reactive to light. Conjunctivae/corneas clear. Neck: Suppl. No jugular venous distention. Trachea midline. Respiratory:  Intubated and sedated,no wheezing coarse breath sounds  Cardiovascular: Regular rate and rhythm with normal S1/S2 without murmurs, rubs or gallops. Abdomen: Soft, non-tender, non-distended with normal bowel sounds. Musculoskeletal: No clubbing, cyanosis or edema bilaterally. Neurologic: Unresponsive, does not with draw to pain  Peripheral Pulses: +2 palpable, equal bilaterally       Labs:   Recent Labs     05/14/21  0445 05/14/21  2054 05/15/21  0336 05/15/21  0807   WBC 10.8 9.5 8.3  --    HGB 11.1* 10.7* 9.6* 11.2*   HCT 34.1* 34.1* 30.3*  --     167 148  --      Recent Labs     05/14/21  1445 05/14/21  1830 05/15/21  0336   * 158* 152*   K 4.9 3.8 3.3*   * 129* 121*   CO2 22 23 23   BUN 15 19 18   CREATININE 1.1 1.2* 1.1   CALCIUM 8.5 8.9 8.6     No results for input(s): AST, ALT, BILIDIR, BILITOT, ALKPHOS in the last 72 hours. No results for input(s): INR in the last 72 hours. No results for input(s): [place] in the last 72 hours. Assessment/Plan:    -Cardiac arrest, PEA with ROSC. Sterling Stuart Unclear etiology   -No response patient has been off of sedation.   -Appreciate critical care recommendation  -EEG noted diffuse slowing  - plan for withdrawal of care once life [facility] ready      Acute hypoxic respiratory failure  -Needing intubation secondary to drug abuse.  -Patient is currently intubated off any sedation still with minimal improvement  -Patient remains comatose with minimal brainstem reflexes at times having myoclonus      Hemodynamically unstable: likely secondary to anoxic brain injury: BP hypotensive to hypertensive  - will titrate levophed to keep map>65    Hypernatremia:   -

## 2021-05-15 NOTE — PROGRESS NOTES
bp noted as low. Charge nurse In to see pt. Noted smell. Saw wet bed and blood in og area. House md and obgyn called to bedside. Dr London Luu preformed bedside us and noted no heart tones for baby and active labor for pt. Updated pt mother and lifecenter. Pt bp has been very up and down during this time bp will go from systolic of  with no changes.

## 2021-05-15 NOTE — PROGRESS NOTES
Family wants to go home and rest. Mother states she wants to return at 5 am and withdraw care. lifecenter updated.  md updated  Leila Marinelli

## 2021-05-15 NOTE — PROGRESS NOTES
Dr Linus García spoke with family. Did not bring up withdraw at this time.  Many family members with pt  Minesh Christian

## 2021-05-15 NOTE — PROGRESS NOTES
Assessment done  Family here  Discussed status with organ team   Seen by Dr Kathrin Castellanos  Update given  k noted  Will replace  Prn dextrose given for hypoglycemia.

## 2021-05-16 NOTE — PROGRESS NOTES
Per request of lifecenter team, 02 up to 100% for 30 minutes prior to abg draw.  Will return to 70% after blood drawn  Barrera Laughlin RN

## 2021-05-16 NOTE — PROGRESS NOTES
Pt in ct scan from 2345 to 0015. Pt remained stable.  Transport with 3 rn and 1 rt  Erwin Patterson RN

## 2021-05-16 NOTE — PROGRESS NOTES
failure with hypoxia ([facility] 5/11/2021 Yes     Substance abuse ([facility] 5/11/2021 Yes     Hypernatremia 5/11/2021 Yes           Christina Araya MD

## 2021-05-16 NOTE — PROGRESS NOTES
off any sedation still with minimal improvement  -Patient remains comatose with minimal brainstem reflexes at times having myoclonus      Hemodynamically unstable: likely secondary to anoxic brain injury:  - started on levo, epi and vaso, titrate to keep map> 65    Hypernatremia:   -improving, ?  Secondary to DI uo decreased with vasopressin  - nephro on board  - d5w     Pregnancy  Appreciate ob gyn, unfortunate fetal demise yesterday      DVT Prophylaxis: Lovenox  Diet: DIET TUBE FEED CONTINUOUS/CYCLIC NPO; STANDARD WITHOUT FIBER; Orogastric  Code Status: DNR-CCA    I spent 35 minutes providing critical care services to this hemodynamically unstable patient        Oksana Yarbrough MD

## 2021-05-17 NOTE — PROGRESS NOTES
Clinical Pharmacy Note: Pharmacy to Dose Aminoglycoside    Aminoglycoside dosing method: High Dose Extended Interval Dosing     Assessment/Plan:  Gentamycin random 8 hours after the initial dose was 3.6 mcg/mL. Level is appropriate given dose of medication, continue Q24 hour dosing. Pharmacy will continue to monitor and adjust dosing accordingly. Weight: 145 lb 15.1 oz (66.2 kg)   Height: 5' 6\" (167.6 cm)  Lab Results   Component Value Date    CREATININE 0.9 05/17/2021    WBC 7.1 05/17/2021     Estimated Creatinine Clearance: 80 mL/min (based on SCr of 0.9 mg/dL).      Dosing weight utilized: 59.3 kg      Thank you for the consult,    Jossie Spencer, PharmD  PGY-1 Pharmacy Resident  [id number]

## 2021-05-17 NOTE — PROGRESS NOTES
MD  [facility] [address] [place]: [phone] or [address]  Fax: [phone]        ========================================================   ========================================================     Subjective:  Pt remains intubated, sedated,   Minimally responsive   Hypernatremia - better     Family at bedside   Nurse at bedside       Past medical, Surgical, Social, Family medical history reviewed by me. MEDICATIONS: reviewed by me. Medications Prior to Admission:  No current facility-administered medications on file prior to encounter.      Current Outpatient Medications on File Prior to Encounter   Medication Sig Dispense Refill    amLODIPine (NORVASC) 5 MG tablet Take 5 mg by mouth daily      levothyroxine (SYNTHROID) 100 MCG tablet Take 100 mcg by mouth Daily      omeprazole (PRILOSEC) 40 MG delayed release capsule TAKE ONE CAPSULE BY MOUTH DAILY ONE HOUR BEFORE EVERY EVENING MEAL 30 capsule 0    sertraline (ZOLOFT) 100 MG tablet Take 1 tablet by mouth daily 30 tablet 1    mirtazapine (REMERON) 30 MG tablet TAKE ONE TABLET BY MOUTH ONCE NIGHTLY 30 tablet 2    labetalol (NORMODYNE) 200 MG tablet Take 200 mg by mouth 2 times daily      ondansetron (ZOFRAN-ODT) 4 MG disintegrating tablet DISSOLVE ONE TABLET BY MOUTH EVERY 8 HOURS AS NEEDED FOR NAUSEA 20 tablet 1         Current Facility-Administered Medications:     0.9 % sodium chloride infusion, , Intravenous, PRN, Radha Willoughby MD    clindamycin (CLEOCIN) 900 mg in dextrose 5 % 50 mL IVPB, 900 mg, Intravenous, Q8H, Radha Willoughby MD, Stopped at 05/17/21 0400    ampicillin 2000 mg ivpb mini bag, 2,000 mg, Intravenous, 6 times per day, Radha Willoughby MD, Last Rate: 200 mL/hr at 05/17/21 0825, 2,000 mg at 05/17/21 0825    gentamicin (GARAMYCIN) 300 mg in dextrose 5 % 250 mL IVPB, 300 mg, Intravenous, Q24H, Radha Willoughby MD, Stopped at 05/16/21 2143    EPINEPHrine (EPINEPHrine HCL) 5 mg in dextrose 5 % 250 4 mg, 4 mg, Intravenous, Q6H PRN, Wilfrido Liang MD    chlorhexidine (PERIDEX) 0.12 % solution 15 mL, 15 mL, Mouth/Throat, BID, Antonia Tejada MD, 15 mL at 05/17/21 1474    polyvinyl alcohol (LIQUIFILM TEARS) 1.4 % ophthalmic solution 1 drop, 1 drop, Both Eyes, Q4H, 1 drop at 05/17/21 0828 **AND** [DISCONTINUED] lubrifresh P.M. (artificial tears) ophthalmic ointment, , Both Eyes, Q4H, Wilfrido Liang MD    fentaNYL (SUBLIMAZE) injection 25 mcg, 25 mcg, Intravenous, Q1H PRN, Wilfrido Liang MD    potassium chloride 20 mEq/50 mL IVPB (Central Line), 20 mEq, Intravenous, PRN, Loyda Dominique MD, Last Rate: 50 mL/hr at 05/17/21 0823, 20 mEq at 05/17/21 7647    magnesium sulfate 2000 mg in 50 mL IVPB premix, 2,000 mg, Intravenous, PRN, Loyda Dominique MD, Stopped at 05/12/21 1609    sodium phosphate 10.53 mmol in dextrose 5 % 250 mL IVPB, 0.16 mmol/kg, Intravenous, PRN, Stopped at 05/11/21 1821 **OR** sodium phosphate 21.06 mmol in dextrose 5 % 250 mL IVPB, 0.32 mmol/kg, Intravenous, PRN, Loyda Dominique MD    midazolam PF (VERSED) injection 2 mg, 2 mg, Intravenous, Q30 Min PRN, Loyda Dominique MD    REVIEW OF SYSTEMS:  Review of systems not obtained due to patient factors - mental status       PHYSICAL EXAM:  Recent vital signs and recent I/Os reviewed by me.      Wt Readings from Last 3 Encounters:   05/17/21 145 lb 15.1 oz (66.2 kg)   04/30/21 150 lb (68 kg)   03/24/21 141 lb (64 kg)     BP Readings from Last 3 Encounters:   05/17/21 115/68   04/30/21 122/82   03/24/21 122/72     Patient Vitals for the past 24 hrs:   BP Temp Temp src Pulse Resp SpO2 Weight   05/17/21 0800 -- 97.7 °F (36.5 °C) CORE 94 -- 99 % --   05/17/21 0700 -- -- -- 90 -- 100 % --   05/17/21 0645 -- -- -- 89 -- 100 % --   05/17/21 0630 -- -- -- 89 -- 100 % --   05/17/21 0615 -- -- -- 89 -- 100 % --   05/17/21 0600 -- 97.4 °F (36.3 °C) -- 90 -- 100 % --   05/17/21 0545 -- -- -- 89 -- 100 % --   05/17/21 0530 -- 97.4 °F (36.3 °C) -- 88 -- 100 % -- 05/17/21 0515 -- -- -- 90 -- 100 % --   05/17/21 0500 -- 97.5 °F (36.4 °C) -- 90 -- 100 % --   05/17/21 0445 -- -- -- 92 -- 100 % 145 lb 15.1 oz (66.2 kg)   05/17/21 0430 -- 97.6 °F (36.4 °C) -- 94 -- 100 % --   05/17/21 0415 -- -- -- 91 -- 99 % --   05/17/21 0400 -- 98 °F (36.7 °C) -- 93 -- 99 % --   05/17/21 0345 -- -- -- 95 -- 99 % --   05/17/21 0330 -- 98.1 °F (36.7 °C) -- 101 -- 98 % --   05/17/21 0315 -- -- -- 101 -- 97 % --   05/17/21 0300 -- 98.3 °F (36.8 °C) -- 102 -- 97 % --   05/17/21 0245 -- 98.4 °F (36.9 °C) -- 104 -- 97 % --   05/17/21 0230 -- 98.4 °F (36.9 °C) -- 106 -- 97 % --   05/17/21 0215 -- 98.4 °F (36.9 °C) -- 113 -- 96 % --   05/17/21 0200 -- 98.4 °F (36.9 °C) -- 111 -- 96 % --   05/17/21 0149 -- -- -- 123 -- 94 % --   05/17/21 0145 -- 98.4 °F (36.9 °C) -- 121 -- 94 % --   05/17/21 0130 -- -- -- 103 -- 98 % --   05/17/21 0115 -- 98.3 °F (36.8 °C) -- 100 -- 98 % --   05/17/21 0100 -- 98.4 °F (36.9 °C) -- 97 -- 98 % --   05/17/21 0045 -- 98.4 °F (36.9 °C) -- 99 -- 98 % --   05/17/21 0038 115/68 98.5 °F (36.9 °C) -- 99 20 -- --   05/17/21 0030 -- 98.5 °F (36.9 °C) CORE 98 -- 99 % --   05/17/21 0015 -- -- -- 99 -- 99 % --   05/17/21 0000 -- -- -- 103 -- 99 % --   05/16/21 2345 -- -- -- 118 -- 97 % --   05/16/21 2330 -- -- -- 98 -- 100 % --   05/16/21 2327 -- -- -- 93 -- 100 % --   05/16/21 2315 -- -- -- 99 -- 100 % --   05/16/21 2300 -- 98.8 °F (37.1 °C) -- 100 -- 100 % --   05/16/21 2245 -- -- -- 99 -- 100 % --   05/16/21 2230 -- -- -- 99 -- 100 % --   05/16/21 2215 -- -- -- 97 -- 100 % --   05/16/21 2200 -- -- -- 97 -- 100 % --   05/16/21 2145 -- -- -- 97 -- 100 % --   05/16/21 2130 -- -- -- 100 -- 100 % --   05/16/21 2115 -- -- -- 97 -- 100 % --   05/16/21 2100 -- 98.9 °F (37.2 °C) -- 99 -- 100 % --   05/16/21 2045 -- -- -- 97 -- 100 % --   05/16/21 2030 -- -- -- 99 -- 100 % --   05/16/21 2015 -- -- -- 97 -- 100 % --   05/16/21 2000 -- -- -- 99 -- 100 % --   05/16/21 1948 -- -- -- 100 -- 100 % --   05/16/21 1945 -- 98.9 °F (37.2 °C) CORE 100 -- 100 % --   05/16/21 1930 -- -- -- 101 -- 100 % --   05/16/21 1915 -- -- -- 106 -- 99 % --   05/16/21 1900 -- -- -- 116 -- 98 % --   05/16/21 1830 -- -- -- 101 -- 99 % --   05/16/21 1800 -- -- -- 95 -- 100 % --   05/16/21 1730 -- -- -- 100 -- 100 % --   05/16/21 1700 -- -- -- 101 -- 100 % --   05/16/21 1659 -- -- -- -- 20 -- --   05/16/21 1630 -- -- -- 110 -- 100 % --   05/16/21 1600 127/68 98.6 °F (37 °C) Temporal 101 20 99 % --   05/16/21 1530 -- -- -- 101 -- 100 % --   05/16/21 1500 -- -- -- 103 -- 100 % --   05/16/21 1456 119/63 98.7 °F (37.1 °C) Bladder -- 20 -- --   05/16/21 1430 -- -- -- 114 -- -- --   05/16/21 1412 124/70 98.7 °F (37.1 °C) Bladder 101 -- -- --   05/16/21 1400 -- -- -- 100 -- 100 % --   05/16/21 1352 119/64 98.9 °F (37.2 °C) Bladder 104 20 -- --   05/16/21 1330 -- -- -- 104 -- 100 % --   05/16/21 1300 -- -- -- 106 -- 100 % --   05/16/21 1253 -- -- -- -- 20 -- --   05/16/21 1230 -- -- -- 107 -- 100 % --   05/16/21 1200 -- -- -- 106 -- 100 % --   05/16/21 1130 -- -- -- 107 -- 100 % --   05/16/21 1100 -- 98.7 °F (37.1 °C) Bladder 107 20 100 % --   05/16/21 1030 -- -- -- 109 -- -- --   05/16/21 1000 -- -- -- 110 20 100 % --   05/16/21 0934 -- -- -- -- 20 99 % --   05/16/21 0933 -- -- -- -- 20 -- --   05/16/21 0930 -- -- -- 113 -- 99 % --   05/16/21 0900 -- -- -- 118 -- 98 % --       Intake/Output Summary (Last 24 hours) at 5/17/2021 0837  Last data filed at 5/17/2021 0606  Gross per 24 hour   Intake 3494.58 ml   Output 2565 ml   Net 929.58 ml   Constitutional: Poorly responsive, Intubated and  habitus  Eyes: Conjunctiva clear and Noscleral icterus  Ear, Nose, and Throat: dry oral mucosa; ET to vent  Neck: Trachea midline, No jugular venous distension  Cardiovascular: Regular rate and ryhthm, normal S1 and S2,    Respiratory: Mechanically ventilated; Lung sounds notable for synchronous vent-associated breath sounds  Abdomen: Soft, non-tender, non-distended. Normal bowel sounds. : Mckinnon catheter is inserted, draining yellow colored urine   Skin: no rash,  bruises on visible body area  Musculoskeletal: No clubbing or cyanosis of digits. and Normocephalic. Extremitties: trace peripheral edema, no deformities. Neurologic:Unable to obtain as intubated/sedated. Psychiatric: Unable to obtain as intubated/sedated. DATA:  Diagnostic tests reviewed by me for today's visit:   (AS NEEDED FOR MY EVALUATION AND MANAGEMENT). Recent Labs     05/16/21  0540 05/16/21  1153 05/16/21  1725 05/16/21  2300 05/17/21  0525   WBC 11.8* 10.6 9.9 8.3 7.1   HCT 25.5* 23.0* 25.1* 25.0* 26.8*   PLT 89* 65* 54* 43* 38*     Iron Saturation:  No components found for: PERCENTFE  FERRITIN:  No results found for: FERRITIN  IRON:    Lab Results   Component Value Date    IRON 45 05/14/2020     TIBC:    Lab Results   Component Value Date    TIBC 399 05/14/2020       Recent Labs     05/16/21  0540 05/16/21  1153 05/16/21  1725 05/17/21  0115 05/17/21  0525   * 153* 151* 147*  145 147*   K 3.6 3.3* 2.9* 3.6 3.0*   * 119* 116* 116* 116*   CO2 23 24 24 22 22   BUN 20 20 19 16 15   CREATININE 1.3* 1.1 1.0 0.9 0.9     Recent Labs     05/16/21  0540 05/16/21  1153 05/16/21  1725 05/17/21  0115 05/17/21  0525   CALCIUM 7.8* 7.4* 7.6* 8.1* 7.9*   MG 1.90 1.90 1.80 1.70* 1.70*   PHOS 6.4* 6.4* 5.7* 4.7 4.8     No results for input(s): PH, PCO2, PO2 in the last 72 hours.     Invalid input(s): Indra Linares    ABG:  No results found for: PH, PCO2, PO2, HCO3, BE, THGB, TCO2, O2SAT  VBG:    Lab Results   Component Value Date    PHVEN 7.309 05/15/2021    TKD7ICB 50.7 05/15/2021    BEVEN -1 05/15/2021    L3WMUVYW 100 05/15/2021       LDH:  No results found for: LDH  Uric Acid:    Lab Results   Component Value Date    LABURIC 1.9 03/16/2021       PT/INR:    Lab Results   Component Value Date    PROTIME 12.4 05/17/2021    INR 1.07 05/17/2021     Warfarin PT/INR:  No components found for: Benjamin Grijalva  PTT:    Lab Results   Component Value Date    APTT 39.5 05/17/2021   [APTT}  Last 3 Troponin:    Lab Results   Component Value Date    TROPONINI <0.01 05/11/2021    TROPONINI 0.01 05/11/2021    TROPONINI 0.03 05/10/2021       U/A:    Lab Results   Component Value Date    NITRITE neg 04/05/2012    COLORU YELLOW 05/16/2021    PROTEINU 100 05/16/2021    PHUR 7.0 05/16/2021    WBCUA 15 05/16/2021    RBCUA 11 05/16/2021    MUCUS Trace 07/28/2012    BACTERIA 2+ 03/18/2021    CLARITYU Clear 05/16/2021    SPECGRAV 1.017 05/16/2021    LEUKOCYTESUR SMALL 05/16/2021    UROBILINOGEN 1.0 05/16/2021    BILIRUBINUR Negative 05/16/2021    BILIRUBINUR neg 04/05/2012    BILIRUBINUR NEGATIVE 11/14/2011    BLOODU MODERATE 05/16/2021    GLUCOSEU Negative 05/16/2021    GLUCOSEU NEGATIVE 11/14/2011    AMORPHOUS 1+ 07/28/2012     Microalbumen/Creatinine ratio:  No components found for: RUCREAT  24 Hour Urine for Protein:  No components found for: RAWUPRO, UHRS3, HJPS12OL, UTV3  24 Hour Urine for Creatinine Clearance:  No components found for: CREAT4, UHRS10, UTV10  Urine Toxicology:  No components found for: Darilyn Rouleau, IBENZO, ICOCAINE, IMARTHC, IOPIATES, IPHENCYC    HgBA1c:    Lab Results   Component Value Date    LABA1C 5.2 05/16/2021     RPR:    Lab Results   Component Value Date    RPR Non-Reactive 12/08/2011     HIV:  No results found for: HIV  KRISTA:    Lab Results   Component Value Date    KRISTA Negative 05/14/2020     RF:    Lab Results   Component Value Date    RF <10.0 05/14/2020     DSDNA:  No components found for: DNA  AMYLASE:    Lab Results   Component Value Date    AMYLASE 13 05/15/2021     LIPASE:    Lab Results   Component Value Date    LIPASE 5.0 05/15/2021     Fibrinogen Level:  No components found for: FIB       BELOW MENTIONED RADIOLOGY STUDY RESULTS BY ME (AS NEEDED FOR MY EVALUATION AND MANAGEMENT).      Echo Complete 2d W Doppler W Color    Result Date: Average E/e': 7.5. Mitral Valve  The mitral valve normal in structure and function. No evidence of mitral regurgitation or stenosis. Left Atrium  The left atrium is normal in size. Aortic Valve  The aortic valve is structurally normal. There is no significant aortic  valve regurgitation or stenosis. Tricuspid aortic valve. Aorta  The aortic root is normal in size. Right Ventricle  The right ventricle is normal in size and function. TAPSE: 1.7 cm. RV s' velocity: 16.5 cm/s. Tricuspid Valve  The tricuspid valve is normal in structure and function. There is no  significant tricuspid valve regurgitation or stenosis. Right Atrium  The right atrial size is normal.   Pulmonic Valve  The pulmonic valve is not well visualized. There is no evidence of pulmonic valve regurgitation or stenosis. Pericardial Effusion  No pericardial effusion noted. Pleural Effusion  No pleural effusion. Miscellaneous  The inferior vena cava appears normal in size with normal respiratory  variation.   M-Mode/2D Measurements (cm)                LA Area: 15.6 cm2  LVOT: 1.9 cm LA volume/Index: 41.9 ml /24 ml/m2  Doppler Measurements   AV Peak Velocity: 142 cm/s     MV Peak E-Wave: 105 cm/s  AV Peak Gradient: 8.07 mmHg  AV Mean Gradient: 5 mmHg  LVOT Peak Velocity: 128 cm/s  AV Area (Continuity):2.24 cm2   E' Septal Velocity: 15.1 cm/s  E' Lateral Velocity: 13.3 cm/s  PV Peak Velocity: 89 cm/s  PV Peak Gradient: 3.17 mmHg   Aortic Valve   Peak Velocity: 142 cm/s     Mean Velocity: 101 cm/s  Peak Gradient: 8.07 mmHg    Mean Gradient: 5 mmHg  Area (continuity): 2.24 cm2  AV VTI: 25.4 cm  Aorta   LVOT Diameter: 1.9 cm      Ct Head Wo Contrast    Result Date: 5/10/2021  EXAMINATION: CT OF THE HEAD WITHOUT CONTRAST  5/10/2021 4:42 pm TECHNIQUE: CT of the head was performed without the administration of intravenous contrast. Dose modulation, iterative reconstruction, and/or weight based adjustment of the mA/kV was utilized to reduce the radiation dose to as low as reasonably achievable. COMPARISON: CT sinus 01/06/2021. HISTORY: ORDERING SYSTEM PROVIDED HISTORY: arrest TECHNOLOGIST PROVIDED HISTORY: Has a \"code stroke\" or \"stroke alert\" been called? ->No Reason for exam:->arrest Decision Support Exception - unselect if not a suspected or confirmed emergency medical condition->Emergency Medical Condition (MA) Is the patient pregnant? ->Yes Reason for Exam: Arrest. Cardiac Arrest (Arrived by [facility] rt unwitnessed arrest. Was found down by mother; mom began CPR. PEA upon EMS arrival @ 1500; 4 rounds of epi & 2 narcan admin. Pulse at present. Hx of iv drug use; believed to be 13 weeks pregnant. ). Acuity: Acute Type of Exam: Initial FINDINGS: BRAIN/VENTRICLES: Artifact degraded images. No acute hemorrhage. Diffuse poor delineation of gray-white differentiation. Artifact partially obscures the justin. Artifact partially obscures the inferior cerebellum. Ventricles are within normal limits in size. There is no midline shift. Diffuse sulcal effacement and crowding of the basal cisterns. ORBITS: Visualized orbits appear unremarkable on this unenhanced exam. SINUSES: Mild mucosal thickening of the ethmoid and sphenoid sinuses. Visualized mastoid air cells appear clear. SOFT TISSUES/SKULL: No depressed calvarial fracture identified. 1. Diffuse anoxic injury and cerebral edema. MRI would better evaluate. 2. No acute hemorrhage or midline shift. 3. Other findings as described. Critical results were called by Dr. Georges Barton DO to [address] [place] [address] on 5/10/2021 at 17:36. Xr Chest Portable    Result Date: 5/12/2021  EXAMINATION: ONE XRAY VIEW OF THE CHEST 5/12/2021 5:47 am COMPARISON: 05/10/2021 HISTORY: ORDERING SYSTEM PROVIDED HISTORY: OG verifcation TECHNOLOGIST PROVIDED HISTORY: Reason for exam:->OG verifcation Reason for Exam:  OG verifcation Acuity: Unknown Type of Exam: Unknown FINDINGS: Endotracheal tube extends to the level of the clavicles. Right internal jugular central venous catheter is again seen. Nasogastric tube extends to the abdominal left upper quadrant. Prominence of central pulmonary vascularity is again demonstrated centrally. Decreased heterogeneous opacity within the lateral lungs. No significant pleural effusion. No pneumothorax. Cardiac and mediastinal silhouettes are similar to prior. Nasogastric tube extends to the abdominal left upper quadrant. Pulmonary vascular congestion, slightly decreased as compared to prior. Xr Chest Portable    Result Date: 5/10/2021  EXAMINATION: ONE XRAY VIEW OF THE CHEST 5/10/2021 4:39 pm COMPARISON: 03/18/2021 HISTORY: ORDERING SYSTEM PROVIDED HISTORY: code TECHNOLOGIST PROVIDED HISTORY: Reason for exam:->code Reason for Exam: code Acuity: Unknown Type of Exam: Unknown FINDINGS: Study limited by overlying support and monitoring apparatus Endotracheal tube is in the mid to upper trachea approximately 4.3 cm above the stuart Right-sided IJ line projects over the superior vena cava Heart size is within normal limits. Pulmonary vascularity is congested and is indistinct. Diffuse increased ground-glass and interstitial opacities in a perihilar distribution noted. This is accentuated by low lung volumes. Gaseous distention of the stomach is noted. Foreign body within the stomach versus is artifact noted. Osseous structures demonstrate no acute abnormality     Support apparatus in adequate position as above Perihilar and interstitial opacities which may represent pulmonary vascular congestion, pulmonary edema. Multifocal pneumonia is also in the differential Possible ingested foreign body versus artifact.

## 2021-05-17 NOTE — PROGRESS NOTES
Asystolic, No BP, Areflexic, No palpable pulse, No respirations, pupils dilated and fixed. Family around bedside. Pronounced.

## 2021-05-17 NOTE — PROGRESS NOTES
Death note    Patient was terminally extubated and  at: 15 :23  Patient had no further electrical activity on cardiac monitor. No pupillary or gag response.  Patient with no auscultated breath sounds or heart sounds, no pulse appreciated

## 2021-05-17 NOTE — FLOWSHEET NOTE
Reason for Visit: Spiritual Care Referral from Palliative Care/Family for EOLC/Prayer at bedside. Episcopal/Spiritual/Philosophical belief: Family identifies as Taoism, Non-Specific    Summary:  was contacted by Palliative Care nurse that family was requesting prayer support.  initiated this visit w/family at bedside to assess spiritual needs and offer support. Pt's family engaged easily w/ in storytelling and processing pt's hospitalization. Family presented as appropriately tearful at bedside and appear to be coping appropriately at this time. Family requested prayer for pt's children and on-going strength/comfort.  prayed in accordance w/the family's request.  Rosy Carter offered prayer and empathetic listening for family. Recommendations: Should any needs arise, please contact spiritual care services for follow-up. Electronically signed by Suzanne Todd on 5/17/2021 at 11:13 AM       05/17/21 1105   Encounter Summary   Services provided to: Family   Referral/Consult From: [address] Parent; Family members   Continue Visiting   (EOL Support for pt's family, prayer. GB 5/17)   Complexity of Encounter High   Length of Encounter 30 minutes   Spiritual/Tenriism   Type Spiritual support   Assessment Approachable;Tearful;Grieving   Intervention Active listening;Explored feelings, thoughts, concerns;Explored coping resources;Prayer;Sustaining presence/ Ministry of presence;Grief care; End of life care; Discussed belief system/Zoroastrian practices/natasha;Discussed illness/injury and it's impact   Outcome Connection/belonging;Comfort;Engaged in conversation; Shared life review;Expressed feelings/needs/concerns;Coping;Tearful;Grieving;Receptive   Grief and Life Adjustment   Type End of life

## 2021-05-17 NOTE — PROGRESS NOTES
Organ coordinator at bedside. Pt with htn and tachy hr. sats down a bit with this also/abg results down. Titrate epi down. Pt not usually with this tachy/htn as long, and no event triggered the response.

## 2021-05-17 NOTE — PROGRESS NOTES
Clinical Pharmacy Note: Pharmacy to Dose Aminoglycoside    Consult received from [facility] to dose gentamicin for the treatment of a possible intraabdominal infection. The patient is also on Clindamycin and Ampicillin. Aminoglycoside dosing method: High Dose Extended Interval Dosing     Assessment/Plan:  Based on high-dose extended interval dosing method, give gentamicin 300 mg q24 hrs. A 8-10 hour aminoglycoside random level has been ordered on 5/17 @ 0500 to further assess dosing interval.   Pharmacy will continue to monitor and adjust dosing accordingly. Weight: 146 lb 9.7 oz (66.5 kg)   Height: 5' 6\" (167.6 cm)  Lab Results   Component Value Date    CREATININE 1.0 05/16/2021    WBC 9.9 05/16/2021     Estimated Creatinine Clearance: 72 mL/min (based on SCr of 1 mg/dL).      Dosing weight utilized: 59.3 kg      Thank you for the consult,    Светлана Rodgers, PharmD  PGY-1 Pharmacy Resident  [id number]

## 2021-05-17 NOTE — PROGRESS NOTES
Palliative Care:     Family meeting with [facility] conducted/completed. Mother of patient grieving appropriately. Two brothers, one sister in law and niece of patient at bedside. Pending terminal extubating in OR for body donation. Call to Abram Knox to offer spiritual support at this time. Family state they are addressing the expiration of patient to her three children this afternoon. Amador Watt has been contacted and on board for bereavement process. [facility] provided additional books/resources for children today. It has been a pleasure in supporting the patient's family in this transitional process. Nurse Xiomara aware of these conversations.

## 2021-05-17 NOTE — PROGRESS NOTES
improvement  -Patient remains comatose with minimal brainstem reflexes at times having myoclonus      Hemodynamically unstable: likely secondary to anoxic brain injury:  - started on levo, epi and vaso, titrate to keep map> 65    Hypernatremia:   -improving, ?  Secondary to DI uo decreased with vasopressin  - nephro on board  - d5w     Pregnancy  Appreciate ob gyn, unfortunate fetal demise yesterday    Hypoglycemia: d5w       DVT Prophylaxis: Lovenox  Diet: DIET TUBE FEED CONTINUOUS/CYCLIC NPO; STANDARD WITHOUT FIBER; Orogastric  Code Status: DNR-CCA    I spent 35 minutes providing critical care services to this hemodynamically unstable patient        Vannessa Crowell MD

## 2021-05-17 NOTE — ANESTHESIA PRE PROCEDURE
Department of Anesthesiology  Preprocedure Note       Name:  Priya Darnell   Age:  38 y.o.  :  1985                                          MRN:  [id number]         Date:  2021      Surgeon: Michell Solorio):  [facility]    Procedure: ORGAN PROCUREMENT (N/A )    Medications prior to admission:   Prior to Admission medications    Medication Sig Start Date End Date Taking? Authorizing Provider   amLODIPine (NORVASC) 5 MG tablet Take 5 mg by mouth daily    Historical Provider, MD   levothyroxine (SYNTHROID) 100 MCG tablet Take 100 mcg by mouth Daily    Historical Provider, MD   omeprazole (PRILOSEC) 40 MG delayed release capsule TAKE ONE CAPSULE BY MOUTH DAILY ONE HOUR BEFORE EVERY EVENING MEAL 5/3/21   Son Aleman MD   sertraline (ZOLOFT) 100 MG tablet Take 1 tablet by mouth daily 21   Stephen Puvris MD   mirtazapine (REMERON) 30 MG tablet TAKE ONE TABLET BY MOUTH ONCE NIGHTLY 21   Stephen Purvis MD   labetalol (NORMODYNE) 200 MG tablet Take 200 mg by mouth 2 times daily    Historical Provider, MD   ondansetron (ZOFRAN-ODT) 4 MG disintegrating tablet DISSOLVE ONE TABLET BY MOUTH EVERY 8 HOURS AS NEEDED FOR NAUSEA 21   Gato Suh, AMBER - CNP       Current medications:    No current facility-administered medications for this visit. No current outpatient medications on file.      Facility-Administered Medications Ordered in Other Visits   Medication Dose Route Frequency Provider Last Rate Last Admin    nitroGLYCERIN 200-5 MCG/ML-% infusion             0.9 % sodium chloride infusion   Intravenous PRN Gracie Harvey MD        clindamycin (CLEOCIN) 900 mg in dextrose 5 % 50 mL IVPB  900 mg Intravenous Q8H Gracie Harvey MD   Stopped at 21 0400    ampicillin 2000 mg ivpb mini bag  2,000 mg Intravenous 6 times per day Gracie Harvey  mL/hr at 21 0825 2,000 mg at 21 0825    gentamicin (GARAMYCIN) 300 mg in dextrose 5 % 250 mL IVPB  300 mg  Borderline personality disorder ([facility] F60.3    Cardiac arrest ([facility] I46.9    Anoxic brain damage ([facility]) G93.1    Acute respiratory failure with hypoxia ([facility]) J96.01    Substance abuse ([facility] F19.10    Hypernatremia E87.0       Past Medical History:        Diagnosis Date    Alcohol use disorder, moderate, in early remission ([facility] 10/28/2019    Anemia     Anxiety     Back pain     Bipolar affective disorder ([facility])     Depression     Migraine     Opiate abuse, continuous ([facility])     Primary cancer of larynx ([facility]) 2017    PTSD (post-traumatic stress disorder)     Voice hoarseness        Past Surgical History:        Procedure Laterality Date    LARYNGOSCOPY N/A 2019    MICROLARYNGOSCOPY AND LYSIS OF ADHESIONS OF LARYNX performed by Celia Evans MD at [facility] Left 2017    MICROLARYNGOSCOPY WITH BIOPSY OF LEFT TRUE VOCAL CORD MASS    OTHER SURGICAL HISTORY  08/10/2017    Tracheostomy    TONSILLECTOMY      TRACHEOSTOMY      TRACHEOTOMY N/A 2017     TRACHEOTOMY WITH 6DCT SHILEY                 Social History:    Social History     Tobacco Use    Smoking status: Former Smoker     Packs/day: 1.00     Years: 18.00     Pack years: 18.00     Types: Cigarettes     Start date: 10/19/2013     Quit date: 10/19/2020     Years since quittin.5    Smokeless tobacco: Never Used   Substance Use Topics    Alcohol use: No     Alcohol/week: 20.0 standard drinks     Types: 10 Cans of beer, 10 Shots of liquor per week     Comment: none for 120 days                                Counseling given: Not Answered      Vital Signs (Current): There were no vitals filed for this visit.                                            BP Readings from Last 3 Encounters:   21 115/68   21 122/82   21 122/72       NPO Status:                                                                                 BMI:   Wt Readings from Last 3 Encounters:   21 145 lb 15.1 oz (66.2 kg)   04/30/21 150 lb (68 kg)   03/24/21 141 lb (64 kg)     There is no height or weight on file to calculate BMI.    CBC:   Lab Results   Component Value Date    WBC 7.1 05/17/2021    RBC 3.07 05/17/2021    HGB 8.9 05/17/2021    HCT 26.8 05/17/2021    MCV 87.4 05/17/2021    RDW 17.2 05/17/2021    PLT 38 05/17/2021       CMP:   Lab Results   Component Value Date     05/17/2021    K 3.0 05/17/2021    K 3.3 09/12/2020     05/17/2021    CO2 22 05/17/2021    BUN 15 05/17/2021    CREATININE 0.9 05/17/2021    GFRAA >60 05/17/2021    GFRAA >60 11/14/2011    AGRATIO 1.4 05/10/2021    LABGLOM >60 05/17/2021    GLUCOSE 68 05/17/2021    PROT 4.7 05/17/2021    PROT 7.8 11/14/2011    CALCIUM 7.9 05/17/2021    BILITOT 0.9 05/17/2021    ALKPHOS 194 05/17/2021    AST 13 05/17/2021    ALT 11 05/17/2021       POC Tests:   Recent Labs     05/15/21  1847 05/15/21  2005 05/17/21  0256 05/17/21  0915   POCGLU 56*  --  73 94   POCNA 157*   < > 146*  --    POCK 4.1   < > 3.4*  --    POCHCT 33.0*  --   --   --     < > = values in this interval not displayed.        Coags:   Lab Results   Component Value Date    PROTIME 12.4 05/17/2021    INR 1.07 05/17/2021    APTT 39.5 05/17/2021       HCG (If Applicable):   Lab Results   Component Value Date    PREGTESTUR Negative 06/20/2019        ABGs:   Lab Results   Component Value Date    PHART 7.365 05/17/2021    PO2ART 164.5 05/17/2021    LCV6KWQ 41.2 05/17/2021    AFQ2SOJ 23.6 05/17/2021    BEART -2 05/17/2021    L6STOMTI 99 05/17/2021        Type & Screen (If Applicable):  Lab Results   Component Value Date    LABABO O 03/12/2013    LABRH Positive 03/12/2013       Anesthesia Evaluation  Patient summary reviewed and Nursing notes reviewed  Airway: Mallampati: II  TM distance: <3 FB   Neck ROM: limited  Mouth opening: < 3 FB Dental:          Pulmonary:Negative Pulmonary ROS   (+) current smoker                          ROS comment: Patient states she has scarring \"inside\" from previous tracheostomy  Hoarseness  Hx of vocal cord  Ca had radiation and chemo   Cardiovascular:Negative CV ROS  Exercise tolerance: poor (<4 METS),         ECG reviewed                        Neuro/Psych:   Negative Neuro/Psych ROS               ROS comment: Brain death for organ procurement GI/Hepatic/Renal: Neg GI/Hepatic/Renal ROS            Endo/Other: Negative Endo/Other ROS                    Abdominal:           Vascular: negative vascular ROS. Anesthesia Plan      general     ASA 6 - emergent       Induction: intravenous. MIPS: Prophylactic antiemetics administered. Anesthetic plan and risks discussed with patient.         Attending anesthesiologist reviewed and agrees with Vignesh Izquierdo MD   5/17/2021

## 2021-05-17 NOTE — PROGRESS NOTES
Family at bedside. Pastoral care providing emotional support. Blood pressure increases after D50 IVP.  Levophed and Vasopressin paused

## 2021-05-18 LAB
CULTURE, RESPIRATORY: ABNORMAL
GRAM STAIN RESULT: ABNORMAL
ORGANISM: ABNORMAL
ORGANISM: ABNORMAL

## 2021-05-19 LAB
BLOOD CULTURE, ROUTINE: NORMAL
CULTURE, BLOOD 2: NORMAL

## 2021-05-25 NOTE — DISCHARGE SUMMARY
Hospital Medicine Discharge Summary    Patient: Gordo Earl     Gender: female  : 1985   Age: 38 y.o. MRN: [id number]    Admitting Physician: Marvin Al MD  Discharge Physician: Fadia Lynne MD     Code Status: Prior     Admit Date: 5/10/2021   Discharge Date: 2021      Disposition:      Discharge Diagnoses: Active Hospital Problems    Diagnosis Date Noted    Anoxic brain damage ([facility] [G93.1]     Acute respiratory failure with hypoxia ([facility] [J96.01]     Substance abuse ([facility] [F19.10]     Hypernatremia [E87.0]     Cardiac arrest ([facility] [I46.9] 05/10/2021       Hospital Course:   Admitted to hospital after cardiac arrest at home. Unclear etiology. Patient remained intubated. Examination was consistent with anoxic brain injury. Patient was pregnant and this unfortunately passed. Patient situation discussed with family and they decided to pursue withdrawal of care. [facility]. And patient was worked up for organ donation.   Patient was terminally extubated and passed at 15 :23 on 21      Signed:    Fadia Lynne MD   2021

## 2021-05-31 DIAGNOSIS — R05.9 COUGH: ICD-10-CM

## 2021-05-31 DIAGNOSIS — K21.9 LARYNGOPHARYNGEAL REFLUX (LPR): ICD-10-CM

## 2021-05-31 RX ORDER — OMEPRAZOLE 40 MG/1
CAPSULE, DELAYED RELEASE ORAL
Qty: 30 CAPSULE | Refills: 0 | OUTPATIENT
Start: 2021-05-31

## 2022-07-08 NOTE — PROGRESS NOTES
Eduardo Moran is a 65year old female here alone presenting with:    Symptoms:  Had positive Covid test 2 weeks ago; productive cough, fatigue, nasal congestion and drainage, sore throat, headaches; was prescribed Paxlovid by Sarah Blas provider, but only took it for one day     Denies:  Fevers, ear pain     OTC medications:  Mucinex     Recent ABX use:  No    Visit Vitals  BP (!) 159/85 (BP Location: RUE - Right upper extremity)   Pulse 64   Temp 97.9 Â°F (36.6 Â°C) (Tympanic)   Resp 18   SpO2 98% Pt more stable now.  Will attempt ct when [facility] rep thinks stable

## 2024-05-21 NOTE — ANESTHESIA POSTPROCEDURE EVALUATION
Department of Anesthesiology  Postprocedure Note    Patient: Meena Skaggs  MRN: [id number]  YOB: 1985  Date of evaluation: 6/20/2019  Time:  12:37 PM     Procedure Summary     Date:  06/20/19 Room / Location:  [facility] OR [facility] OR    Anesthesia Start:  0836 Anesthesia Stop:  4660    Procedure:  MICROLARYNGOSCOPY AND LYSIS OF ADHESIONS OF LARYNX (N/A ) Diagnosis:  (AIRWAY OBSTRUCTION, LARYNGEAL EDEMA)    Surgeon: Piper Jimenez MD Responsible Provider:  Tyson Villegas MD    Anesthesia Type:  general ASA Status:  3          Anesthesia Type: general    Michelle Phase I: Michelle Score: 10    Michelle Phase II: Michelle Score: 10    Last vitals: Reviewed and per EMR flowsheets.        Anesthesia Post Evaluation    Patient location during evaluation: bedside  Patient participation: complete - patient participated  Level of consciousness: awake and alert  Pain score: 3  Airway patency: patent  Nausea & Vomiting: no nausea and no vomiting  Complications: no  Cardiovascular status: blood pressure returned to baseline  Respiratory status: acceptable  Hydration status: stable (2) more than 100 beats/min

## (undated) DEVICE — SURE SET-DOUBLE BASIN-LF: Brand: MEDLINE INDUSTRIES, INC.

## (undated) DEVICE — INTENDED FOR TISSUE SEPARATION, AND OTHER PROCEDURES THAT REQUIRE A SHARP SURGICAL BLADE TO PUNCTURE OR CUT.: Brand: BARD-PARKER ® STAINLESS STEEL BLADES

## (undated) DEVICE — COVER,MAYO STAND,XL,STERILE: Brand: MEDLINE

## (undated) DEVICE — STRAP POS W5XL72IN FOAM KNEE AND BODY HK LOOP CLSR DISP

## (undated) DEVICE — YANKAUER,BULB TIP,W/O VENT,RIGID,STERILE: Brand: MEDLINE

## (undated) DEVICE — 3M™ STERI-DRAPE™ INSTRUMENT POUCH 1018: Brand: STERI-DRAPE™

## (undated) DEVICE — GUARD TEETH AD PR NYL

## (undated) DEVICE — EVERGRIP INSERT SET 86MM: Brand: FOGARTY EVERGRIP

## (undated) DEVICE — 3 ML SYRINGE LUER-LOCK TIP: Brand: MONOJECT

## (undated) DEVICE — CODMAN® SURGICAL PATTIES 1/2" X 1" (1.27CM X 2.54CM): Brand: CODMAN®

## (undated) DEVICE — SUTURE PERMA-HAND SZ 2-0 L30IN NONABSORBABLE BLK L26MM SH K833H

## (undated) DEVICE — TOWEL,OR,DSP,ST,BLUE,DLX,10/PK,8PK/CS: Brand: MEDLINE

## (undated) DEVICE — COVER,TABLE,77X90,STERILE: Brand: MEDLINE

## (undated) DEVICE — SUTURE PERMAHAND SZ 2-0 L30IN NONABSORBABLE BLK SILK W/O A305H

## (undated) DEVICE — CATHETER IV PROTECTIV ACTIVE SAFETY 22GA 1INL BLUE STRGHT HU

## (undated) DEVICE — SUTURE PROL SZ 5-0 L18IN NONABSORBABLE BLU C-1 L13MM 3/8 8717H

## (undated) DEVICE — BAG BODY AD W36XL90IN WHT SHROUD VYN ID TAG

## (undated) DEVICE — APPLIER CLP L L13IN TI MULT RNG HNDL 20 CLP STR LIGACLP

## (undated) DEVICE — DRAPE,REIN 53X77,STERILE: Brand: MEDLINE

## (undated) DEVICE — PAD N ADH W3XL4IN POLY COT SFT PERF FLM EASILY CUT ABSRB

## (undated) DEVICE — YANKAUER SUCTION INSTRUMENT WITHOUT CONTROL VENT, OPEN TIP, CLEAR: Brand: YANKAUER

## (undated) DEVICE — SOLUTION IV IRRIG POUR BRL 0.9% SODIUM CHL 2F7124

## (undated) DEVICE — WAX SURG 2.5GM HEMSTAT BNE BEESWAX PARAFFIN ISO PALMITATE

## (undated) DEVICE — BLADE ES L6IN ELASTOMERIC COAT INSUL DURABLE BEND UPTO

## (undated) DEVICE — GAUZE,SPONGE,4"X4",16PLY,XRAY,STRL,LF: Brand: MEDLINE

## (undated) DEVICE — ELECTRODE PT RET AD L9FT HI MOIST COND ADH HYDRGEL CORDED

## (undated) DEVICE — APPLICATOR PREP 26ML 0.7% IOD POVACRYLEX 74% ISO ALC ST

## (undated) DEVICE — TOWEL,OR,DSP,ST,BLUE,DLX,8/PK,10PK/CS: Brand: MEDLINE

## (undated) DEVICE — BASIC SINGLE BASIN 1-LF: Brand: MEDLINE INDUSTRIES, INC.

## (undated) DEVICE — SUTURE NONABSORBABLE MONOFILAMENT 5-0 C-1 1X24 IN PROLENE 8725H

## (undated) DEVICE — DRAPE,LAP,CHOLE,W/TROUGHS,STERILE: Brand: MEDLINE

## (undated) DEVICE — APPLIER LIG CLP M L11IN TI STR RNG HNDL FOR 20 CLP DISP

## (undated) DEVICE — STAPLER INT L75MM CUT LN L73MM STPL LN L77MM BLU B FRM 8

## (undated) DEVICE — DRAPE SLUSH DISC W44XL66IN ST FOR RND BSIN HUSH SLUSH SYS

## (undated) DEVICE — PACK,EENT,TURBAN DRAPE,PK II: Brand: MEDLINE

## (undated) DEVICE — GLOVE SURG BEAD CUF 7 STD PF WHT STRL TRIUMPH LT LTX

## (undated) DEVICE — SUTURE ETHLN SZ 2 L30IN NONABSORBABLE BLK L75MM LR 3/8 CIR 490T

## (undated) DEVICE — GOWN SIRUS NONREIN XL W/TWL: Brand: MEDLINE INDUSTRIES, INC.

## (undated) DEVICE — LIGHT HANDLE: Brand: DEVON

## (undated) DEVICE — STERNUM BLADE, OFFSET (31.7 X 0.64 X 6.3MM)

## (undated) DEVICE — MERCY FAIRFIELD TURNOVER KIT: Brand: MEDLINE INDUSTRIES, INC.

## (undated) DEVICE — TURNOVER KIT RM INF CTRL TECH

## (undated) DEVICE — TAPE MED W1/8XL30IN WHT POLY

## (undated) DEVICE — SYRINGE IRRIG 60ML SFT PLIABLE BLB EZ TO GRP 1 HND USE W/

## (undated) DEVICE — SHEET,DRAPE,53X77,STERILE: Brand: MEDLINE

## (undated) DEVICE — GARMENT,MEDLINE,DVT,INT,CALF,MED, GEN2: Brand: MEDLINE

## (undated) DEVICE — TUBING, SUCTION, 1/4" X 10', STRAIGHT: Brand: MEDLINE

## (undated) DEVICE — PLATE ES AD W 9FT CRD 2

## (undated) DEVICE — INTENDED FOR TISSUE SEPARATION, AND OTHER PROCEDURES THAT REQUIRE A SHARP SURGICAL BLADE TO PUNCTURE OR CUT.: Brand: BARD-PARKER ® CARBON RIB-BACK BLADES

## (undated) DEVICE — PAD,ABDOMINAL,8"X10",ST,LF: Brand: MEDLINE

## (undated) DEVICE — ELECTRODE ELECSURG NDL 2.8 INX7.2 CM COAT INSUL EDGE

## (undated) DEVICE — TUBING, SUCTION, 1/4" X 12', STRAIGHT: Brand: MEDLINE

## (undated) DEVICE — SUTURE BOOT: Brand: DEROYAL

## (undated) DEVICE — SOLUTION IV 1000ML 0.9% SOD CHL

## (undated) DEVICE — CONTAINER STOR SURG SLUSH

## (undated) DEVICE — HYPODERMIC SAFETY NEEDLE: Brand: MAGELLAN

## (undated) DEVICE — RELOAD STPL L75MM OPN H3.8MM CLS 1.5MM WIRE DIA0.2MM REG

## (undated) DEVICE — GOWN SIRUS NONREIN LG W/TWL: Brand: MEDLINE INDUSTRIES, INC.

## (undated) DEVICE — SYRINGE, LUER LOCK, 10ML: Brand: MEDLINE

## (undated) DEVICE — 3M™ IOBAN™ 2 ANTIMICROBIAL INCISE DRAPE 6650EZ: Brand: IOBAN™ 2

## (undated) DEVICE — STAPLER SKIN H3.9MM WIRE DIA0.58MM CRWN 6.9MM 35 STPL ROT

## (undated) DEVICE — SEALER ENDOSCP NANO COAT OPN DIV CRV L JAW LIGASURE IMPACT

## (undated) DEVICE — MAJOR SET UP PK

## (undated) DEVICE — GOWN,SURGICAL,AURORA,SLEEVE: Brand: MEDLINE

## (undated) DEVICE — SPONGE LAP W18XL18IN WHT COT 4 PLY FLD STRUNG RADPQ DISP ST